# Patient Record
Sex: FEMALE | Race: WHITE | Employment: OTHER | URBAN - METROPOLITAN AREA
[De-identification: names, ages, dates, MRNs, and addresses within clinical notes are randomized per-mention and may not be internally consistent; named-entity substitution may affect disease eponyms.]

---

## 2017-05-05 ENCOUNTER — HOSPITAL ENCOUNTER (EMERGENCY)
Age: 77
Discharge: HOME | End: 2017-05-05
Attending: EMERGENCY MEDICINE
Payer: MEDICARE

## 2017-05-05 ENCOUNTER — APPOINTMENT (OUTPATIENT)
Dept: ULTRASOUND IMAGING | Age: 77
End: 2017-05-05
Attending: EMERGENCY MEDICINE
Payer: MEDICARE

## 2017-05-05 VITALS
RESPIRATION RATE: 18 BRPM | TEMPERATURE: 98 F | OXYGEN SATURATION: 94 % | DIASTOLIC BLOOD PRESSURE: 87 MMHG | HEIGHT: 63 IN | SYSTOLIC BLOOD PRESSURE: 179 MMHG | HEART RATE: 69 BPM | BODY MASS INDEX: 35.44 KG/M2 | WEIGHT: 200 LBS

## 2017-05-05 DIAGNOSIS — R60.0 BILATERAL LEG EDEMA: Primary | ICD-10-CM

## 2017-05-05 LAB
ALBUMIN SERPL BCP-MCNC: 3.6 G/DL (ref 3.2–4.6)
ALBUMIN/GLOB SERPL: 1 {RATIO} (ref 1.2–3.5)
ALP SERPL-CCNC: 134 U/L (ref 50–136)
ALT SERPL-CCNC: 21 U/L (ref 12–65)
ANION GAP BLD CALC-SCNC: 9 MMOL/L (ref 7–16)
AST SERPL W P-5'-P-CCNC: 15 U/L (ref 15–37)
BASOPHILS # BLD AUTO: 0 K/UL (ref 0–0.2)
BASOPHILS # BLD: 0 % (ref 0–2)
BILIRUB SERPL-MCNC: 1 MG/DL (ref 0.2–1.1)
BNP SERPL-MCNC: 31 PG/ML
BUN SERPL-MCNC: 4 MG/DL (ref 8–23)
CALCIUM SERPL-MCNC: 9.6 MG/DL (ref 8.3–10.4)
CHLORIDE SERPL-SCNC: 109 MMOL/L (ref 98–107)
CO2 SERPL-SCNC: 28 MMOL/L (ref 21–32)
CREAT SERPL-MCNC: 0.58 MG/DL (ref 0.6–1)
DIFFERENTIAL METHOD BLD: ABNORMAL
EOSINOPHIL # BLD: 0 K/UL (ref 0–0.8)
EOSINOPHIL NFR BLD: 0 % (ref 0.5–7.8)
ERYTHROCYTE [DISTWIDTH] IN BLOOD BY AUTOMATED COUNT: 14.4 % (ref 11.9–14.6)
GLOBULIN SER CALC-MCNC: 3.7 G/DL (ref 2.3–3.5)
GLUCOSE SERPL-MCNC: 113 MG/DL (ref 65–100)
HCT VFR BLD AUTO: 47.3 % (ref 35.8–46.3)
HGB BLD-MCNC: 15.7 G/DL (ref 11.7–15.4)
IMM GRANULOCYTES # BLD: 0 K/UL (ref 0–0.5)
IMM GRANULOCYTES NFR BLD AUTO: 0.2 % (ref 0–5)
LYMPHOCYTES # BLD AUTO: 24 % (ref 13–44)
LYMPHOCYTES # BLD: 1.3 K/UL (ref 0.5–4.6)
MCH RBC QN AUTO: 29 PG (ref 26.1–32.9)
MCHC RBC AUTO-ENTMCNC: 33.2 G/DL (ref 31.4–35)
MCV RBC AUTO: 87.3 FL (ref 79.6–97.8)
MONOCYTES # BLD: 0.3 K/UL (ref 0.1–1.3)
MONOCYTES NFR BLD AUTO: 6 % (ref 4–12)
NEUTS SEG # BLD: 3.9 K/UL (ref 1.7–8.2)
NEUTS SEG NFR BLD AUTO: 70 % (ref 43–78)
PLATELET # BLD AUTO: 217 K/UL (ref 150–450)
PMV BLD AUTO: 11 FL (ref 10.8–14.1)
POTASSIUM SERPL-SCNC: 3.7 MMOL/L (ref 3.5–5.1)
PROT SERPL-MCNC: 7.3 G/DL (ref 6.3–8.2)
RBC # BLD AUTO: 5.42 M/UL (ref 4.05–5.25)
SODIUM SERPL-SCNC: 146 MMOL/L (ref 136–145)
WBC # BLD AUTO: 5.5 K/UL (ref 4.3–11.1)

## 2017-05-05 PROCEDURE — 81003 URINALYSIS AUTO W/O SCOPE: CPT | Performed by: EMERGENCY MEDICINE

## 2017-05-05 PROCEDURE — 99284 EMERGENCY DEPT VISIT MOD MDM: CPT | Performed by: EMERGENCY MEDICINE

## 2017-05-05 PROCEDURE — 85025 COMPLETE CBC W/AUTO DIFF WBC: CPT

## 2017-05-05 PROCEDURE — 83880 ASSAY OF NATRIURETIC PEPTIDE: CPT | Performed by: EMERGENCY MEDICINE

## 2017-05-05 PROCEDURE — 80053 COMPREHEN METABOLIC PANEL: CPT

## 2017-05-05 RX ORDER — FUROSEMIDE 20 MG/1
20 TABLET ORAL DAILY
Qty: 7 TAB | Refills: 0 | Status: SHIPPED | OUTPATIENT
Start: 2017-05-05 | End: 2017-05-07

## 2017-05-05 NOTE — DISCHARGE INSTRUCTIONS
Return with any fevers, vomiting, difficulty breathing, worsening symptoms, or additional concerns. Consider wearing compression stockings to help decrease some of her swelling. Follow-up with your primary care doctor in 3-5 days for reevaluation.

## 2017-05-05 NOTE — ED TRIAGE NOTES
Pt states swelling in both legs for the past week. Pt reports she just got out of rehab and was sent to [facility] yesterday where they took her off of one of her blood pressure meds.  Pt states her feet and legs are painful

## 2017-05-05 NOTE — ED PROVIDER NOTES
HPI Comments: 69-year-old lady with a history of bilateral lower leg swelling that she says has gotten worse over the past several days. Patient says that she was at a rehabilitation facility in [place] which is where she was sent by social workers in [place]. Patient says ever since getting edges had to be on her feet too much which is causing her legs to become swollen. She denies any chest pain or difficulty breathing. Patient says that both legs hurt because they are so swollen. She denies any redness or fevers in the. Patient says she is not on any fluid pills that she is aware of. Elements of this note were created using speech recognition software. As such, errors of speech recognition may be present. Patient is a 69 y.o. female presenting with ankle swelling. The history is provided by the patient. Ankle swelling           Past Medical History:   Diagnosis Date    Arthritis     Asthma     Chronic kidney disease     Gastrointestinal disorder     acid reflux, hernia    Hypertension     Other ill-defined conditions     blood clot on lung    Stroke ([facility] [address]     Thyroid disease        Past Surgical History:   Procedure Laterality Date    APPENDECTOMY      HX APPENDECTOMY      HX GYN  71,73    c-sec    HX HEENT      t & a    HX OTHER SURGICAL      fatty tissue removed from under arm         Family History:   Problem Relation Age of Onset    No Known Problems Mother     No Known Problems Father        Social History     Social History    Marital status:      Spouse name: N/A    Number of children: N/A    Years of education: N/A     Occupational History    Not on file. Social History Main Topics    Smoking status: Never Smoker    Smokeless tobacco: Never Used    Alcohol use No    Drug use: No    Sexual activity: No     Other Topics Concern    Not on file     Social History Narrative         ALLERGIES: Aspirin; Ciprofloxacin; Diflucan [fluconazole];  Glucophage [metformin]; Motrin [ibuprofen]; Nsaids (non-steroidal anti-inflammatory drug); Other medication; Pcn [penicillins]; Septra [sulfamethoprim ds]; Sulfa (sulfonamide antibiotics); Tessalon perles [benzonatate]; and Vibramycin [doxycycline calcium]    Review of Systems   Constitutional: Positive for fatigue. Negative for chills, diaphoresis and fever. HENT: Negative for congestion, rhinorrhea and sore throat. Eyes: Negative for redness and visual disturbance. Respiratory: Negative for cough, chest tightness, shortness of breath and wheezing. Cardiovascular: Positive for leg swelling. Negative for chest pain and palpitations. Gastrointestinal: Negative for abdominal pain, blood in stool, diarrhea, nausea and vomiting. Endocrine: Negative for polydipsia and polyuria. Genitourinary: Negative for dysuria and hematuria. Musculoskeletal: Negative for arthralgias, myalgias and neck stiffness. Skin: Negative for rash. Allergic/Immunologic: Negative for environmental allergies and food allergies. Neurological: Negative for dizziness, weakness and headaches. Hematological: Negative for adenopathy. Does not bruise/bleed easily. Psychiatric/Behavioral: Negative for confusion and sleep disturbance. The patient is not nervous/anxious. Vitals:    05/05/17 1046   BP: 187/80   Pulse: 66   Resp: 18   Temp: 97.8 °F (36.6 °C)   SpO2: 94%   Weight: 90.7 kg (200 lb)   Height: 5' 3\" (1.6 m)            Physical Exam   Constitutional: She is oriented to person, place, and time. She appears well-developed and well-nourished. Neck: No JVD present. Cardiovascular: Normal rate, regular rhythm and normal heart sounds. Pulmonary/Chest: No respiratory distress. She has no wheezes. She has no rales. Abdominal: There is no tenderness. There is no rebound and no guarding. Musculoskeletal: She exhibits edema. 4+ edema to both knees   Lymphadenopathy:     She has no cervical adenopathy.    Neurological: She is alert and oriented to person, place, and time. Skin: Skin is warm and dry. Nursing note and vitals reviewed. MDM  Number of Diagnoses or Management Options  Diagnosis management comments: Patient's swelling may be related simply to dependent edema potentially she could have a DVT or congestive heart failure. I will check a BNP as well as an ultrasound of both legs. I'll also check her renal and hepatic function. Further evaluation and treatment pending those results. Patient got over to ultrasound and then refused to lay flat or even at a minor inclined to get the study done. Therefore I will discuss with her the options of additional blood work to try to decrease the potential risk of a DVT being present. Patient says she does not want to wait around for any additional blood work. Therefore, I will discharge her home with some Lasix.     ED Course       Procedures

## 2017-05-05 NOTE — ED NOTES
I have reviewed discharge instructions with the patient. The patient verbalized understanding. Discharge medications reviewed with patient and appropriate educational materials and side effects teaching were provided. Pt transferred to waiting room via wheelchair w/o incident.

## 2017-05-07 RX ORDER — HYDROCHLOROTHIAZIDE 25 MG/1
25 TABLET ORAL DAILY
Qty: 7 TAB | Refills: 0 | Status: SHIPPED | OUTPATIENT
Start: 2017-05-07 | End: 2017-05-15 | Stop reason: SDUPTHER

## 2017-05-07 NOTE — ED NOTES
Patient called the ER this morning and has concerns about the Lasix prescription. The pharmacist informed her there is some cross reactivity with herself allergy therefore she does not feel comfortable taking it. I have changed her prescription to HCTZ 25 mg one every morning for 7 days as an alternate diuretic. She should follow up with her primary care physician for recheck. Return to the ED if worse.

## 2017-07-07 ENCOUNTER — HOSPITAL ENCOUNTER (EMERGENCY)
Age: 77
Discharge: HOME | End: 2017-07-07
Attending: EMERGENCY MEDICINE
Payer: MEDICARE

## 2017-07-07 VITALS
HEIGHT: 63 IN | HEART RATE: 69 BPM | DIASTOLIC BLOOD PRESSURE: 91 MMHG | RESPIRATION RATE: 18 BRPM | BODY MASS INDEX: 44.12 KG/M2 | WEIGHT: 249 LBS | TEMPERATURE: 98.5 F | SYSTOLIC BLOOD PRESSURE: 178 MMHG | OXYGEN SATURATION: 94 %

## 2017-07-07 DIAGNOSIS — J01.91 ACUTE RECURRENT SINUSITIS, UNSPECIFIED LOCATION: Primary | ICD-10-CM

## 2017-07-07 LAB
ALBUMIN SERPL BCP-MCNC: 3.4 G/DL (ref 3.2–4.6)
ALBUMIN/GLOB SERPL: 0.9 {RATIO} (ref 1.2–3.5)
ALP SERPL-CCNC: 128 U/L (ref 50–136)
ALT SERPL-CCNC: 22 U/L (ref 12–65)
ANION GAP BLD CALC-SCNC: 7 MMOL/L (ref 7–16)
AST SERPL W P-5'-P-CCNC: 19 U/L (ref 15–37)
BASOPHILS # BLD AUTO: 0 K/UL (ref 0–0.2)
BASOPHILS # BLD: 0 % (ref 0–2)
BILIRUB SERPL-MCNC: 0.6 MG/DL (ref 0.2–1.1)
BUN SERPL-MCNC: 6 MG/DL (ref 8–23)
CALCIUM SERPL-MCNC: 9.6 MG/DL (ref 8.3–10.4)
CHLORIDE SERPL-SCNC: 105 MMOL/L (ref 98–107)
CO2 SERPL-SCNC: 32 MMOL/L (ref 21–32)
CREAT SERPL-MCNC: 0.61 MG/DL (ref 0.6–1)
DIFFERENTIAL METHOD BLD: ABNORMAL
EOSINOPHIL # BLD: 0 K/UL (ref 0–0.8)
EOSINOPHIL NFR BLD: 0 % (ref 0.5–7.8)
ERYTHROCYTE [DISTWIDTH] IN BLOOD BY AUTOMATED COUNT: 14.3 % (ref 11.9–14.6)
GLOBULIN SER CALC-MCNC: 3.6 G/DL (ref 2.3–3.5)
GLUCOSE SERPL-MCNC: 118 MG/DL (ref 65–100)
HCT VFR BLD AUTO: 48.5 % (ref 35.8–46.3)
HGB BLD-MCNC: 16.4 G/DL (ref 11.7–15.4)
IMM GRANULOCYTES # BLD: 0 K/UL (ref 0–0.5)
IMM GRANULOCYTES NFR BLD AUTO: 0.1 % (ref 0–5)
LYMPHOCYTES # BLD AUTO: 23 % (ref 13–44)
LYMPHOCYTES # BLD: 1.5 K/UL (ref 0.5–4.6)
MCH RBC QN AUTO: 29.1 PG (ref 26.1–32.9)
MCHC RBC AUTO-ENTMCNC: 33.8 G/DL (ref 31.4–35)
MCV RBC AUTO: 86 FL (ref 79.6–97.8)
MONOCYTES # BLD: 0.4 K/UL (ref 0.1–1.3)
MONOCYTES NFR BLD AUTO: 6 % (ref 4–12)
NEUTS SEG # BLD: 4.7 K/UL (ref 1.7–8.2)
NEUTS SEG NFR BLD AUTO: 71 % (ref 43–78)
PLATELET # BLD AUTO: 231 K/UL (ref 150–450)
PMV BLD AUTO: 11.7 FL (ref 10.8–14.1)
POTASSIUM SERPL-SCNC: 3.2 MMOL/L (ref 3.5–5.1)
PROT SERPL-MCNC: 7 G/DL (ref 6.3–8.2)
RBC # BLD AUTO: 5.64 M/UL (ref 4.05–5.25)
SODIUM SERPL-SCNC: 144 MMOL/L (ref 136–145)
WBC # BLD AUTO: 6.7 K/UL (ref 4.3–11.1)

## 2017-07-07 PROCEDURE — 99283 EMERGENCY DEPT VISIT LOW MDM: CPT | Performed by: EMERGENCY MEDICINE

## 2017-07-07 PROCEDURE — 85025 COMPLETE CBC W/AUTO DIFF WBC: CPT | Performed by: EMERGENCY MEDICINE

## 2017-07-07 PROCEDURE — 80053 COMPREHEN METABOLIC PANEL: CPT | Performed by: EMERGENCY MEDICINE

## 2017-07-07 RX ORDER — AZITHROMYCIN 250 MG/1
TABLET, FILM COATED ORAL
Qty: 6 TAB | Refills: 0 | Status: SHIPPED | OUTPATIENT
Start: 2017-07-07 | End: 2017-07-24 | Stop reason: ALTCHOICE

## 2017-07-07 RX ORDER — FLUTICASONE PROPIONATE 50 MCG
1 SPRAY, SUSPENSION (ML) NASAL DAILY
Qty: 1 BOTTLE | Refills: 0 | Status: SHIPPED | OUTPATIENT
Start: 2017-07-07 | End: 2017-09-05 | Stop reason: SDUPTHER

## 2017-07-07 NOTE — ED TRIAGE NOTES
Patient complains of headache for over a week. States she is having \"roaring\" in right ear and \"all kinds of sounds\" in left ear.

## 2017-07-07 NOTE — ED PROVIDER NOTES
Patient is a 69 y.o. female presenting with headaches and sinus pain. The history is provided by the patient. Headache    Pertinent negatives include no fever, no palpitations, no shortness of breath and no vomiting. Sinus Pain    This is a recurrent problem. The current episode started more than 1 week ago. The problem has been gradually worsening. Patient reports a subjective fever - was not measured. The pain is moderate. The pain has been constant since onset. Associated symptoms include chills, congestion, ear pain, sinus pressure, swollen glands, rhinorrhea and headaches. Pertinent negatives include no sweats, no hoarse voice, no sore throat, no cough, no shortness of breath, no neck pain, no neck pain and no chest pain. She has tried nothing for the symptoms. Past Medical History:   Diagnosis Date    Arthritis     Asthma     Chronic kidney disease     Gastrointestinal disorder     acid reflux, hernia    Hypertension     Other ill-defined conditions     blood clot on lung    Stroke ([facility] [address]     Thyroid disease        Past Surgical History:   Procedure Laterality Date    APPENDECTOMY      HX APPENDECTOMY      HX GYN  71,73    c-sec    HX HEENT      t & a    HX OTHER SURGICAL      fatty tissue removed from under arm         Family History:   Problem Relation Age of Onset    No Known Problems Mother     No Known Problems Father        Social History     Social History    Marital status:      Spouse name: N/A    Number of children: N/A    Years of education: N/A     Occupational History    Not on file. Social History Main Topics    Smoking status: Never Smoker    Smokeless tobacco: Never Used    Alcohol use No    Drug use: No    Sexual activity: No     Other Topics Concern    Not on file     Social History Narrative         ALLERGIES: Aspirin; Ciprofloxacin; Diflucan [fluconazole]; Glucophage [metformin]; Motrin [ibuprofen];  Nsaids (non-steroidal anti-inflammatory drug); Other medication; Pcn [penicillins]; Septra [sulfamethoprim ds]; Sulfa (sulfonamide antibiotics); Tessalon perles [benzonatate]; and Vibramycin [doxycycline calcium]    Review of Systems   Constitutional: Positive for chills. Negative for fever. HENT: Positive for congestion, ear pain, rhinorrhea and sinus pressure. Negative for hoarse voice and sore throat. Eyes: Negative for photophobia and discharge. Respiratory: Negative for cough and shortness of breath. Cardiovascular: Negative for chest pain and palpitations. Gastrointestinal: Negative for abdominal pain, constipation, diarrhea and vomiting. Endocrine: Negative for cold intolerance and heat intolerance. Genitourinary: Negative for dysuria and flank pain. Musculoskeletal: Negative for arthralgias, myalgias and neck pain. Skin: Negative for rash and wound. Allergic/Immunologic: Negative for environmental allergies and food allergies. Neurological: Positive for headaches. Negative for syncope. Hematological: Negative for adenopathy. Does not bruise/bleed easily. Psychiatric/Behavioral: Negative for dysphoric mood. The patient is not nervous/anxious. All other systems reviewed and are negative. Vitals:    07/07/17 1217   BP: 142/86   Pulse: 70   Resp: 16   Temp: 98 °F (36.7 °C)   SpO2: 93%   Weight: 112.9 kg (249 lb)   Height: 5' 3\" (1.6 m)            Physical Exam   Constitutional: She is oriented to person, place, and time. She appears well-developed and well-nourished. She appears distressed. HENT:   Head: Normocephalic and atraumatic. Right Ear: Tympanic membrane, external ear and ear canal normal. No swelling. No foreign bodies. Left Ear: Tympanic membrane, external ear and ear canal normal. No swelling. No foreign bodies. Mouth/Throat: Oropharynx is clear and moist. No oropharyngeal exudate. Eyes: Conjunctivae and EOM are normal. Pupils are equal, round, and reactive to light.  Right eye exhibits no discharge. Left eye exhibits no discharge. No scleral icterus. Neck: Normal range of motion. Neck supple. No thyromegaly present. Cardiovascular: Normal rate, regular rhythm, normal heart sounds and intact distal pulses. Exam reveals no gallop and no friction rub. No murmur heard. Pulmonary/Chest: Effort normal and breath sounds normal. No respiratory distress. She has no wheezes. She exhibits no tenderness. Abdominal: Soft. Bowel sounds are normal. She exhibits no distension. There is no hepatosplenomegaly. There is no tenderness. There is no rebound and no guarding. No hernia. Musculoskeletal: Normal range of motion. She exhibits no edema or tenderness. Neurological: She is alert and oriented to person, place, and time. No cranial nerve deficit. She exhibits normal muscle tone. Skin: Skin is warm. No rash noted. She is not diaphoretic. No erythema. Psychiatric: She has a normal mood and affect. Her behavior is normal. Judgment and thought content normal.        MDM  Number of Diagnoses or Management Options  Acute recurrent sinusitis, unspecified location: new and requires workup  Diagnosis management comments: URI, otitis media, otitis externa, sinusitis       Amount and/or Complexity of Data Reviewed  Clinical lab tests: ordered and reviewed  Review and summarize past medical records: yes    Risk of Complications, Morbidity, and/or Mortality  Presenting problems: moderate  Diagnostic procedures: moderate  Management options: moderate  General comments: Elements of this note have been dictated via voice recognition software. Text and phrases may be limited by the accuracy of the software. The chart has been reviewed, but errors may still be present.       Patient Progress  Patient progress: stable    ED Course       Procedures

## 2017-07-07 NOTE — DISCHARGE INSTRUCTIONS
Sinusitis: Care Instructions  Your Care Instructions    Sinusitis is an infection of the lining of the sinus cavities in your head. Sinusitis often follows a cold. It causes pain and pressure in your head and face. In most cases, sinusitis gets better on its own in 1 to 2 weeks. But some mild symptoms may last for several weeks. Sometimes antibiotics are needed. Follow-up care is a key part of your treatment and safety. Be sure to make and go to all appointments, and call your doctor if you are having problems. It's also a good idea to know your test results and keep a list of the medicines you take. How can you care for yourself at home? · Take an over-the-counter pain medicine, such as acetaminophen (Tylenol), ibuprofen (Advil, Motrin), or naproxen (Aleve). Read and follow all instructions on the label. · If the doctor prescribed antibiotics, take them as directed. Do not stop taking them just because you feel better. You need to take the full course of antibiotics. · Be careful when taking over-the-counter cold or flu medicines and Tylenol at the same time. Many of these medicines have acetaminophen, which is Tylenol. Read the labels to make sure that you are not taking more than the recommended dose. Too much acetaminophen (Tylenol) can be harmful. · Breathe warm, moist air from a steamy shower, a hot bath, or a sink filled with hot water. Avoid cold, dry air. Using a humidifier in your home may help. Follow the directions for cleaning the machine. · Use saline (saltwater) nasal washes to help keep your nasal passages open and wash out mucus and bacteria. You can buy saline nose drops at a grocery store or drugstore. Or you can make your own at home by adding 1 teaspoon of salt and 1 teaspoon of baking soda to 2 cups of distilled water. If you make your own, fill a bulb syringe with the solution, insert the tip into your nostril, and squeeze gently. Clement Rough your nose.   · Put a hot, wet towel or a warm gel pack on your face 3 or 4 times a day for 5 to 10 minutes each time. · Try a decongestant nasal spray like oxymetazoline (Afrin). Do not use it for more than 3 days in a row. Using it for more than 3 days can make your congestion worse. When should you call for help? Call your doctor now or seek immediate medical care if:  · You have new or worse swelling or redness in your face or around your eyes. · You have a new or higher fever. Watch closely for changes in your health, and be sure to contact your doctor if:  · You have new or worse facial pain. · The mucus from your nose becomes thicker (like pus) or has new blood in it. · You are not getting better as expected. Where can you learn more? Go to http://tommy-sil.info/. Enter D751 in the search box to learn more about \"Sinusitis: Care Instructions. \"  Current as of: July 29, 2016  Content Version: 11.3  © 4369-4227 [employer]. Care instructions adapted under license by [employer] (which disclaims liability or warranty for this information). If you have questions about a medical condition or this instruction, always ask your healthcare professional. [place] [address] any warranty or liability for your use of this information.

## 2017-08-01 ENCOUNTER — APPOINTMENT (OUTPATIENT)
Dept: CT IMAGING | Age: 77
End: 2017-08-01
Attending: EMERGENCY MEDICINE
Payer: MEDICARE

## 2017-08-01 ENCOUNTER — HOSPITAL ENCOUNTER (EMERGENCY)
Age: 77
Discharge: HOME | End: 2017-08-01
Attending: EMERGENCY MEDICINE
Payer: MEDICARE

## 2017-08-01 VITALS
WEIGHT: 250 LBS | TEMPERATURE: 98.7 F | OXYGEN SATURATION: 91 % | HEART RATE: 71 BPM | SYSTOLIC BLOOD PRESSURE: 150 MMHG | RESPIRATION RATE: 16 BRPM | DIASTOLIC BLOOD PRESSURE: 82 MMHG | HEIGHT: 63 IN | BODY MASS INDEX: 44.3 KG/M2

## 2017-08-01 DIAGNOSIS — R51.9 ACUTE NONINTRACTABLE HEADACHE, UNSPECIFIED HEADACHE TYPE: Primary | ICD-10-CM

## 2017-08-01 LAB
ALBUMIN SERPL BCP-MCNC: 3.7 G/DL (ref 3.2–4.6)
ALBUMIN/GLOB SERPL: 0.9 {RATIO} (ref 1.2–3.5)
ALP SERPL-CCNC: 114 U/L (ref 50–136)
ALT SERPL-CCNC: 20 U/L (ref 12–65)
ANION GAP BLD CALC-SCNC: 9 MMOL/L (ref 7–16)
AST SERPL W P-5'-P-CCNC: 20 U/L (ref 15–37)
BASOPHILS # BLD AUTO: 0 K/UL (ref 0–0.2)
BASOPHILS # BLD: 0 % (ref 0–2)
BILIRUB SERPL-MCNC: 0.6 MG/DL (ref 0.2–1.1)
BUN SERPL-MCNC: 13 MG/DL (ref 8–23)
CALCIUM SERPL-MCNC: 10.1 MG/DL (ref 8.3–10.4)
CHLORIDE SERPL-SCNC: 103 MMOL/L (ref 98–107)
CO2 SERPL-SCNC: 29 MMOL/L (ref 21–32)
CREAT SERPL-MCNC: 0.79 MG/DL (ref 0.6–1)
DIFFERENTIAL METHOD BLD: ABNORMAL
EOSINOPHIL # BLD: 0 K/UL (ref 0–0.8)
EOSINOPHIL NFR BLD: 0 % (ref 0.5–7.8)
ERYTHROCYTE [DISTWIDTH] IN BLOOD BY AUTOMATED COUNT: 14.7 % (ref 11.9–14.6)
GLOBULIN SER CALC-MCNC: 4 G/DL (ref 2.3–3.5)
GLUCOSE SERPL-MCNC: 126 MG/DL (ref 65–100)
HCT VFR BLD AUTO: 49.6 % (ref 35.8–46.3)
HGB BLD-MCNC: 16.7 G/DL (ref 11.7–15.4)
IMM GRANULOCYTES # BLD: 0 K/UL (ref 0–0.5)
IMM GRANULOCYTES NFR BLD AUTO: 0.1 % (ref 0–5)
LYMPHOCYTES # BLD AUTO: 21 % (ref 13–44)
LYMPHOCYTES # BLD: 1.7 K/UL (ref 0.5–4.6)
MCH RBC QN AUTO: 28.9 PG (ref 26.1–32.9)
MCHC RBC AUTO-ENTMCNC: 33.7 G/DL (ref 31.4–35)
MCV RBC AUTO: 86 FL (ref 79.6–97.8)
MONOCYTES # BLD: 0.6 K/UL (ref 0.1–1.3)
MONOCYTES NFR BLD AUTO: 7 % (ref 4–12)
NEUTS SEG # BLD: 5.7 K/UL (ref 1.7–8.2)
NEUTS SEG NFR BLD AUTO: 72 % (ref 43–78)
PLATELET # BLD AUTO: 250 K/UL (ref 150–450)
PMV BLD AUTO: 11.4 FL (ref 10.8–14.1)
POTASSIUM SERPL-SCNC: 3.3 MMOL/L (ref 3.5–5.1)
PROT SERPL-MCNC: 7.7 G/DL (ref 6.3–8.2)
RBC # BLD AUTO: 5.77 M/UL (ref 4.05–5.25)
SODIUM SERPL-SCNC: 141 MMOL/L (ref 136–145)
WBC # BLD AUTO: 8 K/UL (ref 4.3–11.1)

## 2017-08-01 PROCEDURE — 80053 COMPREHEN METABOLIC PANEL: CPT | Performed by: EMERGENCY MEDICINE

## 2017-08-01 PROCEDURE — 85025 COMPLETE CBC W/AUTO DIFF WBC: CPT | Performed by: EMERGENCY MEDICINE

## 2017-08-01 PROCEDURE — 99284 EMERGENCY DEPT VISIT MOD MDM: CPT | Performed by: EMERGENCY MEDICINE

## 2017-08-01 PROCEDURE — 70450 CT HEAD/BRAIN W/O DYE: CPT

## 2017-08-01 NOTE — ED TRIAGE NOTES
Patient reports ear infection x1 month ago, medicine given and followed up with PCP. Since then patient reports migraine.

## 2017-08-01 NOTE — DISCHARGE INSTRUCTIONS
As we discussed, we did not find an exact cause of your symptoms today in the emergency department. Therefore, it is important for you to follow up with your primary care doctor for further evaluation. Please return to the emergency department with any fevers, vomiting, worsening symptoms, or additional concerns.

## 2017-08-01 NOTE — ED NOTES
I have reviewed discharge instructions with the patient. The patient verbalized understanding. The patient is ambulatory upon exit to [facility] where she is waiting for her ride. The patient is in no acute distress at this time. The patient has discharge instructions and follow up information in hand. The patient does not have any questions at this time.

## 2017-08-02 ENCOUNTER — PATIENT OUTREACH (OUTPATIENT)
Dept: CASE MANAGEMENT | Age: 77
End: 2017-08-02

## 2017-08-02 NOTE — PROGRESS NOTES
This note will not be viewable in [address] Transition of Care Discharge Follow-up Questionnaire   Date/Time of Call:   August 2, 2017 3:27PM   What was the patient hospitalized for? Patient seen in ED for Acute nonintractable headache, unspecified headache type             Does the patient understand his/her diagnosis and/or treatment and what happened during the hospitalization? Patient states understanding of diagnosis and treatment during hospitalization. Did the patient receive discharge instructions? Yes     Review any discharge instructions (see notes in [facility]). Ask patient if they understand these. Do they have any questions? Patient states understanding of discharge instructions, patient states no questions. Were home services ordered (nursing, PT, OT, ST, etc.)? No home health services ordered. If so, has the first visit occurred? If not, why? (Assist with coordination of services if necessary. ) NA         Was any DME ordered? No durable medical equipment ordered. If so, has it been received? If not, why?  (Assist with coordination of arranging DME orders if necessary. ) NA         Complete a review of all medications (new, continued and discontinued meds per the D/C instructions and medication tab in [facility]). Care Coordinator reviewed all medications with patient per [facility], no new medications prescribed. Were all new prescriptions filled? If not, why?  (Assist with obtainment of medications if necessary. ) NA         Does the patient understand the purpose and dosing instructions for all medications? (If patient has questions, provide explanation and education.) Patient states understanding of all current medications and dosing instructions. Does the patient have any problems in performing ADLs? (If patient is unable to perform ADLs - what is the limiting factor(s)?   Do they have a support system that can assist? If no support system is present, discuss possible assistance that they may be able to obtain.) Patient states she is independent with ADL's and ambulation. Patient states she is still having frying and rolling type feeling inside of head. Patient states she is managing pain with po medications. Does the patient have all follow-up appointments scheduled? 7 day f/up with PCP?    7-14 day f/up with specialist?    If f/up has not been made - what actions has the care coordinator made to accomplish this? Has transportation been arranged? [facility] follow-up should be within 7 days of discharge; all others should have PCP follow-up within 7 days of discharge; follow-ups with other specialists should be within 7-14 days of discharge.) No      No, patient states she called Dr. Ghulam Angulo office and left voicemail message requesting a hospital follow up earlier today. Patient states she is waiting for a return call. Yes      NA   Any other questions or concerns expressed by the patient? Patient states no questions or concerns. Schedule next appointment with VICENTA Matias or refer to RN Case Manager/  per the workflow guidelines. When is care coordinators next follow-up call scheduled? If referred for CCM - what RN care manager was the referral assigned?  NA          NA      NA   RUBENS Call Completed By: TIM Chi ACO  Care Coordinator

## 2017-08-02 NOTE — ED PROVIDER NOTES
HPI Comments: 60-year-old lady who presents with concerns about headache has been present for a little over one week. Patient says that she had some fluid in her ears a couple of weeks ago and feels like that has left her with a residual headache. She said that she's had no fevers or vomiting and no weakness, numbness, or tingling. Patient says she has had several months of intermittent homelessness. She says that she has been bouncing from place to place and Adult Protective Services has been involved. Patient says that she's had some sinus drainage and not had any fevers or chills she denies any ear drainage. She denies chest pain or cough. Elements of this note were made using speech recognition software. As such, there may be errors of speech recognition present. Patient is a 69 y.o. female presenting with headaches. The history is provided by the patient. Headache    Pertinent negatives include no fever, no palpitations, no shortness of breath, no weakness, no dizziness, no nausea and no vomiting.         Past Medical History:   Diagnosis Date    Arthritis     Asthma     Chronic kidney disease     Gastrointestinal disorder     acid reflux, hernia    Hyperlipidemia     Hypertension     Insomnia     Other ill-defined conditions     blood clot on lung    Stroke ([facility] [address]     Thyroid disease     Vitamin D deficiency        Past Surgical History:   Procedure Laterality Date    APPENDECTOMY      HX APPENDECTOMY      HX GYN  71,72    c-sec    HX HEENT      t & a    HX OTHER SURGICAL      fatty tissue removed from under arm         Family History:   Problem Relation Age of Onset    No Known Problems Mother     No Known Problems Father     No Known Problems Maternal Grandmother     No Known Problems Maternal Grandfather     No Known Problems Paternal Grandmother     No Known Problems Paternal Grandfather        Social History     Social History    Marital status:      Spouse name: N/A    Number of children: N/A    Years of education: N/A     Occupational History    Not on file. Social History Main Topics    Smoking status: Never Smoker    Smokeless tobacco: Never Used    Alcohol use No    Drug use: No    Sexual activity: No     Other Topics Concern    Not on file     Social History Narrative         ALLERGIES: Aspirin; Ciprofloxacin; Diflucan [fluconazole]; Glucophage [metformin]; Motrin [ibuprofen]; Nsaids (non-steroidal anti-inflammatory drug); Septra [sulfamethoprim ds]; Sulfa (sulfonamide antibiotics); Sulfabenzamide; Tessalon perles [benzonatate]; and Vibramycin [doxycycline calcium]    Review of Systems   Constitutional: Negative for chills, diaphoresis and fever. HENT: Negative for congestion, rhinorrhea and sore throat. Sinus drainage   Eyes: Negative for redness and visual disturbance. Respiratory: Negative for cough, chest tightness, shortness of breath and wheezing. Cardiovascular: Negative for chest pain and palpitations. Gastrointestinal: Negative for abdominal pain, blood in stool, diarrhea, nausea and vomiting. Endocrine: Negative for polydipsia and polyuria. Genitourinary: Negative for dysuria and hematuria. Musculoskeletal: Negative for arthralgias, myalgias and neck stiffness. Skin: Negative for rash. Allergic/Immunologic: Negative for environmental allergies and food allergies. Neurological: Positive for headaches. Negative for dizziness and weakness. Hematological: Negative for adenopathy. Does not bruise/bleed easily. Psychiatric/Behavioral: Negative for confusion and sleep disturbance. The patient is not nervous/anxious. Vitals:    08/01/17 1608 08/01/17 1907   BP: 151/86 150/82   Pulse: 83 71   Resp: 18 16   Temp:  98.7 °F (37.1 °C)   SpO2: 93% 91%   Weight: 113.4 kg (250 lb)    Height: 5' 3\" (1.6 m)             Physical Exam   Constitutional: She is oriented to person, place, and time.  She appears well-developed and well-nourished. HENT:   Head: Normocephalic and atraumatic. Eyes: Conjunctivae and EOM are normal. Pupils are equal, round, and reactive to light. Neck: Normal range of motion. Cardiovascular: Normal rate and regular rhythm. Pulmonary/Chest: Effort normal and breath sounds normal. No respiratory distress. She has no wheezes. She has no rales. She exhibits no tenderness. Abdominal: Soft. Bowel sounds are normal. There is no rebound and no guarding. Musculoskeletal: Normal range of motion. She exhibits no edema or tenderness. Lymphadenopathy:     She has no cervical adenopathy. Neurological: She is alert and oriented to person, place, and time. Skin: Skin is warm and dry. Psychiatric: She has a normal mood and affect. Nursing note and vitals reviewed. MDM  Number of Diagnoses or Management Options  Acute nonintractable headache, unspecified headache type:   Diagnosis management comments: Patient's labs and CT scan were essentially unremarkable. I did not have an excellent mentation for her symptoms tonight in the emergency department. I will discharge her home and encourage her to follow up with her primary care doctor.     ED Course       Procedures

## 2017-08-09 ENCOUNTER — HOSPITAL ENCOUNTER (OUTPATIENT)
Dept: GENERAL RADIOLOGY | Age: 77
Discharge: HOME | End: 2017-08-09
Attending: FAMILY MEDICINE
Payer: MEDICARE

## 2017-08-09 DIAGNOSIS — R13.10 DYSPHAGIA, UNSPECIFIED TYPE: ICD-10-CM

## 2017-08-09 PROCEDURE — 74011000255 HC RX REV CODE- 255: Performed by: FAMILY MEDICINE

## 2017-08-09 PROCEDURE — 74011000250 HC RX REV CODE- 250: Performed by: FAMILY MEDICINE

## 2017-08-09 PROCEDURE — 74247 XR UPPER GI W KUB AIR CONT: CPT

## 2017-08-09 RX ADMIN — ANTACID/ANTIFLATULENT 4 G: 380; 550; 10; 10 GRANULE, EFFERVESCENT ORAL at 13:46

## 2017-08-09 RX ADMIN — BARIUM SULFATE 135 ML: 980 POWDER, FOR SUSPENSION ORAL at 13:46

## 2017-08-12 ENCOUNTER — HOSPITAL ENCOUNTER (EMERGENCY)
Age: 77
Discharge: HOME | End: 2017-08-12
Attending: EMERGENCY MEDICINE
Payer: MEDICARE

## 2017-08-12 VITALS
WEIGHT: 249 LBS | TEMPERATURE: 98.7 F | DIASTOLIC BLOOD PRESSURE: 83 MMHG | BODY MASS INDEX: 44.12 KG/M2 | OXYGEN SATURATION: 95 % | HEIGHT: 63 IN | RESPIRATION RATE: 16 BRPM | SYSTOLIC BLOOD PRESSURE: 159 MMHG | HEART RATE: 74 BPM

## 2017-08-12 DIAGNOSIS — R51.9 CHRONIC DAILY HEADACHE: Primary | ICD-10-CM

## 2017-08-12 DIAGNOSIS — H93.13 TINNITUS, BILATERAL: ICD-10-CM

## 2017-08-12 LAB
ALBUMIN SERPL BCP-MCNC: 3.4 G/DL (ref 3.2–4.6)
ALBUMIN/GLOB SERPL: 0.9 {RATIO} (ref 1.2–3.5)
ALP SERPL-CCNC: 101 U/L (ref 50–136)
ALT SERPL-CCNC: 21 U/L (ref 12–65)
ANION GAP BLD CALC-SCNC: 11 MMOL/L (ref 7–16)
AST SERPL W P-5'-P-CCNC: 22 U/L (ref 15–37)
BACTERIA URNS QL MICRO: 0 /HPF
BASOPHILS # BLD AUTO: 0 K/UL (ref 0–0.2)
BASOPHILS # BLD: 0 % (ref 0–2)
BILIRUB SERPL-MCNC: 0.6 MG/DL (ref 0.2–1.1)
BUN SERPL-MCNC: 12 MG/DL (ref 8–23)
CALCIUM SERPL-MCNC: 9.8 MG/DL (ref 8.3–10.4)
CASTS URNS QL MICRO: NORMAL /LPF
CHLORIDE SERPL-SCNC: 104 MMOL/L (ref 98–107)
CO2 SERPL-SCNC: 25 MMOL/L (ref 21–32)
CREAT SERPL-MCNC: 0.7 MG/DL (ref 0.6–1)
DIFFERENTIAL METHOD BLD: ABNORMAL
EOSINOPHIL # BLD: 0 K/UL (ref 0–0.8)
EOSINOPHIL NFR BLD: 0 % (ref 0.5–7.8)
EPI CELLS #/AREA URNS HPF: NORMAL /HPF
ERYTHROCYTE [DISTWIDTH] IN BLOOD BY AUTOMATED COUNT: 14.6 % (ref 11.9–14.6)
ERYTHROCYTE [SEDIMENTATION RATE] IN BLOOD: 14 MM/HR (ref 0–30)
GLOBULIN SER CALC-MCNC: 3.9 G/DL (ref 2.3–3.5)
GLUCOSE SERPL-MCNC: 156 MG/DL (ref 65–100)
HCT VFR BLD AUTO: 48.4 % (ref 35.8–46.3)
HGB BLD-MCNC: 16.2 G/DL (ref 11.7–15.4)
IMM GRANULOCYTES # BLD: 0 K/UL (ref 0–0.5)
IMM GRANULOCYTES NFR BLD AUTO: 0.2 % (ref 0–5)
LYMPHOCYTES # BLD AUTO: 27 % (ref 13–44)
LYMPHOCYTES # BLD: 2.2 K/UL (ref 0.5–4.6)
MCH RBC QN AUTO: 29 PG (ref 26.1–32.9)
MCHC RBC AUTO-ENTMCNC: 33.5 G/DL (ref 31.4–35)
MCV RBC AUTO: 86.6 FL (ref 79.6–97.8)
MONOCYTES # BLD: 0.6 K/UL (ref 0.1–1.3)
MONOCYTES NFR BLD AUTO: 8 % (ref 4–12)
NEUTS SEG # BLD: 5.3 K/UL (ref 1.7–8.2)
NEUTS SEG NFR BLD AUTO: 65 % (ref 43–78)
PLATELET # BLD AUTO: 239 K/UL (ref 150–450)
PMV BLD AUTO: 10.9 FL (ref 10.8–14.1)
POTASSIUM SERPL-SCNC: 3.6 MMOL/L (ref 3.5–5.1)
PROT SERPL-MCNC: 7.3 G/DL (ref 6.3–8.2)
RBC # BLD AUTO: 5.59 M/UL (ref 4.05–5.25)
RBC #/AREA URNS HPF: NORMAL /HPF
SODIUM SERPL-SCNC: 140 MMOL/L (ref 136–145)
WBC # BLD AUTO: 8.2 K/UL (ref 4.3–11.1)
WBC URNS QL MICRO: NORMAL /HPF

## 2017-08-12 PROCEDURE — 81003 URINALYSIS AUTO W/O SCOPE: CPT | Performed by: EMERGENCY MEDICINE

## 2017-08-12 PROCEDURE — 85025 COMPLETE CBC W/AUTO DIFF WBC: CPT | Performed by: EMERGENCY MEDICINE

## 2017-08-12 PROCEDURE — 80053 COMPREHEN METABOLIC PANEL: CPT | Performed by: EMERGENCY MEDICINE

## 2017-08-12 PROCEDURE — 81015 MICROSCOPIC EXAM OF URINE: CPT | Performed by: EMERGENCY MEDICINE

## 2017-08-12 PROCEDURE — 85652 RBC SED RATE AUTOMATED: CPT | Performed by: EMERGENCY MEDICINE

## 2017-08-12 PROCEDURE — 99283 EMERGENCY DEPT VISIT LOW MDM: CPT | Performed by: EMERGENCY MEDICINE

## 2017-08-13 NOTE — ED PROVIDER NOTES
Patient is a 69 y.o. female presenting with headaches. The history is provided by the patient. Headache    This is a chronic problem. Episode onset: 3-4 weeks ago. The problem occurs constantly. The problem has not changed since onset. Associated with: c/o roaring in ears, worse on left. The pain is located in the bilateral and temporal region. Quality: \"roaring\" Pertinent negatives include no fever, no shortness of breath, no weakness, no visual change, no nausea and no vomiting. Treatments tried: augmentin, amox. The treatment provided no relief. Past Medical History:   Diagnosis Date    Arthritis     Asthma     Chronic kidney disease     Gastrointestinal disorder     acid reflux, hernia    Hyperlipidemia     Hypertension     Insomnia     Other ill-defined conditions     blood clot on lung    Stroke ([facility] [address]     Thyroid disease     Vitamin D deficiency        Past Surgical History:   Procedure Laterality Date    APPENDECTOMY      HX APPENDECTOMY      HX GYN  71,72    c-sec    HX HEENT      t & a    HX OTHER SURGICAL      fatty tissue removed from under arm         Family History:   Problem Relation Age of Onset    No Known Problems Mother     No Known Problems Father     No Known Problems Maternal Grandmother     No Known Problems Maternal Grandfather     No Known Problems Paternal Grandmother     No Known Problems Paternal Grandfather        Social History     Social History    Marital status:      Spouse name: N/A    Number of children: N/A    Years of education: N/A     Occupational History    Not on file. Social History Main Topics    Smoking status: Never Smoker    Smokeless tobacco: Never Used    Alcohol use No    Drug use: No    Sexual activity: No     Other Topics Concern    Not on file     Social History Narrative         ALLERGIES: Aspirin; Ciprofloxacin; Diflucan [fluconazole]; Glucophage [metformin]; Motrin [ibuprofen];  Nsaids (non-steroidal anti-inflammatory drug); Septra [sulfamethoprim ds]; Sulfa (sulfonamide antibiotics); Sulfabenzamide; Tessalon perles [benzonatate]; and Vibramycin [doxycycline calcium]    Review of Systems   Constitutional: Negative for fever. HENT: Positive for postnasal drip. Negative for congestion and rhinorrhea. Eyes: Negative for visual disturbance. Respiratory: Negative for cough and shortness of breath. Gastrointestinal: Negative for nausea and vomiting. Neurological: Positive for headaches. Negative for weakness and numbness. Vitals:    08/12/17 1956   BP: 165/87   Pulse: 71   Resp: 16   Temp: 98.5 °F (36.9 °C)   SpO2: 94%   Weight: 112.9 kg (249 lb)   Height: 5' 3\" (1.6 m)            Physical Exam   Constitutional: She is oriented to person, place, and time. She appears well-developed and well-nourished. No distress. HENT:   Head: Normocephalic and atraumatic. Mouth/Throat: Oropharynx is clear and moist. No oropharyngeal exudate. Eyes: Conjunctivae and EOM are normal. Pupils are equal, round, and reactive to light. Neck: Normal range of motion. No JVD present. Cardiovascular: Normal rate, regular rhythm, normal heart sounds and intact distal pulses. Pulses:       Carotid pulses are 2+ on the right side, and 2+ on the left side. Radial pulses are 2+ on the right side, and 2+ on the left side. No carotid bruit   Pulmonary/Chest: Effort normal and breath sounds normal.   Musculoskeletal: Normal range of motion. She exhibits no edema or tenderness. Lymphadenopathy:     She has no cervical adenopathy. Neurological: She is alert and oriented to person, place, and time. No cranial nerve deficit. She exhibits normal muscle tone. Coordination normal.   Skin: Skin is warm and dry. Nursing note and vitals reviewed.        MDM  Number of Diagnoses or Management Options  Diagnosis management comments: Patient has been seeing her primary care on a regular basis for this and saw the emergency department here not too long ago with a negative CT scan on August 1. Chief complaint seems to be more of a tendinitis versus headache. Does have some tenderness over the left temporal artery and in this vicinity of the scalp rather diffusely. I will order a sedimentation rate to evaluate for temporal arteritis  Though I think it is not very likely. Refer to ENT for tinnitus. No vertigo described.        Amount and/or Complexity of Data Reviewed  Clinical lab tests: ordered and reviewed (Results for orders placed or performed during the hospital encounter of 08/12/17  -CBC WITH AUTOMATED DIFF       Result                                            Value                         Ref Range                       WBC                                               8.2                           4.3 - 11.1 K/uL                 RBC                                               5.59 (H)                      4.05 - 5.25 M/uL                HGB                                               16.2 (H)                      11.7 - 15.4 g/dL                HCT                                               48.4 (H)                      35.8 - 46.3 %                   MCV                                               86.6                          79.6 - 97.8 FL                  MCH                                               29.0                          26.1 - 32.9 PG                  MCHC                                              33.5                          31.4 - 35.0 g/dL                RDW                                               14.6                          11.9 - 14.6 %                   PLATELET                                          239                           150 - 450 K/uL                  MPV                                               10.9                          10.8 - 14.1 FL                  DF                                                AUTOMATED NEUTROPHILS                                       65                            43 - 78 %                       LYMPHOCYTES                                       27                            13 - 44 %                       MONOCYTES                                         8                             4.0 - 12.0 %                    EOSINOPHILS                                       0 (L)                         0.5 - 7.8 %                     BASOPHILS                                         0                             0.0 - 2.0 %                     IMMATURE GRANULOCYTES                             0.2                           0.0 - 5.0 %                     ABS. NEUTROPHILS                                  5.3                           1.7 - 8.2 K/UL                  ABS. LYMPHOCYTES                                  2.2                           0.5 - 4.6 K/UL                  ABS. MONOCYTES                                    0.6                           0.1 - 1.3 K/UL                  ABS. EOSINOPHILS                                  0.0                           0.0 - 0.8 K/UL                  ABS. BASOPHILS                                    0.0                           0.0 - 0.2 K/UL                  ABS. IMM.  GRANS.                                  0.0                           0.0 - 0.5 K/UL             -METABOLIC PANEL, COMPREHENSIVE       Result                                            Value                         Ref Range                       Sodium                                            140                           136 - 145 mmol/L                Potassium                                         3.6                           3.5 - 5.1 mmol/L                Chloride                                          104                           98 - 107 mmol/L                 CO2                                               25                            21 - 32 mmol/L                  Anion gap 11                            7 - 16 mmol/L                   Glucose                                           156 (H)                       65 - 100 mg/dL                  BUN                                               12                            8 - 23 MG/DL                    Creatinine                                        0.70                          0.6 - 1.0 MG/DL                 GFR est AA                                        >60                           >60 ml/min/1.73m2               GFR est non-AA                                    >60                           >60 ml/min/1.73m2               Calcium                                           9.8                           8.3 - 10.4 MG/DL                Bilirubin, total                                  0.6                           0.2 - 1.1 MG/DL                 ALT (SGPT)                                        21                            12 - 65 U/L                     AST (SGOT)                                        22                            15 - 37 U/L                     Alk. phosphatase                                  101                           50 - 136 U/L                    Protein, total                                    7.3                           6.3 - 8.2 g/dL                  Albumin                                           3.4                           3.2 - 4.6 g/dL                  Globulin                                          3.9 (H)                       2.3 - 3.5 g/dL                  A-G Ratio                                         0.9 (L)                       1.2 - 3.5                  -URINE MICROSCOPIC       Result                                            Value                         Ref Range                       WBC                                               10-20                         0 /hpf                          RBC 0-3                           0 /hpf                          Epithelial cells                                  0-3                           0 /hpf                          Bacteria                                          0                             0 /hpf                          Casts                                             5-10                          0 /lpf                      sed rate-14)      ED Course       Procedures

## 2017-08-13 NOTE — DISCHARGE INSTRUCTIONS
Tinnitus: Care Instructions  Your Care Instructions  Many people have some ringing sounds in their ears once in a while. You may hear a roar, a hiss, a tinkle, or a buzz. The sound usually lasts only a few minutes. If it goes on all the time, you may have tinnitus. Tinnitus is usually caused by long-term exposure to loud noise. This damages the nerves in the inner ear. It can occur with all types of hearing loss. It may be a symptom of almost any ear problem. Tinnitus may be caused by a buildup of earwax. Or it may be caused by ear infections or certain medicines (especially antibiotics or large amounts of aspirin). You can also hear noises in your ears because of an injury to the ears, drinking too much alcohol or caffeine, or a medical condition. You may need tests to evaluate your hearing and to find causes of long-lasting tinnitus. Your doctor may suggest one or more treatments to help you cope with it. You can also do things at home to help reduce symptoms. Follow-up care is a key part of your treatment and safety. Be sure to make and go to all appointments, and call your doctor if you are having problems. It's also a good idea to know your test results and keep a list of the medicines you take. How can you care for yourself at home? · Limit or cut out alcohol and caffeine. They can make your symptoms worse. · Do not smoke or use other tobacco products. Nicotine reduces blood flow to the ear and makes tinnitus worse. If you need help quitting, talk to your doctor about stop-smoking programs and medicines. These can increase your chances of quitting for good. · Talk to your doctor about whether to stop taking aspirin and similar products such as ibuprofen or naproxen. · Get exercise often. It can improve blood flow to the ear. Ways to cope with noise  Some tinnitus may last a long time. To cope with noise, try to:  · Avoid noises that you think caused your tinnitus.  If you can't avoid loud noises, wear earplugs or earmuffs. · Ignore the sound by paying attention to other things. · Relax using biofeedback, meditation, or yoga. Feeling stressed and being tired can make tinnitus worse. · Play music or white noise to help you sleep. Background noise may cover up the noise that you hear in your ears. You can buy a machine that makes soothing sounds, such as ocean waves. When should you call for help? Call 911 anytime you think you may need emergency care. For example, call if:  · You have symptoms of a stroke. These may include:  ¨ Sudden numbness, tingling, weakness, or loss of movement in your face, arm, or leg, especially on only one side of your body. ¨ Sudden vision changes. ¨ Sudden trouble speaking. ¨ Sudden confusion or trouble understanding simple statements. ¨ Sudden problems with walking or balance. ¨ A sudden, severe headache that is different from past headaches. Call your doctor now or seek immediate medical care if:  · You develop other symptoms. These may include hearing loss (or worse hearing loss), balance problems, dizziness, nausea, or vomiting. Watch closely for changes in your health, and be sure to contact your doctor if:  · Your tinnitus moves from both ears to one ear. · Your hearing loss gets worse within 1 day after an ear injury. · Your tinnitus or hearing loss does not get better within 1 week after an ear injury. · Your tinnitus bothers you enough that you want to take medicines to help you cope with it. Where can you learn more? Go to http://tommy-sil.info/. Enter S165 in the search box to learn more about \"Tinnitus: Care Instructions. \"  Current as of: July 29, 2016  Content Version: 11.3  © 8416-3074 [employer]. Care instructions adapted under license by [employer] (which disclaims liability or warranty for this information).  If you have questions about a medical condition or this instruction, always ask your healthcare professional. [address] any warranty or liability for your use of this information.

## 2017-09-22 ENCOUNTER — HOME HEALTH ADMISSION (OUTPATIENT)
Dept: HOME HEALTH SERVICES | Facility: HOME HEALTH | Age: 77
End: 2017-09-22

## 2017-09-23 PROBLEM — E11.49 DIABETES MELLITUS TYPE 2 WITH NEUROLOGICAL MANIFESTATIONS (HCC): Chronic | Status: ACTIVE | Noted: 2017-09-23

## 2017-10-20 PROBLEM — Z74.09 POOR MOBILITY: Status: ACTIVE | Noted: 2017-10-20

## 2017-10-31 PROBLEM — R41.3 MEMORY CHANGE: Status: ACTIVE | Noted: 2017-10-31

## 2017-11-10 PROBLEM — M79.606 CHRONIC PAIN OF LOWER EXTREMITY: Chronic | Status: ACTIVE | Noted: 2017-11-10

## 2017-11-10 PROBLEM — G89.29 CHRONIC PAIN OF LOWER EXTREMITY: Chronic | Status: ACTIVE | Noted: 2017-11-10

## 2017-11-27 PROBLEM — E66.01 OBESITY, MORBID (HCC): Status: ACTIVE | Noted: 2017-11-27

## 2017-12-07 PROBLEM — E78.2 MIXED HYPERLIPIDEMIA: Chronic | Status: ACTIVE | Noted: 2017-12-07

## 2017-12-07 PROBLEM — E66.01 OBESITY, MORBID (HCC): Chronic | Status: ACTIVE | Noted: 2017-11-27

## 2017-12-07 PROBLEM — E66.01 OBESITY, MORBID (HCC): Chronic | Status: RESOLVED | Noted: 2017-11-27 | Resolved: 2017-12-07

## 2018-03-05 PROBLEM — Z74.09 POOR MOBILITY: Chronic | Status: ACTIVE | Noted: 2017-10-20

## 2018-03-05 PROBLEM — H93.13 TINNITUS, BILATERAL: Status: ACTIVE | Noted: 2017-09-14

## 2018-03-08 PROBLEM — G47.34 NOCTURNAL HYPOXIA: Chronic | Status: ACTIVE | Noted: 2018-03-08

## 2018-04-26 ENCOUNTER — HOSPITAL ENCOUNTER (OUTPATIENT)
Dept: CT IMAGING | Age: 78
Discharge: HOME | End: 2018-04-26
Attending: FAMILY MEDICINE
Payer: MEDICARE

## 2018-04-26 DIAGNOSIS — R10.84 CHRONIC GENERALIZED ABDOMINAL PAIN: ICD-10-CM

## 2018-04-26 DIAGNOSIS — R10.2 CHRONIC PELVIC PAIN IN FEMALE: ICD-10-CM

## 2018-04-26 DIAGNOSIS — G89.29 CHRONIC GENERALIZED ABDOMINAL PAIN: ICD-10-CM

## 2018-04-26 DIAGNOSIS — G89.29 CHRONIC PELVIC PAIN IN FEMALE: ICD-10-CM

## 2018-04-26 LAB — CREAT BLD-MCNC: 0.6 MG/DL (ref 0.8–1.5)

## 2018-04-26 PROCEDURE — 82565 ASSAY OF CREATININE: CPT

## 2018-04-26 PROCEDURE — 74177 CT ABD & PELVIS W/CONTRAST: CPT

## 2018-04-26 PROCEDURE — 74011000258 HC RX REV CODE- 258: Performed by: FAMILY MEDICINE

## 2018-04-26 PROCEDURE — 74011636320 HC RX REV CODE- 636/320: Performed by: FAMILY MEDICINE

## 2018-04-26 RX ORDER — SODIUM CHLORIDE 0.9 % (FLUSH) 0.9 %
10 SYRINGE (ML) INJECTION
Status: COMPLETED | OUTPATIENT
Start: 2018-04-26 | End: 2018-04-26

## 2018-04-26 RX ADMIN — SODIUM CHLORIDE 100 ML: 900 INJECTION, SOLUTION INTRAVENOUS at 15:01

## 2018-04-26 RX ADMIN — Medication 10 ML: at 15:01

## 2018-04-26 RX ADMIN — DIATRIZOATE MEGLUMINE AND DIATRIZOATE SODIUM 15 ML: 660; 100 LIQUID ORAL; RECTAL at 15:01

## 2018-04-26 RX ADMIN — IOPAMIDOL 100 ML: 755 INJECTION, SOLUTION INTRAVENOUS at 15:00

## 2018-05-15 PROBLEM — G89.29 CHRONIC GENERALIZED ABDOMINAL PAIN: Chronic | Status: ACTIVE | Noted: 2018-05-15

## 2018-05-15 PROBLEM — R10.84 CHRONIC GENERALIZED ABDOMINAL PAIN: Chronic | Status: ACTIVE | Noted: 2018-05-15

## 2018-05-15 PROBLEM — G89.29 CHRONIC PAIN OF LEFT KNEE: Chronic | Status: ACTIVE | Noted: 2018-05-15

## 2018-05-15 PROBLEM — M25.562 CHRONIC PAIN OF LEFT KNEE: Chronic | Status: ACTIVE | Noted: 2018-05-15

## 2018-05-15 PROBLEM — N39.41 URGE INCONTINENCE OF URINE: Chronic | Status: ACTIVE | Noted: 2018-05-15

## 2018-07-16 ENCOUNTER — HOME HEALTH ADMISSION (OUTPATIENT)
Dept: HOME HEALTH SERVICES | Facility: HOME HEALTH | Age: 78
End: 2018-07-16

## 2018-07-17 ENCOUNTER — HOME CARE VISIT (OUTPATIENT)
Dept: SCHEDULING | Facility: HOME HEALTH | Age: 78
End: 2018-07-17

## 2018-07-20 ENCOUNTER — HOME CARE VISIT (OUTPATIENT)
Dept: HOME HEALTH SERVICES | Facility: HOME HEALTH | Age: 78
End: 2018-07-20

## 2018-08-27 ENCOUNTER — HOSPITAL ENCOUNTER (OUTPATIENT)
Dept: GENERAL RADIOLOGY | Age: 78
Discharge: HOME | End: 2018-08-27
Payer: MEDICARE

## 2018-08-27 DIAGNOSIS — M25.562 CHRONIC PAIN OF LEFT KNEE: Chronic | ICD-10-CM

## 2018-08-27 DIAGNOSIS — G89.29 CHRONIC PAIN OF LEFT KNEE: Chronic | ICD-10-CM

## 2018-08-27 PROCEDURE — 73560 X-RAY EXAM OF KNEE 1 OR 2: CPT

## 2018-11-06 ENCOUNTER — HOSPITAL ENCOUNTER (OUTPATIENT)
Dept: GENERAL RADIOLOGY | Age: 78
Discharge: HOME | End: 2018-11-06
Payer: MEDICARE

## 2018-11-06 DIAGNOSIS — R07.9 LEFT SIDED CHEST PAIN: ICD-10-CM

## 2018-11-06 PROCEDURE — 71046 X-RAY EXAM CHEST 2 VIEWS: CPT

## 2018-11-13 NOTE — PROGRESS NOTES
It wasn't ordered the day she was here. It was ordered the last time she was here she just never went to get it.

## 2019-01-28 ENCOUNTER — HOSPITAL ENCOUNTER (OUTPATIENT)
Dept: GENERAL RADIOLOGY | Age: 79
Discharge: HOME | End: 2019-01-28
Payer: MEDICARE

## 2019-01-28 DIAGNOSIS — M54.50 LUMBAR BACK PAIN: ICD-10-CM

## 2019-01-28 PROCEDURE — 72110 X-RAY EXAM L-2 SPINE 4/>VWS: CPT

## 2019-02-28 ENCOUNTER — PATIENT OUTREACH (OUTPATIENT)
Dept: CASE MANAGEMENT | Age: 79
End: 2019-02-28

## 2019-02-28 NOTE — PROGRESS NOTES
Ambulatory Care Coordination - Social Work Assessment   Referral from: Dr. Zoey Álvarez    Previously referred? If so, reason and brief outcome No   Reason for current referral: Community resources   Income information (if needed): 799 a month     Sources of Support: Family    ACP set up? Needs information? Doesnt want talk about    Medication Cost assistance needed? Said PCP office was helping her get her medications   Referral to CLTC/Medicaid needed? Already has in place    Referral to Medicare Extra Help/LIS program needed? NA   Small home repair needed? NA   MOW referral? Already getting MOW    Any other concerns/questions? NA   Next steps: MARCELLO CM will mail out information on utility and rent assistance programs  MARCELLO CM will follow up with pts CLTC CM  MARCELLO CM will follow up with pt in two weeks      This note will not be viewable in [employer].

## 2019-03-14 ENCOUNTER — PATIENT OUTREACH (OUTPATIENT)
Dept: CASE MANAGEMENT | Age: 79
End: 2019-03-14

## 2019-03-14 NOTE — PROGRESS NOTES
MARCELLO OLEARY called to follow up with pt and to see if she got the information MARCELLO OLEARY mailed out to her and to talk with her about what MARCELLO OLEARY was told by her Abida Lancaster. However, when MARCELLO OLEARY called pt's phone it sounded like someone answered to phone but then MARCELLO OLEARY was unable to talk to anyone or leave a message. MARCELLO OLEARY tried again and got the same thing. PLAN:  MARCELLO OLEARY will attempt to reach pt again in a week if MARCELLO OLEARY doesn't hear from pt before then. This note will not be viewable in [employer].

## 2019-03-20 ENCOUNTER — PATIENT OUTREACH (OUTPATIENT)
Dept: CASE MANAGEMENT | Age: 79
End: 2019-03-20

## 2019-03-20 NOTE — PROGRESS NOTES
MARCELLO OLEARY called and spoke with pt who said that she's going to change the [address] she has coming out. She said that she hopes she'll be able to get to one of the assistance programs MARCELLO OLEARY mailed her information on if she's able to switch CLTC aid companies. PLAN:  MARCELLO OLEARY will follow back up with pt in two weeks to see if she has a different aid company that can help her. This note will not be viewable in [address]

## 2019-04-03 ENCOUNTER — PATIENT OUTREACH (OUTPATIENT)
Dept: CASE MANAGEMENT | Age: 79
End: 2019-04-03

## 2019-04-03 NOTE — Clinical Note
Just an FYI on this one,I've tried to talk with her multiple times about resources and supports but she's not willing or open to my input. She only tells me how nothing will help her and what she can't do. I've tried talking with her SW through DSS but she said pt has fired multiple aid companies and is difficult to get assistance for. I'm sorry I don't think I can impact this patient. MARCELLO Bueno CM

## 2019-04-03 NOTE — PROGRESS NOTES
MARCELLO OLEARY called and talked with pt who said that she's just not able to do any of the things MARCELLO OLEARY has provided her information on. Pt said that no one ever calls her back and she can't get her aid to do what she needs them to do. MARCELLO OLEARY tried to provide pt with information on aid companies but pt declined. PLAN:  MARCELLO OLEARY will close CCM episode and remove herself from pt's care team because MARCELLO OLEARY is unable to impact pt's care at this time. Pt is unwilling to follow through with information MARCELLO OLEARY has provided and declines help/ information MARCELLO OLEARY gives. This note will not be viewable in [address]

## 2019-04-05 ENCOUNTER — PATIENT OUTREACH (OUTPATIENT)
Dept: CASE MANAGEMENT | Age: 79
End: 2019-04-05

## 2019-04-05 NOTE — PROGRESS NOTES
Attempted outreach to f/u with patient about resources we had discussed this week - UTR at this time  PLAN:  Will attempt outreach to patient again next week  This note will not be viewable in [address]

## 2019-04-09 ENCOUNTER — PATIENT OUTREACH (OUTPATIENT)
Dept: CASE MANAGEMENT | Age: 79
End: 2019-04-09

## 2019-04-09 NOTE — PROGRESS NOTES
Jamee Gilbert from [facility] [employer] phoned me back and will be outreaching to patient to assist patient with social needs  PLAN:  Will f/u with patient later this week  This note will not be viewable in [address]

## 2019-04-09 NOTE — PROGRESS NOTES
· Outreached to f/u with patient for referral for some assistance with rent/utilities, etc.  · (phoned patient at request of PCP office due to patient makes numerous calls to PCP office)  · Phoned [facility] and left message for Judy Moffett to phone me back   · Patient is needing assistance paying her power bill    · Patient stating that she cant get anyone from the phone numbers I give her   · Patient has transportation numbers for Medicaid transport  · Will mail patient a list of resources with phone numbers for assistance  PLAN:  · Will f/u with patient later this week  This note will not be viewable in [address]

## 2019-04-12 ENCOUNTER — PATIENT OUTREACH (OUTPATIENT)
Dept: CASE MANAGEMENT | Age: 79
End: 2019-04-12

## 2019-04-12 NOTE — PROGRESS NOTES
[employer] has outreached to patient   Patient being assisted by [employer] at this time  PLAN:  Will f/u with patient next week  This note will not be viewable in [address]

## 2019-04-23 ENCOUNTER — PATIENT OUTREACH (OUTPATIENT)
Dept: CASE MANAGEMENT | Age: 79
End: 2019-04-23

## 2019-04-23 NOTE — PROGRESS NOTES
Attempted outreach to f/u with patient about using resources through the [facility]  PCP office has reported that patient is making fewer outreaches to their office   PLAN:  Will attempt to outreach to patient again later this week for f/u  This note will not be viewable in [address]

## 2019-04-23 NOTE — PROGRESS NOTES
Patient phoned me back and stated that her neighbor is helping her to get an apartment   Will phone Bon Yen at [facility] and update her on this information  PLAN:  Will f/u with patient again next week - patient stated that if I have a hard time getting in touch with her it is because she is letting her phone charge  This note will not be viewable in [address]

## 2019-05-01 ENCOUNTER — PATIENT OUTREACH (OUTPATIENT)
Dept: CASE MANAGEMENT | Age: 79
End: 2019-05-01

## 2019-05-06 ENCOUNTER — PATIENT OUTREACH (OUTPATIENT)
Dept: CASE MANAGEMENT | Age: 79
End: 2019-05-06

## 2019-05-06 NOTE — PROGRESS NOTES
Missed call from patient while on phone with another patient  Attempted to phone patient back and was UTR  PLAN:  Will attempt to outreach again tomorrow if patient doesn't return my call before EOB today  This note will not be viewable in [address]

## 2019-05-14 ENCOUNTER — PATIENT OUTREACH (OUTPATIENT)
Dept: CASE MANAGEMENT | Age: 79
End: 2019-05-14

## 2019-05-14 NOTE — PROGRESS NOTES
Attempted outreach to patient on 5/7/19 - UTR  Attempted outreach today 5/14/19 - UTR no answer  Patient currently has open DSS case in [facility] with APS  PLAN:  Will attempt to outreach to f/u with patient later this week  This note will not be viewable in [address]

## 2019-05-17 ENCOUNTER — PATIENT OUTREACH (OUTPATIENT)
Dept: CASE MANAGEMENT | Age: 79
End: 2019-05-17

## 2019-05-17 NOTE — PROGRESS NOTES
· Patient outreached to me on 5/16/19  and stated that she was still looking for an apartment/place to live  · Neighbor that was helping her look for an apartment did not have any luck and wasn't able to get an apartment because one wasn't open at that time   · Patient did state that neighbor did help her get her power bill paid  PLAN:  · Will f/u patient next week and attempt to f/u about APS case opened on patient back last month (not sure of who called and opened case with APS on patient)  This note will not be viewable in [address]

## 2019-05-28 ENCOUNTER — HOSPITAL ENCOUNTER (EMERGENCY)
Age: 79
Discharge: HOME | End: 2019-05-29
Attending: EMERGENCY MEDICINE
Payer: MEDICARE

## 2019-05-28 ENCOUNTER — APPOINTMENT (OUTPATIENT)
Dept: GENERAL RADIOLOGY | Age: 79
End: 2019-05-28
Attending: EMERGENCY MEDICINE
Payer: MEDICARE

## 2019-05-28 DIAGNOSIS — R07.89 ATYPICAL CHEST PAIN: Primary | ICD-10-CM

## 2019-05-28 LAB
ALBUMIN SERPL-MCNC: 3.9 G/DL (ref 3.2–4.6)
ALBUMIN/GLOB SERPL: 1.1 {RATIO} (ref 1.2–3.5)
ALP SERPL-CCNC: 98 U/L (ref 50–136)
ALT SERPL-CCNC: 22 U/L (ref 12–65)
ANION GAP SERPL CALC-SCNC: 6 MMOL/L (ref 7–16)
AST SERPL-CCNC: 18 U/L (ref 15–37)
BASOPHILS # BLD: 0 K/UL (ref 0–0.2)
BASOPHILS NFR BLD: 0 % (ref 0–2)
BILIRUB SERPL-MCNC: 1 MG/DL (ref 0.2–1.1)
BUN SERPL-MCNC: 18 MG/DL (ref 8–23)
CALCIUM SERPL-MCNC: 10.3 MG/DL (ref 8.3–10.4)
CHLORIDE SERPL-SCNC: 103 MMOL/L (ref 98–107)
CO2 SERPL-SCNC: 31 MMOL/L (ref 21–32)
CREAT SERPL-MCNC: 0.84 MG/DL (ref 0.6–1)
DIFFERENTIAL METHOD BLD: ABNORMAL
EOSINOPHIL # BLD: 0 K/UL (ref 0–0.8)
EOSINOPHIL NFR BLD: 0 % (ref 0.5–7.8)
ERYTHROCYTE [DISTWIDTH] IN BLOOD BY AUTOMATED COUNT: 14.2 % (ref 11.9–14.6)
GLOBULIN SER CALC-MCNC: 3.4 G/DL (ref 2.3–3.5)
GLUCOSE SERPL-MCNC: 104 MG/DL (ref 65–100)
HCT VFR BLD AUTO: 52 % (ref 35.8–46.3)
HGB BLD-MCNC: 16.8 G/DL (ref 11.7–15.4)
IMM GRANULOCYTES # BLD AUTO: 0 K/UL (ref 0–0.5)
IMM GRANULOCYTES NFR BLD AUTO: 0 % (ref 0–5)
LYMPHOCYTES # BLD: 2 K/UL (ref 0.5–4.6)
LYMPHOCYTES NFR BLD: 25 % (ref 13–44)
MCH RBC QN AUTO: 28.6 PG (ref 26.1–32.9)
MCHC RBC AUTO-ENTMCNC: 32.3 G/DL (ref 31.4–35)
MCV RBC AUTO: 88.6 FL (ref 79.6–97.8)
MONOCYTES # BLD: 0.6 K/UL (ref 0.1–1.3)
MONOCYTES NFR BLD: 7 % (ref 4–12)
NEUTS SEG # BLD: 5.5 K/UL (ref 1.7–8.2)
NEUTS SEG NFR BLD: 67 % (ref 43–78)
NRBC # BLD: 0 K/UL (ref 0–0.2)
PLATELET # BLD AUTO: 239 K/UL (ref 150–450)
PMV BLD AUTO: 11.7 FL (ref 9.4–12.3)
POTASSIUM SERPL-SCNC: 3.3 MMOL/L (ref 3.5–5.1)
PROT SERPL-MCNC: 7.3 G/DL (ref 6.3–8.2)
RBC # BLD AUTO: 5.87 M/UL (ref 4.05–5.2)
SODIUM SERPL-SCNC: 140 MMOL/L (ref 136–145)
TROPONIN I BLD-MCNC: 0 NG/ML (ref 0.02–0.05)
TROPONIN I SERPL-MCNC: <0.02 NG/ML (ref 0.02–0.05)
WBC # BLD AUTO: 8.1 K/UL (ref 4.3–11.1)

## 2019-05-28 PROCEDURE — 84484 ASSAY OF TROPONIN QUANT: CPT

## 2019-05-28 PROCEDURE — 80053 COMPREHEN METABOLIC PANEL: CPT

## 2019-05-28 PROCEDURE — 93005 ELECTROCARDIOGRAM TRACING: CPT | Performed by: EMERGENCY MEDICINE

## 2019-05-28 PROCEDURE — 85025 COMPLETE CBC W/AUTO DIFF WBC: CPT

## 2019-05-28 PROCEDURE — 74011250637 HC RX REV CODE- 250/637: Performed by: EMERGENCY MEDICINE

## 2019-05-28 PROCEDURE — 99285 EMERGENCY DEPT VISIT HI MDM: CPT | Performed by: EMERGENCY MEDICINE

## 2019-05-28 PROCEDURE — 71046 X-RAY EXAM CHEST 2 VIEWS: CPT

## 2019-05-28 RX ORDER — SUCRALFATE 1 G/1
1 TABLET ORAL 3 TIMES DAILY
Qty: 42 TAB | Refills: 0 | Status: SHIPPED | OUTPATIENT
Start: 2019-05-28 | End: 2019-06-11

## 2019-05-28 RX ORDER — HYDROCODONE BITARTRATE AND ACETAMINOPHEN 5; 325 MG/1; MG/1
1 TABLET ORAL ONCE
Status: COMPLETED | OUTPATIENT
Start: 2019-05-28 | End: 2019-05-28

## 2019-05-28 RX ADMIN — HYDROCODONE BITARTRATE AND ACETAMINOPHEN 1 TABLET: 5; 325 TABLET ORAL at 22:23

## 2019-05-28 NOTE — ED TRIAGE NOTES
Patient from home via EMS for chest pain x 6 days. Described as nondescript it radiated to \"shourlder and into my bronchical tubes. \" States that she has a cough. Reports that she is coughing up \"white stuff. \" Reports some nausea yesterday denies vomiting.  EMS vitals 158/82, temp 98.9,

## 2019-05-29 ENCOUNTER — PATIENT OUTREACH (OUTPATIENT)
Dept: CASE MANAGEMENT | Age: 79
End: 2019-05-29

## 2019-05-29 VITALS
OXYGEN SATURATION: 93 % | RESPIRATION RATE: 19 BRPM | SYSTOLIC BLOOD PRESSURE: 119 MMHG | WEIGHT: 252 LBS | TEMPERATURE: 98 F | BODY MASS INDEX: 44.65 KG/M2 | HEART RATE: 64 BPM | DIASTOLIC BLOOD PRESSURE: 75 MMHG | HEIGHT: 63 IN

## 2019-05-29 LAB
ATRIAL RATE: 74 BPM
CALCULATED P AXIS, ECG09: 53 DEGREES
CALCULATED R AXIS, ECG10: -15 DEGREES
CALCULATED T AXIS, ECG11: 33 DEGREES
DIAGNOSIS, 93000: NORMAL
P-R INTERVAL, ECG05: 174 MS
Q-T INTERVAL, ECG07: 410 MS
QRS DURATION, ECG06: 90 MS
QTC CALCULATION (BEZET), ECG08: 455 MS
VENTRICULAR RATE, ECG03: 74 BPM

## 2019-05-29 NOTE — ED NOTES
I have reviewed discharge instructions with the patient. The patient verbalized understanding. Patient left ED via Discharge Method: stretcher to Home with [facility] for questions and clarification provided. Patient given 1 scripts. To continue your aftercare when you leave the hospital, you may receive an automated call from our care team to check in on how you are doing. This is a free service and part of our promise to provide the best care and service to meet your aftercare needs.  If you have questions, or wish to unsubscribe from this service please call [phone]. Thank you for Choosing our [facility] Emergency Department.

## 2019-05-29 NOTE — ED PROVIDER NOTES
73-year-old lady presents with a pain that is in her chest, right shoulder, upper back, and right side. She says those symptoms have been present for about 6 days. She said she has had a mild cough with some clear to white sputum but it has been non-persistent and intermittent. She denies any specific fevers or chills and says she has had no vomiting. Yesterday she said she did have some nausea but it has since resolved. She denies any difficulty breathing or shortness of breath. Elements of this note were created using speech recognition software. As such, errors of speech recognition may be present.            Past Medical History:   Diagnosis Date    Arthritis     Asthma     Chronic kidney disease     Ear problems     Gastrointestinal disorder     acid reflux, hernia    Hyperlipidemia     Hypertension     Insomnia     Other ill-defined conditions(799.89)     blood clot on lung    Stroke ([facility] [address]     Thyroid disease     Tinnitus     Vitamin D deficiency        Past Surgical History:   Procedure Laterality Date    APPENDECTOMY      HX APPENDECTOMY      HX GYN  [age]    c-sec    HX HEENT      t & a    HX OTHER SURGICAL      fatty tissue removed from under arm         Family History:   Problem Relation Age of Onset    No Known Problems Mother     No Known Problems Father     No Known Problems Maternal Grandmother     No Known Problems Maternal Grandfather     No Known Problems Paternal Grandmother     No Known Problems Paternal Grandfather        Social History     Socioeconomic History    Marital status:      Spouse name: Not on file    Number of children: Not on file    Years of education: Not on file    Highest education level: Not on file   Occupational History    Not on file   Social Needs    Financial resource strain: Not on file    Food insecurity:     Worry: Not on file     Inability: Not on file    Transportation needs:     Medical: Not on file     Non-medical: Not on file   Tobacco Use    Smoking status: Never Smoker    Smokeless tobacco: Never Used   Substance and Sexual Activity    Alcohol use: No     Alcohol/week: 0.0 oz    Drug use: No    Sexual activity: Never   Lifestyle    Physical activity:     Days per week: Not on file     Minutes per session: Not on file    Stress: Not on file   Relationships    Social connections:     Talks on phone: Not on file     Gets together: Not on file     Attends Pent[employer]ism service: Not on file     Active member of club or organization: Not on file     Attends meetings of clubs or organizations: Not on file     Relationship status: Not on file    Intimate partner violence:     Fear of current or ex partner: Not on file     Emotionally abused: Not on file     Physically abused: Not on file     Forced sexual activity: Not on file   Other Topics Concern    Not on file   Social History Narrative    Not on file         ALLERGIES: Aspirin; Ciprofloxacin; Diflucan [fluconazole]; Glucophage [metformin]; Motrin [ibuprofen]; Neurontin [gabapentin]; Nsaids (non-steroidal anti-inflammatory drug); Septra [sulfamethoprim ds]; Sulfa (sulfonamide antibiotics); Sulfabenzamide; Tessalon perles [benzonatate]; and Vibramycin [doxycycline calcium]    Review of Systems   Constitutional: Negative for chills, diaphoresis and fever. HENT: Negative for congestion, rhinorrhea and sore throat. Eyes: Negative for redness and visual disturbance. Respiratory: Positive for cough. Negative for chest tightness, shortness of breath and wheezing. Cardiovascular: Positive for chest pain. Negative for palpitations. Gastrointestinal: Negative for abdominal pain, blood in stool, diarrhea, nausea and vomiting. Endocrine: Negative for polydipsia and polyuria. Genitourinary: Negative for dysuria and hematuria. Musculoskeletal: Negative for arthralgias, myalgias and neck stiffness. Skin: Negative for rash.    Allergic/Immunologic: Negative for environmental allergies and food allergies. Neurological: Negative for dizziness, weakness and headaches. Hematological: Negative for adenopathy. Does not bruise/bleed easily. Psychiatric/Behavioral: Negative for confusion and sleep disturbance. The patient is not nervous/anxious. Vitals:    05/28/19 1617 05/28/19 2201 05/28/19 2301   BP: 150/82 124/85 124/64   Pulse: 71 64 67   Resp: 18 15    Temp: 98 °F (36.7 °C)     SpO2: 95% 94% 95%   Weight: 114.3 kg (252 lb)     Height: 5' 3\" (1.6 m)              Physical Exam   Constitutional: She is oriented to person, place, and time. She appears well-developed and well-nourished. General deconditioning   HENT:   Head: Normocephalic and atraumatic. Mouth/Throat: Oropharynx is clear and moist.   Eyes: Pupils are equal, round, and reactive to light. Conjunctivae are normal. Right eye exhibits no discharge. Left eye exhibits no discharge. Neck: No thyromegaly present. Cardiovascular: Normal rate, regular rhythm and normal heart sounds. No murmur heard. Pulmonary/Chest: Effort normal and breath sounds normal. No stridor. No respiratory distress. She has no wheezes. Abdominal: Soft. Bowel sounds are normal. There is no tenderness. There is no rebound and no guarding. Musculoskeletal: Normal range of motion. She exhibits no edema. Neurological: She is alert and oriented to person, place, and time. She exhibits normal muscle tone. Coordination normal.   Skin: Skin is warm and dry. Psychiatric: She has a normal mood and affect. Her behavior is normal.        MDM  Number of Diagnoses or Management Options  Diagnosis management comments: Patient's troponins are negative. Her chest x-ray is unremarkable. Her other blood work is unrevealing. I do not have an exact cause of her symptoms and I will discharge her home.          Procedures

## 2019-05-29 NOTE — PROGRESS NOTES
Attempted outreach to patient to f/u - UTR no answer at this time unable to leave VM  Patient was in the ER yesterday with chest pain and was discharged to home today  PLAN:  Will attempt outreach again later this week   This note will not be viewable in [address]

## 2019-05-31 ENCOUNTER — PATIENT OUTREACH (OUTPATIENT)
Dept: CASE MANAGEMENT | Age: 79
End: 2019-05-31

## 2019-05-31 ENCOUNTER — HOSPITAL ENCOUNTER (EMERGENCY)
Age: 79
Discharge: HOME | End: 2019-06-01
Attending: EMERGENCY MEDICINE | Admitting: EMERGENCY MEDICINE
Payer: MEDICARE

## 2019-05-31 DIAGNOSIS — R07.89 ATYPICAL CHEST PAIN: Primary | ICD-10-CM

## 2019-05-31 DIAGNOSIS — Z65.9 OTHER SOCIAL STRESSOR: ICD-10-CM

## 2019-05-31 LAB
ALBUMIN SERPL-MCNC: 3.7 G/DL (ref 3.2–4.6)
ALBUMIN/GLOB SERPL: 1.1 {RATIO} (ref 1.2–3.5)
ALP SERPL-CCNC: 96 U/L (ref 50–136)
ALT SERPL-CCNC: 24 U/L (ref 12–65)
ANION GAP SERPL CALC-SCNC: 8 MMOL/L (ref 7–16)
AST SERPL-CCNC: 18 U/L (ref 15–37)
BASOPHILS # BLD: 0 K/UL (ref 0–0.2)
BASOPHILS NFR BLD: 0 % (ref 0–2)
BILIRUB SERPL-MCNC: 1.1 MG/DL (ref 0.2–1.1)
BUN SERPL-MCNC: 13 MG/DL (ref 8–23)
CALCIUM SERPL-MCNC: 9.9 MG/DL (ref 8.3–10.4)
CHLORIDE SERPL-SCNC: 103 MMOL/L (ref 98–107)
CO2 SERPL-SCNC: 30 MMOL/L (ref 21–32)
CREAT SERPL-MCNC: 0.75 MG/DL (ref 0.6–1)
DIFFERENTIAL METHOD BLD: ABNORMAL
EOSINOPHIL # BLD: 0 K/UL (ref 0–0.8)
EOSINOPHIL NFR BLD: 0 % (ref 0.5–7.8)
ERYTHROCYTE [DISTWIDTH] IN BLOOD BY AUTOMATED COUNT: 14.2 % (ref 11.9–14.6)
GLOBULIN SER CALC-MCNC: 3.4 G/DL (ref 2.3–3.5)
GLUCOSE SERPL-MCNC: 106 MG/DL (ref 65–100)
HCT VFR BLD AUTO: 50.3 % (ref 35.8–46.3)
HGB BLD-MCNC: 16.6 G/DL (ref 11.7–15.4)
IMM GRANULOCYTES # BLD AUTO: 0 K/UL (ref 0–0.5)
IMM GRANULOCYTES NFR BLD AUTO: 0 % (ref 0–5)
LYMPHOCYTES # BLD: 2 K/UL (ref 0.5–4.6)
LYMPHOCYTES NFR BLD: 30 % (ref 13–44)
MCH RBC QN AUTO: 29 PG (ref 26.1–32.9)
MCHC RBC AUTO-ENTMCNC: 33 G/DL (ref 31.4–35)
MCV RBC AUTO: 87.8 FL (ref 79.6–97.8)
MONOCYTES # BLD: 0.5 K/UL (ref 0.1–1.3)
MONOCYTES NFR BLD: 8 % (ref 4–12)
NEUTS SEG # BLD: 4 K/UL (ref 1.7–8.2)
NEUTS SEG NFR BLD: 61 % (ref 43–78)
NRBC # BLD: 0 K/UL (ref 0–0.2)
PLATELET # BLD AUTO: 223 K/UL (ref 150–450)
PMV BLD AUTO: 11.9 FL (ref 9.4–12.3)
POTASSIUM SERPL-SCNC: 2.8 MMOL/L (ref 3.5–5.1)
PROT SERPL-MCNC: 7.1 G/DL (ref 6.3–8.2)
RBC # BLD AUTO: 5.73 M/UL (ref 4.05–5.2)
SODIUM SERPL-SCNC: 141 MMOL/L (ref 136–145)
TROPONIN I SERPL-MCNC: <0.02 NG/ML (ref 0.02–0.05)
WBC # BLD AUTO: 6.6 K/UL (ref 4.3–11.1)

## 2019-05-31 PROCEDURE — 74011250637 HC RX REV CODE- 250/637: Performed by: EMERGENCY MEDICINE

## 2019-05-31 PROCEDURE — 93005 ELECTROCARDIOGRAM TRACING: CPT | Performed by: EMERGENCY MEDICINE

## 2019-05-31 PROCEDURE — 99285 EMERGENCY DEPT VISIT HI MDM: CPT | Performed by: EMERGENCY MEDICINE

## 2019-05-31 PROCEDURE — 80053 COMPREHEN METABOLIC PANEL: CPT

## 2019-05-31 PROCEDURE — 81003 URINALYSIS AUTO W/O SCOPE: CPT | Performed by: EMERGENCY MEDICINE

## 2019-05-31 PROCEDURE — 85025 COMPLETE CBC W/AUTO DIFF WBC: CPT

## 2019-05-31 PROCEDURE — 84484 ASSAY OF TROPONIN QUANT: CPT

## 2019-05-31 RX ORDER — POTASSIUM CHLORIDE 20 MEQ/1
40 TABLET, EXTENDED RELEASE ORAL
Status: COMPLETED | OUTPATIENT
Start: 2019-05-31 | End: 2019-05-31

## 2019-05-31 RX ADMIN — POTASSIUM CHLORIDE 40 MEQ: 20 TABLET, EXTENDED RELEASE ORAL at 19:11

## 2019-05-31 NOTE — PROGRESS NOTES
Attempted outreach to f/u with patient UTR at this time  PLAN:  Will attempt outreach again next week  This note will not be viewable in [address]

## 2019-05-31 NOTE — ED NOTES
Patient has been provided turkey sandwich tray at this time. Patient denies having any other needs, appears in no acute distress. Will continue to monitor.

## 2019-05-31 NOTE — ED TRIAGE NOTES
Pt arrives via EMS from home with c/o being upset after receiving an eviction notice. 12 lead unremarkable in route. VSS in route. Pt is on [facility] as needed. Pt states she is having some cp right now but denies SOB. Pt states she just hurts and can't rates or describe it but moves to right shoulder/ back. Pt denies being upset and states she was only aggravated.

## 2019-06-01 VITALS
RESPIRATION RATE: 18 BRPM | HEIGHT: 63 IN | BODY MASS INDEX: 44.47 KG/M2 | DIASTOLIC BLOOD PRESSURE: 65 MMHG | WEIGHT: 251 LBS | HEART RATE: 70 BPM | OXYGEN SATURATION: 96 % | SYSTOLIC BLOOD PRESSURE: 103 MMHG | TEMPERATURE: 98 F

## 2019-06-01 LAB
ATRIAL RATE: 65 BPM
CALCULATED P AXIS, ECG09: 42 DEGREES
CALCULATED R AXIS, ECG10: -16 DEGREES
CALCULATED T AXIS, ECG11: 41 DEGREES
DIAGNOSIS, 93000: NORMAL
P-R INTERVAL, ECG05: 194 MS
Q-T INTERVAL, ECG07: 434 MS
QRS DURATION, ECG06: 92 MS
QTC CALCULATION (BEZET), ECG08: 451 MS
VENTRICULAR RATE, ECG03: 65 BPM

## 2019-06-01 NOTE — PROGRESS NOTES
LMSW contacted by ED about pt presentation to the ED as homeless since eviction on yesterday 5/31/19. Reviewed pt chart. Per community CM documentation pt has an open APS Case and they were aware that pt was to be evicted. Call placed to Intake line with [phone]. Left a request for a call back from the [address] [place] [address] worker on call this weekend in reference to this pt. Await call back.

## 2019-06-01 NOTE — ED NOTES
IV removed per patient request.  Vital signs have been updated. Patient resting in stretcher, appears in no acute distress. Will continue to monitor.

## 2019-06-01 NOTE — ED NOTES
Full report to [employer]. Care assumed by that RN at this time. At current, patient restful in bed on O2 as per usual at home. Plan for AM Social work consult.   Unit sec to order breakfast tray in AM.

## 2019-06-01 NOTE — PROGRESS NOTES
LMSW received a call back from Sachin Love [phone] with [facility] APS. Pt does not have an open case on pt but they did accept the referral and have 72 hours to investigate pt situation and they are aware that pt is going to be evicted soon and needs assistance. Upon follow up with pt LMSW learned that she was not actually evicted by authorities yesterday she just received the eviction notice which I read to say that she has 10 days from receipt to respond to. I left a message on pt behalf for the 's office, [address] [phone]. Pt also disclosed that she has an adult son that is staying with her that doesn't work but is looking for a job. Pt will get her next check on Monday. Pt will go back to the her current residence today via trasport. She has contact number for [facility] [phone]. I have instructed her that if authorities arrive to evict her before she can secure other housing to ask them to contact [facility] on her behalf. LMSW also agreed to make contact with [facility] and community outreach to ask them to please follow up with pt Monday am to assist with other housing options. Since she has an eviction notice [facility] has a program that may be able to assist with transitional housing and also the [facility] may have options but these will need to be contact during regular business hours.

## 2019-06-01 NOTE — ED NOTES
Patient assisted via wheelchair to restroom and voided w/o difficulty. Patient states she is going to try to rest. Patient reports she uses home O2 via NC.  Placed on O2 to rest.

## 2019-06-01 NOTE — ED NOTES
Patient moved to fast track room for rest overnight ahead of social work consult in AM.     Patient placed on O2 via NC @ 3LPM which is what patient normally uses at home. RT contacted for humidified O2 as patient reports airway \"raspiness\" in AM r/t dry O2 use. Unit secretary and charge RN aware of plan. US to order AM breakfast for the patient. Patient restful in bed in NAD with no further needs expressed. Patient oriented to sitter outside of room and to ask with any needs.

## 2019-06-01 NOTE — ED NOTES
Full report from [facility]. Care assumed by this RN at this time. Plan to let patient rest in ED overnight and speak to  in the AM. Will attempt to move patient to a FT room when one comes open.

## 2019-06-01 NOTE — ED PROVIDER NOTES
23-year-old lady who presents with social issues as well as lingering intermittent upper chest pain. Patient was seen by me about a week ago and evaluated for the pain symptoms that she says these are the same. Additionally, she said that she was evicted from her trailer today. She said that she normally requires oxygen in the evenings she does not now have access to that. She also said that she has not sure if she is going to have a place to live but does apparently have a friend who may be trying to get her own apartment. Patient also has several other medical problems. She denies any other symptoms and says that she thinks her pain was exacerbated by the fact that she got upset and angry. Elements of this note were created using speech recognition software. As such, errors of speech recognition may be present.            Past Medical History:   Diagnosis Date    Arthritis     Asthma     Chronic kidney disease     Ear problems     Gastrointestinal disorder     acid reflux, hernia    Hyperlipidemia     Hypertension     Insomnia     Other ill-defined conditions(799.89)     blood clot on lung    Stroke ([facility] [address]     Thyroid disease     Tinnitus     Vitamin D deficiency        Past Surgical History:   Procedure Laterality Date    APPENDECTOMY      HX APPENDECTOMY      HX GYN  [age]    c-sec    HX HEENT      t & a    HX OTHER SURGICAL      fatty tissue removed from under arm         Family History:   Problem Relation Age of Onset    No Known Problems Mother     No Known Problems Father     No Known Problems Maternal Grandmother     No Known Problems Maternal Grandfather     No Known Problems Paternal Grandmother     No Known Problems Paternal Grandfather        Social History     Socioeconomic History    Marital status:      Spouse name: Not on file    Number of children: Not on file    Years of education: Not on file    Highest education level: Not on file   Occupational History    Not on file   Social Needs    Financial resource strain: Not on file    Food insecurity:     Worry: Not on file     Inability: Not on file    Transportation needs:     Medical: Not on file     Non-medical: Not on file   Tobacco Use    Smoking status: Never Smoker    Smokeless tobacco: Never Used   Substance and Sexual Activity    Alcohol use: No     Alcohol/week: 0.0 oz    Drug use: No    Sexual activity: Never   Lifestyle    Physical activity:     Days per week: Not on file     Minutes per session: Not on file    Stress: Not on file   Relationships    Social connections:     Talks on phone: Not on file     Gets together: Not on file     Attends Congregation service: Not on file     Active member of club or organization: Not on file     Attends meetings of clubs or organizations: Not on file     Relationship status: Not on file    Intimate partner violence:     Fear of current or ex partner: Not on file     Emotionally abused: Not on file     Physically abused: Not on file     Forced sexual activity: Not on file   Other Topics Concern    Not on file   Social History Narrative    Not on file         ALLERGIES: Aspirin; Ciprofloxacin; Diflucan [fluconazole]; Glucophage [metformin]; Motrin [ibuprofen]; Neurontin [gabapentin]; Nsaids (non-steroidal anti-inflammatory drug); Septra [sulfamethoprim ds]; Sulfa (sulfonamide antibiotics); Sulfabenzamide; Tessalon perles [benzonatate]; and Vibramycin [doxycycline calcium]    Review of Systems   Constitutional: Negative for chills, diaphoresis and fever. HENT: Negative for congestion, rhinorrhea and sore throat. Eyes: Negative for redness and visual disturbance. Respiratory: Negative for cough, chest tightness, shortness of breath and wheezing. Cardiovascular: Positive for chest pain. Negative for palpitations. Gastrointestinal: Negative for abdominal pain, blood in stool, diarrhea, nausea and vomiting.    Endocrine: Negative for polydipsia and polyuria. Genitourinary: Negative for dysuria and hematuria. Musculoskeletal: Negative for arthralgias, myalgias and neck stiffness. Skin: Negative for rash. Allergic/Immunologic: Negative for environmental allergies and food allergies. Neurological: Negative for dizziness, weakness and headaches. Hematological: Negative for adenopathy. Does not bruise/bleed easily. Psychiatric/Behavioral: Negative for confusion and sleep disturbance. The patient is not nervous/anxious. Vitals:    05/31/19 1631   BP: 102/76   Pulse: 67   Resp: 16   Temp: 98.3 °F (36.8 °C)   SpO2: 97%   Weight: 113.9 kg (251 lb)   Height: 5' 3\" (1.6 m)            Physical Exam   Constitutional: She is oriented to person, place, and time. She appears well-developed and well-nourished. HENT:   Head: Normocephalic and atraumatic. Cardiovascular: Regular rhythm and normal heart sounds. Pulmonary/Chest: Effort normal and breath sounds normal.   Abdominal: Soft. Bowel sounds are normal.   Neurological: She is alert and oriented to person, place, and time. Nursing note and vitals reviewed. MDM  Number of Diagnoses or Management Options  Diagnosis management comments: I am uncertain about the patient's current social situation and I am not sure if she will have anywhere to be able to go. She is also not certain. Therefore I will hold her tonight and have case management and investigate this tomorrow.          Procedures

## 2019-06-01 NOTE — ED NOTES
Patient has been wheeled to bathroom via nurse. Able to transfer with minimal difficulty. Patient able to provide urine specimen. Patient has returned to stretcher, appears in no acute distress at this time. Will continue to monitor.

## 2019-06-01 NOTE — ED NOTES
I have reviewed discharge instructions with the patient. The patient verbalized understanding. Patient left ED via Discharge Method: wheelchair to Home with [facility]. Opportunity for questions and clarification provided. Patient given 0 scripts. To continue your aftercare when you leave the hospital, you may receive an automated call from our care team to check in on how you are doing. This is a free service and part of our promise to provide the best care and service to meet your aftercare needs.  If you have questions, or wish to unsubscribe from this service please call [phone]. Thank you for Choosing our [facility] Emergency Department.

## 2019-06-06 ENCOUNTER — PATIENT OUTREACH (OUTPATIENT)
Dept: CASE MANAGEMENT | Age: 79
End: 2019-06-06

## 2019-06-06 NOTE — PROGRESS NOTES
Patient attempted to phone me on 6/5/19 - was on phone with another patient and unable to answer at that time  Attempted to phone patient back on 6/5/19 - UTR when I phoned patient back and unable to leave VM message  Attempted to phone patient back this morning on 6/6/19 and still UTR at this time  PLAN:  Will attempt to phone patient again tomorrow unless patient returns my call before then  This note will not be viewable in [address]

## 2019-06-07 ENCOUNTER — PATIENT OUTREACH (OUTPATIENT)
Dept: CASE MANAGEMENT | Age: 79
End: 2019-06-07

## 2019-06-07 NOTE — PROGRESS NOTES
Attempted outreach to f/u with patient - UTR at this time no answer and unable to leave VM  PLAN:  Will attempt outreach to patient again next week  This note will not be viewable in [address]

## 2019-06-14 ENCOUNTER — PATIENT OUTREACH (OUTPATIENT)
Dept: CASE MANAGEMENT | Age: 79
End: 2019-06-14

## 2019-06-14 NOTE — PROGRESS NOTES
Late entry for 6/12/19:   Patient outreached to me and stated that her phone has been messing up and she has been missing calls from everyone  Patient stated that she has not been able to outreach to the resources/phone numbers that were given to her at the  hospital during her last ER visit   Patient was at her PCP office for appointment on 6/11/19 and per staff patient had to use SW assistance to get ride back home after appointment  Encouraged patient to contact those numbers given to her at the ER for assistance with housing  Patient will change the subject and say she hasn't been able to call anyone and doesn't know what to do  Patient has an unemployed son that lives with her which may be making it difficult for patient to find suitable housing at this time   Patient's phone cut off during conversation and was unable to outreach and reach patient again  6/14/19: attempted outreach to patient again and UTR and no VM available   PLAN:  Will attempt to outreach to patient again later this week  This note will not be viewable in [address]

## 2019-06-19 ENCOUNTER — PATIENT OUTREACH (OUTPATIENT)
Dept: CASE MANAGEMENT | Age: 79
End: 2019-06-19

## 2019-06-19 NOTE — PROGRESS NOTES
Outreached to patient to f/u - UTR no answer at this time and unable to leave a VM message  PLAN:  Will attempt to outreach again later this week to f/u with patient   This note will not be viewable in [address]

## 2019-06-21 ENCOUNTER — PATIENT OUTREACH (OUTPATIENT)
Dept: CASE MANAGEMENT | Age: 79
End: 2019-06-21

## 2019-06-21 NOTE — PROGRESS NOTES
Outreached to patient to f/u - UTR no answer at this time and unable to leave a VM message  PLAN:  Will attempt to outreach again next week to f/u with patient   This note will not be viewable in [address]

## 2019-06-22 ENCOUNTER — HOSPITAL ENCOUNTER (EMERGENCY)
Age: 79
Discharge: HOME | End: 2019-06-28
Attending: EMERGENCY MEDICINE
Payer: MEDICARE

## 2019-06-22 ENCOUNTER — APPOINTMENT (OUTPATIENT)
Dept: CT IMAGING | Age: 79
End: 2019-06-22
Attending: EMERGENCY MEDICINE
Payer: MEDICARE

## 2019-06-22 DIAGNOSIS — G47.00 INSOMNIA, UNSPECIFIED TYPE: Chronic | ICD-10-CM

## 2019-06-22 DIAGNOSIS — R42 DIZZINESS: Primary | ICD-10-CM

## 2019-06-22 DIAGNOSIS — M17.0 OSTEOARTHRITIS OF BOTH KNEES, UNSPECIFIED OSTEOARTHRITIS TYPE: ICD-10-CM

## 2019-06-22 DIAGNOSIS — Z59.00 HOMELESSNESS: ICD-10-CM

## 2019-06-22 LAB
ALBUMIN SERPL-MCNC: 3.3 G/DL (ref 3.2–4.6)
ALBUMIN/GLOB SERPL: 1 {RATIO} (ref 1.2–3.5)
ALP SERPL-CCNC: 100 U/L (ref 50–136)
ALT SERPL-CCNC: 13 U/L (ref 12–65)
ANION GAP SERPL CALC-SCNC: 9 MMOL/L (ref 7–16)
AST SERPL-CCNC: 16 U/L (ref 15–37)
ATRIAL RATE: 55 BPM
BASOPHILS # BLD: 0 K/UL (ref 0–0.2)
BASOPHILS NFR BLD: 0 % (ref 0–2)
BILIRUB SERPL-MCNC: 0.9 MG/DL (ref 0.2–1.1)
BUN SERPL-MCNC: 10 MG/DL (ref 8–23)
CALCIUM SERPL-MCNC: 9.4 MG/DL (ref 8.3–10.4)
CALCULATED P AXIS, ECG09: 75 DEGREES
CALCULATED R AXIS, ECG10: -2 DEGREES
CALCULATED T AXIS, ECG11: 7 DEGREES
CHLORIDE SERPL-SCNC: 103 MMOL/L (ref 98–107)
CO2 SERPL-SCNC: 29 MMOL/L (ref 21–32)
CREAT SERPL-MCNC: 0.71 MG/DL (ref 0.6–1)
DIAGNOSIS, 93000: NORMAL
DIFFERENTIAL METHOD BLD: ABNORMAL
EOSINOPHIL # BLD: 0 K/UL (ref 0–0.8)
EOSINOPHIL NFR BLD: 0 % (ref 0.5–7.8)
ERYTHROCYTE [DISTWIDTH] IN BLOOD BY AUTOMATED COUNT: 14.2 % (ref 11.9–14.6)
GLOBULIN SER CALC-MCNC: 3.4 G/DL (ref 2.3–3.5)
GLUCOSE SERPL-MCNC: 121 MG/DL (ref 65–100)
HCT VFR BLD AUTO: 47.9 % (ref 35.8–46.3)
HGB BLD-MCNC: 15.2 G/DL (ref 11.7–15.4)
IMM GRANULOCYTES # BLD AUTO: 0 K/UL (ref 0–0.5)
IMM GRANULOCYTES NFR BLD AUTO: 0 % (ref 0–5)
LYMPHOCYTES # BLD: 1.6 K/UL (ref 0.5–4.6)
LYMPHOCYTES NFR BLD: 25 % (ref 13–44)
MCH RBC QN AUTO: 28.4 PG (ref 26.1–32.9)
MCHC RBC AUTO-ENTMCNC: 31.7 G/DL (ref 31.4–35)
MCV RBC AUTO: 89.5 FL (ref 79.6–97.8)
MONOCYTES # BLD: 0.4 K/UL (ref 0.1–1.3)
MONOCYTES NFR BLD: 6 % (ref 4–12)
NEUTS SEG # BLD: 4.4 K/UL (ref 1.7–8.2)
NEUTS SEG NFR BLD: 69 % (ref 43–78)
NRBC # BLD: 0 K/UL (ref 0–0.2)
P-R INTERVAL, ECG05: 204 MS
PLATELET # BLD AUTO: 234 K/UL (ref 150–450)
PMV BLD AUTO: 11.7 FL (ref 9.4–12.3)
POTASSIUM SERPL-SCNC: 3.2 MMOL/L (ref 3.5–5.1)
PROT SERPL-MCNC: 6.7 G/DL (ref 6.3–8.2)
Q-T INTERVAL, ECG07: 448 MS
QRS DURATION, ECG06: 86 MS
QTC CALCULATION (BEZET), ECG08: 428 MS
RBC # BLD AUTO: 5.35 M/UL (ref 4.05–5.2)
SODIUM SERPL-SCNC: 141 MMOL/L (ref 136–145)
VENTRICULAR RATE, ECG03: 55 BPM
WBC # BLD AUTO: 6.5 K/UL (ref 4.3–11.1)

## 2019-06-22 PROCEDURE — 99285 EMERGENCY DEPT VISIT HI MDM: CPT | Performed by: EMERGENCY MEDICINE

## 2019-06-22 PROCEDURE — 70450 CT HEAD/BRAIN W/O DYE: CPT

## 2019-06-22 PROCEDURE — 86580 TB INTRADERMAL TEST: CPT | Performed by: EMERGENCY MEDICINE

## 2019-06-22 PROCEDURE — 74011000302 HC RX REV CODE- 302: Performed by: EMERGENCY MEDICINE

## 2019-06-22 PROCEDURE — 80053 COMPREHEN METABOLIC PANEL: CPT

## 2019-06-22 PROCEDURE — 93005 ELECTROCARDIOGRAM TRACING: CPT | Performed by: EMERGENCY MEDICINE

## 2019-06-22 PROCEDURE — 85025 COMPLETE CBC W/AUTO DIFF WBC: CPT

## 2019-06-22 RX ORDER — LEVOTHYROXINE SODIUM 125 UG/1
125 TABLET ORAL
Status: DISCONTINUED | OUTPATIENT
Start: 2019-06-23 | End: 2019-06-28 | Stop reason: HOSPADM

## 2019-06-22 RX ORDER — ATORVASTATIN CALCIUM 40 MG/1
20 TABLET, FILM COATED ORAL DAILY
Status: DISCONTINUED | OUTPATIENT
Start: 2019-06-23 | End: 2019-06-28 | Stop reason: HOSPADM

## 2019-06-22 RX ORDER — HYDROCHLOROTHIAZIDE 25 MG/1
25 TABLET ORAL DAILY
Status: DISCONTINUED | OUTPATIENT
Start: 2019-06-23 | End: 2019-06-28 | Stop reason: HOSPADM

## 2019-06-22 RX ADMIN — TUBERCULIN PURIFIED PROTEIN DERIVATIVE 5 UNITS: 5 INJECTION, SOLUTION INTRADERMAL at 15:48

## 2019-06-22 SDOH — ECONOMIC STABILITY - HOUSING INSECURITY: HOMELESSNESS UNSPECIFIED: Z59.00

## 2019-06-22 NOTE — ED PROVIDER NOTES
Patient is a vague historian. She tells me she was told by the courts yesterday she could not state where she is living. She has been working with DSS to get another place to stay. She called EMS this morning because she complains of dizziness and some headache. Denies vertigo denies lightheadedness or syncope. But states she is dizzy and is worse when she is up and around. She uses a walker to get around but has difficulty because of arthritis in her knees cannot find her medications. She is recently changed primary care doctor. Has a history of hypertension and diabetes. Remote history of PE. The history is provided by the patient. Headache    This is a new problem. The current episode started yesterday. The problem occurs constantly. The problem has not changed since onset. The headache is aggravated by nothing. The pain is located in the generalized region. The pain is mild. Associated symptoms include dizziness. Pertinent negatives include no fever, no near-syncope, no palpitations, no syncope, no shortness of breath, no weakness, no tingling, no visual change, no nausea and no vomiting. She has tried nothing for the symptoms.         Past Medical History:   Diagnosis Date    Arthritis     Asthma     Chronic kidney disease     Ear problems     Gastrointestinal disorder     acid reflux, hernia    Hyperlipidemia     Hypertension     Insomnia     Other ill-defined conditions(799.89)     blood clot on lung    Stroke ([facility] [address]     Thyroid disease     Tinnitus     Vitamin D deficiency        Past Surgical History:   Procedure Laterality Date    APPENDECTOMY      HX APPENDECTOMY      HX GYN  [age]    c-sec    HX HEENT      t & a    HX OTHER SURGICAL      fatty tissue removed from under arm         Family History:   Problem Relation Age of Onset    No Known Problems Mother     No Known Problems Father     No Known Problems Maternal Grandmother     No Known Problems Maternal Grandfather  No Known Problems Paternal Grandmother     No Known Problems Paternal Grandfather        Social History     Socioeconomic History    Marital status:      Spouse name: Not on file    Number of children: Not on file    Years of education: Not on file    Highest education level: Not on file   Occupational History    Not on file   Social Needs    Financial resource strain: Not on file    Food insecurity:     Worry: Not on file     Inability: Not on file    Transportation needs:     Medical: Not on file     Non-medical: Not on file   Tobacco Use    Smoking status: Never Smoker    Smokeless tobacco: Never Used   Substance and Sexual Activity    Alcohol use: No     Alcohol/week: 0.0 oz    Drug use: No    Sexual activity: Never   Lifestyle    Physical activity:     Days per week: Not on file     Minutes per session: Not on file    Stress: Not on file   Relationships    Social connections:     Talks on phone: Not on file     Gets together: Not on file     Attends Spiritism service: Not on file     Active member of club or organization: Not on file     Attends meetings of clubs or organizations: Not on file     Relationship status: Not on file    Intimate partner violence:     Fear of current or ex partner: Not on file     Emotionally abused: Not on file     Physically abused: Not on file     Forced sexual activity: Not on file   Other Topics Concern    Not on file   Social History Narrative    Not on file         ALLERGIES: Aspirin; Ciprofloxacin; Diflucan [fluconazole]; Glucophage [metformin]; Motrin [ibuprofen]; Neurontin [gabapentin]; Nsaids (non-steroidal anti-inflammatory drug); Septra [sulfamethoprim ds]; Sulfa (sulfonamide antibiotics); Sulfabenzamide; Tessalon perles [benzonatate]; and Vibramycin [doxycycline calcium]    Review of Systems   Constitutional: Negative for chills and fever. Respiratory: Negative for cough and shortness of breath.     Cardiovascular: Negative for chest pain, palpitations, syncope and near-syncope. Gastrointestinal: Negative for abdominal pain, diarrhea, nausea and vomiting. Genitourinary: Negative for dysuria and flank pain. Musculoskeletal: Negative for back pain and neck pain. Skin: Negative for color change and rash. Neurological: Positive for dizziness and headaches. Negative for tingling, syncope, speech difficulty, weakness and light-headedness. All other systems reviewed and are negative. Vitals:    06/22/19 1046   BP: 140/69   Pulse: (!) 59   Resp: 16   Temp: 97.7 °F (36.5 °C)   SpO2: 94%   Weight: 113.9 kg (251 lb)   Height: 5' 3\" (1.6 m)            Physical Exam   Constitutional: She is oriented to person, place, and time. She appears well-developed and well-nourished. No distress. HENT:   Head: Normocephalic and atraumatic. Right Ear: External ear normal.   Left Ear: External ear normal.   Mouth/Throat: Oropharynx is clear and moist. No oropharyngeal exudate. Eyes: Pupils are equal, round, and reactive to light. Conjunctivae and EOM are normal.   Neck: Normal range of motion. Neck supple. Cardiovascular: Normal rate, regular rhythm and intact distal pulses. No murmur heard. Pulmonary/Chest: Breath sounds normal. No respiratory distress. Abdominal: Soft. Bowel sounds are normal. She exhibits no mass. There is no tenderness. There is no rebound and no guarding. No hernia. Neurological: She is alert and oriented to person, place, and time. Gait normal.   Nl speech  No drift. Normal finger-nose testing. Skin: Skin is warm and dry. Psychiatric: She has a normal mood and affect. Her speech is normal.   Nursing note and vitals reviewed. MDM  Number of Diagnoses or Management Options  Diagnosis management comments: Screening lab work. Attempt to contact DSS and case management.        Amount and/or Complexity of Data Reviewed  Clinical lab tests: ordered and reviewed  Tests in the radiology section of CPT®: reviewed and ordered  Tests in the medicine section of CPT®: ordered and reviewed  Independent visualization of images, tracings, or specimens: yes    Risk of Complications, Morbidity, and/or Mortality  Presenting problems: moderate  Diagnostic procedures: minimal  Management options: low    Patient Progress  Patient progress: stable         Procedures    Results Include:    Recent Results (from the past 24 hour(s))   CBC WITH AUTOMATED DIFF    Collection Time: 06/22/19 11:14 AM   Result Value Ref Range    WBC 6.5 4.3 - 11.1 K/uL    RBC 5.35 (H) 4.05 - 5.2 M/uL    HGB 15.2 11.7 - 15.4 g/dL    HCT 47.9 (H) 35.8 - 46.3 %    MCV 89.5 79.6 - 97.8 FL    MCH 28.4 26.1 - 32.9 PG    MCHC 31.7 31.4 - 35.0 g/dL    RDW 14.2 11.9 - 14.6 %    PLATELET 419 407 - 074 K/uL    MPV 11.7 9.4 - 12.3 FL    ABSOLUTE NRBC 0.00 0.0 - 0.2 K/uL    DF AUTOMATED      NEUTROPHILS 69 43 - 78 %    LYMPHOCYTES 25 13 - 44 %    MONOCYTES 6 4.0 - 12.0 %    EOSINOPHILS 0 (L) 0.5 - 7.8 %    BASOPHILS 0 0.0 - 2.0 %    IMMATURE GRANULOCYTES 0 0.0 - 5.0 %    ABS. NEUTROPHILS 4.4 1.7 - 8.2 K/UL    ABS. LYMPHOCYTES 1.6 0.5 - 4.6 K/UL    ABS. MONOCYTES 0.4 0.1 - 1.3 K/UL    ABS. EOSINOPHILS 0.0 0.0 - 0.8 K/UL    ABS. BASOPHILS 0.0 0.0 - 0.2 K/UL    ABS. IMM. GRANS. 0.0 0.0 - 0.5 K/UL   METABOLIC PANEL, COMPREHENSIVE    Collection Time: 06/22/19 11:14 AM   Result Value Ref Range    Sodium 141 136 - 145 mmol/L    Potassium 3.2 (L) 3.5 - 5.1 mmol/L    Chloride 103 98 - 107 mmol/L    CO2 29 21 - 32 mmol/L    Anion gap 9 7 - 16 mmol/L    Glucose 121 (H) 65 - 100 mg/dL    BUN 10 8 - 23 MG/DL    Creatinine 0.71 0.6 - 1.0 MG/DL    GFR est AA >60 >60 ml/min/1.73m2    GFR est non-AA >60 >60 ml/min/1.73m2    Calcium 9.4 8.3 - 10.4 MG/DL    Bilirubin, total 0.9 0.2 - 1.1 MG/DL    ALT (SGPT) 13 12 - 65 U/L    AST (SGOT) 16 15 - 37 U/L    Alk.  phosphatase 100 50 - 136 U/L    Protein, total 6.7 6.3 - 8.2 g/dL    Albumin 3.3 3.2 - 4.6 g/dL    Globulin 3.4 2.3 - 3.5 g/dL    A-G Ratio 1.0 (L) 1.2 - 3.5     EKG, 12 LEAD, INITIAL    Collection Time: 06/22/19 11:25 AM   Result Value Ref Range    Ventricular Rate 55 BPM    Atrial Rate 55 BPM    P-R Interval 204 ms    QRS Duration 86 ms    Q-T Interval 448 ms    QTC Calculation (Bezet) 428 ms    Calculated P Axis 75 degrees    Calculated R Axis -2 degrees    Calculated T Axis 7 degrees    Diagnosis       !! AGE AND GENDER SPECIFIC ECG ANALYSIS !! Sinus bradycardia  Low voltage QRS  Cannot rule out Anterior infarct , age undetermined  Abnormal ECG  When compared with ECG of 31-MAY-2019 16:28,  Nonspecific T wave abnormality now evident in Inferior leads       Ct Head Wo Cont    Result Date: 6/22/2019  NONCONTRAST HEAD CT CLINICAL HISTORY:  Headaches and dizziness. TECHNIQUE:  Axial images were obtained with spiral technique. Radiation dose reduction was achieved using one or all of the following techniques: automated exposure control, weight-based dosing, iterative reconstruction. COMPARISON:  August 1, 2017. REPORT:   Standard non contrast head CT demonstrates no definite intracranial mass effect, hemorrhage, or evidence of acute geographic infarction. Extensive white matter hypodensities are most consistent with small vessel ischemic disease. The ventricles are normal in size and configuration, accounting for the patient's age. Orbits and  paranasal sinuses are clear where imaged. Bone windows demonstrate no definite fracture or destruction. IMPRESSION:     EXTENSIVE SMALL VESSEL ISCHEMIC DISEASE WITH NO ACUTE INTRACRANIAL ABNORMALITY IDENTIFIED AT NONCONTRAST CT. No emergency medical condition identified.  is familiar with the patient. They will contact DSS and get further background on the story.  asked that please called to evaluate patient to see if they needed to be placed in a PC. Please evaluate the situation and I think that is necessary for proper.   Patient having second thoughts and would consent to placement. PPD ordered. Will order PT and OT evaluation.

## 2019-06-22 NOTE — ED NOTES
Patient notified that she will be staying in ER tonight, until PT and OT can assess her tomorrow. Pt to be moved into room when one is available.

## 2019-06-22 NOTE — PROGRESS NOTES
BERYL spoke with [phone] option [phone]. They have agreed to send a deputy to assess pt and they have the contact information for Finesse CHILD with [address] [facility] for follow up.

## 2019-06-22 NOTE — ED TRIAGE NOTES
Pt arrives via EMS from home with c/o dizziness, but has situational issues as well. Pt is not able to return home because DSS states she cannot be there and pt brought her belongings with her. Pt states she woke up dizzy this morning. Pt states she has dizziness a lot, but she is under pressure because she has to move today and doesn't have anywhere to go. Pt states [facility] told the pt they are trying to find her a place to go and she is under APC with [facility]. Pt states Addi Askew is the agent she is dealing with in [facility].  Pt denies dizziness right now, but states she now has a HA over her left eye and states she can't give a number, it just isn't hurting all that bad

## 2019-06-22 NOTE — PROGRESS NOTES
Pt was seen by [facility] but was not placed in emergency custody. PPD and therapies have been ordered for any rehab needs. LMSW spoke back with DSS APS worker Maryann Holman and she will follow up with ED CM staff Monday am to assist with coordination of pt placement at the appropriate level of care.

## 2019-06-22 NOTE — PROGRESS NOTES
LMSW met this pt about 3 weeks ago when she came to the ED after she first received the eviction notice from her residence. Pt was referred to APS at that time and she returned to her home where she resides with an adult unemployed son. Pt returns today saying she is now evicted and has no place to go. She reported that name of her APS worker and this LMSW has spoken with Fern Padilla with [address] [phone] about this pt. She has seen pt in the community and offered. to assist with placement in a boarding home or [facility] but pt refused as she wants her own place. Pt cannot go to a shelter unless she can manage her own care without any physical assistance and she appears to need some physical support for activity. Pt did receive her SSA check this month so she can opt to go to a motel with her son if she has not exhausted her funds. Karla Eastman advised us to contact [employer] to have pt assessed for [facility] then she would be placed in a boarding home or other facility by APS without her agreement if necessary. BERYL updated MD about conversation with DSS .

## 2019-06-22 NOTE — ED NOTES
Patient report received from [facility]. Patient care to be assumed at this time.

## 2019-06-23 LAB — GLUCOSE BLD STRIP.AUTO-MCNC: 96 MG/DL (ref 65–100)

## 2019-06-23 PROCEDURE — 74011250637 HC RX REV CODE- 250/637: Performed by: EMERGENCY MEDICINE

## 2019-06-23 PROCEDURE — 97530 THERAPEUTIC ACTIVITIES: CPT

## 2019-06-23 PROCEDURE — 97161 PT EVAL LOW COMPLEX 20 MIN: CPT

## 2019-06-23 PROCEDURE — 82962 GLUCOSE BLOOD TEST: CPT

## 2019-06-23 RX ORDER — LORATADINE 10 MG/1
10 TABLET ORAL DAILY
Status: DISCONTINUED | OUTPATIENT
Start: 2019-06-24 | End: 2019-06-28 | Stop reason: HOSPADM

## 2019-06-23 RX ADMIN — HYDROCHLOROTHIAZIDE 25 MG: 25 TABLET ORAL at 09:25

## 2019-06-23 RX ADMIN — ATORVASTATIN CALCIUM 20 MG: 40 TABLET, FILM COATED ORAL at 09:24

## 2019-06-23 RX ADMIN — SITAGLIPTIN 100 MG: 100 TABLET, FILM COATED ORAL at 09:25

## 2019-06-23 RX ADMIN — LEVOTHYROXINE SODIUM 125 MCG: 125 TABLET ORAL at 09:24

## 2019-06-23 NOTE — ED NOTES
Pt resting in bed, eyes closed, arouse to verbal, sitter at bedside, bed low and locked, will continue to monitor

## 2019-06-23 NOTE — ED NOTES
Patient up to bedside commode with assistance and without difficulty. Patient states she is going to attempt to get some more sleep. No further needs expressed or apparent at this time.

## 2019-06-23 NOTE — ED NOTES
Full report to [employer]. Care assumed by that RN at this time. Oncoming RN aware of plan for coordination with social work for PT/OT eval to secure possible placement in assisted living. Patient with some routine meds to be given in AM. Patient restful at current with eyes closed and even, non-labored respirations.

## 2019-06-23 NOTE — ED NOTES
Bedside rounding completed - pt resting on stretcher - pt awaiting PT/OT evaluation - will continue to monitor

## 2019-06-23 NOTE — ED NOTES
Patient sitting upright in stretcher, appears in no acute distress. Belongings at bedside. Patient denies having any needs at this time. Will continue to monitor.

## 2019-06-23 NOTE — PROGRESS NOTES
Problem: Mobility Impaired (Adult and Pediatric)  Goal: *Acute Goals and Plan of Care (Insert Text)  Description  LTG:  (1.)Ms. Ashley Mckee will move from supine to sit and sit to supine , scoot up and down and roll side to side in bed with MODIFIED INDEPENDENCE within 7 treatment day(s). (2.)Ms. Ashley Mckee will transfer from bed to chair and chair to bed with SUPERVISION using the least restrictive device within 7 treatment day(s). (3.)Ms. Ashley Mckee will ambulate with SUPERVISION for 100 feet with the least restrictive device within 7 treatment day(s). (4.)Ms. Ashley Mckee will participate in therapeutic activity/exercises x 23 minutes for increased strength within 7 treatment days. ________________________________________________________________________________________________     Outcome: Progressing Towards Goal     PHYSICAL THERAPY: Initial Assessment and PM 6/23/2019  EMERGENCY: PT Visit Days : 1  Payor: Devon Shay / Plan: [address] [place] [address] HMO / Product Type: [employer] /       NAME/AGE/GENDER: Eugene Royal is a 67 y.o. female   PRIMARY DIAGNOSIS: No admission diagnoses are documented for this encounter. <principal problem not specified>   <principal problem not specified>          ICD-10: Treatment Diagnosis:    Generalized Muscle Weakness (M62.81)  History of falling (Z91.81)   Precaution/Allergies:  Aspirin; Ciprofloxacin; Diflucan [fluconazole]; Glucophage [metformin]; Motrin [ibuprofen]; Neurontin [gabapentin]; Nsaids (non-steroidal anti-inflammatory drug); Septra [sulfamethoprim ds]; Sulfa (sulfonamide antibiotics); Sulfabenzamide; Tessalon perles [benzonatate]; and Vibramycin [doxycycline calcium]      ASSESSMENT:     Ms. Ashley Mckee is a 67 y.o. female in the hospital for the above who was sitting on the edge of the stretcher upon arrival.  Pt reports that she was recently evicted and has no place to go. She stated that she has a daughter but doesn't feel she is very supportive.   Pt also reported that PTA she was getting assistance from aid with ADLs and ambulated with RW for household distnaces. Pt admitted to several recent falls in the past year. Ms. Donta Hammonds presents to PT with decreased B hip flexion AROM and strength but WFL PROM. During evaluation pt performed STS with with CGA and good to fair standing balance. Pt ambulated short distance with CGA/RW demonstrating shuffling gait with decreased gait speed. Ms. Donta Hammonds could benefit from skilled PT as she is currently functioning below her baseline. Pt received treatment of therapeutic activity. She ambulated in zhang with RW/gait belt while receiving CGA-SBA. Pt noted to push RW too far in from and attempted to correct several times with pt stating she knew what she was going. Pt took standing rest break and SpO2 after walk recorded at 93% on room air. Pt also performed several exercises sitting on the edge of the stretcher. This section established at most recent assessment   PROBLEM LIST (Impairments causing functional limitations):  Decreased Strength  Decreased ADL/Functional Activities  Decreased Ambulation Ability/Technique  Decreased Balance   INTERVENTIONS PLANNED: (Benefits and precautions of physical therapy have been discussed with the patient.)  Balance Exercise  Bed Mobility  Family Education  Gait Training  Therapeutic Activites  Therapeutic Exercise/Strengthening  Transfer Training     TREATMENT PLAN: Frequency/Duration: 3 times a week for duration of hospital stay  Rehabilitation Potential For Stated Goals: Good     REHAB RECOMMENDATIONS (at time of discharge pending progress):    Placement: It is my opinion, based on this patient's performance to date, that Ms. Donta Hammonds may benefit from participating in 1-2 additional therapy sessions in order to continue to assess for rehab potential and then make recommendation for disposition at discharge.   Equipment:   None at this time              HISTORY:   History of Present Injury/Illness (Reason for Referral):  See H&P  Past Medical History/Comorbidities:   Ms. Donta Hammonds  has a past medical history of Arthritis, Asthma, Chronic kidney disease, Ear problems, Gastrointestinal disorder, Hyperlipidemia, Hypertension, Insomnia, Other ill-defined conditions(799.89), Stroke [facility]), Thyroid disease, Tinnitus, and Vitamin D deficiency. Ms. Donta Hammonds  has a past surgical history that includes hx gyn ([age]); hx appendectomy; hx heent; hx other surgical; and pr appendectomy. Social History/Living Environment:   Patient Expects to be Discharged to[de-identified] Unknown  Current DME Used/Available at Home: Walker, rolling  Prior Level of Function/Work/Activity:  Recently homeless and reporting PTA getting help with ADLs. RW for household ambulation. Recent falls reported.      Number of Personal Factors/Comorbidities that affect the Plan of Care: 3+: HIGH COMPLEXITY   EXAMINATION:   Most Recent Physical Functioning:   Gross Assessment:  AROM: Generally decreased, functional(B hip flexion)  PROM: Within functional limits(B hip flexion)  Strength: Generally decreased, functional(B hip flexion 3-/5)               Posture:  Posture (WDL): Exceptions to WDL  Posture Assessment: Rounded shoulders  Balance:  Sitting: Intact  Standing: Impaired  Standing - Static: Good  Standing - Dynamic : Fair Bed Mobility:     Wheelchair Mobility:     Transfers:  Sit to Stand: Contact guard assistance  Stand to Sit: Stand-by assistance  Gait:     Base of Support: Widened  Speed/Anju: Slow;Shuffled  Step Length: Left shortened;Right shortened  Gait Abnormalities: Decreased step clearance;Shuffling gait  Distance (ft): 80 Feet (ft)(x 3)  Assistive Device: Walker, rolling;Gait belt  Ambulation - Level of Assistance: Contact guard assistance;Stand-by assistance      Body Structures Involved:  Muscles Body Functions Affected:  Neuromusculoskeletal  Movement Related Activities and Participation Affected:  General Tasks and Demands  Mobility  Self Care  Domestic Life  Community, Social and [place]   Number of elements that affect the Plan of Care: 4+: HIGH COMPLEXITY   CLINICAL PRESENTATION:   Presentation: Stable and uncomplicated: LOW COMPLEXITY   CLINICAL DECISION MAKIN [address]? ?6 Clicks? Basic Mobility Inpatient Short Form  How much difficulty does the patient currently have. .. Unable A Lot A Little None   1. Turning over in bed (including adjusting bedclothes, sheets and blankets)? ? 1   ? 2   ? 3   ? 4   2. Sitting down on and standing up from a chair with arms ( e.g., wheelchair, bedside commode, etc.)   ? 1   ? 2   ? 3   ? 4   3. Moving from lying on back to sitting on the side of the bed?   ? 1   ? 2   ? 3   ? 4   How much help from another person does the patient currently need. .. Total A Lot A Little None   4. Moving to and from a bed to a chair (including a wheelchair)? ? 1   ? 2   ? 3   ? 4   5. Need to walk in hospital room? ? 1   ? 2   ? 3   ? 4   6. Climbing 3-5 steps with a railing? ? 1   ? 2   ? 3   ? 4   © 2007, Trustees of [address] [place] [address] [address] [address], under license to [employer]. All rights reserved      Score:  Initial: 21 Most Recent: X (Date: -- )    Interpretation of Tool:  Represents activities that are increasingly more difficult (i.e. Bed mobility, Transfers, Gait). Medical Necessity:     Patient demonstrates good   rehab potential due to higher previous functional level. Reason for Services/Other Comments:  Patient continues to require skilled intervention due to decreased balance   .    Use of outcome tool(s) and clinical judgement create a POC that gives a: Clear prediction of patient's progress: LOW COMPLEXITY            TREATMENT:   (In addition to Assessment/Re-Assessment sessions the following treatments were rendered)   Pre-treatment Symptoms/Complaints:  None  Pain: Initial:   Pain Intensity 1: 0  Post Session:  0     Therapeutic Activity: (    8 minutes): Therapeutic activities including STS transfers, Ambulation on level ground and seated exercises  to improve mobility, strength and balance. Required minimal cuing   to promote dynamic balance in standing and safe use of RW . Date:  6/23/19 Date:   Date:     Activity/Exercise Parameters Parameters Parameters   APs 1 x 20 B     LAQ 1 x 15 B     Marching 1 x 5 B                                 Braces/Orthotics/Lines/Etc:   O2 Device: Room air  Treatment/Session Assessment:    Response to Treatment:  Tolerated well but exhibited decreased safety awareness. Interdisciplinary Collaboration:   Physical Therapist  Registered Nurse  After treatment position/precautions:   Sitting EOB with RN alerted    Compliance with Program/Exercises: Will assess as treatment progresses  Recommendations/Intent for next treatment session: \"Next visit will focus on advancements to more challenging activities and reduction in assistance provided\".   Total Treatment Duration:  PT Patient Time In/Time Out  Time In: 1307  Time Out: [address] PT, DPT

## 2019-06-23 NOTE — ED NOTES
Patient reports taking her nighttime home medication to help sleep, temazepam.  Dr. Michela Choe aware. Patient also requesting 2L NC O2 and states she she sleeps with O2 at night and uses as needed at home. Patient denies having any other needs at this time. Will continue to monitor.

## 2019-06-23 NOTE — ED NOTES
Vital signs updated. Patient requesting more food. Patient has been provided turkey sandwich tray and soda. Items have been provided. Patient appears in no acute distress. Denies having any needs at this time. Will continue to monitor.

## 2019-06-23 NOTE — ED NOTES
Full report from [employer]. Care assumed by this RN at this time. At current, patient restful in bed with eyes closed, even non-labored respirations. No needs expressed or apparent at this time.

## 2019-06-23 NOTE — ED NOTES
Bedside commode placed in patient's room due to location from restroom. Patient denies having any other needs. Will continue to monitor.

## 2019-06-23 NOTE — ED NOTES
Pt resting in bed, eyes closed, arouse to verbal, bedside commode and walker at bedside, 2X side rails in place, will continue to monitor

## 2019-06-23 NOTE — ED NOTES
Patient  Yes - Name: ludy    Patient  Oriented YES   High risk patients are in line of sight at all times Yes   Excess equipment/medical supplies not necessary for the care of the patient removed Yes   All sharp or dangerous objects are removed from room: including but not limited to belts, pens & pencils, needles, medications, cosmetics, lighters, matches, nail files, watches, necklaces, glass objects, razors, razor blades, knives, aerosol sprays, drawstring pants, shoes, cords (telephone, call bells, etc.) cleaning wipes or other cleaning items, aluminum cans, not permanently attached wall décor Yes   Telephone/cell phone removed as well as TV remote (batteries can be swallowed) Yes   Patient belongings removed and labeled at nurses station Yes   Excess linen is removed from room Yes   All plastic bags are removed from the room and replaced with paper trash bags Yes   Patient is in gown and using hospital socks with rubber soles Yes   No metal, hard eating utensils or hard plates are on meal tray Yes   Remove all cleaning agents used by [employer] Yes   Ensure bathroom door key is easily accessible Yes   If Crucifix is hanging on a nail, remove Crucifix as well as the nail Yes       *If any question above is answered \"No,\" documentation is required.

## 2019-06-24 PROCEDURE — 97165 OT EVAL LOW COMPLEX 30 MIN: CPT

## 2019-06-24 NOTE — PROGRESS NOTES
Problem: Self Care Deficits Care Plan (Adult)  Goal: *Acute Goals and Plan of Care (Insert Text)  Description     1. Pt will complete functional mobility for ADLs with mod I  2. Pt will complete lower body dressing with independence using AE as needed  3. Pt will complete grooming and hygiene at sink with independence  4. Pt will demonstrate independence with HEP to promote increased BUE strength and functional use for ADLs      Timeframe: 7 days     Outcome: Progressing Towards Goal     OCCUPATIONAL THERAPY: Initial Assessment 6/24/2019  EMERGENCY:    Payor: Otis Chauhan / Plan: [address] [place] [address] HMO / Product Type: Managed Care Medicare /      NAME/AGE/GENDER: Kady Talbot is a 67 y.o. female   PRIMARY DIAGNOSIS:  No admission diagnoses are documented for this encounter. <principal problem not specified>   <principal problem not specified>          ICD-10: Treatment Diagnosis:    Dizziness and Giddiness (R42)   Precautions/Allergies:     Aspirin; Ciprofloxacin; Diflucan [fluconazole]; Glucophage [metformin]; Motrin [ibuprofen]; Neurontin [gabapentin]; Nsaids (non-steroidal anti-inflammatory drug); Septra [sulfamethoprim ds]; Sulfa (sulfonamide antibiotics); Sulfabenzamide; Tessalon perles [benzonatate]; and Vibramycin [doxycycline calcium]      ASSESSMENT:     Ms. Alysia Daniel was admitted with dizziness. Pt just got evicted and is now homeless, is dealing with underlying social issues. Pt reports that she is independent at baseline with RW for mobility, endorses 1-2 recent falls. This session, pt demonstrated ADLs (donned/ doffed shoes, hygiene at sink, feeding) and mobility for ADLs with SBA using RW. BUE strength is generally decreased, grossly 4- to 4/5. Pt defensive throughout session, stated, \"I'm not crazy\" with interview questions and attempted use of gait belt. Pt appears to be close to her functional baseline, may benefit from 1-2 additional sessions to maximize safety and independence.      This section established at most recent assessment   PROBLEM LIST (Impairments causing functional limitations):  Decreased Strength  Decreased ADL/Functional Activities  Decreased Transfer Abilities  Decreased Activity Tolerance  Decreased Cognition   INTERVENTIONS PLANNED: (Benefits and precautions of occupational therapy have been discussed with the patient.)  Activities of daily living training  Adaptive equipment training  Balance training  Therapeutic activity  Therapeutic exercise     TREATMENT PLAN: Frequency/Duration: Follow patient 2 times/ week to address above goals. Rehabilitation Potential For Stated Goals: [facility] (at time of discharge pending progress):    Placement:  Pt will likely not require further skilled OT services post d/c   Equipment:   None at this time              Louis 86:   History of Present Injury/Illness (Reason for Referral):  See H&P  Past Medical History/Comorbidities:   Ms. Evans Parker  has a past medical history of Arthritis, Asthma, Chronic kidney disease, Ear problems, Gastrointestinal disorder, Hyperlipidemia, Hypertension, Insomnia, Other ill-defined conditions(799.89), Stroke [facility]), Thyroid disease, Tinnitus, and Vitamin D deficiency. Ms. Evans Parker  has a past surgical history that includes hx gyn ([age]); hx appendectomy; hx heent; hx other surgical; and pr appendectomy.   Social History/Living Environment:   Home Environment: (homeless/ just got evicted)  Patient Expects to be Discharged to[de-identified] Unknown  Current DME Used/Available at Home: Walker, rolling  Prior Level of Function/Work/Activity:  Independent, lived with son but just got evicted, RW for mobility     Number of Personal Factors/Comorbidities that affect the Plan of Care: Brief history (0):  LOW COMPLEXITY   ASSESSMENT OF OCCUPATIONAL PERFORMANCE[de-identified]   Activities of Daily Living:   Basic ADLs (From Assessment) Complex ADLs (From Assessment)   Feeding: Independent  Oral Facial Hygiene/Grooming: Stand-by assistance  Bathing: Stand-by assistance  Upper Body Dressing: Independent  Lower Body Dressing: Setup  Toileting: Stand by assistance Instrumental ADL  Meal Preparation: Minimum assistance  Homemaking: Minimum assistance   Grooming/Bathing/Dressing Activities of Daily Living     Cognitive Retraining  Safety/Judgement: Fall prevention                       Bed/Mat Mobility  Supine to Sit: Stand-by assistance  Sit to Supine: Stand-by assistance  Sit to Stand: Stand-by assistance  Stand to Sit: Stand-by assistance  Scooting: Stand-by assistance     Most Recent Physical Functioning:   Gross Assessment:  AROM: Within functional limits  Strength: Generally decreased, functional               Posture:  Posture (WDL): Exceptions to WDL  Posture Assessment: Rounded shoulders  Balance:  Sitting: Intact  Standing: Intact; With support Bed Mobility:  Supine to Sit: Stand-by assistance  Sit to Supine: Stand-by assistance  Scooting: Stand-by assistance  Wheelchair Mobility:     Transfers:  Sit to Stand: Stand-by assistance  Stand to Sit: Stand-by assistance            No data found. Mental Status  Neurologic State: Alert  Orientation Level: Oriented X4  Cognition: Follows commands  Perception: Appears intact  Perseveration: No perseveration noted  Safety/Judgement: Fall prevention                          Physical Skills Involved:  Strength  Activity Tolerance Cognitive Skills Affected (resulting in the inability to perform in a timely and safe manner):  Executive Function Psychosocial Skills Affected:  Habits/Routines  Self-Awareness   Number of elements that affect the Plan of Care: 1-3:  LOW COMPLEXITY   CLINICAL DECISION MAKIN [address]? ?6 Clicks? Daily Activity Inpatient Short Form  How much help from another person does the patient currently need. .. Total A Lot A Little None   1. Putting on and taking off regular lower body clothing? ? 1   ? 2   ? 3   ? 4   2. Bathing (including washing, rinsing, drying)? ? 1   ? 2   ? 3   ? 4   3. Toileting, which includes using toilet, bedpan or urinal?   ? 1   ? 2   ? 3   ? 4   4. Putting on and taking off regular upper body clothing? ? 1   ? 2   ? 3   ? 4   5. Taking care of personal grooming such as brushing teeth? ? 1   ? 2   ? 3   ? 4   6. Eating meals? ? 1   ? 2   ? 3   ? 4   © 2007, Trustees of [facility] [employer], under license to [employer]. All rights reserved      Score:  Initial: 22 Most Recent: X (Date: -- )    Interpretation of Tool:  Represents activities that are increasingly more difficult (i.e. Bed mobility, Transfers, Gait). Medical Necessity:     Patient demonstrates good   rehab potential due to higher previous functional level. Reason for Services/Other Comments:  Patient continues to require present interventions due to patient's inability to complete ADLs at baseline level of function   . Use of outcome tool(s) and clinical judgement create a POC that gives a: LOW COMPLEXITY         TREATMENT:   (In addition to Assessment/Re-Assessment sessions the following treatments were rendered)     Pre-treatment Symptoms/Complaints:    Pain: Initial:   Pain Intensity 1: 0  Post Session:  0     Assessment/Reassessment only, no treatment provided today    Braces/Orthotics/Lines/Etc:   O2 Device: Nasal cannula  Treatment/Session Assessment:    Response to Treatment:  no adverse reaction   Interdisciplinary Collaboration:   Occupational Therapist  Registered Nurse  After treatment position/precautions:   Bed/Chair-wheels locked  Bed in low position  Call light within reach  RN notified   Compliance with Program/Exercises: Will assess as treatment progresses. Recommendations/Intent for next treatment session: \"Next visit will focus on advancements to more challenging activities and reduction in assistance provided\".   Total Treatment Duration:  OT Patient Time In/Time Out  Time In: 0846  Time Out: 9[address] Dallas Suarez, OT

## 2019-06-24 NOTE — ED NOTES
Full report to [employer] Care assumed by that RN at this time. At current, patient restful in bed with eyes closed and even, non-labored respirations. Plan for social work to consult this AM to hopefully place patient in assisted living. No needs expressed or apparent at current.

## 2019-06-24 NOTE — ED NOTES
Report from Rosalio Antonio, [address].  Transfer of care at this time

## 2019-06-24 NOTE — ED NOTES
Pt assisted to bedside commode, assisted back to bed, side rails in place, given ice water at bedside

## 2019-06-24 NOTE — PROGRESS NOTES
No call back from Marialuisa Kenny at [place] [address]. Spoke with Dahiana Power ([phone]) with [facility] / she is patient's CM. She states she is aware of status, and also aware that online referral made to [facility] for NH placement. Called patients friend Jerome Franc [phone]) per patient's request  / no answer / left VM.

## 2019-06-24 NOTE — ED NOTES
Bedside rounding completed - this RN assuming care of patient - report received from Andria Young [facility]

## 2019-06-24 NOTE — ED NOTES
Pt resting in bed, respirations even and unlabored, pt denies any additional needs at this time. Report to John Padron RN for continuation of care.

## 2019-06-24 NOTE — ED NOTES
Pt resting in bed, side rails in place, resp easy, eyes closed, arouse to verbal, bedside commode and walker at bedside, will continue to monitor

## 2019-06-24 NOTE — ED NOTES
Pt resting in stretcher, pt denies any additional needs at this time, respirations even and unlabored.

## 2019-06-25 ENCOUNTER — PATIENT OUTREACH (OUTPATIENT)
Dept: CASE MANAGEMENT | Age: 79
End: 2019-06-25

## 2019-06-25 LAB — GLUCOSE BLD STRIP.AUTO-MCNC: 119 MG/DL (ref 65–100)

## 2019-06-25 PROCEDURE — 74011250637 HC RX REV CODE- 250/637: Performed by: EMERGENCY MEDICINE

## 2019-06-25 PROCEDURE — 97530 THERAPEUTIC ACTIVITIES: CPT

## 2019-06-25 PROCEDURE — 82962 GLUCOSE BLOOD TEST: CPT

## 2019-06-25 RX ADMIN — HYDROCHLOROTHIAZIDE 25 MG: 25 TABLET ORAL at 08:17

## 2019-06-25 RX ADMIN — SITAGLIPTIN 100 MG: 100 TABLET, FILM COATED ORAL at 09:00

## 2019-06-25 RX ADMIN — ATORVASTATIN CALCIUM 20 MG: 40 TABLET, FILM COATED ORAL at 08:17

## 2019-06-25 RX ADMIN — LEVOTHYROXINE SODIUM 125 MCG: 125 TABLET ORAL at 08:17

## 2019-06-25 RX ADMIN — LORATADINE 10 MG: 10 TABLET ORAL at 08:17

## 2019-06-25 NOTE — ED NOTES
This RN assisted patient to the bathroom and washed patients body. Patient put on new clothes and was given new pads. Pt states \"I feel so much better now\". This RN walked the patient back to her room and given lunch meal tray.

## 2019-06-25 NOTE — PROGRESS NOTES
Referral (through [address] [place] [address]) to [facility] and Sami Schwab. Information also sent to [facility] and [facility].

## 2019-06-25 NOTE — PROGRESS NOTES
Problem: Mobility Impaired (Adult and Pediatric)  Goal: *Acute Goals and Plan of Care (Insert Text)  Description  LTG:  (1.)Ms. Justin Espinal will move from supine to sit and sit to supine , scoot up and down and roll side to side in bed with MODIFIED INDEPENDENCE within 7 treatment day(s). Goal met 6/25/19  (2.)Ms. Justin Espinal will transfer from bed to chair and chair to bed with SUPERVISION using the least restrictive device within 7 treatment day(s). (3.)Ms. Justin Espinal will ambulate with SUPERVISION for 100 feet with the least restrictive device within 7 treatment day(s). Goal met 9/25/19  (4.)Ms. Justin Espinal will participate in therapeutic activity/exercises x 23 minutes for increased strength within 7 treatment days. ________________________________________________________________________________________________     Outcome: Progressing Towards Goal     PHYSICAL THERAPY: Daily Note and AM 6/25/2019  EMERGENCY: PT Visit Days : 2  Payor: [employer] / Plan: [address] [place] [address] HMO / Product Type: [employer] Care Medicare /       NAME/AGE/GENDER: Lilly Curtis is a 67 y.o. female   PRIMARY DIAGNOSIS: No admission diagnoses are documented for this encounter. <principal problem not specified>   <principal problem not specified>         ICD-10: Treatment Diagnosis:    · Generalized Muscle Weakness (M62.81)  · History of falling (Z91.81)   Precaution/Allergies:  Aspirin; Ciprofloxacin; Diflucan [fluconazole]; Glucophage [metformin]; Motrin [ibuprofen]; Neurontin [gabapentin]; Nsaids (non-steroidal anti-inflammatory drug); Septra [sulfamethoprim ds]; Sulfa (sulfonamide antibiotics); Sulfabenzamide; Tessalon perles [benzonatate]; and Vibramycin [doxycycline calcium]      ASSESSMENT:     Ms. Justin Espinal is a 67 y.o. female in the hospital for the above sleeping and somewhat hard to arouse however when this writer did get the patient awake she was very rough.   After explaining the reason for this visit the patient stated that is was too early to walk, her knees hurts and she doesn't want to do this right now. This was reported to the RN who entered the room and encouraged the patient to participate. Patient agreeable. This writer donned her socks and shoes and the patient scooted to the edge and stood to the walker. Gait training with the rolling walker x 150 feet with fluctuation blanca. The patient appears to be no happy about participating and is not listening to any of this writer instructions. She even states \"I know what I am doing\"  Patient is returned to the room and as I asked the patient to participate in therapeutic exercises the patient spotted her breakfast and asked that it be heated now so that she could eat. Patient is left sitting edge of the bed eating her breakfast.  Good session. Per eval:  Patient does have some safety issues with her gait. Pt reports that she was recently evicted and has no place to go. She stated that she has a daughter but doesn't feel she is very supportive. Pt also reported that PTA she was getting assistance from aid with ADLs and ambulated with RW for household distnaces. Pt admitted to several recent falls in the past year. Patient would benefit from additional skilled therapy to allow the patient to increased her independence and safety with all functional mobility activities. Will continue PT efforts. This section established at most recent assessment   PROBLEM LIST (Impairments causing functional limitations):  1. Decreased Strength  2. Decreased ADL/Functional Activities  3. Decreased Ambulation Ability/Technique  4. Decreased Balance   INTERVENTIONS PLANNED: (Benefits and precautions of physical therapy have been discussed with the patient.)  1. Balance Exercise  2. Bed Mobility  3. Family Education  4. Gait Training  5. Therapeutic Activites  6. Therapeutic Exercise/Strengthening  7.  Transfer Training     TREATMENT PLAN: Frequency/Duration: 3 times a week for duration of hospital stay  Rehabilitation Potential For Stated Goals: Good     REHAB RECOMMENDATIONS (at time of discharge pending progress):    Placement: It is my opinion, based on this patient's performance to date, that Ms. Yasir Loaiza may benefit from participating in 1-2 additional therapy sessions in order to continue to assess for rehab potential and then make recommendation for disposition at discharge. Equipment:    None at this time              HISTORY:   History of Present Injury/Illness (Reason for Referral):  See H&P  Past Medical History/Comorbidities:   Ms. Yasir Loaiza  has a past medical history of Arthritis, Asthma, Chronic kidney disease, Ear problems, Gastrointestinal disorder, Hyperlipidemia, Hypertension, Insomnia, Other ill-defined conditions(799.89), Stroke [facility]), Thyroid disease, Tinnitus, and Vitamin D deficiency. Ms. Yasir Loaiza  has a past surgical history that includes hx gyn ([age]); hx appendectomy; hx heent; hx other surgical; and pr appendectomy. Social History/Living Environment:   Home Environment: (homeless/ just got evicted)  Patient Expects to be Discharged to[de-identified] Unknown  Current DME Used/Available at Home: Walker, rolling  Prior Level of Function/Work/Activity:  Recently homeless and reporting PTA getting help with ADLs. RW for household ambulation. Recent falls reported.      Number of Personal Factors/Comorbidities that affect the Plan of Care: 3+: HIGH COMPLEXITY   EXAMINATION:   Most Recent Physical Functioning:   Gross Assessment:                  Posture:     Balance:  Sitting: Intact  Standing: Intact  Standing - Static: Good  Standing - Dynamic : Fair Bed Mobility:  Rolling: Supervision;Stand-by assistance  Supine to Sit: Supervision;Stand-by assistance  Scooting: Supervision;Stand-by assistance  Wheelchair Mobility:     Transfers:  Sit to Stand: Stand-by assistance  Stand to Sit: Stand-by assistance  Gait:     Base of Support: Widened  Speed/Anju: Fluctuations  Distance (ft): 150 Feet (ft)  Assistive Device: Walker, rolling  Ambulation - Level of Assistance: Contact guard assistance      Body Structures Involved:  1. Muscles Body Functions Affected:  1. Neuromusculoskeletal  2. Movement Related Activities and Participation Affected:  1. General Tasks and Demands  2. Mobility  3. Self Care  4. Domestic Life  5. Community, Social and [place]   Number of elements that affect the Plan of Care: 4+: HIGH COMPLEXITY   CLINICAL PRESENTATION:   Presentation: Stable and uncomplicated: LOW COMPLEXITY   CLINICAL DECISION MAKIN [facility] Mobility Inpatient Short Form  How much difficulty does the patient currently have. .. Unable A Lot A Little None   1. Turning over in bed (including adjusting bedclothes, sheets and blankets)? ? 1   ? 2   ? 3   ? 4   2. Sitting down on and standing up from a chair with arms ( e.g., wheelchair, bedside commode, etc.)   ? 1   ? 2   ? 3   ? 4   3. Moving from lying on back to sitting on the side of the bed?   ? 1   ? 2   ? 3   ? 4   How much help from another person does the patient currently need. .. Total A Lot A Little None   4. Moving to and from a bed to a chair (including a wheelchair)? ? 1   ? 2   ? 3   ? 4   5. Need to walk in hospital room? ? 1   ? 2   ? 3   ? 4   6. Climbing 3-5 steps with a railing? ? 1   ? 2   ? 3   ? 4   © , Trustees of [facility] [employer], under license to [employer]. All rights reserved      Score:  Initial: 21 Most Recent: X (Date: -- )    Interpretation of Tool:  Represents activities that are increasingly more difficult (i.e. Bed mobility, Transfers, Gait). Medical Necessity:     · Patient demonstrates good  ·  rehab potential due to higher previous functional level. Reason for Services/Other Comments:  · Patient continues to require skilled intervention due to decreased balance   · .    Use of outcome tool(s) and clinical judgement create a POC that gives a: Clear prediction of patient's progress: LOW COMPLEXITY            TREATMENT:   (In addition to Assessment/Re-Assessment sessions the following treatments were rendered)   Pre-treatment Symptoms/Complaints: \"I can't walk this time of morning\"  \"My knees hurt\"  Pain: Initial:   Pain Intensity 1: (no number given)  Post Session:  0     Therapeutic Activity: (    12 minutes): Therapeutic activities including bed mobility, sitting and standing balance activities, STS transfers, Ambulation on level ground x 150 feet   to improve mobility, strength and balance. Required minimal cuing   to promote dynamic balance in standing and safe use of RW . Date:  6/23/19 Date:   Date:     Activity/Exercise Parameters Parameters Parameters   APs 1 x 20 B     LAQ 1 x 15 B     Marching 1 x 5 B                                 Braces/Orthotics/Lines/Etc:   · O2 Device: Room air but 2L when asleep  Treatment/Session Assessment:    · Response to Treatment:  Tolerated well gait speed fluctuates due to behavior  · Interdisciplinary Collaboration:   o Physical Therapy Assistant  o Registered Nurse  · After treatment position/precautions:   o Sitting edge of stretcher eating breakfast   · Compliance with Program/Exercises: Will assess as treatment progresses  · Recommendations/Intent for next treatment session: \"Next visit will focus on advancements to more challenging activities and reduction in assistance provided\".   Total Treatment Duration:  PT Patient Time In/Time Out  Time In: 0950  Time Out: Sunshine Castillo PTA

## 2019-06-25 NOTE — PROGRESS NOTES
SW discussed in-depth with patient about SNF/rehab facilities. Patient states that she is agreeable with the plan. Referral has been made.

## 2019-06-25 NOTE — ED NOTES
This RN gave patient a food tray at this time,  Pt states not wanting to eat any food. Lights off in patients room, VSS. This RN will continue to monitor.

## 2019-06-25 NOTE — PROGRESS NOTES
· This nurse missed phone call from patient last night 6/24/19 at 7:37pm  · Per [facility] patient is currently at the ER at [facility]   · Patient at ER for dizziness and headache but also stating that she has been evicted and is now homeless  · Case management at [facility] working with patient on finding placement at SNF  · Per [facility] patient has an open case with APS at [address]  · This nurse and SW has been trying to work with patient on community resources but patient wouldn't call resources or patient had issues with phone and couldn't answer calls  · Patient non-compliant with working with community resources even after this nurse made calls to the community resources for patient  · Patient has an unemployed adult son that was living with her and may have been one of the reasons she was hesitant in the past about going to a LTC setting  PLAN:  · Will f/u and review patient's chart during hospital stay   · Will f/u for [facility]/CCM if patient were to be discharged to a private residence for living arrangements  This note will not be viewable in [address]

## 2019-06-25 NOTE — ED NOTES
Patient is resting in her bed at this time. VSS. Patient states having no needs at this time. This RN will continue to monitor.

## 2019-06-26 LAB — GLUCOSE BLD STRIP.AUTO-MCNC: 103 MG/DL (ref 65–100)

## 2019-06-26 PROCEDURE — 74011250637 HC RX REV CODE- 250/637: Performed by: EMERGENCY MEDICINE

## 2019-06-26 PROCEDURE — 82962 GLUCOSE BLOOD TEST: CPT

## 2019-06-26 RX ADMIN — LORATADINE 10 MG: 10 TABLET ORAL at 09:55

## 2019-06-26 RX ADMIN — SITAGLIPTIN 100 MG: 100 TABLET, FILM COATED ORAL at 10:23

## 2019-06-26 RX ADMIN — HYDROCHLOROTHIAZIDE 25 MG: 25 TABLET ORAL at 10:23

## 2019-06-26 RX ADMIN — ATORVASTATIN CALCIUM 20 MG: 40 TABLET, FILM COATED ORAL at 09:55

## 2019-06-26 RX ADMIN — LEVOTHYROXINE SODIUM 125 MCG: 125 TABLET ORAL at 09:55

## 2019-06-26 NOTE — ED NOTES
Patient ambulatory to bathroom with walker. Patient asked for water to drink. Patient denies pain or discomfort. No acute distress noted.

## 2019-06-26 NOTE — ED NOTES
Patient ambulatory with walker to bathroom. Patient in pleasant mood. No signs of distress noted at this time. Respirations equal and unlabored.

## 2019-06-26 NOTE — ED NOTES
Pt moved to B bed at this time - pt on hospital stretcher - report given to Christine Rosenberg, [facility] - will continue to monitor

## 2019-06-26 NOTE — ED NOTES
Patient ambulatory at this time to bathroom with walker. Patient requested her bed straightened before she went to sleep. No signs of distress noted at this time.

## 2019-06-26 NOTE — ED NOTES
Pt given nighttime snacks as requested (pb and crackers and a radha mist). Verbal report given to Addi Bailey RN and Laurel Natarajan RN. Transfer of care at this time.

## 2019-06-26 NOTE — ED NOTES
During hourly rounding pt reports left knee pain stating \"it feels like it is splitting in two\" Tried to have pt rate pain on a scale of 0 to 10 several times, but pt would just start describing the pain again. Pt says that she was given tylenol this afternoon for pain. MAR does not reflect any tylenol given. Asked pt if she had taken any OTC tylenol from home, pt denies taking any of her at home medications since she has been in the ED. Will follow-up with pt regarding the fact that she has not been ordered or given any tylenol since arrival to ED.

## 2019-06-27 LAB
GLUCOSE BLD STRIP.AUTO-MCNC: 177 MG/DL (ref 65–100)
GLUCOSE BLD STRIP.AUTO-MCNC: 94 MG/DL (ref 65–100)

## 2019-06-27 PROCEDURE — 74011250637 HC RX REV CODE- 250/637: Performed by: EMERGENCY MEDICINE

## 2019-06-27 PROCEDURE — 82962 GLUCOSE BLOOD TEST: CPT

## 2019-06-27 RX ORDER — NYSTATIN 100000 [USP'U]/G
POWDER TOPICAL 2 TIMES DAILY
Status: DISCONTINUED | OUTPATIENT
Start: 2019-06-27 | End: 2019-06-28 | Stop reason: HOSPADM

## 2019-06-27 RX ADMIN — NYSTATIN: 100000 POWDER TOPICAL at 15:07

## 2019-06-27 RX ADMIN — LORATADINE 10 MG: 10 TABLET ORAL at 08:57

## 2019-06-27 RX ADMIN — SITAGLIPTIN 100 MG: 100 TABLET, FILM COATED ORAL at 08:57

## 2019-06-27 RX ADMIN — HYDROCHLOROTHIAZIDE 25 MG: 25 TABLET ORAL at 08:57

## 2019-06-27 RX ADMIN — ATORVASTATIN CALCIUM 20 MG: 40 TABLET, FILM COATED ORAL at 08:57

## 2019-06-27 RX ADMIN — LEVOTHYROXINE SODIUM 125 MCG: 125 TABLET ORAL at 07:23

## 2019-06-27 NOTE — PROGRESS NOTES
CLTC Referral made and a possible bed placement at [facility]. [facility] Referral Reference #: [id number]    Patient states she's agreeable.

## 2019-06-27 NOTE — ED NOTES
[facility] care assumed at this time.

## 2019-06-27 NOTE — ED NOTES
Patient voices no complaints at this time. Sitting up in bed. Alert and oriented. Given water as requested.

## 2019-06-27 NOTE — ED NOTES
Patient ambulatory to rest room and back with walker. Voices no complaints at this time. Patient given ice water as requested.

## 2019-06-28 VITALS
RESPIRATION RATE: 16 BRPM | HEART RATE: 55 BPM | DIASTOLIC BLOOD PRESSURE: 70 MMHG | HEIGHT: 63 IN | BODY MASS INDEX: 44.47 KG/M2 | TEMPERATURE: 98 F | SYSTOLIC BLOOD PRESSURE: 138 MMHG | OXYGEN SATURATION: 99 % | WEIGHT: 251 LBS

## 2019-06-28 LAB
GLUCOSE BLD STRIP.AUTO-MCNC: 101 MG/DL (ref 65–100)
GLUCOSE BLD STRIP.AUTO-MCNC: 103 MG/DL (ref 65–100)

## 2019-06-28 PROCEDURE — 74011250637 HC RX REV CODE- 250/637: Performed by: EMERGENCY MEDICINE

## 2019-06-28 PROCEDURE — 82962 GLUCOSE BLOOD TEST: CPT

## 2019-06-28 RX ORDER — TEMAZEPAM 30 MG/1
30 CAPSULE ORAL
Qty: 10 CAP | Refills: 0 | Status: SHIPPED | OUTPATIENT
Start: 2019-06-28 | End: 2019-08-21 | Stop reason: SDUPTHER

## 2019-06-28 RX ADMIN — LORATADINE 10 MG: 10 TABLET ORAL at 09:24

## 2019-06-28 RX ADMIN — ATORVASTATIN CALCIUM 20 MG: 40 TABLET, FILM COATED ORAL at 09:24

## 2019-06-28 RX ADMIN — LEVOTHYROXINE SODIUM 125 MCG: 125 TABLET ORAL at 09:24

## 2019-06-28 NOTE — ED NOTES
Verbal report given to Lan Dawson RN for continuation of patient care upon shift change. Patient care transferred at this time.

## 2019-06-28 NOTE — ED NOTES
[facility] report to Soila Lynch RN. Lakshmi soares arranged for .

## 2019-06-28 NOTE — ED NOTES
Patient refuses to wake up and take additional medications. Patient states that, \"all ya'll do is wake people up when they are sleeping. \" Patient does not want to take ordered medication at this time.

## 2019-06-28 NOTE — ED NOTES
I have reviewed discharge instructions with the patient. The patient verbalized understanding. Patient left ED via Discharge Method: stretcher to [facility] with [employer] transport. Opportunity for questions and clarification provided. Patient given 0 scripts. To continue your aftercare when you leave the hospital, you may receive an automated call from our care team to check in on how you are doing. This is a free service and part of our promise to provide the best care and service to meet your aftercare needs.  If you have questions, or wish to unsubscribe from this service please call [phone]. Thank you for Choosing our [employer] [facility] Emergency Department.
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
## 2019-06-28 NOTE — ED NOTES
Patient report from [address] [place] [address]. Care assumed at this time. Patient resting in bed with eyes closed. Respirations present.

## 2019-06-28 NOTE — PROGRESS NOTES
Patient scheduled to go [facility] rehab. MD and Primary nurse updated. Primary Nurse has agreed to all and let them know patient is on her way. [facility] to Transport by nurse arrangement. Patient is agreeable and wants to go. No other needs identified.

## 2019-06-28 NOTE — DISCHARGE INSTRUCTIONS
Continue medications as prescribed    Slow position changes    Drink plenty of fluids    Small frequent meals

## 2019-06-28 NOTE — ED NOTES
Patient resting in bed. Medicated as ordered with medications available. Some medications not available from pharmacy. Sent message to pharmacy to send ordered medications. Patient upset with this RN and nurse extern that she was awaken for BGL check and medication administration.

## 2019-06-28 NOTE — ED NOTES
[facility] Pt sleeping in bed, respirations even and unlabored.

## 2019-07-01 ENCOUNTER — PATIENT OUTREACH (OUTPATIENT)
Dept: CASE MANAGEMENT | Age: 79
End: 2019-07-01

## 2019-07-01 NOTE — PROGRESS NOTES
Patient discharged from hospital to [facility] on 6/28/19   PLAN:  Patient will possibly need LTC placement after receiving rehab  Will f/u with patient and STR admit in the next 2-3 weeks  This note will not be viewable in [address]

## 2019-07-22 ENCOUNTER — PATIENT OUTREACH (OUTPATIENT)
Dept: CASE MANAGEMENT | Age: 79
End: 2019-07-22
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
## 2019-08-05 ENCOUNTER — PATIENT OUTREACH (OUTPATIENT)
Dept: CASE MANAGEMENT | Age: 79
End: 2019-08-05
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
## 2019-08-11 ENCOUNTER — HOSPITAL ENCOUNTER (EMERGENCY)
Age: 79
Discharge: HOME | End: 2019-08-12
Attending: EMERGENCY MEDICINE
Payer: MEDICARE

## 2019-08-11 ENCOUNTER — APPOINTMENT (OUTPATIENT)
Dept: GENERAL RADIOLOGY | Age: 79
End: 2019-08-11
Attending: EMERGENCY MEDICINE
Payer: MEDICARE

## 2019-08-11 DIAGNOSIS — R53.83 FATIGUE, UNSPECIFIED TYPE: Primary | ICD-10-CM

## 2019-08-11 PROCEDURE — 81003 URINALYSIS AUTO W/O SCOPE: CPT | Performed by: EMERGENCY MEDICINE

## 2019-08-11 PROCEDURE — 93005 ELECTROCARDIOGRAM TRACING: CPT | Performed by: EMERGENCY MEDICINE

## 2019-08-11 PROCEDURE — 85025 COMPLETE CBC W/AUTO DIFF WBC: CPT

## 2019-08-11 PROCEDURE — 71046 X-RAY EXAM CHEST 2 VIEWS: CPT

## 2019-08-11 PROCEDURE — 84484 ASSAY OF TROPONIN QUANT: CPT

## 2019-08-11 PROCEDURE — 80053 COMPREHEN METABOLIC PANEL: CPT

## 2019-08-11 PROCEDURE — 99285 EMERGENCY DEPT VISIT HI MDM: CPT | Performed by: EMERGENCY MEDICINE

## 2019-08-12 ENCOUNTER — APPOINTMENT (OUTPATIENT)
Dept: GENERAL RADIOLOGY | Age: 79
End: 2019-08-12
Attending: EMERGENCY MEDICINE
Payer: MEDICARE

## 2019-08-12 VITALS
WEIGHT: 251 LBS | BODY MASS INDEX: 44.47 KG/M2 | OXYGEN SATURATION: 95 % | SYSTOLIC BLOOD PRESSURE: 159 MMHG | DIASTOLIC BLOOD PRESSURE: 79 MMHG | HEART RATE: 62 BPM | HEIGHT: 63 IN | TEMPERATURE: 98 F | RESPIRATION RATE: 18 BRPM

## 2019-08-12 LAB
ALBUMIN SERPL-MCNC: 3.6 G/DL (ref 3.2–4.6)
ALBUMIN/GLOB SERPL: 0.9 {RATIO} (ref 1.2–3.5)
ALP SERPL-CCNC: 108 U/L (ref 50–136)
ALT SERPL-CCNC: 21 U/L (ref 12–65)
ANION GAP SERPL CALC-SCNC: 5 MMOL/L (ref 7–16)
ARTERIAL PATENCY WRIST A: YES
AST SERPL-CCNC: 17 U/L (ref 15–37)
ATRIAL RATE: 57 BPM
BASE EXCESS BLD CALC-SCNC: 3 MMOL/L
BASOPHILS # BLD: 0 K/UL (ref 0–0.2)
BASOPHILS NFR BLD: 0 % (ref 0–2)
BDY SITE: ABNORMAL
BILIRUB SERPL-MCNC: 0.7 MG/DL (ref 0.2–1.1)
BODY TEMPERATURE: 98.6
BUN SERPL-MCNC: 10 MG/DL (ref 8–23)
CALCIUM SERPL-MCNC: 10.2 MG/DL (ref 8.3–10.4)
CALCULATED P AXIS, ECG09: 54 DEGREES
CALCULATED R AXIS, ECG10: -2 DEGREES
CALCULATED T AXIS, ECG11: 53 DEGREES
CHLORIDE SERPL-SCNC: 103 MMOL/L (ref 98–107)
CO2 BLD-SCNC: 29 MMOL/L
CO2 SERPL-SCNC: 33 MMOL/L (ref 21–32)
COLLECT TIME,HTIME: 40
CREAT SERPL-MCNC: 0.74 MG/DL (ref 0.6–1)
DIAGNOSIS, 93000: NORMAL
DIFFERENTIAL METHOD BLD: ABNORMAL
EOSINOPHIL # BLD: 0.1 K/UL (ref 0–0.8)
EOSINOPHIL NFR BLD: 2 % (ref 0.5–7.8)
ERYTHROCYTE [DISTWIDTH] IN BLOOD BY AUTOMATED COUNT: 14.1 % (ref 11.9–14.6)
GAS FLOW.O2 O2 DELIVERY SYS: ABNORMAL L/MIN
GLOBULIN SER CALC-MCNC: 3.8 G/DL (ref 2.3–3.5)
GLUCOSE BLD STRIP.AUTO-MCNC: 104 MG/DL (ref 65–100)
GLUCOSE SERPL-MCNC: 104 MG/DL (ref 65–100)
HCO3 BLD-SCNC: 28 MMOL/L (ref 22–26)
HCT VFR BLD AUTO: 51.5 % (ref 35.8–46.3)
HGB BLD-MCNC: 16.4 G/DL (ref 11.7–15.4)
IMM GRANULOCYTES # BLD AUTO: 0 K/UL (ref 0–0.5)
IMM GRANULOCYTES NFR BLD AUTO: 0 % (ref 0–5)
LYMPHOCYTES # BLD: 2.3 K/UL (ref 0.5–4.6)
LYMPHOCYTES NFR BLD: 31 % (ref 13–44)
MCH RBC QN AUTO: 28.6 PG (ref 26.1–32.9)
MCHC RBC AUTO-ENTMCNC: 31.8 G/DL (ref 31.4–35)
MCV RBC AUTO: 89.9 FL (ref 79.6–97.8)
MONOCYTES # BLD: 0.5 K/UL (ref 0.1–1.3)
MONOCYTES NFR BLD: 7 % (ref 4–12)
NEUTS SEG # BLD: 4.4 K/UL (ref 1.7–8.2)
NEUTS SEG NFR BLD: 60 % (ref 43–78)
NRBC # BLD: 0 K/UL (ref 0–0.2)
O2/TOTAL GAS SETTING VFR VENT: 21 %
P-R INTERVAL, ECG05: 196 MS
PCO2 BLD: 43.1 MMHG (ref 35–45)
PH BLD: 7.42 [PH] (ref 7.35–7.45)
PLATELET # BLD AUTO: 225 K/UL (ref 150–450)
PMV BLD AUTO: 11.4 FL (ref 9.4–12.3)
PO2 BLD: 64 MMHG (ref 75–100)
POTASSIUM SERPL-SCNC: 3 MMOL/L (ref 3.5–5.1)
PROT SERPL-MCNC: 7.4 G/DL (ref 6.3–8.2)
Q-T INTERVAL, ECG07: 430 MS
QRS DURATION, ECG06: 84 MS
QTC CALCULATION (BEZET), ECG08: 418 MS
RBC # BLD AUTO: 5.73 M/UL (ref 4.05–5.2)
SAO2 % BLD: 92 % (ref 95–98)
SERVICE CMNT-IMP: ABNORMAL
SERVICE CMNT-IMP: ABNORMAL
SODIUM SERPL-SCNC: 141 MMOL/L (ref 136–145)
SPECIMEN TYPE: ABNORMAL
TROPONIN I SERPL-MCNC: <0.02 NG/ML (ref 0.02–0.05)
VENTRICULAR RATE, ECG03: 57 BPM
WBC # BLD AUTO: 7.4 K/UL (ref 4.3–11.1)

## 2019-08-12 PROCEDURE — 82803 BLOOD GASES ANY COMBINATION: CPT

## 2019-08-12 PROCEDURE — 73080 X-RAY EXAM OF ELBOW: CPT

## 2019-08-12 PROCEDURE — 82962 GLUCOSE BLOOD TEST: CPT

## 2019-08-12 PROCEDURE — 36600 WITHDRAWAL OF ARTERIAL BLOOD: CPT

## 2019-08-12 RX ORDER — LEVOTHYROXINE SODIUM 125 UG/1
125 TABLET ORAL
Status: DISCONTINUED | OUTPATIENT
Start: 2019-08-12 | End: 2019-08-12 | Stop reason: HOSPADM

## 2019-08-12 RX ORDER — HYDROCHLOROTHIAZIDE 25 MG/1
25 TABLET ORAL DAILY
Status: DISCONTINUED | OUTPATIENT
Start: 2019-08-12 | End: 2019-08-12 | Stop reason: HOSPADM

## 2019-08-12 RX ORDER — ATORVASTATIN CALCIUM 40 MG/1
20 TABLET, FILM COATED ORAL DAILY
Status: DISCONTINUED | OUTPATIENT
Start: 2019-08-12 | End: 2019-08-12 | Stop reason: HOSPADM

## 2019-08-12 RX ORDER — TEMAZEPAM 15 MG/1
30 CAPSULE ORAL
Status: DISCONTINUED | OUTPATIENT
Start: 2019-08-12 | End: 2019-08-12 | Stop reason: HOSPADM
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
## 2019-08-12 NOTE — PROGRESS NOTES
MARCELLO is familiar with patient from previous admission in the ED. Patient's last admission in  ED is similar to the previous one. She is on and off homeless. Patient was last evicted at last admission. SW [facility] for [facility] care at that time by MARCELLO. MARCELLO arranged for patient to go to [facility] after level of care (MARCELLO arranged) was approved by [employer]. Patient states that she left after they requested she pay $800. She states she did not have the money. Patient was then sent to a boarding home. She states that she hated it there and that the room was small and everyone has MR. Patient has been in the custody of [facility] in the past but they have closed her case. The have helped with boarding homes in the past but she did not like those either. Patient states that she wants to go back to the residence she was staying prior to coming to the ED. She states that she has spoken with her friend who let her move in and that she states that her and her  have resolved there issues. She states that she feels safe there and no one bothered her she just did not feel comfortable without her friend there.

## 2019-08-12 NOTE — ED NOTES
I have reviewed discharge instructions with the patient. The patient verbalized understanding. Patient left ED via Discharge Method: ambulatory to Home with self/cab). Opportunity for questions and clarification provided. Patient given 0 scripts. To continue your aftercare when you leave the hospital, you may receive an automated call from our care team to check in on how you are doing. This is a free service and part of our promise to provide the best care and service to meet your aftercare needs.  If you have questions, or wish to unsubscribe from this service please call [phone]. Thank you for Choosing our [facility] Emergency Department.
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
## 2019-08-16 NOTE — PROGRESS NOTES
Electronic Referral for Confirmation #[id number]     IMPORTANT   Be sure to record this confirmation number. Because of HIPAA regulations, all further information regarding this referral will contain no identifying information except for this confirmation number.    Referal submit complete,Confirmation number is [id number], repeat, [id number],,Referral created on 08/16/2019,Reason for referral is OSCAP,Referral status is Awaiting Participant Match,You may now upload PDF documents for this referral.   Confirmation Number:   [id number]     Reason for Referral:   OSCAP   Created On:   08/16/2019     Status:   Awaiting Participant Match

## 2019-08-21 ENCOUNTER — PATIENT OUTREACH (OUTPATIENT)
Dept: CASE MANAGEMENT | Age: 79
End: 2019-08-21

## 2019-08-21 NOTE — PROGRESS NOTES
· Received phone call from PCP office for referral for CCM services for patient  · Patient currently in PCP office for apt  · PCP has written orders for PT and requesting CCM services  PLAN:  · Will attempt outreach to patient tomorrow due to patient currently at PCP office  This note will not be viewable in [address]

## 2019-08-23 ENCOUNTER — PATIENT OUTREACH (OUTPATIENT)
Dept: CASE MANAGEMENT | Age: 79
End: 2019-08-23

## 2019-08-23 NOTE — PROGRESS NOTES
Attempted outreach to f/u with patient for CCM on 8/22/19 - UTR at this time  Attempted outreach # 2 today for CCM - UTR and unable to leave VM message  PLAN:  Will attempt outreach # 2 next week  This note will not be viewable in [address]

## 2019-08-29 ENCOUNTER — PATIENT OUTREACH (OUTPATIENT)
Dept: CASE MANAGEMENT | Age: 79
End: 2019-08-29

## 2019-08-30 ENCOUNTER — PATIENT OUTREACH (OUTPATIENT)
Dept: CASE MANAGEMENT | Age: 79
End: 2019-08-30
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
## 2019-09-12 ENCOUNTER — PATIENT OUTREACH (OUTPATIENT)
Dept: CASE MANAGEMENT | Age: 79
End: 2019-09-12

## 2019-09-12 NOTE — PROGRESS NOTES
Received message from [facility] about patient calling the doctor's office asking for assistance on places to live  Referral sent to [address] to outreach and assist patient with this   PLAN:  Will outreach to f/u with patient tomorrow about SW referral   This note will not be viewable in [address]

## 2019-09-13 ENCOUNTER — PATIENT OUTREACH (OUTPATIENT)
Dept: CASE MANAGEMENT | Age: 79
End: 2019-09-13

## 2019-09-13 NOTE — PROGRESS NOTES
Ambulatory Care Coordination - Social Work Assessment   Referral from which KELLY CM: Anabelle Hebert   Previously referred? If so, reason and brief outcome Yes,  didnt want information    Reason for current referral: Housing issues    Income information (if needed): 795   Sources of Support: Family    ACP set up? Needs information? NA   Medication Cost assistance needed? NA   Referral to CLTC/Medicaid needed? Has CLTC in place    Referral to Medicare Extra Help/LIS program needed? NA   Small home repair needed? NA   MOW referral? NA   Any other concerns/questions? NA   Next steps: See below      [facility]LLO OLEARY called and spoke with pt who said that she would like information on Section 8 and HUD housing. [facility]O OLEARY let pt know that [facility] O[facility] can mail her out that information but most of those housing options have a pretty long wait list.  Pt stated she can't wait she wants to move now. MARCELLO OLEARY talked with pt about going to a Medicaid [facility]AAK but pt stated she doesn't want to do anything like that. PLAN:  MARCELLO OLEARY will mail out information on housing and follow back up with pt. This note will not be viewable in [address]

## 2019-09-13 NOTE — PROGRESS NOTES
· Outreached to f/u with patient   · SW is mailing patient information on housing options  · Patient requiring reminders to be patient that some of the options will take time and there are waiting lists  · Patient working with SW on these options now  PLAN:  · Will f/u with patient on 9/23/19 due to out of office next week  · Patient has my contact information  This note will not be viewable in [address]

## 2019-09-23 ENCOUNTER — PATIENT OUTREACH (OUTPATIENT)
Dept: CASE MANAGEMENT | Age: 79
End: 2019-09-23

## 2019-09-23 NOTE — PROGRESS NOTES
· Outreached to f/u with patient - UTR at this time and unable to leave VM message  Patient working with SW on housing options  PLAN:  · Will f/u with patient later this week  · Patient has my contact information  This note will not be viewable in [address]

## 2019-09-27 ENCOUNTER — PATIENT OUTREACH (OUTPATIENT)
Dept: CASE MANAGEMENT | Age: 79
End: 2019-09-27

## 2019-09-27 NOTE — PROGRESS NOTES
· Attempted outreach to patient for f/u - UTR and unable to leave VM message at this time  · Patient has hx of becoming angry and not working with SW or CM in providing assistance and patient is non-compliant with plan of care  · Per connect care patient was seen at [address] on 9/24/19 for nausea/ living arrangement conditions  · Patient is scheduled to speak with a SW  from [facility] and patient was evaluated by Rocky ARREOLA-FRANCHESKA while in the ER at [facility]  · Patient reports to SW at ER that she is not happy with current living arrangements because home is not clean and she doesn't want to return to boarding home   · Rocky Cerrato did the following while patient was at ER: \"I have placed a call to [employer] with [employer], [phone] and to [employer], [phone]. I have also placed a call to [employer] with [employer], [phone], and requested a return call to determine if he would be able to assist the patient with finding a new place to live. Awaiting a return call. \"  · Patient has a CLTC adie 5 days a week ( is [employer] [phone] with [employer])   · Patient has MOW's   · Patient uses 2 L of oxygen during sleep  · Patient uses walker for ambulation  · Patient has the following DME:  Hospital bed, [employer] [employer], Wheelchair  · Patient has Medicare, Humana, and Medicaid  · Patient uses [facility] for transport  PLAN:  · Will attempt one more outreach next week to f/u with patient before closing the case   This note will not be viewable in [address]

## 2019-09-27 NOTE — Clinical Note
FYI only,Pt said she didn't like the information I mailed out that she wants a house to move into. I explained that I can't help her find a house and offered her [facility]AAK placement again. Pt yelled that she doesn't want that she just needs to move into a house. I let her know I can't help her find a home to move into that I've provided her with HUD/Low income housing and she needs to follow up with that (she refused). I've removed myself from her case I have nothing else I can do to help her.

## 2019-09-27 NOTE — PROGRESS NOTES
[facility] O[facility] called and spoke with pt who said that she got the information that MARCELLO OLEARY mailed out but she doesn't like any of those options she wants a house to move into. MARCELLO OLEARY explained that [facility] can't help her find a house to move into. MARCELLO OLEARY talked with pt about USP options and pt yelled that she's not going to one of those places and she doesn't need [facility]. MARCELLO OLEARY let pt know that if she changes her mind and decides that she wants placement [facility] can help her with that but otherwise there are no other resources MARCE[facility] OLE[facility] has to provide her with housing options. PLAN:  MARCELLO OLEARY will remove herself from pt's care team a this time. This note will not be viewable in [address]

## 2019-10-04 ENCOUNTER — PATIENT OUTREACH (OUTPATIENT)
Dept: CASE MANAGEMENT | Age: 79
End: 2019-10-04

## 2019-10-04 NOTE — PROGRESS NOTES
· Attempted outreach to patient for f/u - UTR and unable to leave VM message at this time  · Patient has hx of becoming angry and not working with SW or CM in providing assistance and patient is non-compliant with plan of care  · Per connect care patient was seen at [address] on 9/24/19 for nausea/ living arrangement conditions  · Patient is scheduled to speak with a SW  from [facility] and patient was evaluated by Eli BERNABE while in the ER at [facility]  · Patient reports to SW at ER that she is not happy with current living arrangements because home is not clean and she doesn't want to return to boarding home   · Eli Delgado did the following while patient was at ER: \"I have placed a call to [employer] with [employer], [phone] and to [employer], [phone]. I have also placed a call to [employer] with [employer], [phone], and requested a return call to determine if he would be able to assist the patient with finding a new place to live. Awaiting a return call. \"  · Patient has a CLTC adie 5 days a week ( is [employer] [phone] with [employer])   · Patient has MOW's   · Patient uses 2 L of oxygen during sleep  · Patient uses walker for ambulation  · Patient has the following DME:  Hospital bed, Walker, Wheelchair  · Patient has Medicare, Humana, and Medicaid  · Patient uses [facility] for transport  PLAN:  · Case closed at this time due to numerous UTR episodes and patient is non-compliant with CCM services and/or recommendations  This note will not be viewable in [address]

## 2019-10-22 PROBLEM — Z91.14 MEDICATION NONADHERENCE DUE TO PSYCHOSOCIAL PROBLEM: Status: ACTIVE | Noted: 2019-10-22

## 2019-10-22 PROBLEM — J30.9 ALLERGIC RHINITIS: Status: ACTIVE | Noted: 2019-10-22

## 2020-01-19 ENCOUNTER — APPOINTMENT (OUTPATIENT)
Dept: GENERAL RADIOLOGY | Age: 80
End: 2020-01-19
Attending: EMERGENCY MEDICINE
Payer: MEDICARE

## 2020-01-19 ENCOUNTER — HOSPITAL ENCOUNTER (OUTPATIENT)
Age: 80
Setting detail: OBSERVATION
Discharge: HOME | End: 2020-01-27
Attending: EMERGENCY MEDICINE | Admitting: HOSPITALIST
Payer: MEDICARE

## 2020-01-19 DIAGNOSIS — J18.9 COMMUNITY ACQUIRED PNEUMONIA OF LEFT LOWER LOBE OF LUNG: Primary | ICD-10-CM

## 2020-01-19 PROBLEM — R07.81 PLEURITIC CHEST PAIN: Status: ACTIVE | Noted: 2020-01-19

## 2020-01-19 PROBLEM — I51.7 CARDIOMEGALY: Status: ACTIVE | Noted: 2020-01-19

## 2020-01-19 PROBLEM — R06.02 SHORTNESS OF BREATH: Status: ACTIVE | Noted: 2020-01-19

## 2020-01-19 LAB
ALBUMIN SERPL-MCNC: 3 G/DL (ref 3.2–4.6)
ALBUMIN/GLOB SERPL: 0.7 {RATIO} (ref 1.2–3.5)
ALP SERPL-CCNC: 95 U/L (ref 50–136)
ALT SERPL-CCNC: 19 U/L (ref 12–65)
ANION GAP SERPL CALC-SCNC: 7 MMOL/L (ref 7–16)
AST SERPL-CCNC: 19 U/L (ref 15–37)
BASOPHILS # BLD: 0 K/UL (ref 0–0.2)
BASOPHILS NFR BLD: 0 % (ref 0–2)
BILIRUB SERPL-MCNC: 0.5 MG/DL (ref 0.2–1.1)
BNP SERPL-MCNC: 89 PG/ML
BUN SERPL-MCNC: 10 MG/DL (ref 8–23)
CALCIUM SERPL-MCNC: 9.7 MG/DL (ref 8.3–10.4)
CHLORIDE SERPL-SCNC: 106 MMOL/L (ref 98–107)
CO2 SERPL-SCNC: 28 MMOL/L (ref 21–32)
CREAT SERPL-MCNC: 0.69 MG/DL (ref 0.6–1)
DIFFERENTIAL METHOD BLD: ABNORMAL
EOSINOPHIL # BLD: 0 K/UL (ref 0–0.8)
EOSINOPHIL NFR BLD: 0 % (ref 0.5–7.8)
ERYTHROCYTE [DISTWIDTH] IN BLOOD BY AUTOMATED COUNT: 13.8 % (ref 11.9–14.6)
GLOBULIN SER CALC-MCNC: 4.1 G/DL (ref 2.3–3.5)
GLUCOSE BLD STRIP.AUTO-MCNC: 101 MG/DL (ref 65–100)
GLUCOSE SERPL-MCNC: 161 MG/DL (ref 65–100)
HCT VFR BLD AUTO: 47.6 % (ref 35.8–46.3)
HGB BLD-MCNC: 15.6 G/DL (ref 11.7–15.4)
IMM GRANULOCYTES # BLD AUTO: 0 K/UL (ref 0–0.5)
IMM GRANULOCYTES NFR BLD AUTO: 0 % (ref 0–5)
LACTATE SERPL-SCNC: 1.6 MMOL/L (ref 0.4–2)
LYMPHOCYTES # BLD: 2.1 K/UL (ref 0.5–4.6)
LYMPHOCYTES NFR BLD: 29 % (ref 13–44)
MCH RBC QN AUTO: 29.5 PG (ref 26.1–32.9)
MCHC RBC AUTO-ENTMCNC: 32.8 G/DL (ref 31.4–35)
MCV RBC AUTO: 90 FL (ref 79.6–97.8)
MONOCYTES # BLD: 0.4 K/UL (ref 0.1–1.3)
MONOCYTES NFR BLD: 5 % (ref 4–12)
NEUTS SEG # BLD: 4.7 K/UL (ref 1.7–8.2)
NEUTS SEG NFR BLD: 66 % (ref 43–78)
NRBC # BLD: 0 K/UL (ref 0–0.2)
PLATELET # BLD AUTO: 256 K/UL (ref 150–450)
PMV BLD AUTO: 11.7 FL (ref 9.4–12.3)
POTASSIUM SERPL-SCNC: 3.8 MMOL/L (ref 3.5–5.1)
PROCALCITONIN SERPL-MCNC: <0.05 NG/ML
PROT SERPL-MCNC: 7.1 G/DL (ref 6.3–8.2)
RBC # BLD AUTO: 5.29 M/UL (ref 4.05–5.2)
SODIUM SERPL-SCNC: 141 MMOL/L (ref 136–145)
WBC # BLD AUTO: 7.2 K/UL (ref 4.3–11.1)

## 2020-01-19 PROCEDURE — 87040 BLOOD CULTURE FOR BACTERIA: CPT

## 2020-01-19 PROCEDURE — 96368 THER/DIAG CONCURRENT INF: CPT

## 2020-01-19 PROCEDURE — 85025 COMPLETE CBC W/AUTO DIFF WBC: CPT

## 2020-01-19 PROCEDURE — 83605 ASSAY OF LACTIC ACID: CPT

## 2020-01-19 PROCEDURE — 83880 ASSAY OF NATRIURETIC PEPTIDE: CPT

## 2020-01-19 PROCEDURE — 96365 THER/PROPH/DIAG IV INF INIT: CPT

## 2020-01-19 PROCEDURE — 99218 HC RM OBSERVATION: CPT

## 2020-01-19 PROCEDURE — 99283 EMERGENCY DEPT VISIT LOW MDM: CPT

## 2020-01-19 PROCEDURE — 84145 PROCALCITONIN (PCT): CPT

## 2020-01-19 PROCEDURE — 80053 COMPREHEN METABOLIC PANEL: CPT

## 2020-01-19 PROCEDURE — 74011250636 HC RX REV CODE- 250/636: Performed by: EMERGENCY MEDICINE

## 2020-01-19 PROCEDURE — 82962 GLUCOSE BLOOD TEST: CPT

## 2020-01-19 PROCEDURE — 74011000250 HC RX REV CODE- 250: Performed by: EMERGENCY MEDICINE

## 2020-01-19 PROCEDURE — 74011000258 HC RX REV CODE- 258: Performed by: EMERGENCY MEDICINE

## 2020-01-19 PROCEDURE — 96372 THER/PROPH/DIAG INJ SC/IM: CPT

## 2020-01-19 PROCEDURE — 94640 AIRWAY INHALATION TREATMENT: CPT

## 2020-01-19 PROCEDURE — 71046 X-RAY EXAM CHEST 2 VIEWS: CPT

## 2020-01-19 RX ORDER — DEXAMETHASONE SODIUM PHOSPHATE 100 MG/10ML
10 INJECTION INTRAMUSCULAR; INTRAVENOUS
Status: COMPLETED | OUTPATIENT
Start: 2020-01-19 | End: 2020-01-19

## 2020-01-19 RX ORDER — GUAIFENESIN 600 MG/1
600 TABLET, EXTENDED RELEASE ORAL EVERY 12 HOURS
Status: DISCONTINUED | OUTPATIENT
Start: 2020-01-19 | End: 2020-01-27 | Stop reason: HOSPADM

## 2020-01-19 RX ORDER — ALBUTEROL SULFATE 0.83 MG/ML
1.25 SOLUTION RESPIRATORY (INHALATION)
Status: DISCONTINUED | OUTPATIENT
Start: 2020-01-20 | End: 2020-01-25

## 2020-01-19 RX ORDER — ALBUTEROL SULFATE 0.83 MG/ML
5 SOLUTION RESPIRATORY (INHALATION)
Status: COMPLETED | OUTPATIENT
Start: 2020-01-19 | End: 2020-01-19

## 2020-01-19 RX ADMIN — ALBUTEROL SULFATE 5 MG: 2.5 SOLUTION RESPIRATORY (INHALATION) at 20:41

## 2020-01-19 RX ADMIN — AZITHROMYCIN MONOHYDRATE 500 MG: 500 INJECTION, POWDER, LYOPHILIZED, FOR SOLUTION INTRAVENOUS at 21:37

## 2020-01-19 RX ADMIN — CEFTRIAXONE 2 G: 2 INJECTION, POWDER, FOR SOLUTION INTRAMUSCULAR; INTRAVENOUS at 21:35

## 2020-01-19 RX ADMIN — DEXAMETHASONE SODIUM PHOSPHATE 10 MG: 10 INJECTION INTRAMUSCULAR; INTRAVENOUS at 21:32

## 2020-01-19 NOTE — ED NOTES
Patient arrives via [facility] from home. Patient reports shortness of breath, starting yesterday. 96% on room air. 180/90, hr 62, bgl 181. Patient reports cough for approximately 1 week. \"rattling in my bronchial tubes. \"  Patient uses 2L NC at night. Patient reports unable to sleep due to cough. Patient does not have inhaler, does not do breathing treatments at home. Patient reports post nasal drip but unable to cough up secretions. Patient with increased edema to right leg, reports need for knee replacement, states this is unchanged from baseline.

## 2020-01-20 LAB
ANION GAP SERPL CALC-SCNC: 5 MMOL/L (ref 7–16)
BUN SERPL-MCNC: 10 MG/DL (ref 8–23)
CALCIUM SERPL-MCNC: 10.2 MG/DL (ref 8.3–10.4)
CHLORIDE SERPL-SCNC: 107 MMOL/L (ref 98–107)
CO2 SERPL-SCNC: 30 MMOL/L (ref 21–32)
CREAT SERPL-MCNC: 0.72 MG/DL (ref 0.6–1)
ERYTHROCYTE [DISTWIDTH] IN BLOOD BY AUTOMATED COUNT: 13.6 % (ref 11.9–14.6)
GLUCOSE BLD STRIP.AUTO-MCNC: 139 MG/DL (ref 65–100)
GLUCOSE BLD STRIP.AUTO-MCNC: 146 MG/DL (ref 65–100)
GLUCOSE BLD STRIP.AUTO-MCNC: 219 MG/DL (ref 65–100)
GLUCOSE SERPL-MCNC: 198 MG/DL (ref 65–100)
HCT VFR BLD AUTO: 47.5 % (ref 35.8–46.3)
HGB BLD-MCNC: 15.2 G/DL (ref 11.7–15.4)
MCH RBC QN AUTO: 28.6 PG (ref 26.1–32.9)
MCHC RBC AUTO-ENTMCNC: 32 G/DL (ref 31.4–35)
MCV RBC AUTO: 89.5 FL (ref 79.6–97.8)
NRBC # BLD: 0 K/UL (ref 0–0.2)
PLATELET # BLD AUTO: 228 K/UL (ref 150–450)
PMV BLD AUTO: 11.3 FL (ref 9.4–12.3)
POTASSIUM SERPL-SCNC: 3.2 MMOL/L (ref 3.5–5.1)
RBC # BLD AUTO: 5.31 M/UL (ref 4.05–5.2)
SODIUM SERPL-SCNC: 142 MMOL/L (ref 136–145)
WBC # BLD AUTO: 6.8 K/UL (ref 4.3–11.1)

## 2020-01-20 PROCEDURE — 74011000250 HC RX REV CODE- 250: Performed by: HOSPITALIST

## 2020-01-20 PROCEDURE — 99218 HC RM OBSERVATION: CPT

## 2020-01-20 PROCEDURE — 77010033678 HC OXYGEN DAILY

## 2020-01-20 PROCEDURE — 74011250637 HC RX REV CODE- 250/637: Performed by: HOSPITALIST

## 2020-01-20 PROCEDURE — 94640 AIRWAY INHALATION TREATMENT: CPT

## 2020-01-20 PROCEDURE — 74011250637 HC RX REV CODE- 250/637: Performed by: INTERNAL MEDICINE

## 2020-01-20 PROCEDURE — 74011250636 HC RX REV CODE- 250/636: Performed by: EMERGENCY MEDICINE

## 2020-01-20 PROCEDURE — 97110 THERAPEUTIC EXERCISES: CPT

## 2020-01-20 PROCEDURE — 74011000302 HC RX REV CODE- 302: Performed by: INTERNAL MEDICINE

## 2020-01-20 PROCEDURE — 96372 THER/PROPH/DIAG INJ SC/IM: CPT

## 2020-01-20 PROCEDURE — 82962 GLUCOSE BLOOD TEST: CPT

## 2020-01-20 PROCEDURE — 74011250636 HC RX REV CODE- 250/636: Performed by: HOSPITALIST

## 2020-01-20 PROCEDURE — 96366 THER/PROPH/DIAG IV INF ADDON: CPT

## 2020-01-20 PROCEDURE — 94760 N-INVAS EAR/PLS OXIMETRY 1: CPT

## 2020-01-20 PROCEDURE — 85027 COMPLETE CBC AUTOMATED: CPT

## 2020-01-20 PROCEDURE — 97161 PT EVAL LOW COMPLEX 20 MIN: CPT

## 2020-01-20 PROCEDURE — 74011000258 HC RX REV CODE- 258: Performed by: EMERGENCY MEDICINE

## 2020-01-20 PROCEDURE — 86580 TB INTRADERMAL TEST: CPT | Performed by: INTERNAL MEDICINE

## 2020-01-20 PROCEDURE — 93306 TTE W/DOPPLER COMPLETE: CPT

## 2020-01-20 PROCEDURE — 80048 BASIC METABOLIC PNL TOTAL CA: CPT

## 2020-01-20 RX ORDER — POTASSIUM CHLORIDE 20 MEQ/1
40 TABLET, EXTENDED RELEASE ORAL
Status: COMPLETED | OUTPATIENT
Start: 2020-01-20 | End: 2020-01-20

## 2020-01-20 RX ORDER — PANTOPRAZOLE SODIUM 40 MG/1
40 TABLET, DELAYED RELEASE ORAL
Status: DISCONTINUED | OUTPATIENT
Start: 2020-01-20 | End: 2020-01-27 | Stop reason: HOSPADM

## 2020-01-20 RX ORDER — ACETAMINOPHEN 325 MG/1
650 TABLET ORAL
Status: DISCONTINUED | OUTPATIENT
Start: 2020-01-20 | End: 2020-01-27 | Stop reason: HOSPADM

## 2020-01-20 RX ORDER — NYSTATIN 100000 [USP'U]/G
POWDER TOPICAL 4 TIMES DAILY
Status: DISCONTINUED | OUTPATIENT
Start: 2020-01-20 | End: 2020-01-27 | Stop reason: HOSPADM

## 2020-01-20 RX ORDER — FLUTICASONE PROPIONATE 50 MCG
1 SPRAY, SUSPENSION (ML) NASAL DAILY
Status: DISCONTINUED | OUTPATIENT
Start: 2020-01-20 | End: 2020-01-27 | Stop reason: HOSPADM

## 2020-01-20 RX ORDER — ENOXAPARIN SODIUM 100 MG/ML
40 INJECTION SUBCUTANEOUS EVERY 12 HOURS
Status: DISCONTINUED | OUTPATIENT
Start: 2020-01-20 | End: 2020-01-21

## 2020-01-20 RX ORDER — ATORVASTATIN CALCIUM 10 MG/1
20 TABLET, FILM COATED ORAL
Status: DISCONTINUED | OUTPATIENT
Start: 2020-01-20 | End: 2020-01-27 | Stop reason: HOSPADM

## 2020-01-20 RX ORDER — ONDANSETRON 2 MG/ML
4 INJECTION INTRAMUSCULAR; INTRAVENOUS
Status: DISCONTINUED | OUTPATIENT
Start: 2020-01-20 | End: 2020-01-27 | Stop reason: HOSPADM

## 2020-01-20 RX ORDER — LORATADINE 10 MG/1
10 TABLET ORAL DAILY
Status: DISCONTINUED | OUTPATIENT
Start: 2020-01-20 | End: 2020-01-27 | Stop reason: HOSPADM

## 2020-01-20 RX ORDER — TEMAZEPAM 15 MG/1
30 CAPSULE ORAL
Status: DISCONTINUED | OUTPATIENT
Start: 2020-01-20 | End: 2020-01-27 | Stop reason: HOSPADM

## 2020-01-20 RX ORDER — HYDROCHLOROTHIAZIDE 25 MG/1
25 TABLET ORAL DAILY
Status: DISCONTINUED | OUTPATIENT
Start: 2020-01-20 | End: 2020-01-22

## 2020-01-20 RX ORDER — SODIUM CHLORIDE 0.9 % (FLUSH) 0.9 %
5-40 SYRINGE (ML) INJECTION AS NEEDED
Status: DISCONTINUED | OUTPATIENT
Start: 2020-01-20 | End: 2020-01-27 | Stop reason: HOSPADM

## 2020-01-20 RX ORDER — AMOXICILLIN 250 MG
1 CAPSULE ORAL DAILY
Status: DISCONTINUED | OUTPATIENT
Start: 2020-01-20 | End: 2020-01-27 | Stop reason: HOSPADM

## 2020-01-20 RX ORDER — LEVOTHYROXINE SODIUM 125 UG/1
125 TABLET ORAL
Status: DISCONTINUED | OUTPATIENT
Start: 2020-01-20 | End: 2020-01-27 | Stop reason: HOSPADM

## 2020-01-20 RX ORDER — SODIUM CHLORIDE 0.9 % (FLUSH) 0.9 %
5-40 SYRINGE (ML) INJECTION EVERY 8 HOURS
Status: DISCONTINUED | OUTPATIENT
Start: 2020-01-20 | End: 2020-01-26

## 2020-01-20 RX ORDER — ENOXAPARIN SODIUM 100 MG/ML
40 INJECTION SUBCUTANEOUS EVERY 24 HOURS
Status: DISCONTINUED | OUTPATIENT
Start: 2020-01-20 | End: 2020-01-20 | Stop reason: DRUGHIGH

## 2020-01-20 RX ORDER — HYDROCODONE BITARTRATE AND ACETAMINOPHEN 7.5; 325 MG/1; MG/1
1 TABLET ORAL
Status: DISCONTINUED | OUTPATIENT
Start: 2020-01-20 | End: 2020-01-27 | Stop reason: HOSPADM

## 2020-01-20 RX ORDER — NALOXONE HYDROCHLORIDE 0.4 MG/ML
0.4 INJECTION, SOLUTION INTRAMUSCULAR; INTRAVENOUS; SUBCUTANEOUS AS NEEDED
Status: DISCONTINUED | OUTPATIENT
Start: 2020-01-20 | End: 2020-01-27 | Stop reason: HOSPADM

## 2020-01-20 RX ADMIN — PANTOPRAZOLE SODIUM 40 MG: 40 TABLET, DELAYED RELEASE ORAL at 08:22

## 2020-01-20 RX ADMIN — AZITHROMYCIN MONOHYDRATE 500 MG: 500 INJECTION, POWDER, LYOPHILIZED, FOR SOLUTION INTRAVENOUS at 22:01

## 2020-01-20 RX ADMIN — HYDROCHLOROTHIAZIDE 25 MG: 25 TABLET ORAL at 09:21

## 2020-01-20 RX ADMIN — NYSTATIN: 100000 POWDER TOPICAL at 09:24

## 2020-01-20 RX ADMIN — NYSTATIN: 100000 POWDER TOPICAL at 17:26

## 2020-01-20 RX ADMIN — GUAIFENESIN 600 MG: 600 TABLET ORAL at 21:30

## 2020-01-20 RX ADMIN — ALBUTEROL SULFATE 1.25 MG: 2.5 SOLUTION RESPIRATORY (INHALATION) at 15:13

## 2020-01-20 RX ADMIN — ALBUTEROL SULFATE 1.25 MG: 2.5 SOLUTION RESPIRATORY (INHALATION) at 07:48

## 2020-01-20 RX ADMIN — SITAGLIPTIN 100 MG: 100 TABLET, FILM COATED ORAL at 09:23

## 2020-01-20 RX ADMIN — TUBERCULIN PURIFIED PROTEIN DERIVATIVE 5 UNITS: 5 INJECTION, SOLUTION INTRADERMAL at 17:25

## 2020-01-20 RX ADMIN — POTASSIUM CHLORIDE 40 MEQ: 20 TABLET, EXTENDED RELEASE ORAL at 09:19

## 2020-01-20 RX ADMIN — ENOXAPARIN SODIUM 40 MG: 40 INJECTION SUBCUTANEOUS at 09:24

## 2020-01-20 RX ADMIN — ALBUTEROL SULFATE 1.25 MG: 2.5 SOLUTION RESPIRATORY (INHALATION) at 11:49

## 2020-01-20 RX ADMIN — ALBUTEROL SULFATE 1.25 MG: 2.5 SOLUTION RESPIRATORY (INHALATION) at 20:03

## 2020-01-20 RX ADMIN — LORATADINE 10 MG: 10 TABLET ORAL at 09:22

## 2020-01-20 RX ADMIN — CEFTRIAXONE 2 G: 2 INJECTION, POWDER, FOR SOLUTION INTRAMUSCULAR; INTRAVENOUS at 21:51

## 2020-01-20 RX ADMIN — ENOXAPARIN SODIUM 40 MG: 40 INJECTION SUBCUTANEOUS at 21:33

## 2020-01-20 RX ADMIN — Medication 10 ML: at 22:09

## 2020-01-20 RX ADMIN — SENNOSIDES AND DOCUSATE SODIUM 1 TABLET: 8.6; 5 TABLET ORAL at 09:23

## 2020-01-20 RX ADMIN — GUAIFENESIN 600 MG: 600 TABLET ORAL at 09:21

## 2020-01-20 RX ADMIN — NYSTATIN: 100000 POWDER TOPICAL at 21:43

## 2020-01-20 RX ADMIN — TEMAZEPAM 30 MG: 15 CAPSULE ORAL at 22:07

## 2020-01-20 RX ADMIN — Medication 5 ML: at 08:29

## 2020-01-20 RX ADMIN — FLUTICASONE PROPIONATE 1 SPRAY: 50 SPRAY, METERED NASAL at 09:17

## 2020-01-20 RX ADMIN — LEVOTHYROXINE SODIUM 125 MCG: 125 TABLET ORAL at 08:22

## 2020-01-20 NOTE — ED NOTES
Pt calling to nurses station requesting help to place oxygen back on. Pt still belligerent and yelling. Oxygen placed on pt. Pt refuses to leave one side rail down so she can get up easily and use walker to bathroom. Pt educated that this is a fall risk.

## 2020-01-20 NOTE — PROGRESS NOTES
Pt is a 78 yo female admitted due to SOB. SW consult received to assist with her home O2 concentrator as pt reported that it is malfunctioning. Chart reviewed. Pt has an extensive history of involvement with both inpatient and community case management at both hospital systems. SW met with pt. She denied any problems with her O2 concentrator. She did, however, express a myriad of complaints about her current living situation (an adult boarding home) and adamantly and vehemently stated that she would not return there. She does not have another plan for housing but stated she refused to return there. Pt wants her own apartment but has not followed through with resources provided to be put on the housing authority waiting list.  At present, if she is refusing to go back to the boarding home, pt's options are to either go to a shelter or, if she qualifies, go to a short term rehab facility to give her a little bit of time to work on the housing issues. PT/OT evals are ordered. PPD ordered today. Pt has little to no social or family supports except one friend and also her TC  and aide. TC to [employer] @ [address][phone]. Left  msg requesting a return call. SW will continue to follow. Care Management Interventions  PCP Verified by CM: Yes  Last Visit to PCP: 10/22/19  Mode of Transport at Discharge: Other (see comment)([facility] or AOL)  Transition of Care Consult (CM Consult):  Other  Discharge Durable Medical Equipment: No  Physical Therapy Consult: Yes  Occupational Therapy Consult: Yes  Current Support Network: Family Lives Nearby, Has Personal Caregivers, Adult Group Home([facility] [phone]; pt has [facility] aide)  Confirm Follow Up Transport: Wheelchair [facility] Reviewed  Discharge Location  Discharge Placement: Unable to determine at this time

## 2020-01-20 NOTE — PROGRESS NOTES
Problem: Mobility Impaired (Adult and Pediatric)  Goal: *Acute Goals and Plan of Care (Insert Text)  Description  LTG:  (1.)Ms. Evans Parker will move from supine to sit and sit to supine , scoot up and down and roll side to side in bed with MODIFIED INDEPENDENCE within 7 treatment day(s). (2.)Ms. Evans Parker will transfer from bed to chair and chair to bed with MODIFIED INDEPENDENCE using the least restrictive device within 7 treatment day(s). (3.)Ms. Evans Parker will ambulate with SUPERVISION for 90 feet with the least restrictive device within 7 treatment day(s). (4.)Ms. Evans Parker will participate in therapeutic activity/exercises x 23 minutes for increased strength within 7 treatment days. ________________________________________________________________________________________________      Outcome: Progressing Towards Goal     PHYSICAL THERAPY: Initial Assessment and AM 1/20/2020  OBSERVATION: PT Visit Days : 1  Payor: [address] [place],9D / Plan: [address] / Product Type: Managed Care Medicare /       NAME/AGE/GENDER: Shadia Garrido is a 79 y.o. female   PRIMARY DIAGNOSIS: Shortness of breath [R06.02]  Community acquired pneumonia [J18.9]  Cardiomegaly [I51.7]  Pleuritic chest pain [R07.81] Shortness of breath Shortness of breath        ICD-10: Treatment Diagnosis:    · Generalized Muscle Weakness (M62.81)  · History of falling (Z91.81)   Precaution/Allergies:  Aspirin; Ciprofloxacin; Diflucan [fluconazole]; Glucophage [metformin]; Motrin [ibuprofen]; Neurontin [gabapentin]; Nsaids (non-steroidal anti-inflammatory drug); Septra [sulfamethoprim ds]; Sulfa (sulfonamide antibiotics); Sulfabenzamide; Tessalon perles [benzonatate]; and Vibramycin [doxycycline calcium]      ASSESSMENT:     Ms. Evans Parker is a 79 y.o. female in the hospital for the above who was supine in bed upon arrival.  Ms. Evans Parker presents to PT with [facility] AROM and decreased strength in B LEs.   During evaluation pt performed bed mobility with SBA and intact sitting balance. Pt given CGA-Otis for STS transfers, toilet transfers, and chair transfers. Pt also performed numerous activities with SBA and verbal/tactile cuing. Ms. Jose Bruce could benefit from skilled PT as she is currently functioning below her baseline. This section established at most recent assessment   PROBLEM LIST (Impairments causing functional limitations):  1. Decreased Strength  2. Decreased Transfer Abilities  3. Decreased Ambulation Ability/Technique  4. Decreased Balance  5. Decreased Activity Tolerance  6. Increased Fatigue   INTERVENTIONS PLANNED: (Benefits and precautions of physical therapy have been discussed with the patient.)  1. Balance Exercise  2. Bed Mobility  3. Family Education  4. Gait Training  5. Home Exercise Program (HEP)  6. Neuromuscular Re-education/Strengthening  7. Therapeutic Activites  8. Therapeutic Exercise/Strengthening  9. Transfer Training     TREATMENT PLAN: Frequency/Duration: 3 times a week for duration of hospital stay  Rehabilitation Potential For Stated Goals: Good     REHAB RECOMMENDATIONS (at time of discharge pending progress):    Placement: It is my opinion, based on this patient's performance to date, that Ms. Jose Bruce may benefit from participating in 1-2 additional therapy sessions in order to continue to assess for rehab potential and then make recommendation for disposition at discharge. Equipment:    None at this time              HISTORY:   History of Present Injury/Illness (Reason for Referral):  SOB  Past Medical History/Comorbidities:   Ms. Jose Bruce  has a past medical history of Arthritis, Asthma, Chronic kidney disease, Ear problems, Gastrointestinal disorder, Hyperlipidemia, Hypertension, Insomnia, Other ill-defined conditions(799.89), Stroke [facility]), Thyroid disease, Tinnitus, and Vitamin D deficiency.   Ms. Jose Bruce  has a past surgical history that includes hx gyn ([age]); hx appendectomy; hx heent; hx other surgical; and pr appendectomy. Social History/Living Environment:   Home Environment: Private residence(boarding home)  Living Alone: No  Support Systems: Family member(s)  Patient Expects to be Discharged to[de-identified] Unknown  Current DME Used/Available at Home: Walker, rolling  Prior Level of Function/Work/Activity:  Lives in boarding house; ambulatory with RW; history of falls. Number of Personal Factors/Comorbidities that affect the Plan of Care: 1-2: MODERATE COMPLEXITY   EXAMINATION:   Most Recent Physical Functioning:   Gross Assessment:  AROM: Within functional limits  Strength: Within functional limits               Posture:     Balance:  Sitting: Intact  Standing: Impaired  Standing - Static: Fair  Standing - Dynamic : Fair Bed Mobility:  Supine to Sit: Stand-by assistance  Wheelchair Mobility:     Transfers:  Sit to Stand: Contact guard assistance  Stand to Sit: Contact guard assistance  Bed to Chair: Contact guard assistance;Minimum assistance  Gait:            Body Structures Involved:  1. Muscles Body Functions Affected:  1. Neuromusculoskeletal  2. Movement Related Activities and Participation Affected:  1. Mobility  2. Community, Social and Archbald Crandall   Number of elements that affect the Plan of Care: 4+: HIGH COMPLEXITY   CLINICAL PRESENTATION:   Presentation: Stable and uncomplicated: LOW COMPLEXITY   CLINICAL DECISION MAKIN [facility] Mobility Inpatient Short Form  How much difficulty does the patient currently have. .. Unable A Lot A Little None   1. Turning over in bed (including adjusting bedclothes, sheets and blankets)? [] 1   [] 2   [] 3   [x] 4   2. Sitting down on and standing up from a chair with arms ( e.g., wheelchair, bedside commode, etc.)   [] 1   [] 2   [x] 3   [] 4   3. Moving from lying on back to sitting on the side of the bed? [] 1   [] 2   [] 3   [x] 4   How much help from another person does the patient currently need. ..  Total A Lot A Little None   4. Moving to and from a bed to a chair (including a wheelchair)? [] 1   [] 2   [x] 3   [] 4   5. Need to walk in hospital room? [] 1   [] 2   [x] 3   [] 4   6. Climbing 3-5 steps with a railing? [] 1   [x] 2   [] 3   [] 4   © 2007, Trustees of [address] [place] [address] [address] [id number], under license to [employer]. All rights reserved      Score:  Initial: 19 Most Recent: X (Date: -- )    Interpretation of Tool:  Represents activities that are increasingly more difficult (i.e. Bed mobility, Transfers, Gait). Medical Necessity:     · Patient demonstrates   · good  ·  rehab potential due to higher previous functional level. Reason for Services/Other Comments:  · Patient continues to require skilled intervention due to   · Decreased balance   · . Use of outcome tool(s) and clinical judgement create a POC that gives a: Clear prediction of patient's progress: LOW COMPLEXITY            TREATMENT:   (In addition to Assessment/Re-Assessment sessions the following treatments were rendered)   Pre-treatment Symptoms/Complaints: \"Get me a \"  Pain: Initial:   Pain Intensity 1: 0  Post Session:  0     Therapeutic Exercise: ( 24 minutes):  Functional tasks to improve mobility, strength, balance, and activity tolerance including balance/transfer training. Required minimal visual, verbal, and manual cues to promote safe transfer techniques and activity pacing . Progressed duration of activity as indicated. Braces/Orthotics/Lines/Etc:   · O2 Device: Room air  Treatment/Session Assessment:    · Response to Treatment:  Fairly given some decreased safety awareness. · Interdisciplinary Collaboration:   o Physical Therapist  o Registered Nurse  o Certified Nursing Assistant/Patient Care Technician  · After treatment position/precautions:   o Up in chair  o Bed/Chair-wheels locked  o Bed in low position  o Call light within reach  o RN notified   · Compliance with Program/Exercises:  Will assess as treatment progresses  · Recommendations/Intent for next treatment session: \"Next visit will focus on advancements to more challenging activities and reduction in assistance provided\".   Total Treatment Duration:  PT Patient Time In/Time Out  Time In: 1046  Time Out: Rich Baca [address] [place] PT, DPT

## 2020-01-20 NOTE — PROGRESS NOTES
Hospitalist Progress Note    2020  Admit Date: 2020  6:21 PM   NAME: Fatou Beltrán   :  1940   MRN:  [id number]   Attending: Iliana Cowart MD  PCP:  Henry Dunn MD    HPI/SUBJECTIVE:   79years old female with multiple medical comorbidities as mentioned below presented to ER with CC of SOB for 2 days, admitted for CAP treatment. : Patient seen, Alan Shaw in bed. Reports multiple issues with her social situation. Has flight of ideas. Complains about so many different things. Nursing notes and chart reviewed. Review of Systems negative with exception of pertinent positives noted above. PHYSICAL EXAM     Visit Vitals  BP (!) 187/97   Pulse 81   Temp 97.5 °F (36.4 °C)   Resp 18   Ht 5' 3\" (1.6 m)   Wt 111 kg (244 lb 12.8 oz)   SpO2 95%   BMI 43.36 kg/m²      Temp (24hrs), Av.8 °F (36.6 °C), Min:97.5 °F (36.4 °C), Max:98 °F (36.7 °C)    Oxygen Therapy  O2 Sat (%): 95 % (20 075)  Pulse via Oximetry: 75 beats per minute (20 075)  O2 Device: Nasal cannula (20)  O2 Flow Rate (L/min): 2 l/min (20 075)  No intake or output data in the 24 hours ending 20 0809      General: No acute distress. Alert.  Obese  Head:  AT/NC  Lungs:  Rales at bases. Heart:  RRR, no murmur, rub, or gallop  Abdomen: Soft, non-distended, non-tender, +bs  Extremities: No cyanosis or clubbing. Neurologic:  No focal deficits. Moves all extremities. Skin:  No Obvious Rash  Psych:  Anxious appearing. LABS AND STUDIES:  Personally reviewed all labs, meds, and studies for past 24hrs.       ASSESSMENT      Active Hospital Problems    Diagnosis Date Noted    Cardiomegaly 2020    Shortness of breath 2020    Pleuritic chest pain 2020    Community acquired pneumonia 2020    BMI 40.0-44.9, adult ([address] 2017    Mixed hyperlipidemia 2017    Bradycardia 2016    Diabetes mellitus type 2, controlled ([facility] 2016    Benign essential HTN 08/05/2015    Asthma 08/05/2015    Congenital hypothyroidism without goiter 08/05/2015    IBS (irritable bowel syndrome) 08/05/2015    Gastroesophageal reflux disease without esophagitis 08/05/2015    Primary osteoarthritis involving multiple joints 08/05/2015           PLAN:    · CAP: New with antibiotics. On supplemental oxygen, PRN nebs. Follow cultures. · HLD: Resume home statin. · DM2: On Januvia and SSI. Trend sugars. · HTN: Resumed HCTZ. Trend BP. · GERD: On home dose of Protonix. · Hypothyroidism: Resumed home dose of Synthroid. Dispo: Hopefully should be ready in 1 to 2 days, social work to assist with discharge. DVT ppx: Lovenox  Discussed plan with pt who is in agreement. All questions answered.     Signed By: Brandon Davidson MD     January 20, 2020

## 2020-01-20 NOTE — ED NOTES
Pt walked to nurses station and began yelling about her cell phone and needing blankets. Security took phone to place with purse in safe at this time. Pt left in bed.

## 2020-01-20 NOTE — H&P
HOSPITALIST H&P/CONSULT  NAME:  Minnie Koyanagi   Age:  79 y.o.  :   1940   MRN:   [id number]  PCP: Claudine Walters MD  Consulting MD:  Treatment Team: Attending Provider: Ady Floyd MD; Primary Nurse: Clint Guan RN  HPI:   Patient is a 79years old female with multiple medical comorbidities as mentioned below presented to ER with CC of SOB for past 1-2 days. Pt reports feeling SOB for past 1-2 days, progressively got worse this AM. Pt reports only mild cough with minimal sputum production. She stated that she had a recent URI, she felt better for few days but symptoms came back again. She reports pain in right back on deep breath and coughing. She uses 2 ltrs supplemental oxygen at night. She denies fever, chills, nausea/vomiting, diarrhea, chest pain, palpitations, urinary symptoms. ER work up showed CXR with cardiomegaly and possible bibasilar infiltrates. Complete ROS done and is as stated in HPI or otherwise negative  Past Medical History:   Diagnosis Date    Arthritis     Asthma     Chronic kidney disease     Ear problems     Gastrointestinal disorder     acid reflux, hernia    Hyperlipidemia     Hypertension     Insomnia     Other ill-defined conditions(799.89)     blood clot on lung    Stroke ([facility]     Thyroid disease     Tinnitus     Vitamin D deficiency       Past Surgical History:   Procedure Laterality Date    APPENDECTOMY      HX APPENDECTOMY      HX GYN  [age]    c-sec    HX HEENT      t & a    HX OTHER SURGICAL      fatty tissue removed from under arm      Prior to Admission Medications   Prescriptions Last Dose Informant Patient Reported? Taking? SITagliptin (JANUVIA) 100 mg tablet   No No   Sig: TAKE 1 TAB BY MOUTH DAILY. acetaminophen (TYLENOL EXTRA STRENGTH) 500 mg tablet   No No   Sig: Take 2 Tabs by mouth three (3) times daily.    albuterol (PROVENTIL HFA, VENTOLIN HFA, PROAIR HFA) 90 mcg/actuation inhaler   No No   Sig: Take 2 Puffs by inhalation every six (6) hours as needed for Wheezing. atorvastatin (LIPITOR) 20 mg tablet   No No   Sig: TAKE 1 TABLET BY MOUTH EVERY DAY   diclofenac (VOLTAREN) 1 % gel   No No   Sig: Apply 2 g to affected area four (4) times daily. ergocalciferol (ERGOCALCIFEROL) 50,000 unit capsule   No No   Sig: Take 1 Cap by mouth every seven (7) days. esomeprazole (NEXIUM) 40 mg capsule   No No   Sig: Take 1 Cap by mouth daily. fexofenadine (ALLEGRA) 60 mg tablet   No No   Sig: Take 1 Tab by mouth daily. fluticasone propionate (FLONASE) 50 mcg/actuation nasal spray   No No   [place] by Both Nostrils route daily. glucose blood VI test strips (BLOOD GLUCOSE TEST) strip   No No   Sig: Check blood sugars BID. E11.9   hydroCHLOROthiazide (HYDRODIURIL) 25 mg tablet   No No   Sig: TAKE 1 TABLET BY MOUTH EVERY DAY   lancets misc   No No   Sig: Check blood sugars BID. E11.9   levothyroxine (SYNTHROID) 125 mcg tablet   No No   Sig: TAKE 1 TAB BY MOUTH DAILY (BEFORE BREAKFAST). naftifine (NAFTIN) 1 % topical cream   No No   Sig: Apply  to affected area daily. nystatin (MYCOSTATIN) powder   No No   Sig: Apply  to affected area four (4) times daily. Under left breast   temazepam (RESTORIL) 30 mg capsule   No No   Sig: Take 1 Cap by mouth nightly as needed for Sleep. Max Daily Amount: 30 mg.       Facility-Administered Medications: None     Allergies   Allergen Reactions    Aspirin Not Reported This Time    Ciprofloxacin Not Reported This Time    Diflucan [Fluconazole] Not Reported This Time    Glucophage [Metformin] Not Reported This Time    Motrin [Ibuprofen] Not Reported This Time    Neurontin [Gabapentin] Anxiety    Nsaids (Non-Steroidal Anti-Inflammatory Drug) Other (comments)    Septra [Sulfamethoprim Ds] Not Reported This Time    Sulfa (Sulfonamide Antibiotics) Hives    Sulfabenzamide Unknown (comments)    Tessalon Perles [Benzonatate] Rash    Vibramycin [Doxycycline Calcium] Not Reported This Time Social History     Tobacco Use    Smoking status: Never Smoker    Smokeless tobacco: Never Used   Substance Use Topics    Alcohol use: No     Alcohol/week: 0.0 standard drinks      Family History   Problem Relation Age of Onset    No Known Problems Mother     No Known Problems Father     No Known Problems Maternal Grandmother     No Known Problems Maternal Grandfather     No Known Problems Paternal Grandmother     No Known Problems Paternal Grandfather       Objective:     Visit Vitals  /55   Pulse (!) 58   Temp 98 °F (36.7 °C)   Resp 18   Ht 5' 3\" (1.6 m)   Wt 113.4 kg (250 lb)   SpO2 93%   BMI 44.29 kg/m²      Temp (24hrs), Av °F (36.7 °C), Min:98 °F (36.7 °C), Max:98 °F (36.7 °C)    Oxygen Therapy  O2 Sat (%): 93 % (20)  Pulse via Oximetry: 60 beats per minute (20)  O2 Device: Room air (20)  Physical Exam:  General:    Alert, cooperative, obese, NAD, on RA    Head:   Normocephalic, without obvious abnormality, atraumatic. Nose:  Nares normal. No drainage or sinus tenderness. Lungs:   Clear to auscultation bilaterally. No Wheezing or Rhonchi. No rales. Heart:   Regular rate and rhythm,  no murmur, rub or gallop. Abdomen:   Soft, non-tender. Not distended. Bowel sounds normal.   Extremities: No cyanosis. No edema. No clubbing  Skin:     Texture, turgor normal. No rashes or lesions.   Not Jaundiced  Neurologic: GCS 15, no motor or sensory deficits, CN 2-12 intact  Psych:             AOx3, mood and affect appropriate  Data Review:   Recent Results (from the past 24 hour(s))   CBC WITH AUTOMATED DIFF    Collection Time: 20  5:51 PM   Result Value Ref Range    WBC 7.2 4.3 - 11.1 K/uL    RBC 5.29 (H) 4.05 - 5.2 M/uL    HGB 15.6 (H) 11.7 - 15.4 g/dL    HCT 47.6 (H) 35.8 - 46.3 %    MCV 90.0 79.6 - 97.8 FL    MCH 29.5 26.1 - 32.9 PG    MCHC 32.8 31.4 - 35.0 g/dL    RDW 13.8 11.9 - 14.6 %    PLATELET 598 836 - 627 K/uL    MPV 11.7 9.4 - 12.3 FL    ABSOLUTE NRBC 0.00 0.0 - 0.2 K/uL    DF AUTOMATED      NEUTROPHILS 66 43 - 78 %    LYMPHOCYTES 29 13 - 44 %    MONOCYTES 5 4.0 - 12.0 %    EOSINOPHILS 0 (L) 0.5 - 7.8 %    BASOPHILS 0 0.0 - 2.0 %    IMMATURE GRANULOCYTES 0 0.0 - 5.0 %    ABS. NEUTROPHILS 4.7 1.7 - 8.2 K/UL    ABS. LYMPHOCYTES 2.1 0.5 - 4.6 K/UL    ABS. MONOCYTES 0.4 0.1 - 1.3 K/UL    ABS. EOSINOPHILS 0.0 0.0 - 0.8 K/UL    ABS. BASOPHILS 0.0 0.0 - 0.2 K/UL    ABS. IMM. GRANS. 0.0 0.0 - 0.5 K/UL   METABOLIC PANEL, COMPREHENSIVE    Collection Time: 01/19/20  5:51 PM   Result Value Ref Range    Sodium 141 136 - 145 mmol/L    Potassium 3.8 3.5 - 5.1 mmol/L    Chloride 106 98 - 107 mmol/L    CO2 28 21 - 32 mmol/L    Anion gap 7 7 - 16 mmol/L    Glucose 161 (H) 65 - 100 mg/dL    BUN 10 8 - 23 MG/DL    Creatinine 0.69 0.6 - 1.0 MG/DL    GFR est AA >60 >60 ml/min/1.73m2    GFR est non-AA >60 >60 ml/min/1.73m2    Calcium 9.7 8.3 - 10.4 MG/DL    Bilirubin, total 0.5 0.2 - 1.1 MG/DL    ALT (SGPT) 19 12 - 65 U/L    AST (SGOT) 19 15 - 37 U/L    Alk. phosphatase 95 50 - 136 U/L    Protein, total 7.1 6.3 - 8.2 g/dL    Albumin 3.0 (L) 3.2 - 4.6 g/dL    Globulin 4.1 (H) 2.3 - 3.5 g/dL    A-G Ratio 0.7 (L) 1.2 - 3.5     PROCALCITONIN    Collection Time: 01/19/20  9:22 PM   Result Value Ref Range    Procalcitonin <0.05 ng/mL   LACTIC ACID    Collection Time: 01/19/20  9:22 PM   Result Value Ref Range    Lactic acid 1.6 0.4 - 2.0 MMOL/L   GLUCOSE, POC    Collection Time: 01/19/20  9:42 PM   Result Value Ref Range    Glucose (POC) 101 (H) 65 - 100 mg/dL     Imaging /Procedures /Studies   All diagnostic imaging personally reviewed by me. CHEST X-RAY, 2 views 1/19/2020     History: Shortness of breath starting yesterday with cough for one week.     Technique: PA and lateral views of the chest.      Comparison: Chest x-ray 11/20/2019     Findings: The cardiac silhouette is mildly enlarged although stable. The lungs are  expanded without evidence for pneumothorax.   Left basilar airspace changes and  small left basilar pleural effusion are seen.     The bony thorax demonstrates no acute changes. The upper abdomen is  unremarkable in appearance.     IMPRESSION  IMPRESSION:   1. Left basilar airspace changes and small left basilar effusion. Symptoms of  pneumonia should be excluded. Alternatively, this could represent an atypical  presentation of heart failure given the associated cardiomegaly. Assessment and Plan: Active Hospital Problems    Diagnosis Date Noted    Cardiomegaly 01/19/2020    Shortness of breath 01/19/2020    Pleuritic chest pain 01/19/2020    Community acquired pneumonia 01/19/2020    BMI 40.0-44.9, adult ([facility] 12/07/2017    Mixed hyperlipidemia 12/07/2017    Bradycardia 08/24/2016    Diabetes mellitus type 2, controlled ([facility] 08/04/2016    Benign essential HTN 08/05/2015    Asthma 08/05/2015    Congenital hypothyroidism without goiter 08/05/2015    IBS (irritable bowel syndrome) 08/05/2015    Gastroesophageal reflux disease without esophagitis 08/05/2015    Primary osteoarthritis involving multiple joints 08/05/2015       PLAN  ·  Admit as obs for SOB from CAP likely bacterial etiology  · Continue IV rocephin and azithromycin. Complete total of 7 days, switch to oral antibiotics on discharge  · Albuterol nebs and incentive spirometry  · mucinex bid  · Check ECHO to r/o CHF.  Check BNP  · poc glucose qachs, continue Januvia and humalog SS  · Continue other home meds as reconciled in [facility]  · Supplemental oxygen at HS  · CM to assist with home oxygen concentrator assessment  · Bradycardia is asymptomatic, present at baseline  · PT/OT eval    Code Status: full    Anticipated discharge: less than 24 hours    Signed By: Siomara Gaffney MD     January 19, 2020

## 2020-01-20 NOTE — ROUTINE PROCESS
TRANSFER - OUT REPORT:    Verbal report given to KELLY hollins(name) on Marianne Tenorio  being transferred to 3rd floor(unit) for routine progression of care       Report consisted of patients Situation, Background, Assessment and   Recommendations(SBAR). Information from the following report(s) SBAR, Kardex, ED Summary, Intake/Output, MAR and Recent Results was reviewed with the receiving nurse. Lines:   Peripheral IV 01/19/20 Left Hand (Active)   Site Assessment Clean, dry, & intact 1/19/2020  5:51 PM   Phlebitis Assessment 0 1/19/2020  5:51 PM   Infiltration Assessment 0 1/19/2020  5:51 PM   Dressing Status Clean, dry, & intact 1/19/2020  5:51 PM   Dressing Type 4 X 4;Transparent 1/19/2020  5:51 PM   Hub Color/Line Status Blue 1/19/2020  5:51 PM       Peripheral IV Left Antecubital (Active)       Peripheral IV Right Antecubital (Active)        Opportunity for questions and clarification was provided. Patient transported with:  Transport.

## 2020-01-20 NOTE — ED PROVIDER NOTES
Presents with complaint of cough and shortness of breath. Patient states she was up all night coughing. She denies any fever. She is on oxygen at night and has felt congested because the water function is not working. He reports sneezing headache ear and sinus congestion. The history is provided by the patient. Cough   This is a new problem. The current episode started more than 2 days ago. The problem occurs constantly. The problem has not changed since onset. The cough is productive of sputum. There has been no fever. Associated symptoms include chills and shortness of breath. Pertinent negatives include no chest pain. She has tried nothing for the symptoms. She is not a smoker. Her past medical history is significant for asthma.         Past Medical History:   Diagnosis Date    Arthritis     Asthma     Chronic kidney disease     Ear problems     Gastrointestinal disorder     acid reflux, hernia    Hyperlipidemia     Hypertension     Insomnia     Other ill-defined conditions(799.89)     blood clot on lung    Stroke ([facility] [address]     Thyroid disease     Tinnitus     Vitamin D deficiency        Past Surgical History:   Procedure Laterality Date    APPENDECTOMY      HX APPENDECTOMY      HX GYN  71,73    c-sec    HX HEENT      t & a    HX OTHER SURGICAL      fatty tissue removed from under arm         Family History:   Problem Relation Age of Onset    No Known Problems Mother     No Known Problems Father     No Known Problems Maternal Grandmother     No Known Problems Maternal Grandfather     No Known Problems Paternal Grandmother     No Known Problems Paternal Grandfather        Social History     Socioeconomic History    Marital status:      Spouse name: Not on file    Number of children: Not on file    Years of education: Not on file    Highest education level: Not on file   Occupational History    Not on file   Social Needs    Financial resource strain: Not on file   [facility] insecurity:     Worry: Not on file     Inability: Not on file    Transportation needs:     Medical: Not on file     Non-medical: Not on file   Tobacco Use    Smoking status: Never Smoker    Smokeless tobacco: Never Used   Substance and Sexual Activity    Alcohol use: No     Alcohol/week: 0.0 standard drinks    Drug use: No    Sexual activity: Not Currently     Partners: Male     Birth control/protection: None   Lifestyle    Physical activity:     Days per week: Not on file     Minutes per session: Not on file    Stress: Not on file   Relationships    Social connections:     Talks on phone: Not on file     Gets together: Not on file     Attends Sabianism service: Not on file     Active member of club or organization: Not on file     Attends meetings of clubs or organizations: Not on file     Relationship status: Not on file    Intimate partner violence:     Fear of current or ex partner: Not on file     Emotionally abused: Not on file     Physically abused: Not on file     Forced sexual activity: Not on file   Other Topics Concern    Not on file   Social History Narrative    Not on file         ALLERGIES: Aspirin; Ciprofloxacin; Diflucan [fluconazole]; Glucophage [metformin]; Motrin [ibuprofen]; Neurontin [gabapentin]; Nsaids (non-steroidal anti-inflammatory drug); Septra [sulfamethoprim ds]; Sulfa (sulfonamide antibiotics); Sulfabenzamide; Tessalon perles [benzonatate]; and Vibramycin [doxycycline calcium]    Review of Systems   Constitutional: Positive for chills. Respiratory: Positive for cough and shortness of breath. Cardiovascular: Negative for chest pain and leg swelling. All other systems reviewed and are negative. Vitals:    01/19/20 1747 01/19/20 2026   BP: 140/80 127/55   Pulse: 60 (!) 58   Resp: 18    Temp: 98 °F (36.7 °C)    SpO2: 94% 93%   Weight: 113.4 kg (250 lb)    Height: 5' 3\" (1.6 m)             Physical Exam  Vitals signs and nursing note reviewed.    Constitutional: General: She is not in acute distress. Appearance: Normal appearance. She is not ill-appearing or toxic-appearing. HENT:      Head: Normocephalic and atraumatic. Mouth/Throat:      Mouth: Mucous membranes are moist.   Cardiovascular:      Rate and Rhythm: Normal rate and regular rhythm. Pulmonary:      Effort: No respiratory distress. Breath sounds: Wheezing present. Abdominal:      General: There is no distension. Palpations: Abdomen is soft. Musculoskeletal: Normal range of motion. Right lower leg: No edema. Left lower leg: No edema. Skin:     General: Skin is warm and dry. Neurological:      General: No focal deficit present. Mental Status: She is alert and oriented to person, place, and time. MDM  Number of Diagnoses or Management Options  Diagnosis management comments: Patient given duo nebs and steroids and antibiotics. Will treat for pneumonia. Check lactic and pro Kenneth.    Pt feels better but her PNA severity index is 99 mortality at 30 days 8.2-9.3. Recommended admission. She desated into upper 80s. Wears O2 at home.   Placed back on 2 L       Amount and/or Complexity of Data Reviewed  Clinical lab tests: ordered and reviewed  Review and summarize past medical records: yes  Discuss the patient with other providers: yes    Risk of Complications, Morbidity, and/or Mortality  Presenting problems: high  Diagnostic procedures: moderate  Management options: high    Patient Progress  Patient progress: improved         Procedures

## 2020-01-20 NOTE — PROGRESS NOTES
Bedside report completed and pt is w/o complaint. She states she has never had chest pain and at best a mild cough. She is very unhappy with her living situation. I will consult with case cayla.

## 2020-01-20 NOTE — ED NOTES
At 0010 pt was left in room with meal tray, sitting up eating. At 0050 this rn went to pt room to give night meds. Pt found lying in bed with face pressed into side rail. Woke pt, pt confused. Unable to remember where she is at. Pt belligerent, yelling stating \"im not crazy\". Pt finally admitted to taking home medications she has in her purse. Security arrived to take pt purse and secure it in safe. Pt still confused and uncooperative. Vss. bgl 219. md and charge rn notified. No new orders received.

## 2020-01-20 NOTE — PROGRESS NOTES
Skin assessment completed with second RN. Scattered bruising on extremities. Sacrum red, but blanchable. Encouraged repositioning.

## 2020-01-20 NOTE — ROUTINE PROCESS
TRANSFER - OUT REPORT:    Verbal report given to 5th floor, RN on Jesus Manuel Mcdaniel being transferred to [phone] for routine progression of care       Report consisted of patients Situation, Background, Assessment and Recommendations (SBAR). Information from the following report(s) SBAR, Kardex, [facility] and Recent Results was reviewed with the receiving nurse. Opportunity for questions and clarification was provided.

## 2020-01-21 PROBLEM — E87.6 HYPOKALEMIA: Status: ACTIVE | Noted: 2020-01-21

## 2020-01-21 LAB
ANION GAP SERPL CALC-SCNC: 6 MMOL/L (ref 7–16)
BUN SERPL-MCNC: 23 MG/DL (ref 8–23)
CALCIUM SERPL-MCNC: 9.4 MG/DL (ref 8.3–10.4)
CHLORIDE SERPL-SCNC: 107 MMOL/L (ref 98–107)
CO2 SERPL-SCNC: 30 MMOL/L (ref 21–32)
CREAT SERPL-MCNC: 0.66 MG/DL (ref 0.6–1)
ERYTHROCYTE [DISTWIDTH] IN BLOOD BY AUTOMATED COUNT: 14 % (ref 11.9–14.6)
GLUCOSE BLD STRIP.AUTO-MCNC: 101 MG/DL (ref 65–100)
GLUCOSE BLD STRIP.AUTO-MCNC: 111 MG/DL (ref 65–100)
GLUCOSE BLD STRIP.AUTO-MCNC: 111 MG/DL (ref 65–100)
GLUCOSE BLD STRIP.AUTO-MCNC: 88 MG/DL (ref 65–100)
GLUCOSE SERPL-MCNC: 122 MG/DL (ref 65–100)
HCT VFR BLD AUTO: 41.7 % (ref 35.8–46.3)
HGB BLD-MCNC: 13.1 G/DL (ref 11.7–15.4)
MAGNESIUM SERPL-MCNC: 1.9 MG/DL (ref 1.8–2.4)
MCH RBC QN AUTO: 28.2 PG (ref 26.1–32.9)
MCHC RBC AUTO-ENTMCNC: 31.4 G/DL (ref 31.4–35)
MCV RBC AUTO: 89.9 FL (ref 79.6–97.8)
MM INDURATION POC: 0 MM (ref 0–5)
NRBC # BLD: 0 K/UL (ref 0–0.2)
PLATELET # BLD AUTO: 224 K/UL (ref 150–450)
PMV BLD AUTO: 11.4 FL (ref 9.4–12.3)
POTASSIUM SERPL-SCNC: 3.3 MMOL/L (ref 3.5–5.1)
PPD POC: NEGATIVE NEGATIVE
RBC # BLD AUTO: 4.64 M/UL (ref 4.05–5.2)
SODIUM SERPL-SCNC: 143 MMOL/L (ref 136–145)
WBC # BLD AUTO: 9.4 K/UL (ref 4.3–11.1)

## 2020-01-21 PROCEDURE — 74011000250 HC RX REV CODE- 250: Performed by: HOSPITALIST

## 2020-01-21 PROCEDURE — 80048 BASIC METABOLIC PNL TOTAL CA: CPT

## 2020-01-21 PROCEDURE — 96366 THER/PROPH/DIAG IV INF ADDON: CPT

## 2020-01-21 PROCEDURE — 74011250636 HC RX REV CODE- 250/636: Performed by: EMERGENCY MEDICINE

## 2020-01-21 PROCEDURE — 83735 ASSAY OF MAGNESIUM: CPT

## 2020-01-21 PROCEDURE — 74011250637 HC RX REV CODE- 250/637: Performed by: FAMILY MEDICINE

## 2020-01-21 PROCEDURE — 74011000250 HC RX REV CODE- 250: Performed by: INTERNAL MEDICINE

## 2020-01-21 PROCEDURE — 85027 COMPLETE CBC AUTOMATED: CPT

## 2020-01-21 PROCEDURE — 77010033678 HC OXYGEN DAILY

## 2020-01-21 PROCEDURE — 74011250637 HC RX REV CODE- 250/637: Performed by: HOSPITALIST

## 2020-01-21 PROCEDURE — 94640 AIRWAY INHALATION TREATMENT: CPT

## 2020-01-21 PROCEDURE — 97165 OT EVAL LOW COMPLEX 30 MIN: CPT

## 2020-01-21 PROCEDURE — 94760 N-INVAS EAR/PLS OXIMETRY 1: CPT

## 2020-01-21 PROCEDURE — 99218 HC RM OBSERVATION: CPT

## 2020-01-21 PROCEDURE — 74011250636 HC RX REV CODE- 250/636: Performed by: FAMILY MEDICINE

## 2020-01-21 PROCEDURE — 82962 GLUCOSE BLOOD TEST: CPT

## 2020-01-21 PROCEDURE — 74011000258 HC RX REV CODE- 258: Performed by: EMERGENCY MEDICINE

## 2020-01-21 PROCEDURE — 97535 SELF CARE MNGMENT TRAINING: CPT

## 2020-01-21 PROCEDURE — 36415 COLL VENOUS BLD VENIPUNCTURE: CPT

## 2020-01-21 PROCEDURE — 96372 THER/PROPH/DIAG INJ SC/IM: CPT

## 2020-01-21 RX ORDER — ENOXAPARIN SODIUM 100 MG/ML
40 INJECTION SUBCUTANEOUS EVERY 24 HOURS
Status: DISCONTINUED | OUTPATIENT
Start: 2020-01-21 | End: 2020-01-27 | Stop reason: HOSPADM

## 2020-01-21 RX ORDER — POTASSIUM CHLORIDE 20 MEQ/1
40 TABLET, EXTENDED RELEASE ORAL EVERY 4 HOURS
Status: COMPLETED | OUTPATIENT
Start: 2020-01-21 | End: 2020-01-21

## 2020-01-21 RX ORDER — ALBUTEROL SULFATE 0.83 MG/ML
2.5 SOLUTION RESPIRATORY (INHALATION)
Status: DISCONTINUED | OUTPATIENT
Start: 2020-01-21 | End: 2020-01-27 | Stop reason: HOSPADM

## 2020-01-21 RX ADMIN — TEMAZEPAM 30 MG: 15 CAPSULE ORAL at 21:07

## 2020-01-21 RX ADMIN — POTASSIUM CHLORIDE 40 MEQ: 20 TABLET, EXTENDED RELEASE ORAL at 09:44

## 2020-01-21 RX ADMIN — Medication 10 ML: at 21:17

## 2020-01-21 RX ADMIN — ALBUTEROL SULFATE 1.25 MG: 2.5 SOLUTION RESPIRATORY (INHALATION) at 15:31

## 2020-01-21 RX ADMIN — PANTOPRAZOLE SODIUM 40 MG: 40 TABLET, DELAYED RELEASE ORAL at 09:45

## 2020-01-21 RX ADMIN — AZITHROMYCIN MONOHYDRATE 500 MG: 500 INJECTION, POWDER, LYOPHILIZED, FOR SOLUTION INTRAVENOUS at 22:25

## 2020-01-21 RX ADMIN — HYDROCHLOROTHIAZIDE 25 MG: 25 TABLET ORAL at 09:45

## 2020-01-21 RX ADMIN — POTASSIUM CHLORIDE 40 MEQ: 20 TABLET, EXTENDED RELEASE ORAL at 12:19

## 2020-01-21 RX ADMIN — ALBUTEROL SULFATE 2.5 MG: 2.5 SOLUTION RESPIRATORY (INHALATION) at 03:16

## 2020-01-21 RX ADMIN — LORATADINE 10 MG: 10 TABLET ORAL at 09:45

## 2020-01-21 RX ADMIN — ENOXAPARIN SODIUM 40 MG: 40 INJECTION SUBCUTANEOUS at 21:07

## 2020-01-21 RX ADMIN — Medication 10 ML: at 06:38

## 2020-01-21 RX ADMIN — SENNOSIDES AND DOCUSATE SODIUM 1 TABLET: 8.6; 5 TABLET ORAL at 09:44

## 2020-01-21 RX ADMIN — ALBUTEROL SULFATE 1.25 MG: 2.5 SOLUTION RESPIRATORY (INHALATION) at 20:56

## 2020-01-21 RX ADMIN — ATORVASTATIN CALCIUM 20 MG: 10 TABLET, FILM COATED ORAL at 21:07

## 2020-01-21 RX ADMIN — ALBUTEROL SULFATE 1.25 MG: 2.5 SOLUTION RESPIRATORY (INHALATION) at 11:47

## 2020-01-21 RX ADMIN — SITAGLIPTIN 100 MG: 100 TABLET, FILM COATED ORAL at 09:45

## 2020-01-21 RX ADMIN — GUAIFENESIN 600 MG: 600 TABLET ORAL at 21:07

## 2020-01-21 RX ADMIN — Medication 10 ML: at 14:17

## 2020-01-21 RX ADMIN — GUAIFENESIN 600 MG: 600 TABLET ORAL at 09:44

## 2020-01-21 RX ADMIN — LEVOTHYROXINE SODIUM 125 MCG: 125 TABLET ORAL at 09:45

## 2020-01-21 RX ADMIN — CEFTRIAXONE 2 G: 2 INJECTION, POWDER, FOR SOLUTION INTRAMUSCULAR; INTRAVENOUS at 21:09

## 2020-01-21 NOTE — PROGRESS NOTES
Hospitalist Progress Note     Admit Date:  2020  6:21 PM   Name:  Kady Talbot   Age:  79 y.o.  :  1940   MRN:  [id number]   PCP:  Mario Goncalves MD  Treatment Team: Attending Provider: Rosa Oppenheim, MD; Primary Nurse: Nell Marcum; Utilization Review: Izabella Costa RN    Subjective:   79years old female with multiple medical comorbidities as mentioned below presented to ER with CC of SOB for 2 days, admitted for CAP treatment. 2020  Says still is sob at times  Placement issues      Objective:     Patient Vitals for the past 24 hrs:   Temp Pulse Resp BP SpO2   20 1538 98.6 °F (37 °C) 68 18 138/59 100 %   20 1533 -- -- -- -- 92 %   20 1155 98.2 °F (36.8 °C) 61 18 (!) 153/91 94 %   20 1150 -- -- -- -- 94 %   20 0752 98.1 °F (36.7 °C) 61 -- 142/62 97 %   20 0348 97.6 °F (36.4 °C) 76 17 118/71 97 %   20 0316 -- -- -- -- 95 %   20 2244 97.6 °F (36.4 °C) 76 18 121/52 96 %   20 2043 98.3 °F (36.8 °C) 73 18 105/51 91 %   20 -- -- -- -- 94 %     Oxygen Therapy  O2 Sat (%): 100 % (20)  Pulse via Oximetry: 65 beats per minute (20)  O2 Device: Nasal cannula (20)  O2 Flow Rate (L/min): 3 l/min (20)    Intake/Output Summary (Last 24 hours) at 2020 1804  Last data filed at 2020 1405  Gross per 24 hour   Intake 1620 ml   Output 1 ml   Net 1619 ml         General:    Well nourished. Alert.    heent- normal  CV:   RRR. No murmur, rub, or gallop. Lungs:   Minimal wheezing, coarse breath sounds  Abdomen:   Soft, nontender, nondistended. Cns- no focal neurological deficits  Extremities: Warm and dry. No cyanosis or edema. Skin:     No rashes or jaundice. Data Review:  I have reviewed all labs, meds, telemetry events, and studies from the last 24 hours.     Recent Results (from the past 24 hour(s))   GLUCOSE, POC    Collection Time: 20  6:53 PM Result Value Ref Range    Glucose (POC) 146 (H) 65 - 100 mg/dL   GLUCOSE, POC    Collection Time: 01/20/20  9:05 PM   Result Value Ref Range    Glucose (POC) 139 (H) 65 - 100 mg/dL   CBC W/O DIFF    Collection Time: 01/21/20  2:47 AM   Result Value Ref Range    WBC 9.4 4.3 - 11.1 K/uL    RBC 4.64 4.05 - 5.2 M/uL    HGB 13.1 11.7 - 15.4 g/dL    HCT 41.7 35.8 - 46.3 %    MCV 89.9 79.6 - 97.8 FL    MCH 28.2 26.1 - 32.9 PG    MCHC 31.4 31.4 - 35.0 g/dL    RDW 14.0 11.9 - 14.6 %    PLATELET 785 808 - 449 K/uL    MPV 11.4 9.4 - 12.3 FL    ABSOLUTE NRBC 0.00 0.0 - 0.2 K/uL   METABOLIC PANEL, BASIC    Collection Time: 01/21/20  2:47 AM   Result Value Ref Range    Sodium 143 136 - 145 mmol/L    Potassium 3.3 (L) 3.5 - 5.1 mmol/L    Chloride 107 98 - 107 mmol/L    CO2 30 21 - 32 mmol/L    Anion gap 6 (L) 7 - 16 mmol/L    Glucose 122 (H) 65 - 100 mg/dL    BUN 23 8 - 23 MG/DL    Creatinine 0.66 0.6 - 1.0 MG/DL    GFR est AA >60 >60 ml/min/1.73m2    GFR est non-AA >60 >60 ml/min/1.73m2    Calcium 9.4 8.3 - 10.4 MG/DL   MAGNESIUM    Collection Time: 01/21/20  2:47 AM   Result Value Ref Range    Magnesium 1.9 1.8 - 2.4 mg/dL   GLUCOSE, POC    Collection Time: 01/21/20  7:46 AM   Result Value Ref Range    Glucose (POC) 111 (H) 65 - 100 mg/dL   GLUCOSE, POC    Collection Time: 01/21/20 11:38 AM   Result Value Ref Range    Glucose (POC) 88 65 - 100 mg/dL   GLUCOSE, POC    Collection Time: 01/21/20  5:03 PM   Result Value Ref Range    Glucose (POC) 101 (H) 65 - 100 mg/dL   PLEASE READ & DOCUMENT PPD TEST IN 24 HRS    Collection Time: 01/21/20  5:26 PM   Result Value Ref Range    PPD Negative Negative    mm Induration 0 0 - 5 mm        All Micro Results     Procedure Component Value Units Date/Time    CULTURE, BLOOD [[id number]] Collected:  01/19/20 2122    Order Status:  Completed Specimen:  Blood Updated:  01/21/20 0734     Special Requests: --        RIGHT  Antecubital       Culture result: NO GROWTH 2 DAYS       CULTURE, BLOOD [[id number]] Collected:  01/19/20 2122    Order Status:  Completed Specimen:  Blood Updated:  01/21/20 0734     Special Requests: --        LEFT  Antecubital       Culture result: NO GROWTH 2 DAYS             Current Meds:  Current Facility-Administered Medications   Medication Dose Route Frequency    albuterol (PROVENTIL VENTOLIN) nebulizer solution 2.5 mg  2.5 mg Nebulization Q6H PRN    enoxaparin (LOVENOX) injection 40 mg  40 mg SubCUTAneous Q24H    atorvastatin (LIPITOR) tablet 20 mg  20 mg Oral QHS    pantoprazole (PROTONIX) tablet 40 mg  40 mg Oral ACB    loratadine (CLARITIN) tablet 10 mg  10 mg Oral DAILY    fluticasone propionate (FLONASE) 50 mcg/actuation nasal spray 1 Spray  1 Spray Both Nostrils DAILY    hydroCHLOROthiazide (HYDRODIURIL) tablet 25 mg  25 mg Oral DAILY    levothyroxine (SYNTHROID) tablet 125 mcg  125 mcg Oral ACB    nystatin (MYCOSTATIN) 100,000 unit/gram powder   Topical QID    SITagliptin (JANUVIA) tablet 100 mg  100 mg Oral DAILY    temazepam (RESTORIL) capsule 30 mg  30 mg Oral QHS PRN    sodium chloride (NS) flush 5-40 mL  5-40 mL IntraVENous Q8H    sodium chloride (NS) flush 5-40 mL  5-40 mL IntraVENous PRN    acetaminophen (TYLENOL) tablet 650 mg  650 mg Oral Q4H PRN    HYDROcodone-acetaminophen (NORCO) 7.5-325 mg per tablet 1 Tab  1 Tab Oral Q6H PRN    naloxone (NARCAN) injection 0.4 mg  0.4 mg IntraVENous PRN    ondansetron (ZOFRAN) injection 4 mg  4 mg IntraVENous Q4H PRN    senna-docusate (PERICOLACE) 8.6-50 mg per tablet 1 Tab  1 Tab Oral DAILY    cefTRIAXone (ROCEPHIN) 2 g in 0.9% sodium chloride (MBP/ADV) 50 mL  2 g IntraVENous Q24H    azithromycin (ZITHROMAX) 500 mg in 0.9% sodium chloride (MBP/ADV) 250 mL  500 mg IntraVENous Q24H    albuterol (PROVENTIL VENTOLIN) nebulizer solution 1.25 mg  1.25 mg Nebulization Q4HWA RT    guaiFENesin ER (MUCINEX) tablet 600 mg  600 mg Oral Q12H       Other Studies (last 24 hours):  No results found.     Assessment and Plan:     Hospital Problems as of 1/21/2020 Date Reviewed: 10/22/2019          Codes Class Noted - Resolved POA    Hypokalemia ICD-10-CM: E87.6  ICD-9-CM: 276.8  1/21/2020 - Present Unknown        Cardiomegaly ICD-10-CM: I51.7  ICD-9-CM: 429.3  1/19/2020 - Present Unknown        * (Principal) Shortness of breath ICD-10-CM: R06.02  ICD-9-CM: 786.05  1/19/2020 - Present Unknown        Pleuritic chest pain ICD-10-CM: R07.81  ICD-9-CM: 786.52  1/19/2020 - Present Unknown        Community acquired pneumonia ICD-10-CM: J18.9  ICD-9-CM: 151  1/19/2020 - Present Unknown        Mixed hyperlipidemia (Chronic) ICD-10-CM: E78.2  ICD-9-CM: 272.2  12/7/2017 - Present Yes        BMI 40.0-44.9, adult (HCC) (Chronic) ICD-10-CM: Z68.41  ICD-9-CM: V85.41  12/7/2017 - Present Yes        Bradycardia (Chronic) ICD-10-CM: R00.1  ICD-9-CM: 427.89  8/24/2016 - Present Yes        Diabetes mellitus type 2, controlled ([facility] (Chronic) ICD-10-CM: E11.9  ICD-9-CM: 250.00  8/4/2016 - Present Yes        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) ICD-10-CM: E03.1  ICD-9-CM: 243  8/5/2015 - Present Yes        Benign essential HTN (Chronic) ICD-10-CM: I10  ICD-9-CM: 401.1  8/5/2015 - Present Yes        Asthma (Chronic) ICD-10-CM: J45.909  ICD-9-CM: 493.90  8/5/2015 - Present Yes        Gastroesophageal reflux disease without esophagitis (Chronic) ICD-10-CM: K21.9  ICD-9-CM: 530.81  8/5/2015 - Present Yes        IBS (irritable bowel syndrome) (Chronic) ICD-10-CM: K58.9  ICD-9-CM: 564.1  8/5/2015 - Present Yes        Primary osteoarthritis involving multiple joints (Chronic) ICD-10-CM: M15.0  ICD-9-CM: 715.09  8/5/2015 - Present Yes              PLAN:    · CAP: New with antibiotics. On supplemental oxygen, PRN nebs. Follow cultures. Midl wheezing will add prednisone. · Hypokalemia- replace. · HLD: Resume home statin. · DM2: On Januvia and SSI. Trend sugars. · HTN: Resumed HCTZ. Trend BP.   · GERD: On home dose of Protonix. · Hypothyroidism: Resumed home dose of Synthroid.     Case management working on placement issues    Signed:  Carlitos Estrada MD

## 2020-01-21 NOTE — PROGRESS NOTES
Return call and msg from [facility] CM. She stated that there is no resources that she can offer the pt for immediate housing and suggests that the pt return to her boarding home. Pt has transferred to a different unit in the hospital and will be followed by another CM who has been alerted to this pt's predicament.

## 2020-01-21 NOTE — PROGRESS NOTES
Problem: Falls - Risk of  Goal: *Absence of Falls  Description  Document Gage Pat Fall Risk and appropriate interventions in the flowsheet.   Outcome: Progressing Towards Goal  Note: Fall Risk Interventions:  Mobility Interventions: Communicate number of staff needed for ambulation/transfer, Patient to call before getting OOB         Medication Interventions: Teach patient to arise slowly, Patient to call before getting OOB    Elimination Interventions: Patient to call for help with toileting needs, Call light in reach

## 2020-01-21 NOTE — PROGRESS NOTES
Initial visit by  to convey care and concern and encourage patient that  services are available if desired. No needs were voiced during the visit. Provided 's business card for future reference. Chaplains remain available for support.      Valentina Antunez [place]  Board Certified

## 2020-01-21 NOTE — PROGRESS NOTES
END OF SHIFT NOTE:    Intake/Output  01/21 0701 - 01/21 1900  In: 840 [P.O.:840]  Out: 1 [Urine:1]   Voiding: YES  Catheter: NO  Drain:              Stool:  0 occurrences. Emesis:  0 occurrences. VITAL SIGNS  Patient Vitals for the past 12 hrs:   Temp Pulse Resp BP SpO2   01/21/20 1538 98.6 °F (37 °C) 68 18 138/59 100 %   01/21/20 1533 -- -- -- -- 92 %   01/21/20 1155 98.2 °F (36.8 °C) 61 18 (!) 153/91 94 %   01/21/20 1150 -- -- -- -- 94 %   01/21/20 0752 98.1 °F (36.7 °C) 61 -- 142/62 97 %       Pain Assessment  Pain 1  Pain Scale 1: Numeric (0 - 10) (01/21/20 1410)  Pain Intensity 1: 0 (01/21/20 1410)  Patient Stated Pain Goal: 0 (01/21/20 1410)    Ambulating  Yes    Additional Information: Patient very belligerent this morning. Seem to be in a better mood this afternoon. VSS. No needs voiced. Shift report given to oncoming nurse at the bedside.     Arnel Feliz

## 2020-01-21 NOTE — PROGRESS NOTES
Patient very verbally aggressive this morning. Yelled at the PCT, the nursing students and I. Tried to communicate that we are just doing our job and that we would be out of her room as soon as possible. Yelled at all of us to \"get the hell out of my room. \" Also yelled at dietary taking her order.

## 2020-01-21 NOTE — PROGRESS NOTES
Problem: Self Care Deficits Care Plan (Adult)  Goal: *Acute Goals and Plan of Care (Insert Text)  Description  1. Patient will perform bathing with modified independence within 7 days with equipment as needed. 2.  Patient will perform upper body dressing and lower body dressing with modified independence within 7 days with equipment as needed. 3.  Patient will perform toileting with modified independence within 7 days with equipment as needed. 4.   Patient will perform functional transfers with modified independence within 7 days with equipment as needed. 5.   Pt will participate in B UE therapeutic exercises for 8 minutes with 3 rest breaks within 7 days. 6.  Pt and or caregiver to demonstrate and verbalize good understanding of recommendations for increasing safety with functional tasks within 7 days. Outcome: Progressing Towards Goal     OCCUPATIONAL THERAPY: Initial Assessment, Daily Note, and AM 1/21/2020  OBSERVATION: OT Visit Days: 1  Payor: [address] [place] [id number] / Plan: [address] / Product Type: Managed Care Medicare /      NAME/AGE/GENDER: Monica Thorne is a 79 y.o. female   PRIMARY DIAGNOSIS:  Shortness of breath [R06.02]  Community acquired pneumonia [J18.9]  Cardiomegaly [I51.7]  Pleuritic chest pain [R07.81] Shortness of breath Shortness of breath        ICD-10: Treatment Diagnosis:    Generalized Muscle Weakness (M62.81)   Precautions/Allergies:     Aspirin; Ciprofloxacin; Diflucan [fluconazole]; Glucophage [metformin]; Motrin [ibuprofen]; Neurontin [gabapentin]; Nsaids (non-steroidal anti-inflammatory drug); Septra [sulfamethoprim ds]; Sulfa (sulfonamide antibiotics); Sulfabenzamide; Tessalon perles [benzonatate]; and Vibramycin [doxycycline calcium]      ASSESSMENT:     Ms. Cruz Krysta admitted from a boarding home with the above diagnosis.   Patient reported that she was able to take care of herself, prepare small meals, and that she was able to ambulate with RW. Patient required CGA for bathroom mobility, and CGA for toileting, and SBA to don socks. Patient talkative with decreased attention span, though she did cooperate with therapy tasks with encouragement. Patient functioning just below baseline, and patient to benefit from Occupational Therapy to maximize ADL performance. Cont OT per tx plan. This section established at most recent assessment   PROBLEM LIST (Impairments causing functional limitations):  Decreased Strength  Decreased ADL/Functional Activities  Decreased Transfer Abilities  Decreased Ambulation Ability/Technique  Decreased Balance  Decreased Activity Tolerance  Decreased Work Simplification/Energy Conservation Techniques  Increased Fatigue  Increased Shortness of Breath  Decreased Flexibility/Joint Mobility  Edema/Girth  Decreased Collin with Home Exercise Program  Decreased Cognition   INTERVENTIONS PLANNED: (Benefits and precautions of occupational therapy have been discussed with the patient.)  Activities of daily living training  Adaptive equipment training  Balance training  Clothing management  Cognitive training  Donning&doffing training  Hygiene training  Medication management training  Neuromuscular re-eduation  Re-evaluation  Sensory reintegration training  Therapeutic activity  Therapeutic exercise     TREATMENT PLAN: Frequency/Duration: Follow patient 3x's/wk to address above goals. Rehabilitation Potential For Stated Goals: Good     REHAB RECOMMENDATIONS (at time of discharge pending progress):    Placement: It is my opinion, based on this patient's performance to date, that Ms. Bianca Demarco may benefit from participating in 1-2 additional therapy sessions in order to continue to assess for rehab potential and then make recommendation for disposition at discharge.   STR vs HH OT  Equipment:   None at this time              OCCUPATIONAL PROFILE AND HISTORY:   History of Present Injury/Illness (Reason for Referral):  Patient is a 79years old female with multiple medical comorbidities as mentioned below presented to ER with CC of SOB for past 1-2 days. Pt reports feeling SOB for past 1-2 days, progressively got worse this AM. Pt reports only mild cough with minimal sputum production. She stated that she had a recent URI, she felt better for few days but symptoms came back again. She reports pain in right back on deep breath and coughing. She uses 2 ltrs supplemental oxygen at night. She denies fever, chills, nausea/vomiting, diarrhea, chest pain, palpitations, urinary symptoms. ER work up showed CXR with cardiomegaly and possible bibasilar infiltrates. Past Medical History/Comorbidities:   Ms. Graeme Victor  has a past medical history of Arthritis, Asthma, Chronic kidney disease, Ear problems, Gastrointestinal disorder, Hyperlipidemia, Hypertension, Insomnia, Other ill-defined conditions(799.89), Stroke [facility]), Thyroid disease, Tinnitus, and Vitamin D deficiency. Ms. Graeme Victor  has a past surgical history that includes hx gyn ([age]); hx appendectomy; hx heent; hx other surgical; and pr appendectomy. Social History/Living Environment:   Home Environment: Private residence(boarding home)  Living Alone: No  Support Systems: Family member(s)  Patient Expects to be Discharged to[de-identified] Unknown  Current DME Used/Available at Home: Walker, rolling  Tub or Shower Type: (Pt took sponge baths)  Prior Level of Function/Work/Activity:  admitted from a boarding home with the above diagnosis. Patient reported that she was able to take care of herself, prepare small meals, and that she was able to ambulate with RW.       Number of Personal Factors/Comorbidities that affect the Plan of Care: Brief history (0):  LOW COMPLEXITY   ASSESSMENT OF OCCUPATIONAL PERFORMANCE[de-identified]   Activities of Daily Living:   Basic ADLs (From Assessment) Complex ADLs (From Assessment)   Feeding: Modified independent  Oral Facial Hygiene/Grooming: Contact guard assistance  Bathing: Minimum assistance  Upper Body Dressing: Contact guard assistance  Lower Body Dressing: Minimum assistance  Toileting: Minimum assistance Instrumental ADL  Meal Preparation: Minimum assistance  Homemaking: Maximum assistance  Medication Management: Moderate assistance   Grooming/Bathing/Dressing Activities of Daily Living     Cognitive Retraining  Safety/Judgement: Fall prevention;Decreased awareness of need for safety;Decreased awareness of need for assistance                 Functional Transfers  Bathroom Mobility: Contact guard assistance  Toilet Transfer : Contact guard assistance     Bed/Mat Mobility  Sit to Stand: Contact guard assistance  Stand to Sit: Contact guard assistance     Most Recent Physical Functioning:   Gross Assessment:                  Posture:     Balance:    Bed Mobility:     Wheelchair Mobility:     Transfers:  Sit to Stand: Contact guard assistance  Stand to Sit: Contact guard assistance            Patient Vitals for the past 6 hrs:   BP BP Patient Position SpO2 O2 Flow Rate (L/min) Pulse   01/21/20 1150 -- -- 94 % 3 l/min --   01/21/20 1155 (!) 153/91 Sitting 94 % 3 l/min 61   01/21/20 1329 -- -- -- 3 l/min --   01/21/20 1405 -- -- -- 3 l/min --       Mental Status  Neurologic State: Alert  Orientation Level: Oriented to person, Oriented to place  Cognition: Follows commands, Decreased attention/concentration, Impulsive  Perception: Appears intact  Perseveration: Perseverates during ADLS, Perseverates during conversation, Perseverates during mobility, Verbal cues provided  Safety/Judgement: Fall prevention, Decreased awareness of need for safety, Decreased awareness of need for assistance                          Physical Skills Involved:  Balance  Strength  Activity Tolerance Cognitive Skills Affected (resulting in the inability to perform in a timely and safe manner):  Executive Function  Sustained Attention  Divided Attention Psychosocial Skills Affected:  Habits/Routines  Social Interaction  Awareness of Others  Social Roles   Number of elements that affect the Plan of Care: 3-5:  MODERATE COMPLEXITY   CLINICAL DECISION MAKIN[address] [place] [address] AM-PAC 6 Clicks   Daily Activity Inpatient Short Form  How much help from another person does the patient currently need. .. Total A Lot A Little None   1. Putting on and taking off regular lower body clothing? [] 1   [] 2   [x] 3   [] 4   2. Bathing (including washing, rinsing, drying)? [] 1   [] 2   [x] 3   [] 4   3. Toileting, which includes using toilet, bedpan or urinal?   [] 1   [] 2   [x] 3   [] 4   4. Putting on and taking off regular upper body clothing? [] 1   [] 2   [x] 3   [] 4   5. Taking care of personal grooming such as brushing teeth? [] 1   [] 2   [x] 3   [] 4   6. Eating meals? [] 1   [] 2   [] 3   [x] 4   © , Trustees of [address] [place] [address], under license to [employer]. All rights reserved      Score:  Initial: 19 Most Recent: X (Date: -- )    Interpretation of Tool:  Represents activities that are increasingly more difficult (i.e. Bed mobility, Transfers, Gait). Medical Necessity:     Patient demonstrates   good   rehab potential due to higher previous functional level. Reason for Services/Other Comments:  Patient continues to require skilled intervention due to   Patient's inability to take care of herself   . Use of outcome tool(s) and clinical judgement create a POC that gives a: LOW COMPLEXITY         TREATMENT:   (In addition to Assessment/Re-Assessment sessions the following treatments were rendered)     Pre-treatment Symptoms/Complaints:    Pain: Initial:   Pain Intensity 1: 0 0 Post Session:  0     Self Care: (17 minutes): Procedure(s) (per grid) utilized to improve and/or restore self-care/home management as related to dressing, toileting, and grooming. Required minimal verbal and tactile cueing to facilitate activities of daily living skills.     Braces/Orthotics/Lines/Etc:   IV  O2 Device: Nasal cannula  Treatment/Session Assessment:    Response to Treatment:  fair  Interdisciplinary Collaboration:   Physical Therapist  Registered Nurse  Certified Nursing Assistant/Patient Care Technician  Respiratory Therapist  After treatment position/precautions:   Bed/Chair-wheels locked  Bed in low position  Caregiver at bedside  Call light within reach  RN notified  Nurse at bedside  Student nurse at bedside   Compliance with Program/Exercises: Compliant all of the time, Will assess as treatment progresses. Recommendations/Intent for next treatment session: \"Next visit will focus on advancements to more challenging activities and reduction in assistance provided\".   Total Treatment Duration:  OT Patient Time In/Time Out  Time In: 1130  Time Out: [address], OT

## 2020-01-22 PROBLEM — J45.901 MILD ASTHMA WITH EXACERBATION: Status: ACTIVE | Noted: 2020-01-22

## 2020-01-22 LAB
ANION GAP SERPL CALC-SCNC: 6 MMOL/L (ref 7–16)
BUN SERPL-MCNC: 16 MG/DL (ref 8–23)
CALCIUM SERPL-MCNC: 9.9 MG/DL (ref 8.3–10.4)
CHLORIDE SERPL-SCNC: 109 MMOL/L (ref 98–107)
CO2 SERPL-SCNC: 29 MMOL/L (ref 21–32)
CREAT SERPL-MCNC: 0.61 MG/DL (ref 0.6–1)
GLUCOSE BLD STRIP.AUTO-MCNC: 109 MG/DL (ref 65–100)
GLUCOSE BLD STRIP.AUTO-MCNC: 196 MG/DL (ref 65–100)
GLUCOSE BLD STRIP.AUTO-MCNC: 94 MG/DL (ref 65–100)
GLUCOSE SERPL-MCNC: 113 MG/DL (ref 65–100)
MM INDURATION POC: 0 MM (ref 0–5)
POTASSIUM SERPL-SCNC: 3.9 MMOL/L (ref 3.5–5.1)
PPD POC: NEGATIVE NEGATIVE
SODIUM SERPL-SCNC: 144 MMOL/L (ref 136–145)

## 2020-01-22 PROCEDURE — 74011250636 HC RX REV CODE- 250/636: Performed by: EMERGENCY MEDICINE

## 2020-01-22 PROCEDURE — 74011636637 HC RX REV CODE- 636/637: Performed by: FAMILY MEDICINE

## 2020-01-22 PROCEDURE — 99218 HC RM OBSERVATION: CPT

## 2020-01-22 PROCEDURE — 74011000258 HC RX REV CODE- 258: Performed by: FAMILY MEDICINE

## 2020-01-22 PROCEDURE — 97530 THERAPEUTIC ACTIVITIES: CPT

## 2020-01-22 PROCEDURE — 96366 THER/PROPH/DIAG IV INF ADDON: CPT

## 2020-01-22 PROCEDURE — 74011250637 HC RX REV CODE- 250/637: Performed by: HOSPITALIST

## 2020-01-22 PROCEDURE — 94640 AIRWAY INHALATION TREATMENT: CPT

## 2020-01-22 PROCEDURE — 80048 BASIC METABOLIC PNL TOTAL CA: CPT

## 2020-01-22 PROCEDURE — 74011250637 HC RX REV CODE- 250/637: Performed by: FAMILY MEDICINE

## 2020-01-22 PROCEDURE — 36415 COLL VENOUS BLD VENIPUNCTURE: CPT

## 2020-01-22 PROCEDURE — 74011000250 HC RX REV CODE- 250: Performed by: HOSPITALIST

## 2020-01-22 PROCEDURE — 96372 THER/PROPH/DIAG INJ SC/IM: CPT

## 2020-01-22 PROCEDURE — 94760 N-INVAS EAR/PLS OXIMETRY 1: CPT

## 2020-01-22 PROCEDURE — 77010033678 HC OXYGEN DAILY

## 2020-01-22 PROCEDURE — 74011250636 HC RX REV CODE- 250/636: Performed by: FAMILY MEDICINE

## 2020-01-22 PROCEDURE — 82962 GLUCOSE BLOOD TEST: CPT

## 2020-01-22 RX ORDER — LISINOPRIL 5 MG/1
10 TABLET ORAL DAILY
Status: DISCONTINUED | OUTPATIENT
Start: 2020-01-22 | End: 2020-01-27 | Stop reason: HOSPADM

## 2020-01-22 RX ORDER — PREDNISONE 20 MG/1
20 TABLET ORAL
Status: DISCONTINUED | OUTPATIENT
Start: 2020-01-22 | End: 2020-01-22

## 2020-01-22 RX ORDER — PREDNISONE 20 MG/1
20 TABLET ORAL 2 TIMES DAILY WITH MEALS
Status: DISCONTINUED | OUTPATIENT
Start: 2020-01-22 | End: 2020-01-25

## 2020-01-22 RX ADMIN — SITAGLIPTIN 100 MG: 100 TABLET, FILM COATED ORAL at 08:00

## 2020-01-22 RX ADMIN — CEFTRIAXONE 1 G: 1 INJECTION, POWDER, FOR SOLUTION INTRAMUSCULAR; INTRAVENOUS at 21:25

## 2020-01-22 RX ADMIN — LISINOPRIL 10 MG: 5 TABLET ORAL at 08:00

## 2020-01-22 RX ADMIN — LORATADINE 10 MG: 10 TABLET ORAL at 08:00

## 2020-01-22 RX ADMIN — ALBUTEROL SULFATE 1.25 MG: 2.5 SOLUTION RESPIRATORY (INHALATION) at 12:18

## 2020-01-22 RX ADMIN — TEMAZEPAM 30 MG: 15 CAPSULE ORAL at 20:43

## 2020-01-22 RX ADMIN — GUAIFENESIN 600 MG: 600 TABLET ORAL at 08:00

## 2020-01-22 RX ADMIN — HYDROCODONE BITARTRATE AND ACETAMINOPHEN 1 TABLET: 7.5; 325 TABLET ORAL at 20:43

## 2020-01-22 RX ADMIN — ALBUTEROL SULFATE 1.25 MG: 2.5 SOLUTION RESPIRATORY (INHALATION) at 21:42

## 2020-01-22 RX ADMIN — ALBUTEROL SULFATE 1.25 MG: 2.5 SOLUTION RESPIRATORY (INHALATION) at 07:52

## 2020-01-22 RX ADMIN — ENOXAPARIN SODIUM 40 MG: 40 INJECTION SUBCUTANEOUS at 21:35

## 2020-01-22 RX ADMIN — PREDNISONE 20 MG: 20 TABLET ORAL at 08:00

## 2020-01-22 RX ADMIN — Medication 10 ML: at 14:54

## 2020-01-22 RX ADMIN — SENNOSIDES AND DOCUSATE SODIUM 1 TABLET: 8.6; 5 TABLET ORAL at 08:00

## 2020-01-22 RX ADMIN — LEVOTHYROXINE SODIUM 125 MCG: 125 TABLET ORAL at 08:00

## 2020-01-22 RX ADMIN — Medication 10 ML: at 21:36

## 2020-01-22 RX ADMIN — PANTOPRAZOLE SODIUM 40 MG: 40 TABLET, DELAYED RELEASE ORAL at 08:00

## 2020-01-22 RX ADMIN — ATORVASTATIN CALCIUM 20 MG: 10 TABLET, FILM COATED ORAL at 20:43

## 2020-01-22 RX ADMIN — GUAIFENESIN 600 MG: 600 TABLET ORAL at 20:36

## 2020-01-22 RX ADMIN — AZITHROMYCIN MONOHYDRATE 500 MG: 500 INJECTION, POWDER, LYOPHILIZED, FOR SOLUTION INTRAVENOUS at 22:09

## 2020-01-22 RX ADMIN — PREDNISONE 20 MG: 20 TABLET ORAL at 17:24

## 2020-01-22 NOTE — PROGRESS NOTES
Problem: Mobility Impaired (Adult and Pediatric)  Goal: *Acute Goals and Plan of Care (Insert Text)  Description  LTG:  (1.)Ms. Bianca Demarco will move from supine to sit and sit to supine , scoot up and down and roll side to side in bed with MODIFIED INDEPENDENCE within 7 treatment day(s). (2.)Ms. Bianca Demarco will transfer from bed to chair and chair to bed with MODIFIED INDEPENDENCE using the least restrictive device within 7 treatment day(s). (3.)Ms. Bianca Demarco will ambulate with SUPERVISION for 90 feet with the least restrictive device within 7 treatment day(s). (4.)Ms. Bianca Demarco will participate in therapeutic activity/exercises x 23 minutes for increased strength within 7 treatment days. ________________________________________________________________________________________________      Outcome: Progressing Towards Goal     PHYSICAL THERAPY: Daily Note and AM 1/22/2020  OBSERVATION: PT Visit Days : 2  Payor: Gayathri Hoff / Plan: [address] / Product Type: [employer] Medicare /       NAME/AGE/GENDER: Nora Boss is a 79 y.o. female   PRIMARY DIAGNOSIS: Shortness of breath [R06.02]  Community acquired pneumonia [J18.9]  Cardiomegaly [I51.7]  Pleuritic chest pain [R07.81] Shortness of breath Shortness of breath       ICD-10: Treatment Diagnosis:    · Generalized Muscle Weakness (M62.81)  · History of falling (Z91.81)   Precaution/Allergies:  Aspirin; Ciprofloxacin; Diflucan [fluconazole]; Glucophage [metformin]; Motrin [ibuprofen]; Neurontin [gabapentin]; Nsaids (non-steroidal anti-inflammatory drug); Septra [sulfamethoprim ds]; Sulfa (sulfonamide antibiotics); Sulfabenzamide; Tessalon perles [benzonatate]; and Vibramycin [doxycycline calcium]      ASSESSMENT:     Ms. Bianca Demarco is a 79 y.o. female in the hospital for the above who was supine in bed upon arrival. She sat to EOB and performed bilateral LE ex. She stood and amb with CGA and RW 15'. Returned to EOB then supine. Ms. Anthony Chaparro could benefit from skilled PT as she is currently functioning below her baseline. This section established at most recent assessment   PROBLEM LIST (Impairments causing functional limitations):  1. Decreased Strength  2. Decreased Transfer Abilities  3. Decreased Ambulation Ability/Technique  4. Decreased Balance  5. Decreased Activity Tolerance  6. Increased Fatigue   INTERVENTIONS PLANNED: (Benefits and precautions of physical therapy have been discussed with the patient.)  1. Balance Exercise  2. Bed Mobility  3. Family Education  4. Gait Training  5. Home Exercise Program (HEP)  6. Neuromuscular Re-education/Strengthening  7. Therapeutic Activites  8. Therapeutic Exercise/Strengthening  9. Transfer Training     TREATMENT PLAN: Frequency/Duration: 3 times a week for duration of hospital stay  Rehabilitation Potential For Stated Goals: Good     REHAB RECOMMENDATIONS (at time of discharge pending progress):    Placement: It is my opinion, based on this patient's performance to date, that Ms. Anthony Chaparro may benefit from participating in 1-2 additional therapy sessions in order to continue to assess for rehab potential and then make recommendation for disposition at discharge. Equipment:    None at this time              HISTORY:   History of Present Injury/Illness (Reason for Referral):  SOB  Past Medical History/Comorbidities:   Ms. Anthony Chaparro  has a past medical history of Arthritis, Asthma, Chronic kidney disease, Ear problems, Gastrointestinal disorder, Hyperlipidemia, Hypertension, Insomnia, Other ill-defined conditions(799.89), Stroke [facility]), Thyroid disease, Tinnitus, and Vitamin D deficiency. Ms. Anthony Chaparro  has a past surgical history that includes hx gyn ([age]); hx appendectomy; hx heent; hx other surgical; and pr appendectomy.   Social History/Living Environment:   Home Environment: Private residence(boarding home)  Living Alone: No  Support Systems: Family member(s)  Patient Expects to be Discharged to[de-identified] Unknown  Current DME Used/Available at Home: Walker, rolling  Tub or Shower Type: (Pt took sponge baths)  Prior Level of Function/Work/Activity:  Lives in boarding house; ambulatory with RW; history of falls. Number of Personal Factors/Comorbidities that affect the Plan of Care: 1-2: MODERATE COMPLEXITY   EXAMINATION:   Most Recent Physical Functioning:   Gross Assessment:                  Posture:     Balance:  Sitting: Intact  Standing: Impaired  Standing - Static: Fair  Standing - Dynamic : Fair Bed Mobility:  Supine to Sit: Stand-by assistance  Wheelchair Mobility:     Transfers:  Sit to Stand: Stand-by assistance  Stand to Sit: Stand-by assistance  Gait:     Speed/Anju: Shuffled; Slow  Gait Abnormalities: Decreased step clearance  Distance (ft): 15 Feet (ft)  Assistive Device: Walker, rolling  Ambulation - Level of Assistance: Stand-by assistance      Body Structures Involved:  1. Muscles Body Functions Affected:  1. Neuromusculoskeletal  2. Movement Related Activities and Participation Affected:  1. Mobility  2. Community, Social and [place]   Number of elements that affect the Plan of Care: 4+: HIGH COMPLEXITY   CLINICAL PRESENTATION:   Presentation: Stable and uncomplicated: LOW COMPLEXITY   CLINICAL DECISION MAKIN [facility] Mobility Inpatient Short Form  How much difficulty does the patient currently have. .. Unable A Lot A Little None   1. Turning over in bed (including adjusting bedclothes, sheets and blankets)? [] 1   [] 2   [] 3   [x] 4   2. Sitting down on and standing up from a chair with arms ( e.g., wheelchair, bedside commode, etc.)   [] 1   [] 2   [x] 3   [] 4   3. Moving from lying on back to sitting on the side of the bed? [] 1   [] 2   [] 3   [x] 4   How much help from another person does the patient currently need. .. Total A Lot A Little None   4. Moving to and from a bed to a chair (including a wheelchair)?    [] 1   [] 2   [x] 3   [] 4   5.  Need to walk in hospital room? [] 1   [] 2   [x] 3   [] 4   6. Climbing 3-5 steps with a railing? [] 1   [x] 2   [] 3   [] 4   © 2007, Trustees of [address] [place] [address] [address] [address], under license to [employer]. All rights reserved      Score:  Initial: 19 Most Recent: X (Date: -- )    Interpretation of Tool:  Represents activities that are increasingly more difficult (i.e. Bed mobility, Transfers, Gait). Medical Necessity:     · Patient demonstrates   · good  ·  rehab potential due to higher previous functional level. Reason for Services/Other Comments:  · Patient continues to require skilled intervention due to   · Decreased balance   · . Use of outcome tool(s) and clinical judgement create a POC that gives a: Clear prediction of patient's progress: LOW COMPLEXITY            TREATMENT:   (In addition to Assessment/Re-Assessment sessions the following treatments were rendered)   Pre-treatment Symptoms/Complaints:  \"I can't walk! \"  Pain: Initial:      Post Session:  0     Therapeutic Activity: (    24): Therapeutic activities including bed mobility, sitting balance, LE ex, amb on level surfaces to improve mobility, strength and balance. Required  min to promote dynamic balance in standing. Braces/Orthotics/Lines/Etc:   · O2 Device: Room air  Treatment/Session Assessment:    · Response to Treatment:  See above  · Interdisciplinary Collaboration:   o Physical Therapy Assistant  o Registered Nurse  o Certified Nursing Assistant/Patient Care Technician  · After treatment position/precautions:   o Supine in bed  o Bed/Chair-wheels locked  o Bed in low position  o Call light within reach  o RN notified   · Compliance with Program/Exercises: Will assess as treatment progresses  · Recommendations/Intent for next treatment session: \"Next visit will focus on advancements to more challenging activities and reduction in assistance provided\".   Total Treatment Duration:  PT Patient Time In/Time Out  Time In: 1103  Time Out: Jaylon Herrera, [place]

## 2020-01-22 NOTE — PROGRESS NOTES
Problem: Falls - Risk of  Goal: *Absence of Falls  Description  Document Lasha Granda Fall Risk and appropriate interventions in the flowsheet.   Outcome: Progressing Towards Goal  Note: Fall Risk Interventions:  Mobility Interventions: Bed/chair exit alarm, Communicate number of staff needed for ambulation/transfer, Patient to call before getting OOB         Medication Interventions: Patient to call before getting OOB, Teach patient to arise slowly    Elimination Interventions: Bed/chair exit alarm, Call light in reach, Patient to call for help with toileting needs, Stay With Me (per policy)

## 2020-01-22 NOTE — PROGRESS NOTES
END OF SHIFT NOTE:    Intake/Output  No intake/output data recorded. Voiding: YES  Catheter: NO  Drain:              Stool:  0 occurrences. Emesis:  0 occurrences. VITAL SIGNS  Patient Vitals for the past 12 hrs:   Temp Pulse Resp BP SpO2   01/21/20 2340 97.7 °F (36.5 °C) 70 16 131/54 98 %   01/21/20 2056 -- -- -- -- 95 %   01/21/20 1951 98 °F (36.7 °C) 68 20 130/72 98 %       Pain Assessment  Pain 1  Pain Scale 1: Numeric (0 - 10) (01/22/20 0216)  Pain Intensity 1: 0 (01/22/20 0216)  Patient Stated Pain Goal: 0 (01/22/20 0216)    Ambulating  Yes    Additional Information:     Shift report given to oncoming nurse at the bedside.     Teri Thomas RN

## 2020-01-22 NOTE — PROGRESS NOTES
Hospitalist Progress Note     Admit Date:  2020  6:21 PM   Name:  Kristopher Ibrahim   Age:  79 y.o.  :  1940   MRN:  [id number]   PCP:  Valerie Webster MD  Treatment Team: Attending Provider: Layla Thacker MD; Utilization Review: Cleopatra rUias RN; Primary Nurse: Ligia Oliveira; Care Manager: Moraima Varma RN    Subjective:   79years old female with multiple medical comorbidities as mentioned below presented to ER with CC of SOB for 2 days, admitted for CAP treatment. 2020  Says still is sob at times  Placement issues    2020  Complaining of cough      Objective:     Patient Vitals for the past 24 hrs:   Temp Pulse Resp BP SpO2   20 1453 98.5 °F (36.9 °C) 69 18 146/73 92 %   20 1218 -- -- -- -- 95 %   20 1030 97.8 °F (36.6 °C) 68 18 114/55 94 %   20 0752 -- -- -- -- 96 %   20 0737 98.2 °F (36.8 °C) (!) 53 16 132/67 96 %   20 2340 97.7 °F (36.5 °C) 70 16 131/54 98 %   206 -- -- -- -- 95 %   20 1951 98 °F (36.7 °C) 68 20 130/72 98 %     Oxygen Therapy  O2 Sat (%): 92 % (20 1453)  Pulse via Oximetry: 77 beats per minute (20 1218)  O2 Device: Nasal cannula (20 1218)  O2 Flow Rate (L/min): 3 l/min (20 1218)    Intake/Output Summary (Last 24 hours) at 2020 1644  Last data filed at 2020 1311  Gross per 24 hour   Intake 480 ml   Output --   Net 480 ml         General:    Well nourished. Alert.    heent- normal  CV:   RRR. No murmur, rub, or gallop. Lungs:   Minimal wheezing, coarse breath sounds  Abdomen:   Soft, nontender, nondistended. Cns- no focal neurological deficits  Extremities: Warm and dry. No cyanosis or edema. Skin:     No rashes or jaundice. Data Review:  I have reviewed all labs, meds, telemetry events, and studies from the last 24 hours.     Recent Results (from the past 24 hour(s))   GLUCOSE, POC    Collection Time: 20  5:03 PM   Result Value Ref Range Glucose (POC) 101 (H) 65 - 100 mg/dL   PLEASE READ & DOCUMENT PPD TEST IN 24 HRS    Collection Time: 01/21/20  5:26 PM   Result Value Ref Range    PPD Negative Negative    mm Induration 0 0 - 5 mm   GLUCOSE, POC    Collection Time: 01/21/20  9:05 PM   Result Value Ref Range    Glucose (POC) 111 (H) 65 - 468 mg/dL   METABOLIC PANEL, BASIC    Collection Time: 01/22/20  5:13 AM   Result Value Ref Range    Sodium 144 136 - 145 mmol/L    Potassium 3.9 3.5 - 5.1 mmol/L    Chloride 109 (H) 98 - 107 mmol/L    CO2 29 21 - 32 mmol/L    Anion gap 6 (L) 7 - 16 mmol/L    Glucose 113 (H) 65 - 100 mg/dL    BUN 16 8 - 23 MG/DL    Creatinine 0.61 0.6 - 1.0 MG/DL    GFR est AA >60 >60 ml/min/1.73m2    GFR est non-AA >60 >60 ml/min/1.73m2    Calcium 9.9 8.3 - 10.4 MG/DL   GLUCOSE, POC    Collection Time: 01/22/20  7:32 AM   Result Value Ref Range    Glucose (POC) 94 65 - 100 mg/dL   GLUCOSE, POC    Collection Time: 01/22/20 10:52 AM   Result Value Ref Range    Glucose (POC) 196 (H) 65 - 100 mg/dL   GLUCOSE, POC    Collection Time: 01/22/20  4:05 PM   Result Value Ref Range    Glucose (POC) 109 (H) 65 - 100 mg/dL        All Micro Results     Procedure Component Value Units Date/Time    CULTURE, BLOOD [[id number]] Collected:  01/19/20 2122    Order Status:  Completed Specimen:  Blood Updated:  01/22/20 0912     Special Requests: --        LEFT  Antecubital       Culture result: NO GROWTH 3 DAYS       CULTURE, BLOOD [[id number]] Collected:  01/19/20 2122    Order Status:  Completed Specimen:  Blood Updated:  01/22/20 0912     Special Requests: --        RIGHT  Antecubital       Culture result: NO GROWTH 3 DAYS             Current Meds:  Current Facility-Administered Medications   Medication Dose Route Frequency    cefTRIAXone (ROCEPHIN) 1 g in 0.9% sodium chloride (MBP/ADV) 50 mL  1 g IntraVENous Q24H    lisinopril (PRINIVIL, ZESTRIL) tablet 10 mg  10 mg Oral DAILY    predniSONE (DELTASONE) tablet 20 mg  20 mg Oral BID WITH MEALS    albuterol (PROVENTIL VENTOLIN) nebulizer solution 2.5 mg  2.5 mg Nebulization Q6H PRN    enoxaparin (LOVENOX) injection 40 mg  40 mg SubCUTAneous Q24H    atorvastatin (LIPITOR) tablet 20 mg  20 mg Oral QHS    pantoprazole (PROTONIX) tablet 40 mg  40 mg Oral ACB    loratadine (CLARITIN) tablet 10 mg  10 mg Oral DAILY    fluticasone propionate (FLONASE) 50 mcg/actuation nasal spray 1 Spray  1 Spray Both Nostrils DAILY    levothyroxine (SYNTHROID) tablet 125 mcg  125 mcg Oral ACB    nystatin (MYCOSTATIN) 100,000 unit/gram powder   Topical QID    SITagliptin (JANUVIA) tablet 100 mg  100 mg Oral DAILY    temazepam (RESTORIL) capsule 30 mg  30 mg Oral QHS PRN    sodium chloride (NS) flush 5-40 mL  5-40 mL IntraVENous Q8H    sodium chloride (NS) flush 5-40 mL  5-40 mL IntraVENous PRN    acetaminophen (TYLENOL) tablet 650 mg  650 mg Oral Q4H PRN    HYDROcodone-acetaminophen (NORCO) 7.5-325 mg per tablet 1 Tab  1 Tab Oral Q6H PRN    naloxone (NARCAN) injection 0.4 mg  0.4 mg IntraVENous PRN    ondansetron (ZOFRAN) injection 4 mg  4 mg IntraVENous Q4H PRN    senna-docusate (PERICOLACE) 8.6-50 mg per tablet 1 Tab  1 Tab Oral DAILY    azithromycin (ZITHROMAX) 500 mg in 0.9% sodium chloride (MBP/ADV) 250 mL  500 mg IntraVENous Q24H    albuterol (PROVENTIL VENTOLIN) nebulizer solution 1.25 mg  1.25 mg Nebulization Q4HWA RT    guaiFENesin ER (MUCINEX) tablet 600 mg  600 mg Oral Q12H       Other Studies (last 24 hours):  No results found.     Assessment and Plan:     Hospital Problems as of 1/22/2020 Date Reviewed: 10/22/2019          Codes Class Noted - Resolved POA    Mild asthma with exacerbation ICD-10-CM: J45.901  ICD-9-CM: 493.92  1/22/2020 - Present Unknown        Hypokalemia ICD-10-CM: E87.6  ICD-9-CM: 276.8  1/21/2020 - Present Unknown        Cardiomegaly ICD-10-CM: I51.7  ICD-9-CM: 429.3  1/19/2020 - Present Unknown        * (Principal) Shortness of breath ICD-10-CM: R06.02  ICD-9-CM: 786.05  1/19/2020 - Present Unknown        Pleuritic chest pain ICD-10-CM: R07.81  ICD-9-CM: 786.52  1/19/2020 - Present Unknown        Community acquired pneumonia ICD-10-CM: J18.9  ICD-9-CM: 235  1/19/2020 - Present Unknown        Mixed hyperlipidemia (Chronic) ICD-10-CM: E78.2  ICD-9-CM: 272.2  12/7/2017 - Present Yes        BMI 40.0-44.9, adult (HCC) (Chronic) ICD-10-CM: Z68.41  ICD-9-CM: V85.41  12/7/2017 - Present Yes        Bradycardia (Chronic) ICD-10-CM: R00.1  ICD-9-CM: 427.89  8/24/2016 - Present Yes        Diabetes mellitus type 2, controlled ([facility] (Chronic) ICD-10-CM: E11.9  ICD-9-CM: 250.00  8/4/2016 - Present Yes        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) ICD-10-CM: E03.1  ICD-9-CM: 243  8/5/2015 - Present Yes        Benign essential HTN (Chronic) ICD-10-CM: I10  ICD-9-CM: 401.1  8/5/2015 - Present Yes        Asthma (Chronic) ICD-10-CM: J45.909  ICD-9-CM: 493.90  8/5/2015 - Present Yes        Gastroesophageal reflux disease without esophagitis (Chronic) ICD-10-CM: K21.9  ICD-9-CM: 530.81  8/5/2015 - Present Yes        IBS (irritable bowel syndrome) (Chronic) ICD-10-CM: K58.9  ICD-9-CM: 564.1  8/5/2015 - Present Yes        Primary osteoarthritis involving multiple joints (Chronic) ICD-10-CM: M15.0  ICD-9-CM: 715.09  8/5/2015 - Present Yes              PLAN:    · CAP: New with antibiotics. On supplemental oxygen, PRN nebs. Follow cultures. · Mild asthma exacerbation-continue steroids and breathing treatments. · Hypokalemia-resolved  · HLD: Resume home statin. · DM2: On Januvia and SSI. Trend sugars. · HTN: Resumed HCTZ. Trend BP. · GERD: On home dose of Protonix. · Hypothyroidism: Resumed home dose of Synthroid.     Case management working on placement issues    Signed:  Hermila Seals MD

## 2020-01-22 NOTE — PROGRESS NOTES
END OF SHIFT NOTE:    Intake/Output  01/22 0701 - 01/22 1900  In: 720 [P.O.:720]  Out: -    Voiding: YES  Catheter: NO  Drain:              Stool:  0 occurrences. Emesis:  0 occurrences. VITAL SIGNS  Patient Vitals for the past 12 hrs:   Temp Pulse Resp BP SpO2   01/22/20 1453 98.5 °F (36.9 °C) 69 18 146/73 92 %   01/22/20 1218 -- -- -- -- 95 %   01/22/20 1030 97.8 °F (36.6 °C) 68 18 114/55 94 %   01/22/20 0752 -- -- -- -- 96 %   01/22/20 0737 98.2 °F (36.8 °C) (!) 53 16 132/67 96 %       Pain Assessment  Pain 1  Pain Scale 1: Numeric (0 - 10) (01/22/20 1405)  Pain Intensity 1: 0 (01/22/20 1405)  Patient Stated Pain Goal: 0 (01/22/20 1405)    Ambulating  Yes    Additional Information: Patient in much better spirits today. Very talkative and happy. Still trying to find placement. VSS. No needs voiced. Shift report given to oncoming nurse at the bedside.     Raymond Eron

## 2020-01-23 LAB
GLUCOSE BLD STRIP.AUTO-MCNC: 114 MG/DL (ref 65–100)
GLUCOSE BLD STRIP.AUTO-MCNC: 121 MG/DL (ref 65–100)
GLUCOSE BLD STRIP.AUTO-MCNC: 133 MG/DL (ref 65–100)
GLUCOSE BLD STRIP.AUTO-MCNC: 98 MG/DL (ref 65–100)
MM INDURATION POC: 0 MM (ref 0–5)
PPD POC: NEGATIVE NEGATIVE

## 2020-01-23 PROCEDURE — 94640 AIRWAY INHALATION TREATMENT: CPT

## 2020-01-23 PROCEDURE — 96366 THER/PROPH/DIAG IV INF ADDON: CPT

## 2020-01-23 PROCEDURE — 82962 GLUCOSE BLOOD TEST: CPT

## 2020-01-23 PROCEDURE — 74011250637 HC RX REV CODE- 250/637: Performed by: HOSPITALIST

## 2020-01-23 PROCEDURE — 74011000258 HC RX REV CODE- 258: Performed by: FAMILY MEDICINE

## 2020-01-23 PROCEDURE — 96372 THER/PROPH/DIAG INJ SC/IM: CPT

## 2020-01-23 PROCEDURE — 74011636637 HC RX REV CODE- 636/637: Performed by: FAMILY MEDICINE

## 2020-01-23 PROCEDURE — 74011250637 HC RX REV CODE- 250/637: Performed by: FAMILY MEDICINE

## 2020-01-23 PROCEDURE — 74011000250 HC RX REV CODE- 250: Performed by: HOSPITALIST

## 2020-01-23 PROCEDURE — 94760 N-INVAS EAR/PLS OXIMETRY 1: CPT

## 2020-01-23 PROCEDURE — 99218 HC RM OBSERVATION: CPT

## 2020-01-23 PROCEDURE — 74011250636 HC RX REV CODE- 250/636: Performed by: EMERGENCY MEDICINE

## 2020-01-23 PROCEDURE — 74011250636 HC RX REV CODE- 250/636: Performed by: FAMILY MEDICINE

## 2020-01-23 PROCEDURE — 77010033678 HC OXYGEN DAILY

## 2020-01-23 RX ADMIN — LISINOPRIL 10 MG: 5 TABLET ORAL at 09:26

## 2020-01-23 RX ADMIN — LEVOTHYROXINE SODIUM 125 MCG: 125 TABLET ORAL at 06:41

## 2020-01-23 RX ADMIN — GUAIFENESIN 600 MG: 600 TABLET ORAL at 09:26

## 2020-01-23 RX ADMIN — ALBUTEROL SULFATE 1.25 MG: 2.5 SOLUTION RESPIRATORY (INHALATION) at 19:36

## 2020-01-23 RX ADMIN — ALBUTEROL SULFATE 1.25 MG: 2.5 SOLUTION RESPIRATORY (INHALATION) at 12:06

## 2020-01-23 RX ADMIN — PREDNISONE 20 MG: 20 TABLET ORAL at 17:12

## 2020-01-23 RX ADMIN — PANTOPRAZOLE SODIUM 40 MG: 40 TABLET, DELAYED RELEASE ORAL at 06:41

## 2020-01-23 RX ADMIN — ATORVASTATIN CALCIUM 20 MG: 10 TABLET, FILM COATED ORAL at 21:03

## 2020-01-23 RX ADMIN — AZITHROMYCIN MONOHYDRATE 500 MG: 500 INJECTION, POWDER, LYOPHILIZED, FOR SOLUTION INTRAVENOUS at 21:02

## 2020-01-23 RX ADMIN — Medication 10 ML: at 21:03

## 2020-01-23 RX ADMIN — SITAGLIPTIN 100 MG: 100 TABLET, FILM COATED ORAL at 09:26

## 2020-01-23 RX ADMIN — NYSTATIN: 100000 POWDER TOPICAL at 21:04

## 2020-01-23 RX ADMIN — CEFTRIAXONE 1 G: 1 INJECTION, POWDER, FOR SOLUTION INTRAMUSCULAR; INTRAVENOUS at 21:03

## 2020-01-23 RX ADMIN — GUAIFENESIN 600 MG: 600 TABLET ORAL at 21:03

## 2020-01-23 RX ADMIN — ALBUTEROL SULFATE 1.25 MG: 2.5 SOLUTION RESPIRATORY (INHALATION) at 16:26

## 2020-01-23 RX ADMIN — PREDNISONE 20 MG: 20 TABLET ORAL at 09:26

## 2020-01-23 RX ADMIN — Medication 10 ML: at 13:33

## 2020-01-23 RX ADMIN — LORATADINE 10 MG: 10 TABLET ORAL at 09:26

## 2020-01-23 RX ADMIN — ENOXAPARIN SODIUM 40 MG: 40 INJECTION SUBCUTANEOUS at 21:04

## 2020-01-23 RX ADMIN — TEMAZEPAM 30 MG: 15 CAPSULE ORAL at 21:03

## 2020-01-23 NOTE — PROGRESS NOTES
END OF SHIFT NOTE:    Intake/Output  01/22 1901 - 01/23 0700  In: -   Out: 450 [Urine:450]   Voiding: YES  Catheter: NO  Drain:              Stool:  0 occurrences. Stool Assessment  Stool Appearance: Soft (01/22/20 2100)    Emesis:  0 occurrences. VITAL SIGNS  Patient Vitals for the past 12 hrs:   Temp Pulse Resp BP SpO2   01/22/20 2315 98.2 °F (36.8 °C) 63 18 112/64 98 %   01/22/20 2142 -- -- -- -- 97 %   01/22/20 2011 98.7 °F (37.1 °C) 71 18 126/70 93 %       Pain Assessment  Pain 1  Pain Scale 1: Visual (01/23/20 0407)  Pain Intensity 1: 0 (01/23/20 0407)  Patient Stated Pain Goal: 0 (01/22/20 1405)    Ambulating  Yes    Additional Information: Patient rested well throughout night. Uneventful night. Patient voices no complaints at this time. Shift report given to oncoming nurse Simi Ibarra at the bedside.     Merlin Cantu

## 2020-01-23 NOTE — PROGRESS NOTES
OT attempted to see pt in am. Pt refused saying, \"I do not need therapy. \" Will attempt at another time/date as schedule permits.      Thank you   MADISYN Booth/DASHA

## 2020-01-23 NOTE — PROGRESS NOTES
Tried to go in at 0800 to do shift assessment and patient said no one needs to come into her room \"until the sun is up\". Patient called at 0930 and said I could come in to do my assessment and give her, her medication.

## 2020-01-23 NOTE — PROGRESS NOTES
END OF SHIFT NOTE:    Intake/Output  01/23 0701 - 01/23 1900  In: 480 [P.O.:480]  Out: 600 [Urine:600]   Voiding: YES  Catheter: NO  Drain:              Stool:  0 occurrences. Stool Assessment  Stool Appearance: Soft (01/22/20 2100)    Emesis:  0 occurrences. VITAL SIGNS  Patient Vitals for the past 12 hrs:   Temp Pulse Resp BP SpO2   01/23/20 1627 -- -- -- -- 96 %   01/23/20 1537 98.2 °F (36.8 °C) (!) 55 18 122/71 98 %   01/23/20 1208 -- -- -- -- 94 %   01/23/20 1123 97.7 °F (36.5 °C) (!) 58 18 116/67 93 %   01/23/20 0934 97.7 °F (36.5 °C) 60 18 142/75 92 %       Pain Assessment  Pain 1  Pain Scale 1: Numeric (0 - 10) (01/23/20 1405)  Pain Intensity 1: 0 (01/23/20 1405)  Patient Stated Pain Goal: 0 (01/23/20 1405)    Ambulating  Yes    Additional Information: Patient more angry this morning. As day went on she seemed to be better. Very talkative. Still waiting on placement. VSS. No needs voiced. Shift report given to oncoming nurse at the bedside.     Remberto Arevalo

## 2020-01-23 NOTE — PROGRESS NOTES
Problem: Falls - Risk of  Goal: *Absence of Falls  Description  Document Abdoulaye Crabtree Fall Risk and appropriate interventions in the flowsheet.   Outcome: Progressing Towards Goal  Note: Fall Risk Interventions:  Mobility Interventions: Patient to call before getting OOB         Medication Interventions: Patient to call before getting OOB    Elimination Interventions: Patient to call for help with toileting needs

## 2020-01-23 NOTE — PROGRESS NOTES
Physical therapy note: Attempted PT this AM. Pt became angry stating she could not and would not work with therapy. Will continue to treat as pt is able and time/schedule permit.     Elaine Faria, PTA

## 2020-01-24 LAB
BACTERIA SPEC CULT: NORMAL
BACTERIA SPEC CULT: NORMAL
GLUCOSE BLD STRIP.AUTO-MCNC: 101 MG/DL (ref 65–100)
GLUCOSE BLD STRIP.AUTO-MCNC: 119 MG/DL (ref 65–100)
GLUCOSE BLD STRIP.AUTO-MCNC: 123 MG/DL (ref 65–100)
GLUCOSE BLD STRIP.AUTO-MCNC: 201 MG/DL (ref 65–100)
SERVICE CMNT-IMP: NORMAL
SERVICE CMNT-IMP: NORMAL

## 2020-01-24 PROCEDURE — 74011250636 HC RX REV CODE- 250/636: Performed by: FAMILY MEDICINE

## 2020-01-24 PROCEDURE — 77010033678 HC OXYGEN DAILY

## 2020-01-24 PROCEDURE — 94640 AIRWAY INHALATION TREATMENT: CPT

## 2020-01-24 PROCEDURE — 82962 GLUCOSE BLOOD TEST: CPT

## 2020-01-24 PROCEDURE — 74011000258 HC RX REV CODE- 258: Performed by: FAMILY MEDICINE

## 2020-01-24 PROCEDURE — 74011000250 HC RX REV CODE- 250: Performed by: HOSPITALIST

## 2020-01-24 PROCEDURE — 74011250636 HC RX REV CODE- 250/636: Performed by: EMERGENCY MEDICINE

## 2020-01-24 PROCEDURE — 74011250637 HC RX REV CODE- 250/637: Performed by: FAMILY MEDICINE

## 2020-01-24 PROCEDURE — 96372 THER/PROPH/DIAG INJ SC/IM: CPT

## 2020-01-24 PROCEDURE — 74011250637 HC RX REV CODE- 250/637: Performed by: HOSPITALIST

## 2020-01-24 PROCEDURE — 74011636637 HC RX REV CODE- 636/637: Performed by: FAMILY MEDICINE

## 2020-01-24 PROCEDURE — 97530 THERAPEUTIC ACTIVITIES: CPT

## 2020-01-24 PROCEDURE — 94760 N-INVAS EAR/PLS OXIMETRY 1: CPT

## 2020-01-24 PROCEDURE — 96366 THER/PROPH/DIAG IV INF ADDON: CPT

## 2020-01-24 PROCEDURE — 99218 HC RM OBSERVATION: CPT

## 2020-01-24 PROCEDURE — 97110 THERAPEUTIC EXERCISES: CPT

## 2020-01-24 RX ADMIN — CEFTRIAXONE 1 G: 1 INJECTION, POWDER, FOR SOLUTION INTRAMUSCULAR; INTRAVENOUS at 21:53

## 2020-01-24 RX ADMIN — LEVOTHYROXINE SODIUM 125 MCG: 125 TABLET ORAL at 08:33

## 2020-01-24 RX ADMIN — ENOXAPARIN SODIUM 40 MG: 40 INJECTION SUBCUTANEOUS at 21:53

## 2020-01-24 RX ADMIN — PREDNISONE 20 MG: 20 TABLET ORAL at 17:20

## 2020-01-24 RX ADMIN — Medication 10 ML: at 15:14

## 2020-01-24 RX ADMIN — GUAIFENESIN 600 MG: 600 TABLET ORAL at 08:33

## 2020-01-24 RX ADMIN — ALBUTEROL SULFATE 1.25 MG: 2.5 SOLUTION RESPIRATORY (INHALATION) at 08:52

## 2020-01-24 RX ADMIN — GUAIFENESIN 600 MG: 600 TABLET ORAL at 21:53

## 2020-01-24 RX ADMIN — SITAGLIPTIN 100 MG: 100 TABLET, FILM COATED ORAL at 08:33

## 2020-01-24 RX ADMIN — ALBUTEROL SULFATE 1.25 MG: 2.5 SOLUTION RESPIRATORY (INHALATION) at 16:06

## 2020-01-24 RX ADMIN — NYSTATIN: 100000 POWDER TOPICAL at 22:00

## 2020-01-24 RX ADMIN — LORATADINE 10 MG: 10 TABLET ORAL at 08:34

## 2020-01-24 RX ADMIN — Medication 10 ML: at 06:39

## 2020-01-24 RX ADMIN — LISINOPRIL 10 MG: 5 TABLET ORAL at 08:33

## 2020-01-24 RX ADMIN — PREDNISONE 20 MG: 20 TABLET ORAL at 08:34

## 2020-01-24 RX ADMIN — Medication 1 EACH: at 09:21

## 2020-01-24 RX ADMIN — PANTOPRAZOLE SODIUM 40 MG: 40 TABLET, DELAYED RELEASE ORAL at 08:33

## 2020-01-24 RX ADMIN — Medication 10 ML: at 21:55

## 2020-01-24 RX ADMIN — ATORVASTATIN CALCIUM 20 MG: 10 TABLET, FILM COATED ORAL at 21:53

## 2020-01-24 RX ADMIN — ALBUTEROL SULFATE 1.25 MG: 2.5 SOLUTION RESPIRATORY (INHALATION) at 11:50

## 2020-01-24 RX ADMIN — AZITHROMYCIN MONOHYDRATE 500 MG: 500 INJECTION, POWDER, LYOPHILIZED, FOR SOLUTION INTRAVENOUS at 21:28

## 2020-01-24 RX ADMIN — ALBUTEROL SULFATE 1.25 MG: 2.5 SOLUTION RESPIRATORY (INHALATION) at 21:14

## 2020-01-24 RX ADMIN — TEMAZEPAM 30 MG: 15 CAPSULE ORAL at 22:10

## 2020-01-24 NOTE — PROGRESS NOTES
Visit with patient to build rapport with .   Calm  Encouraged with presence and words of hope  She shared that she has a 46 yr old handicapped son who is in a facility since 2 yrs old  Evidently some changes are taking place there because of budget cuts    Prayer for her      Julisa Baptiste,  Staff   C: [phone]  /  [email or web address]

## 2020-01-24 NOTE — PROGRESS NOTES
Spoke with Isabela Lees at [facility] [phone]  Pt  Would not be eligible for shelter related that she requires a walker and facility does not allow them. Will discuss with pt again to reconsider [facility] and continue to look for new boarding residence.   Case management will continue to follow

## 2020-01-24 NOTE — PROGRESS NOTES
END OF SHIFT NOTE:    Intake/Output  01/24 0701 - 01/24 1900  In: 562 [P.O.:594]  Out: 500 [Urine:500]   Voiding: YES  Catheter: NO  Drain:              Stool:  0 occurrences. Stool Assessment  Stool Color: Irene Left (01/24/20 0415)  Stool Appearance: Soft (01/24/20 0415)  Stool Amount: Large (01/24/20 0415)  Stool Source/Status: Rectum (01/24/20 0415)    Emesis:  0 occurrences. VITAL SIGNS  Patient Vitals for the past 12 hrs:   Temp Pulse Resp BP SpO2   01/24/20 1606 -- -- -- -- 91 %   01/24/20 1439 98.3 °F (36.8 °C) 65 18 125/64 95 %   01/24/20 1150 -- -- -- -- 95 %   01/24/20 1131 97.8 °F (36.6 °C) 60 18 156/77 95 %   01/24/20 0852 -- -- -- -- 98 %   01/24/20 0734 98.2 °F (36.8 °C) (!) 57 19 148/68 95 %       Pain Assessment  Pain 1  Pain Scale 1: Numeric (0 - 10) (01/24/20 0830)  Pain Intensity 1: 0 (01/24/20 0830)  Patient Stated Pain Goal: 0 (01/24/20 0830)    Ambulating  Yes    Additional Information: trying to figure out placement, doesn't want STR. Shift report will be given to oncoming nurse at the bedside.     Trinity Cano RN

## 2020-01-24 NOTE — PROGRESS NOTES
Pt is ready to be discharge but has had belongings removed from current boarding room that was paid until Feb 3rd. She does have the funds for acquiring a new room until the feb 3rd. Spoke with daughter Kashif Guillaume [phone] daughter cannot take pt in. Contacted [facility] have to call back at 2 and ask for [facility]. [facility] does not have space at this time. Case management is actively seeking placement for pt.

## 2020-01-24 NOTE — PROGRESS NOTES
Problem: Self Care Deficits Care Plan (Adult)  Goal: *Acute Goals and Plan of Care (Insert Text)  Description  1. Patient will perform bathing with modified independence within 7 days with equipment as needed. 2.  Patient will perform upper body dressing and lower body dressing with modified independence within 7 days with equipment as needed. 3.  Patient will perform toileting with modified independence within 7 days with equipment as needed. 4.   Patient will perform functional transfers with modified independence within 7 days with equipment as needed. 5.   Pt will participate in B UE therapeutic exercises for 8 minutes with 3 rest breaks within 7 days. 6.  Pt and or caregiver to demonstrate and verbalize good understanding of recommendations for increasing safety with functional tasks within 7 days. Outcome: Progressing Towards Goal     OCCUPATIONAL THERAPY: Daily Note and AM 1/24/2020  OBSERVATION: OT Visit Days: 2  Payor: Kenyatta Ramirez / Plan: [employer] / Product Type: [employer] /      NAME/AGE/GENDER: Юлия Temple is a 79 y.o. female   PRIMARY DIAGNOSIS:  Shortness of breath [R06.02]  Community acquired pneumonia [J18.9]  Cardiomegaly [I51.7]  Pleuritic chest pain [R07.81] Shortness of breath Shortness of breath       ICD-10: Treatment Diagnosis:    · Generalized Muscle Weakness (M62.81)   Precautions/Allergies:     Aspirin; Ciprofloxacin; Diflucan [fluconazole]; Glucophage [metformin]; Motrin [ibuprofen]; Neurontin [gabapentin]; Nsaids (non-steroidal anti-inflammatory drug); Septra [sulfamethoprim ds]; Sulfa (sulfonamide antibiotics); Sulfabenzamide; Tessalon perles [benzonatate]; and Vibramycin [doxycycline calcium]      ASSESSMENT:     Ms. Loza Civil admitted from a boarding home with the above diagnosis. Patient reported that she was able to take care of herself, prepare small meals, and that she was able to ambulate with RW. 1/24/2020 Pt was sitting in chair upon arrival. Pt completed the exercises below on B UE's. Pt required lots of encouragement to participate in therapy. Pt required verbal cues to stay on task due to talking. Pt decline to complete sponge bath. Minimal progress made. Continue POC. This section established at most recent assessment   PROBLEM LIST (Impairments causing functional limitations):  1. Decreased Strength  2. Decreased ADL/Functional Activities  3. Decreased Transfer Abilities  4. Decreased Ambulation Ability/Technique  5. Decreased Balance  6. Decreased Activity Tolerance  7. Decreased Work Simplification/Energy Conservation Techniques  8. Increased Fatigue  9. Increased Shortness of Breath  10. Decreased Flexibility/Joint Mobility  11. Edema/Girth  12. Decreased [place] with Home Exercise Program  13. Decreased Cognition   INTERVENTIONS PLANNED: (Benefits and precautions of occupational therapy have been discussed with the patient.)  1. Activities of daily living training  2. Adaptive equipment training  3. Balance training  4. Clothing management  5. Cognitive training  6. Donning&doffing training  7. Hygiene training  8. Medication management training  9. Neuromuscular re-eduation  10. Re-evaluation  11. Sensory reintegration training  12. Therapeutic activity  13. Therapeutic exercise     TREATMENT PLAN: Frequency/Duration: Follow patient 3x's/wk to address above goals. Rehabilitation Potential For Stated Goals: Good     REHAB RECOMMENDATIONS (at time of discharge pending progress):    Placement: It is my opinion, based on this patient's performance to date, that Ms. Shaq Barriga may benefit from participating in 1-2 additional therapy sessions in order to continue to assess for rehab potential and then make recommendation for disposition at discharge.   STR vs HH OT  Equipment:    None at this time              OCCUPATIONAL PROFILE AND HISTORY:   History of Present Injury/Illness (Reason for Referral):  Patient is a 79years old female with multiple medical comorbidities as mentioned below presented to ER with CC of SOB for past 1-2 days. Pt reports feeling SOB for past 1-2 days, progressively got worse this AM. Pt reports only mild cough with minimal sputum production. She stated that she had a recent URI, she felt better for few days but symptoms came back again. She reports pain in right back on deep breath and coughing. She uses 2 ltrs supplemental oxygen at night. She denies fever, chills, nausea/vomiting, diarrhea, chest pain, palpitations, urinary symptoms. ER work up showed CXR with cardiomegaly and possible bibasilar infiltrates. Past Medical History/Comorbidities:   Ms. Irene Lisa  has a past medical history of Arthritis, Asthma, Chronic kidney disease, Ear problems, Gastrointestinal disorder, Hyperlipidemia, Hypertension, Insomnia, Other ill-defined conditions(799.89), Stroke [facility]), Thyroid disease, Tinnitus, and Vitamin D deficiency. Ms. Irene Lisa  has a past surgical history that includes hx gyn ([age]); hx appendectomy; hx heent; hx other surgical; and pr appendectomy. Social History/Living Environment:   Home Environment: Private residence(boarding home)  Living Alone: No  Support Systems: Family member(s)  Patient Expects to be Discharged to[de-identified] Unknown  Current DME Used/Available at Home: Walker, rolling  Tub or Shower Type: (Pt took sponge baths)  Prior Level of Function/Work/Activity:  admitted from a boarding home with the above diagnosis. Patient reported that she was able to take care of herself, prepare small meals, and that she was able to ambulate with RW.       Number of Personal Factors/Comorbidities that affect the Plan of Care: Brief history (0):  LOW COMPLEXITY   ASSESSMENT OF OCCUPATIONAL PERFORMANCE[de-identified]   Activities of Daily Living:   Basic ADLs (From Assessment) Complex ADLs (From Assessment)   Feeding: Modified independent  Oral Facial Hygiene/Grooming: Contact guard assistance  Bathing: Minimum assistance  Upper Body Dressing: Contact guard assistance  Lower Body Dressing: Minimum assistance  Toileting: Minimum assistance Instrumental ADL  Meal Preparation: Minimum assistance  Homemaking: Maximum assistance  Medication Management: Moderate assistance   Grooming/Bathing/Dressing Activities of Daily Living     Cognitive Retraining  Safety/Judgement: Fall prevention                       Bed/Mat Mobility  Supine to Sit: Supervision  Sit to Stand: Supervision  Stand to Sit: Supervision     Most Recent Physical Functioning:   Gross Assessment:                  Posture:     Balance:  Sitting: Intact  Standing - Static: Fair(+)  Standing - Dynamic : Fair(+) Bed Mobility:  Supine to Sit: Supervision  Wheelchair Mobility:     Transfers:  Sit to Stand: Supervision  Stand to Sit: Supervision            Patient Vitals for the past 6 hrs:   BP BP Patient Position SpO2 O2 Flow Rate (L/min) Pulse   01/24/20 0734 148/68 At rest 95 % -- (!) 57   01/24/20 0852 -- -- 98 % 2 l/min --   01/24/20 1131 156/77 Sitting 95 % -- 60   01/24/20 1150 -- -- 95 % 2 l/min --       Mental Status  Neurologic State: Alert  Orientation Level: Oriented to person  Cognition: Decreased command following  Perception: Appears intact  Perseveration: Perseverates during ADLS, Perseverates during conversation, Perseverates during mobility, Verbal cues provided  Safety/Judgement: Fall prevention                          Physical Skills Involved:  1. Balance  2. Strength  3. Activity Tolerance Cognitive Skills Affected (resulting in the inability to perform in a timely and safe manner):  1. Executive Function  2. Sustained Attention  3. Divided Attention Psychosocial Skills Affected:  1. Habits/Routines  2. Social Interaction  3. Awareness of Others  4.  Social Roles   Number of elements that affect the Plan of Care: 3-5:  MODERATE COMPLEXITY   CLINICAL DECISION MAKING:   [employer] AM-PAC 6 Clicks   Daily Activity Inpatient Short Form  How much help from another person does the patient currently need. .. Total A Lot A Little None   1. Putting on and taking off regular lower body clothing? [] 1   [] 2   [x] 3   [] 4   2. Bathing (including washing, rinsing, drying)? [] 1   [] 2   [x] 3   [] 4   3. Toileting, which includes using toilet, bedpan or urinal?   [] 1   [] 2   [x] 3   [] 4   4. Putting on and taking off regular upper body clothing? [] 1   [] 2   [x] 3   [] 4   5. Taking care of personal grooming such as brushing teeth? [] 1   [] 2   [x] 3   [] 4   6. Eating meals? [] 1   [] 2   [] 3   [x] 4   © 2007, Trustees of [address] [place] [address] [address] [address], under license to [employer]. All rights reserved      Score:  Initial: 19 Most Recent: X (Date: -- )    Interpretation of Tool:  Represents activities that are increasingly more difficult (i.e. Bed mobility, Transfers, Gait). Medical Necessity:     · Patient demonstrates   · good  ·  rehab potential due to higher previous functional level. Reason for Services/Other Comments:  · Patient continues to require skilled intervention due to   · Patient's inability to take care of herself   · . Use of outcome tool(s) and clinical judgement create a POC that gives a: LOW COMPLEXITY         TREATMENT:   (In addition to Assessment/Re-Assessment sessions the following treatments were rendered)     Pre-treatment Symptoms/Complaints:    Pain: Initial:     0 Post Session:  0     Therapeutic Exercise: ( 15):  Exercises per grid below to improve mobility and strength. Required min verbal cues to promote proper body posture and promote proper body mechanics. Progressed range and repetitions as indicated.     B UE's  Date:  1/24/2020 Date:   Date:     Activity/Exercise Parameters Parameters Parameters   Shoulder flex/ex  10 reps      Shoulder hor abd/add 10 reps      Punches  10 reps      Elbow flex/ex  10 reps                            Braces/Orthotics/Lines/Etc:   · IV  · O2 Device: Nasal cannula  Treatment/Session Assessment:    · Response to Treatment:  fair  · Interdisciplinary Collaboration:   o Certified Occupational Therapy Assistant  o Registered Nurse  · After treatment position/precautions:   o Up in chair  o Bed/Chair-wheels locked  o Bed in low position  o Call light within reach  o RN notified   · Compliance with Program/Exercises: Compliant all of the time, Will assess as treatment progresses. · Recommendations/Intent for next treatment session: \"Next visit will focus on advancements to more challenging activities and reduction in assistance provided\".   Total Treatment Duration:  OT Patient Time In/Time Out  Time In: 1050  Time Out: [address] [employer] [facility]

## 2020-01-24 NOTE — PROGRESS NOTES
END OF SHIFT NOTE:  Pt ambulating in room using walker. Pt is alert and oriented. Restoril given for sleep. Pt refused 3am vital signs. Intake/Output  No intake/output data recorded. Voiding: yes  Catheter: no  Drain:              Stool:  0 occurrences. Stool Assessment  Stool Color: Irene Left (01/24/20 0415)  Stool Appearance: Soft (01/24/20 0415)  Stool Amount: Large (01/24/20 0415)  Stool Source/Status: Rectum (01/24/20 0415)    Emesis:  0 occurrences. VITAL SIGNS  Patient Vitals for the past 12 hrs:   Temp Pulse Resp BP SpO2   01/24/20 0734 98.2 °F (36.8 °C) (!) 57 19 148/68 95 %   01/24/20 0415 97.7 °F (36.5 °C) 64 20 153/89 94 %   01/23/20 2256 97.8 °F (36.6 °C) 67 18 122/61 96 %       Pain Assessment  Pain 1  Pain Scale 1: Numeric (0 - 10) (01/23/20 1915)  Pain Intensity 1: 0 (01/23/20 1915)  Patient Stated Pain Goal: 0 (01/23/20 1915)    Ambulating  Yes- with assistance    Additional Information:     Shift report given to oncoming nurse at the bedside.     Stefani Traore RN

## 2020-01-24 NOTE — PROGRESS NOTES
Problem: Mobility Impaired (Adult and Pediatric)  Goal: *Acute Goals and Plan of Care (Insert Text)  Description  LTG:  (1.)Ms. Jude Presley will move from supine to sit and sit to supine , scoot up and down and roll side to side in bed with MODIFIED INDEPENDENCE within 7 treatment day(s). (2.)Ms. Jude Presley will transfer from bed to chair and chair to bed with MODIFIED INDEPENDENCE using the least restrictive device within 7 treatment day(s). (3.)Ms. Jude Presley will ambulate with SUPERVISION for 90 feet with the least restrictive device within 7 treatment day(s). (4.)Ms. Jude Presley will participate in therapeutic activity/exercises x 23 minutes for increased strength within 7 treatment days. ________________________________________________________________________________________________      Outcome: Progressing Towards Goal     PHYSICAL THERAPY: Daily Note and AM 1/24/2020  OBSERVATION: PT Visit Days : 2  Payor: [address] [place] [address],9D / Plan: [address] / Product Type: Managed Care Medicare /       NAME/AGE/GENDER: Devyn Katz is a 79 y.o. female   PRIMARY DIAGNOSIS: Shortness of breath [R06.02]  Community acquired pneumonia [J18.9]  Cardiomegaly [I51.7]  Pleuritic chest pain [R07.81] Shortness of breath Shortness of breath       ICD-10: Treatment Diagnosis:    · Generalized Muscle Weakness (M62.81)  · History of falling (Z91.81)   Precaution/Allergies:  Aspirin; Ciprofloxacin; Diflucan [fluconazole]; Glucophage [metformin]; Motrin [ibuprofen]; Neurontin [gabapentin]; Nsaids (non-steroidal anti-inflammatory drug); Septra [sulfamethoprim ds]; Sulfa (sulfonamide antibiotics); Sulfabenzamide; Tessalon perles [benzonatate]; and Vibramycin [doxycycline calcium]      ASSESSMENT:     Ms. Jude Presley is a 79 y.o. female in the hospital for the above who was supine in bed upon arrival.  With encouragement she sat to EOB with supervision. She stood and amb with SBA and RW 5' to chair.  Left sitting up in chair with needs in reach. Ms. Donta Hammonds could benefit from skilled PT as she is currently functioning below her baseline. This section established at most recent assessment   PROBLEM LIST (Impairments causing functional limitations):  1. Decreased Strength  2. Decreased Transfer Abilities  3. Decreased Ambulation Ability/Technique  4. Decreased Balance  5. Decreased Activity Tolerance  6. Increased Fatigue   INTERVENTIONS PLANNED: (Benefits and precautions of physical therapy have been discussed with the patient.)  1. Balance Exercise  2. Bed Mobility  3. Family Education  4. Gait Training  5. Home Exercise Program (HEP)  6. Neuromuscular Re-education/Strengthening  7. Therapeutic Activites  8. Therapeutic Exercise/Strengthening  9. Transfer Training     TREATMENT PLAN: Frequency/Duration: 3 times a week for duration of hospital stay  Rehabilitation Potential For Stated Goals: Good     REHAB RECOMMENDATIONS (at time of discharge pending progress):    Placement: It is my opinion, based on this patient's performance to date, that Ms. Donta Hammonds may benefit from intensive therapy at a [address] [place] [address] after discharge due to the functional deficits listed above that are likely to improve with skilled rehabilitation and concerns that he/she may be unsafe to be unsupervised at home due to weakness and instability. .  Equipment:    None at this time              HISTORY:   History of Present Injury/Illness (Reason for Referral):  SOB  Past Medical History/Comorbidities:   Ms. Donta Hammonds  has a past medical history of Arthritis, Asthma, Chronic kidney disease, Ear problems, Gastrointestinal disorder, Hyperlipidemia, Hypertension, Insomnia, Other ill-defined conditions(799.89), Stroke [facility]), Thyroid disease, Tinnitus, and Vitamin D deficiency. Ms. Donta Hammonds  has a past surgical history that includes hx gyn ([age]); hx appendectomy; hx heent; hx other surgical; and pr appendectomy.   Social History/Living Environment: Home Environment: Private residence(boarding home)  Living Alone: No  Support Systems: Family member(s)  Patient Expects to be Discharged to[de-identified] Unknown  Current DME Used/Available at Home: Walker, rolling  Tub or Shower Type: (Pt took sponge baths)  Prior Level of Function/Work/Activity:  Lives in boarding house; ambulatory with RW; history of falls. Number of Personal Factors/Comorbidities that affect the Plan of Care: 1-2: MODERATE COMPLEXITY   EXAMINATION:   Most Recent Physical Functioning:   Gross Assessment:                  Posture:     Balance:  Sitting: Intact  Standing - Static: Fair(+)  Standing - Dynamic : Fair(+) Bed Mobility:  Supine to Sit: Supervision  Wheelchair Mobility:     Transfers:  Sit to Stand: Supervision  Stand to Sit: Supervision  Gait:     Speed/Anju: Shuffled; Slow  Gait Abnormalities: Decreased step clearance  Distance (ft): 5 Feet (ft)  Assistive Device: Walker, rolling  Ambulation - Level of Assistance: Stand-by assistance      Body Structures Involved:  1. Muscles Body Functions Affected:  1. Neuromusculoskeletal  2. Movement Related Activities and Participation Affected:  1. Mobility  2. Community, Social and [place]   Number of elements that affect the Plan of Care: 4+: HIGH COMPLEXITY   CLINICAL PRESENTATION:   Presentation: Stable and uncomplicated: LOW COMPLEXITY   CLINICAL DECISION MAKIN [facility] Inpatient Short Form  How much difficulty does the patient currently have. .. Unable A Lot A Little None   1. Turning over in bed (including adjusting bedclothes, sheets and blankets)? [] 1   [] 2   [] 3   [x] 4   2. Sitting down on and standing up from a chair with arms ( e.g., wheelchair, bedside commode, etc.)   [] 1   [] 2   [x] 3   [] 4   3. Moving from lying on back to sitting on the side of the bed? [] 1   [] 2   [] 3   [x] 4   How much help from another person does the patient currently need. ..  Total A Lot A Little None   4. Moving to and from a bed to a chair (including a wheelchair)? [] 1   [] 2   [x] 3   [] 4   5. Need to walk in hospital room? [] 1   [] 2   [x] 3   [] 4   6. Climbing 3-5 steps with a railing? [] 1   [x] 2   [] 3   [] 4   © 2007, Trustees of [address] [place] [address] [address] [address], under license to [employer]. All rights reserved      Score:  Initial: 19 Most Recent: X (Date: -- )    Interpretation of Tool:  Represents activities that are increasingly more difficult (i.e. Bed mobility, Transfers, Gait). Medical Necessity:     · Patient demonstrates   · good  ·  rehab potential due to higher previous functional level. Reason for Services/Other Comments:  · Patient continues to require skilled intervention due to   · Decreased balance   · . Use of outcome tool(s) and clinical judgement create a POC that gives a: Clear prediction of patient's progress: LOW COMPLEXITY            TREATMENT:   (In addition to Assessment/Re-Assessment sessions the following treatments were rendered)   Pre-treatment Symptoms/Complaints:  \"I can do all this! \"  Pain: Initial:     0  Post Session:  0     Therapeutic Activity: (    20): Therapeutic activities including bed mobility, sitting balance, chair transfers and amb on level surfaces to improve mobility, strength and balance. Required  min to promote dynamic balance in standing. Braces/Orthotics/Lines/Etc:   · O2 Device: Room air  Treatment/Session Assessment:    · Response to Treatment:  See above  · Interdisciplinary Collaboration:   o Physical Therapist  o Physical Therapy Assistant  o Registered Nurse  o   · After treatment position/precautions:   o Up in chair  o Bed/Chair-wheels locked  o Bed in low position  o Call light within reach  o RN notified  o PCT present   · Compliance with Program/Exercises: Will assess as treatment progresses  · Recommendations/Intent for next treatment session:   \"Next visit will focus on advancements to more challenging activities and reduction in assistance provided\".   Total Treatment Duration:  PT Patient Time In/Time Out  Time In: 0941  Time Out: [address] IDALIA Herrera

## 2020-01-24 NOTE — PROGRESS NOTES
Hospitalist Progress Note     Admit Date:  2020  6:21 PM   Name:  Kristopher Ibrahim   Age:  79 y.o.  :  1940   MRN:  [id number]   PCP:  Valerie Webster MD  Treatment Team: Attending Provider: Layla Thacker MD; Utilization Review: Cleopatra Urias RN; Care Manager: Moraima Varma RN; Physical Therapy Assistant: Kriss Ortega PTA    Subjective:   79years old female with multiple medical comorbidities as mentioned below presented to ER with CC of SOB for 2 days, admitted for CAP treatment. 2020  Says still is sob at times  Placement issues    2020  Complaining of cough    2020  Cough better    2020  Cough      Objective:     Patient Vitals for the past 24 hrs:   Temp Pulse Resp BP SpO2   20 1150 -- -- -- -- 95 %   20 1131 97.8 °F (36.6 °C) 60 18 156/77 95 %   20 0852 -- -- -- -- 98 %   20 0734 98.2 °F (36.8 °C) (!) 57 19 148/68 95 %   20 0415 97.7 °F (36.5 °C) 64 20 153/89 94 %   20 2256 97.8 °F (36.6 °C) 67 18 122/61 96 %   20 1939 -- -- -- -- 94 %   20 1920 98.3 °F (36.8 °C) 61 18 124/64 93 %   20 1627 -- -- -- -- 96 %   20 1537 98.2 °F (36.8 °C) (!) 55 18 122/71 98 %     Oxygen Therapy  O2 Sat (%): 95 % (20 1150)  Pulse via Oximetry: 52 beats per minute (20 1150)  O2 Device: Nasal cannula (20 1150)  O2 Flow Rate (L/min): 2 l/min (20 1150)    Intake/Output Summary (Last 24 hours) at 2020 1304  Last data filed at 2020 0919  Gross per 24 hour   Intake 598 ml   Output 800 ml   Net -202 ml         General:    Well nourished. Alert.    heent- normal  CV:   RRR. No murmur, rub, or gallop. Lungs:   Equal entry bilaterally, no wheezing   abdomen:   Soft, nontender, nondistended. Cns- no focal neurological deficits  Extremities: Warm and dry. No cyanosis or edema. Skin:     No rashes or jaundice.      Data Review:  I have reviewed all labs, meds, telemetry events, and studies from the last 24 hours.     Recent Results (from the past 24 hour(s))   PLEASE READ & DOCUMENT PPD TEST IN 72 HRS    Collection Time: 01/23/20  4:26 PM   Result Value Ref Range    PPD Negative Negative    mm Induration 0 0 - 5 mm   GLUCOSE, POC    Collection Time: 01/23/20  4:44 PM   Result Value Ref Range    Glucose (POC) 114 (H) 65 - 100 mg/dL   GLUCOSE, POC    Collection Time: 01/23/20  9:13 PM   Result Value Ref Range    Glucose (POC) 133 (H) 65 - 100 mg/dL   GLUCOSE, POC    Collection Time: 01/24/20  7:29 AM   Result Value Ref Range    Glucose (POC) 101 (H) 65 - 100 mg/dL   GLUCOSE, POC    Collection Time: 01/24/20 11:26 AM   Result Value Ref Range    Glucose (POC) 119 (H) 65 - 100 mg/dL        All Micro Results     Procedure Component Value Units Date/Time    CULTURE, BLOOD [[id number]] Collected:  01/19/20 2122    Order Status:  Completed Specimen:  Blood Updated:  01/24/20 0814     Special Requests: --        LEFT  Antecubital       Culture result: NO GROWTH 5 DAYS       CULTURE, BLOOD [[id number]] Collected:  01/19/20 2122    Order Status:  Completed Specimen:  Blood Updated:  01/24/20 0814     Special Requests: --        RIGHT  Antecubital       Culture result: NO GROWTH 5 DAYS             Current Meds:  Current Facility-Administered Medications   Medication Dose Route Frequency    lip protectant (BLISTEX) ointment 1 Each  1 Each Topical PRN    cefTRIAXone (ROCEPHIN) 1 g in 0.9% sodium chloride (MBP/ADV) 50 mL  1 g IntraVENous Q24H    lisinopril (PRINIVIL, ZESTRIL) tablet 10 mg  10 mg Oral DAILY    predniSONE (DELTASONE) tablet 20 mg  20 mg Oral BID WITH MEALS    albuterol (PROVENTIL VENTOLIN) nebulizer solution 2.5 mg  2.5 mg Nebulization Q6H PRN    enoxaparin (LOVENOX) injection 40 mg  40 mg SubCUTAneous Q24H    atorvastatin (LIPITOR) tablet 20 mg  20 mg Oral QHS    pantoprazole (PROTONIX) tablet 40 mg  40 mg Oral ACB    loratadine (CLARITIN) tablet 10 mg  10 mg Oral DAILY    fluticasone propionate (FLONASE) 50 mcg/actuation nasal spray 1 Spray  1 Spray Both Nostrils DAILY    levothyroxine (SYNTHROID) tablet 125 mcg  125 mcg Oral ACB    nystatin (MYCOSTATIN) 100,000 unit/gram powder   Topical QID    SITagliptin (JANUVIA) tablet 100 mg  100 mg Oral DAILY    temazepam (RESTORIL) capsule 30 mg  30 mg Oral QHS PRN    sodium chloride (NS) flush 5-40 mL  5-40 mL IntraVENous Q8H    sodium chloride (NS) flush 5-40 mL  5-40 mL IntraVENous PRN    acetaminophen (TYLENOL) tablet 650 mg  650 mg Oral Q4H PRN    HYDROcodone-acetaminophen (NORCO) 7.5-325 mg per tablet 1 Tab  1 Tab Oral Q6H PRN    naloxone (NARCAN) injection 0.4 mg  0.4 mg IntraVENous PRN    ondansetron (ZOFRAN) injection 4 mg  4 mg IntraVENous Q4H PRN    senna-docusate (PERICOLACE) 8.6-50 mg per tablet 1 Tab  1 Tab Oral DAILY    azithromycin (ZITHROMAX) 500 mg in 0.9% sodium chloride (MBP/ADV) 250 mL  500 mg IntraVENous Q24H    albuterol (PROVENTIL VENTOLIN) nebulizer solution 1.25 mg  1.25 mg Nebulization Q4HWA RT    guaiFENesin ER (MUCINEX) tablet 600 mg  600 mg Oral Q12H       Other Studies (last 24 hours):  No results found.     Assessment and Plan:     Hospital Problems as of 1/24/2020 Date Reviewed: 10/22/2019          Codes Class Noted - Resolved POA    Mild asthma with exacerbation ICD-10-CM: J45.901  ICD-9-CM: 493.92  1/22/2020 - Present Unknown        Hypokalemia ICD-10-CM: E87.6  ICD-9-CM: 276.8  1/21/2020 - Present Unknown        Cardiomegaly ICD-10-CM: I51.7  ICD-9-CM: 429.3  1/19/2020 - Present Unknown        * (Principal) Shortness of breath ICD-10-CM: R06.02  ICD-9-CM: 786.05  1/19/2020 - Present Unknown        Pleuritic chest pain ICD-10-CM: R07.81  ICD-9-CM: 786.52  1/19/2020 - Present Unknown        Community acquired pneumonia ICD-10-CM: J18.9  ICD-9-CM: 728  1/19/2020 - Present Unknown        Mixed hyperlipidemia (Chronic) ICD-10-CM: E78.2  ICD-9-CM: 272.2  12/7/2017 - Present Yes        BMI 40.0-44.9, adult ([facility] (Chronic) ICD-10-CM: Z68.41  ICD-9-CM: V85.41  12/7/2017 - Present Yes        Bradycardia (Chronic) ICD-10-CM: R00.1  ICD-9-CM: 427.89  8/24/2016 - Present Yes        Diabetes mellitus type 2, controlled ([facility] (Chronic) ICD-10-CM: E11.9  ICD-9-CM: 250.00  8/4/2016 - Present Yes        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) ICD-10-CM: E03.1  ICD-9-CM: 243  8/5/2015 - Present Yes        Benign essential HTN (Chronic) ICD-10-CM: I10  ICD-9-CM: 401.1  8/5/2015 - Present Yes        Asthma (Chronic) ICD-10-CM: J45.909  ICD-9-CM: 493.90  8/5/2015 - Present Yes        Gastroesophageal reflux disease without esophagitis (Chronic) ICD-10-CM: K21.9  ICD-9-CM: 530.81  8/5/2015 - Present Yes        IBS (irritable bowel syndrome) (Chronic) ICD-10-CM: K58.9  ICD-9-CM: 564.1  8/5/2015 - Present Yes        Primary osteoarthritis involving multiple joints (Chronic) ICD-10-CM: M15.0  ICD-9-CM: 715.09  8/5/2015 - Present Yes              PLAN:    · CAP:  Continue antibiotics. On supplemental oxygen, PRN nebs. Follow cultures. · Mild asthma exacerbation-improving . Continue steroids and nebs  · Hypokalemia-resolved  · HLD: Resume home statin. · DM2: On Januvia and SSI. Trend sugars. · HTN: Resumed HCTZ. Trend BP. · GERD: On home dose of Protonix. · Hypothyroidism: Resumed home dose of Synthroid.     Case management working on placement issues    Signed:  Karlee Raymond MD

## 2020-01-24 NOTE — PROGRESS NOTES
Hospitalist Progress Note     Admit Date:  2020  6:21 PM   Name:  Angela Kimble   Age:  79 y.o.  :  1940   MRN:  [id number]   PCP:  Keven Morelos MD  Treatment Team: Attending Provider: Seth Leal MD; Utilization Review: Meena Miranda, KELLY; Care Manager: Arun Barajas, RN; Primary Nurse: Aylin Brush    Subjective:   79years old female with multiple medical comorbidities as mentioned below presented to ER with CC of SOB for 2 days, admitted for CAP treatment. 2020  Says still is sob at times  Placement issues    2020  Complaining of cough    2020  Cough better      Objective:     Patient Vitals for the past 24 hrs:   Temp Pulse Resp BP SpO2   20 -- -- -- -- 94 %   20 -- -- -- -- 96 %   20 1537 98.2 °F (36.8 °C) (!) 55 18 122/71 98 %   20 1208 -- -- -- -- 94 %   20 1123 97.7 °F (36.5 °C) (!) 58 18 116/67 93 %   20 0934 97.7 °F (36.5 °C) 60 18 142/75 92 %   20 2315 98.2 °F (36.8 °C) 63 18 112/64 98 %   202 -- -- -- -- 97 %   20 98.7 °F (37.1 °C) 71 18 126/70 93 %     Oxygen Therapy  O2 Sat (%): 94 % (20)  Pulse via Oximetry: 69 beats per minute (20)  O2 Device: Room air (20)  O2 Flow Rate (L/min): 3 l/min (20)    Intake/Output Summary (Last 24 hours) at 2020  Last data filed at 2020 1537  Gross per 24 hour   Intake 480 ml   Output 1050 ml   Net -570 ml         General:    Well nourished. Alert.    heent- normal  CV:   RRR. No murmur, rub, or gallop. Lungs:   Resolved wheezing, improved coarse breath sounds   abdomen:   Soft, nontender, nondistended. Cns- no focal neurological deficits  Extremities: Warm and dry. No cyanosis or edema. Skin:     No rashes or jaundice. Data Review:  I have reviewed all labs, meds, telemetry events, and studies from the last 24 hours.     Recent Results (from the past 24 hour(s)) GLUCOSE, POC    Collection Time: 01/23/20  9:31 AM   Result Value Ref Range    Glucose (POC) 98 65 - 100 mg/dL   GLUCOSE, POC    Collection Time: 01/23/20 12:04 PM   Result Value Ref Range    Glucose (POC) 121 (H) 65 - 100 mg/dL   PLEASE READ & DOCUMENT PPD TEST IN 72 HRS    Collection Time: 01/23/20  4:26 PM   Result Value Ref Range    PPD Negative Negative    mm Induration 0 0 - 5 mm   GLUCOSE, POC    Collection Time: 01/23/20  4:44 PM   Result Value Ref Range    Glucose (POC) 114 (H) 65 - 100 mg/dL        All Micro Results     Procedure Component Value Units Date/Time    CULTURE, BLOOD [[id number]] Collected:  01/19/20 2122    Order Status:  Completed Specimen:  Blood Updated:  01/23/20 0821     Special Requests: --        RIGHT  Antecubital       Culture result: NO GROWTH 4 DAYS       CULTURE, BLOOD [[id number]] Collected:  01/19/20 2122    Order Status:  Completed Specimen:  Blood Updated:  01/23/20 0821     Special Requests: --        LEFT  Antecubital       Culture result: NO GROWTH 4 DAYS             Current Meds:  Current Facility-Administered Medications   Medication Dose Route Frequency    cefTRIAXone (ROCEPHIN) 1 g in 0.9% sodium chloride (MBP/ADV) 50 mL  1 g IntraVENous Q24H    lisinopril (PRINIVIL, ZESTRIL) tablet 10 mg  10 mg Oral DAILY    predniSONE (DELTASONE) tablet 20 mg  20 mg Oral BID WITH MEALS    albuterol (PROVENTIL VENTOLIN) nebulizer solution 2.5 mg  2.5 mg Nebulization Q6H PRN    enoxaparin (LOVENOX) injection 40 mg  40 mg SubCUTAneous Q24H    atorvastatin (LIPITOR) tablet 20 mg  20 mg Oral QHS    pantoprazole (PROTONIX) tablet 40 mg  40 mg Oral ACB    loratadine (CLARITIN) tablet 10 mg  10 mg Oral DAILY    fluticasone propionate (FLONASE) 50 mcg/actuation nasal spray 1 Spray  1 Spray Both Nostrils DAILY    levothyroxine (SYNTHROID) tablet 125 mcg  125 mcg Oral ACB    nystatin (MYCOSTATIN) 100,000 unit/gram powder   Topical QID    SITagliptin (JANUVIA) tablet 100 mg  100 mg Oral DAILY    temazepam (RESTORIL) capsule 30 mg  30 mg Oral QHS PRN    sodium chloride (NS) flush 5-40 mL  5-40 mL IntraVENous Q8H    sodium chloride (NS) flush 5-40 mL  5-40 mL IntraVENous PRN    acetaminophen (TYLENOL) tablet 650 mg  650 mg Oral Q4H PRN    HYDROcodone-acetaminophen (NORCO) 7.5-325 mg per tablet 1 Tab  1 Tab Oral Q6H PRN    naloxone (NARCAN) injection 0.4 mg  0.4 mg IntraVENous PRN    ondansetron (ZOFRAN) injection 4 mg  4 mg IntraVENous Q4H PRN    senna-docusate (PERICOLACE) 8.6-50 mg per tablet 1 Tab  1 Tab Oral DAILY    azithromycin (ZITHROMAX) 500 mg in 0.9% sodium chloride (MBP/ADV) 250 mL  500 mg IntraVENous Q24H    albuterol (PROVENTIL VENTOLIN) nebulizer solution 1.25 mg  1.25 mg Nebulization Q4HWA RT    guaiFENesin ER (MUCINEX) tablet 600 mg  600 mg Oral Q12H       Other Studies (last 24 hours):  No results found.     Assessment and Plan:     Hospital Problems as of 1/23/2020 Date Reviewed: 10/22/2019          Codes Class Noted - Resolved POA    Mild asthma with exacerbation ICD-10-CM: J45.901  ICD-9-CM: 493.92  1/22/2020 - Present Unknown        Hypokalemia ICD-10-CM: E87.6  ICD-9-CM: 276.8  1/21/2020 - Present Unknown        Cardiomegaly ICD-10-CM: I51.7  ICD-9-CM: 429.3  1/19/2020 - Present Unknown        * (Principal) Shortness of breath ICD-10-CM: R06.02  ICD-9-CM: 786.05  1/19/2020 - Present Unknown        Pleuritic chest pain ICD-10-CM: R07.81  ICD-9-CM: 786.52  1/19/2020 - Present Unknown        Community acquired pneumonia ICD-10-CM: J18.9  ICD-9-CM: 486  1/19/2020 - Present Unknown        Mixed hyperlipidemia (Chronic) ICD-10-CM: E78.2  ICD-9-CM: 272.2  12/7/2017 - Present Yes        BMI 40.0-44.9, adult (HCC) (Chronic) ICD-10-CM: Z68.41  ICD-9-CM: V85.41  12/7/2017 - Present Yes        Bradycardia (Chronic) ICD-10-CM: R00.1  ICD-9-CM: 427.89  8/24/2016 - Present Yes        Diabetes mellitus type 2, controlled ([facility] (Chronic) ICD-10-CM: E11.9  ICD-9-CM: 250.00 8/4/2016 - Present Yes        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) ICD-10-CM: E03.1  ICD-9-CM: 243  8/5/2015 - Present Yes        Benign essential HTN (Chronic) ICD-10-CM: I10  ICD-9-CM: 401.1  8/5/2015 - Present Yes        Asthma (Chronic) ICD-10-CM: J45.909  ICD-9-CM: 493.90  8/5/2015 - Present Yes        Gastroesophageal reflux disease without esophagitis (Chronic) ICD-10-CM: K21.9  ICD-9-CM: 530.81  8/5/2015 - Present Yes        IBS (irritable bowel syndrome) (Chronic) ICD-10-CM: K58.9  ICD-9-CM: 564.1  8/5/2015 - Present Yes        Primary osteoarthritis involving multiple joints (Chronic) ICD-10-CM: M15.0  ICD-9-CM: 715.09  8/5/2015 - Present Yes              PLAN:    · CAP:  Continue antibiotics. On supplemental oxygen, PRN nebs. Follow cultures. · Mild asthma exacerbation-improving . Continue steroids and nebs  · Hypokalemia-resolved  · HLD: Resume home statin. · DM2: On Januvia and SSI. Trend sugars. · HTN: Resumed HCTZ. Trend BP. · GERD: On home dose of Protonix. · Hypothyroidism: Resumed home dose of Synthroid.     Case management working on placement issues    Signed:  Kari Sr MD

## 2020-01-25 LAB
GLUCOSE BLD STRIP.AUTO-MCNC: 118 MG/DL (ref 65–100)
GLUCOSE BLD STRIP.AUTO-MCNC: 125 MG/DL (ref 65–100)
GLUCOSE BLD STRIP.AUTO-MCNC: 84 MG/DL (ref 65–100)
GLUCOSE BLD STRIP.AUTO-MCNC: 92 MG/DL (ref 65–100)

## 2020-01-25 PROCEDURE — 74011250637 HC RX REV CODE- 250/637: Performed by: HOSPITALIST

## 2020-01-25 PROCEDURE — 74011250637 HC RX REV CODE- 250/637: Performed by: FAMILY MEDICINE

## 2020-01-25 PROCEDURE — 96366 THER/PROPH/DIAG IV INF ADDON: CPT

## 2020-01-25 PROCEDURE — 94640 AIRWAY INHALATION TREATMENT: CPT

## 2020-01-25 PROCEDURE — 96376 TX/PRO/DX INJ SAME DRUG ADON: CPT

## 2020-01-25 PROCEDURE — 74011000250 HC RX REV CODE- 250: Performed by: INTERNAL MEDICINE

## 2020-01-25 PROCEDURE — 74011250636 HC RX REV CODE- 250/636: Performed by: FAMILY MEDICINE

## 2020-01-25 PROCEDURE — 99218 HC RM OBSERVATION: CPT

## 2020-01-25 PROCEDURE — 77010033678 HC OXYGEN DAILY

## 2020-01-25 PROCEDURE — 74011000250 HC RX REV CODE- 250: Performed by: FAMILY MEDICINE

## 2020-01-25 PROCEDURE — 74011636637 HC RX REV CODE- 636/637: Performed by: FAMILY MEDICINE

## 2020-01-25 PROCEDURE — 94760 N-INVAS EAR/PLS OXIMETRY 1: CPT

## 2020-01-25 PROCEDURE — 96372 THER/PROPH/DIAG INJ SC/IM: CPT

## 2020-01-25 PROCEDURE — 74011250636 HC RX REV CODE- 250/636: Performed by: EMERGENCY MEDICINE

## 2020-01-25 PROCEDURE — 82962 GLUCOSE BLOOD TEST: CPT

## 2020-01-25 PROCEDURE — 96375 TX/PRO/DX INJ NEW DRUG ADDON: CPT

## 2020-01-25 PROCEDURE — 74011000250 HC RX REV CODE- 250: Performed by: HOSPITALIST

## 2020-01-25 RX ORDER — IPRATROPIUM BROMIDE AND ALBUTEROL SULFATE 2.5; .5 MG/3ML; MG/3ML
3 SOLUTION RESPIRATORY (INHALATION)
Status: DISCONTINUED | OUTPATIENT
Start: 2020-01-25 | End: 2020-01-27 | Stop reason: HOSPADM

## 2020-01-25 RX ORDER — BUDESONIDE 0.5 MG/2ML
500 INHALANT ORAL
Status: DISCONTINUED | OUTPATIENT
Start: 2020-01-25 | End: 2020-01-27 | Stop reason: HOSPADM

## 2020-01-25 RX ADMIN — BUDESONIDE 500 MCG: 0.5 INHALANT RESPIRATORY (INHALATION) at 20:00

## 2020-01-25 RX ADMIN — TEMAZEPAM 30 MG: 15 CAPSULE ORAL at 21:47

## 2020-01-25 RX ADMIN — GUAIFENESIN 600 MG: 600 TABLET ORAL at 09:13

## 2020-01-25 RX ADMIN — ATORVASTATIN CALCIUM 20 MG: 10 TABLET, FILM COATED ORAL at 21:36

## 2020-01-25 RX ADMIN — Medication 10 ML: at 21:40

## 2020-01-25 RX ADMIN — ENOXAPARIN SODIUM 40 MG: 40 INJECTION SUBCUTANEOUS at 21:37

## 2020-01-25 RX ADMIN — IPRATROPIUM BROMIDE AND ALBUTEROL SULFATE 3 ML: .5; 3 SOLUTION RESPIRATORY (INHALATION) at 20:00

## 2020-01-25 RX ADMIN — LORATADINE 10 MG: 10 TABLET ORAL at 09:13

## 2020-01-25 RX ADMIN — METHYLPREDNISOLONE SODIUM SUCCINATE 40 MG: 40 INJECTION, POWDER, FOR SOLUTION INTRAMUSCULAR; INTRAVENOUS at 12:00

## 2020-01-25 RX ADMIN — SITAGLIPTIN 100 MG: 100 TABLET, FILM COATED ORAL at 09:13

## 2020-01-25 RX ADMIN — IPRATROPIUM BROMIDE AND ALBUTEROL SULFATE 3 ML: .5; 3 SOLUTION RESPIRATORY (INHALATION) at 16:00

## 2020-01-25 RX ADMIN — METHYLPREDNISOLONE SODIUM SUCCINATE 40 MG: 40 INJECTION, POWDER, FOR SOLUTION INTRAMUSCULAR; INTRAVENOUS at 21:36

## 2020-01-25 RX ADMIN — HYDROCODONE BITARTRATE AND ACETAMINOPHEN 1 TABLET: 7.5; 325 TABLET ORAL at 17:02

## 2020-01-25 RX ADMIN — IPRATROPIUM BROMIDE AND ALBUTEROL SULFATE 3 ML: .5; 3 SOLUTION RESPIRATORY (INHALATION) at 13:09

## 2020-01-25 RX ADMIN — GUAIFENESIN 600 MG: 600 TABLET ORAL at 21:36

## 2020-01-25 RX ADMIN — PREDNISONE 20 MG: 20 TABLET ORAL at 09:13

## 2020-01-25 RX ADMIN — Medication 10 ML: at 06:29

## 2020-01-25 RX ADMIN — ALBUTEROL SULFATE 1.25 MG: 2.5 SOLUTION RESPIRATORY (INHALATION) at 09:16

## 2020-01-25 RX ADMIN — NYSTATIN: 100000 POWDER TOPICAL at 17:03

## 2020-01-25 RX ADMIN — LEVOTHYROXINE SODIUM 125 MCG: 125 TABLET ORAL at 09:13

## 2020-01-25 RX ADMIN — LISINOPRIL 10 MG: 5 TABLET ORAL at 09:13

## 2020-01-25 RX ADMIN — NYSTATIN: 100000 POWDER TOPICAL at 09:00

## 2020-01-25 RX ADMIN — AZITHROMYCIN MONOHYDRATE 500 MG: 500 INJECTION, POWDER, LYOPHILIZED, FOR SOLUTION INTRAVENOUS at 21:28

## 2020-01-25 RX ADMIN — NYSTATIN: 100000 POWDER TOPICAL at 21:41

## 2020-01-25 NOTE — PROGRESS NOTES
END OF SHIFT NOTE:    Intake/Output  01/24 1901 - 01/25 0700  In: 360 [P.O.:360]  Out: -    Voiding: YES  Catheter: NO  Drain:              Stool:  0 occurrences. Stool Assessment  Stool Color: [place] (01/24/20 0415)  Stool Appearance: Soft (01/24/20 0415)  Stool Amount: Large (01/24/20 0415)  Stool Source/Status: Rectum (01/24/20 0415)    Emesis:  0 occurrences. VITAL SIGNS  Patient Vitals for the past 12 hrs:   Temp Pulse Resp BP SpO2   01/25/20 0420 98.8 °F (37.1 °C) 66 21 142/88 94 %   01/24/20 2346 97.9 °F (36.6 °C) 72 20 136/77 92 %   01/24/20 2114 -- -- -- -- 97 %   01/24/20 2030 98.3 °F (36.8 °C) 63 20 130/76 96 %       Pain Assessment  Pain 1  Pain Scale 1: Numeric (0 - 10) (01/24/20 0830)  Pain Intensity 1: 0 (01/24/20 0830)  Patient Stated Pain Goal: 0 (01/24/20 0830)    Ambulating  No    Additional Information: pt awake most of the night. Very restless. Unhappy with oxygen - not working properly (checked - no issues). Unhappy with her bed. \"something needs to be done about this bed\". Unable to adjust bed to suit the patient. Shift report given to oncoming nurse at the bedside.     Manny Deras RN

## 2020-01-25 NOTE — PROGRESS NOTES
END OF SHIFT NOTE:    Intake/Output  01/25 0701 - 01/25 1900  In: 240 [P.O.:240]  Out: -    Voiding: YES  Catheter: NO  Drain:              Stool:  1 occurrences. Stool Assessment  Stool Color: Jalaine Proper (01/24/20 0415)  Stool Appearance: Soft (01/24/20 0415)  Stool Amount: Large (01/24/20 0415)  Stool Source/Status: Rectum (01/24/20 0415)    Emesis:  0 occurrences. VITAL SIGNS  Patient Vitals for the past 12 hrs:   Temp Pulse Resp BP SpO2   01/25/20 1600 -- -- -- -- 92 %   01/25/20 1546 98 °F (36.7 °C) 60 20 131/86 94 %   01/25/20 1309 -- -- -- -- 92 %   01/25/20 1131 97.5 °F (36.4 °C) (!) 57 20 126/67 95 %   01/25/20 0917 -- -- -- -- 97 %   01/25/20 0734 97.7 °F (36.5 °C) 63 20 128/66 93 %       Pain Assessment  Pain 1  Pain Scale 1: Numeric (0 - 10) (01/25/20 1705)  Pain Intensity 1: 6 (01/25/20 1705)  Patient Stated Pain Goal: 0 (01/25/20 1705)    Ambulating  Yes    Additional Information:   Pt ambulated to restroom. Bathed and linens changed. Received Norco x 1 (SEE MAR). Pt unhappy and hard to meet demands. No other needs at this time. Shift report given to oncoming nurse at the bedside.     Vesta Mouse

## 2020-01-25 NOTE — PROGRESS NOTES
Hospitalist Progress Note     Admit Date:  2020  6:21 PM   Name:  Jesus Manuel Mcdaniel   Age:  79 y.o.  :  1940   MRN:  [id number]   PCP:  Shea Connelly MD  Treatment Team: Attending Provider: Kenneth Jj MD; Utilization Review: Veronica Klinefelter, RN; Care Manager: Monisha Lizarraga RN; Primary Nurse: Leia Sharif    Subjective:   79years old female with multiple medical comorbidities as mentioned below presented to ER with CC of SOB for 2 days, admitted for CAP treatment. 2020  Says still is sob at times  Placement issues    2020  Complaining of cough    2020  Cough better    2020  Cough    2020  Complaining of increasing shortness of breath      Objective:     Patient Vitals for the past 24 hrs:   Temp Pulse Resp BP SpO2   20 1309 -- -- -- -- 92 %   20 1131 97.5 °F (36.4 °C) (!) 57 20 126/67 95 %   20 0917 -- -- -- -- 97 %   20 0734 97.7 °F (36.5 °C) 63 20 128/66 93 %   20 0420 98.8 °F (37.1 °C) 66 21 142/88 94 %   20 2346 97.9 °F (36.6 °C) 72 20 136/77 92 %   20 2114 -- -- -- -- 97 %   20 2030 98.3 °F (36.8 °C) 63 20 130/76 96 %   20 1606 -- -- -- -- 91 %   20 1439 98.3 °F (36.8 °C) 65 18 125/64 95 %     Oxygen Therapy  O2 Sat (%): 92 % (20 1309)  Pulse via Oximetry: 61 beats per minute (20 1309)  O2 Device: Nasal cannula (20 1309)  O2 Flow Rate (L/min): 2 l/min (20 1309)    Intake/Output Summary (Last 24 hours) at 2020 1404  Last data filed at 2020 0847  Gross per 24 hour   Intake 840 ml   Output --   Net 840 ml         General:    Well nourished. Alert. On oxygen  heent- normal  CV:   RRR. No murmur, rub, or gallop. Lungs:   Bilateral wheezing   Abdomen:   Soft, nontender, nondistended. Cns- no focal neurological deficits  Extremities: Warm and dry. No cyanosis or edema. Skin:     No rashes or jaundice.      Data Review:  I have reviewed all labs, meds, telemetry events, and studies from the last 24 hours.     Recent Results (from the past 24 hour(s))   GLUCOSE, POC    Collection Time: 01/24/20  3:36 PM   Result Value Ref Range    Glucose (POC) 201 (H) 65 - 100 mg/dL   GLUCOSE, POC    Collection Time: 01/24/20  8:55 PM   Result Value Ref Range    Glucose (POC) 123 (H) 65 - 100 mg/dL   GLUCOSE, POC    Collection Time: 01/25/20  7:32 AM   Result Value Ref Range    Glucose (POC) 84 65 - 100 mg/dL   GLUCOSE, POC    Collection Time: 01/25/20 11:29 AM   Result Value Ref Range    Glucose (POC) 92 65 - 100 mg/dL        All Micro Results     Procedure Component Value Units Date/Time    CULTURE, BLOOD [[id number]] Collected:  01/19/20 2122    Order Status:  Completed Specimen:  Blood Updated:  01/24/20 0814     Special Requests: --        LEFT  Antecubital       Culture result: NO GROWTH 5 DAYS       CULTURE, BLOOD [[id number]] Collected:  01/19/20 2122    Order Status:  Completed Specimen:  Blood Updated:  01/24/20 0814     Special Requests: --        RIGHT  Antecubital       Culture result: NO GROWTH 5 DAYS             Current Meds:  Current Facility-Administered Medications   Medication Dose Route Frequency    methylPREDNISolone (PF) (SOLU-MEDROL) injection 40 mg  40 mg IntraVENous Q8H    albuterol-ipratropium (DUO-NEB) 2.5 MG-0.5 MG/3 ML  3 mL Nebulization Q4H RT    lip protectant (BLISTEX) ointment 1 Each  1 Each Topical PRN    cefTRIAXone (ROCEPHIN) 1 g in 0.9% sodium chloride (MBP/ADV) 50 mL  1 g IntraVENous Q24H    lisinopril (PRINIVIL, ZESTRIL) tablet 10 mg  10 mg Oral DAILY    albuterol (PROVENTIL VENTOLIN) nebulizer solution 2.5 mg  2.5 mg Nebulization Q6H PRN    enoxaparin (LOVENOX) injection 40 mg  40 mg SubCUTAneous Q24H    atorvastatin (LIPITOR) tablet 20 mg  20 mg Oral QHS    pantoprazole (PROTONIX) tablet 40 mg  40 mg Oral ACB    loratadine (CLARITIN) tablet 10 mg  10 mg Oral DAILY    fluticasone propionate (FLONASE) 50 mcg/actuation nasal spray 1 Spray  1 Spray Both Nostrils DAILY    levothyroxine (SYNTHROID) tablet 125 mcg  125 mcg Oral ACB    nystatin (MYCOSTATIN) 100,000 unit/gram powder   Topical QID    SITagliptin (JANUVIA) tablet 100 mg  100 mg Oral DAILY    temazepam (RESTORIL) capsule 30 mg  30 mg Oral QHS PRN    sodium chloride (NS) flush 5-40 mL  5-40 mL IntraVENous Q8H    sodium chloride (NS) flush 5-40 mL  5-40 mL IntraVENous PRN    acetaminophen (TYLENOL) tablet 650 mg  650 mg Oral Q4H PRN    HYDROcodone-acetaminophen (NORCO) 7.5-325 mg per tablet 1 Tab  1 Tab Oral Q6H PRN    naloxone (NARCAN) injection 0.4 mg  0.4 mg IntraVENous PRN    ondansetron (ZOFRAN) injection 4 mg  4 mg IntraVENous Q4H PRN    senna-docusate (PERICOLACE) 8.6-50 mg per tablet 1 Tab  1 Tab Oral DAILY    azithromycin (ZITHROMAX) 500 mg in 0.9% sodium chloride (MBP/ADV) 250 mL  500 mg IntraVENous Q24H    guaiFENesin ER (MUCINEX) tablet 600 mg  600 mg Oral Q12H       Other Studies (last 24 hours):  No results found.     Assessment and Plan:     Hospital Problems as of 1/25/2020 Date Reviewed: 10/22/2019          Codes Class Noted - Resolved POA    Mild asthma with exacerbation ICD-10-CM: J45.901  ICD-9-CM: 493.92  1/22/2020 - Present Unknown        Hypokalemia ICD-10-CM: E87.6  ICD-9-CM: 276.8  1/21/2020 - Present Unknown        Cardiomegaly ICD-10-CM: I51.7  ICD-9-CM: 429.3  1/19/2020 - Present Unknown        * (Principal) Shortness of breath ICD-10-CM: R06.02  ICD-9-CM: 786.05  1/19/2020 - Present Unknown        Pleuritic chest pain ICD-10-CM: R07.81  ICD-9-CM: 786.52  1/19/2020 - Present Unknown        Community acquired pneumonia ICD-10-CM: J18.9  ICD-9-CM: 486  1/19/2020 - Present Unknown        Mixed hyperlipidemia (Chronic) ICD-10-CM: E78.2  ICD-9-CM: 272.2  12/7/2017 - Present Yes        BMI 40.0-44.9, adult (HCC) (Chronic) ICD-10-CM: Z68.41  ICD-9-CM: V85.41  12/7/2017 - Present Yes        Bradycardia (Chronic) ICD-10-CM: R00.1  ICD-9-CM: 427.89 8/24/2016 - Present Yes        Diabetes mellitus type 2, controlled ([facility] (Chronic) ICD-10-CM: E11.9  ICD-9-CM: 250.00  8/4/2016 - Present Yes        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) ICD-10-CM: E03.1  ICD-9-CM: 243  8/5/2015 - Present Yes        Benign essential HTN (Chronic) ICD-10-CM: I10  ICD-9-CM: 401.1  8/5/2015 - Present Yes        Asthma (Chronic) ICD-10-CM: J45.909  ICD-9-CM: 493.90  8/5/2015 - Present Yes        Gastroesophageal reflux disease without esophagitis (Chronic) ICD-10-CM: K21.9  ICD-9-CM: 530.81  8/5/2015 - Present Yes        IBS (irritable bowel syndrome) (Chronic) ICD-10-CM: K58.9  ICD-9-CM: 564.1  8/5/2015 - Present Yes        Primary osteoarthritis involving multiple joints (Chronic) ICD-10-CM: M15.0  ICD-9-CM: 715.09  8/5/2015 - Present Yes              PLAN:    · CAP:  Continue antibiotics. On supplemental oxygen, PRN nebs. Follow cultures. · Acute asthma exasperation -increased bilateral wheezing , increase steroids to Solu-Medrol 40 mg IV every 8 hourly, continue nebs . · hypokalemia-resolved  · HLD: Resume home statin. · DM2: On Januvia and SSI. Trend sugars. · HTN: Resumed HCTZ. Trend BP. · GERD: On home dose of Protonix. · Hypothyroidism: Resumed home dose of Synthroid.         Signed:  Sirisha Miner MD

## 2020-01-26 LAB
GLUCOSE BLD STRIP.AUTO-MCNC: 102 MG/DL (ref 65–100)
GLUCOSE BLD STRIP.AUTO-MCNC: 91 MG/DL (ref 65–100)
GLUCOSE BLD STRIP.AUTO-MCNC: 95 MG/DL (ref 65–100)

## 2020-01-26 PROCEDURE — 99218 HC RM OBSERVATION: CPT

## 2020-01-26 PROCEDURE — 74011250637 HC RX REV CODE- 250/637: Performed by: FAMILY MEDICINE

## 2020-01-26 PROCEDURE — 74011250636 HC RX REV CODE- 250/636: Performed by: FAMILY MEDICINE

## 2020-01-26 PROCEDURE — 74011636637 HC RX REV CODE- 636/637: Performed by: FAMILY MEDICINE

## 2020-01-26 PROCEDURE — 82962 GLUCOSE BLOOD TEST: CPT

## 2020-01-26 PROCEDURE — 94760 N-INVAS EAR/PLS OXIMETRY 1: CPT

## 2020-01-26 PROCEDURE — 77010033678 HC OXYGEN DAILY

## 2020-01-26 PROCEDURE — 74011000250 HC RX REV CODE- 250: Performed by: FAMILY MEDICINE

## 2020-01-26 PROCEDURE — 74011250637 HC RX REV CODE- 250/637: Performed by: HOSPITALIST

## 2020-01-26 PROCEDURE — 94640 AIRWAY INHALATION TREATMENT: CPT

## 2020-01-26 RX ORDER — PREDNISONE 20 MG/1
40 TABLET ORAL
Status: DISCONTINUED | OUTPATIENT
Start: 2020-01-27 | End: 2020-01-27 | Stop reason: HOSPADM

## 2020-01-26 RX ORDER — PREDNISONE 20 MG/1
40 TABLET ORAL 2 TIMES DAILY WITH MEALS
Status: DISCONTINUED | OUTPATIENT
Start: 2020-01-26 | End: 2020-01-26

## 2020-01-26 RX ADMIN — IPRATROPIUM BROMIDE AND ALBUTEROL SULFATE 3 ML: .5; 3 SOLUTION RESPIRATORY (INHALATION) at 23:13

## 2020-01-26 RX ADMIN — TEMAZEPAM 30 MG: 15 CAPSULE ORAL at 21:40

## 2020-01-26 RX ADMIN — GUAIFENESIN 600 MG: 600 TABLET ORAL at 21:40

## 2020-01-26 RX ADMIN — ATORVASTATIN CALCIUM 20 MG: 10 TABLET, FILM COATED ORAL at 21:40

## 2020-01-26 RX ADMIN — SITAGLIPTIN 100 MG: 100 TABLET, FILM COATED ORAL at 09:13

## 2020-01-26 RX ADMIN — IPRATROPIUM BROMIDE AND ALBUTEROL SULFATE 3 ML: .5; 3 SOLUTION RESPIRATORY (INHALATION) at 16:33

## 2020-01-26 RX ADMIN — IPRATROPIUM BROMIDE AND ALBUTEROL SULFATE 3 ML: .5; 3 SOLUTION RESPIRATORY (INHALATION) at 00:12

## 2020-01-26 RX ADMIN — BUDESONIDE 500 MCG: 0.5 INHALANT RESPIRATORY (INHALATION) at 20:19

## 2020-01-26 RX ADMIN — ENOXAPARIN SODIUM 40 MG: 40 INJECTION SUBCUTANEOUS at 21:40

## 2020-01-26 RX ADMIN — LEVOTHYROXINE SODIUM 125 MCG: 125 TABLET ORAL at 09:13

## 2020-01-26 RX ADMIN — LISINOPRIL 10 MG: 5 TABLET ORAL at 09:13

## 2020-01-26 RX ADMIN — IPRATROPIUM BROMIDE AND ALBUTEROL SULFATE 3 ML: .5; 3 SOLUTION RESPIRATORY (INHALATION) at 11:52

## 2020-01-26 RX ADMIN — IPRATROPIUM BROMIDE AND ALBUTEROL SULFATE 3 ML: .5; 3 SOLUTION RESPIRATORY (INHALATION) at 20:19

## 2020-01-26 NOTE — PROGRESS NOTES
Hospitalist Progress Note     Admit Date:  2020  6:21 PM   Name:  Negrito Mo   Age:  79 y.o.  :  1940   MRN:  [id number]   PCP:  Marilee Avila MD  Treatment Team: Attending Provider: Vince Garcia MD; Utilization Review: Abril Lubin RN; Care Manager: Emmett Ghotra RN; Primary Nurse: Phoebe Torres    Subjective:   79years old female with multiple medical comorbidities as mentioned below presented to ER with CC of SOB for 2 days, admitted for CAP treatment. 2020  Says still is sob at times  Placement issues    2020  Complaining of cough    2020  Cough better    2020  Cough    2020  Complaining of increasing shortness of breath    2020  Complains of cough. Breathing better. Staff complaining that patient screaming and shouting at them. Patient does not want to be on a diabetic diet. Objective:     Patient Vitals for the past 24 hrs:   Temp Pulse Resp BP SpO2   20 -- -- -- -- 95 %   20 2044 -- -- -- -- 94 %   20 1910 98.1 °F (36.7 °C) 62 18 147/87 93 %   20 1600 -- -- -- -- 92 %   20 1546 98 °F (36.7 °C) 60 20 131/86 94 %   20 1309 -- -- -- -- 92 %   20 1131 97.5 °F (36.4 °C) (!) 57 20 126/67 95 %     Oxygen Therapy  O2 Sat (%): 95 % (20)  Pulse via Oximetry: 58 beats per minute (20)  O2 Device: Nasal cannula (20)  O2 Flow Rate (L/min): 2 l/min (20)    Intake/Output Summary (Last 24 hours) at 2020 1042  Last data filed at 2020 2128  Gross per 24 hour   Intake 360 ml   Output 1000 ml   Net -640 ml         General:    Well nourished. Alert. On oxygen  heent- normal  CV:   RRR. No murmur, rub, or gallop. Lungs: No wheezing , clear air entry   abdomen:   Soft, nontender, nondistended. Cns- no focal neurological deficits  Extremities: Warm and dry. No cyanosis or edema. Skin:     No rashes or jaundice.      Data Review:  I have reviewed all labs, meds, telemetry events, and studies from the last 24 hours.     Recent Results (from the past 24 hour(s))   GLUCOSE, POC    Collection Time: 01/25/20 11:29 AM   Result Value Ref Range    Glucose (POC) 92 65 - 100 mg/dL   GLUCOSE, POC    Collection Time: 01/25/20  4:47 PM   Result Value Ref Range    Glucose (POC) 125 (H) 65 - 100 mg/dL   GLUCOSE, POC    Collection Time: 01/25/20  8:44 PM   Result Value Ref Range    Glucose (POC) 118 (H) 65 - 100 mg/dL        All Micro Results     Procedure Component Value Units Date/Time    CULTURE, BLOOD [[id number]] Collected:  01/19/20 2122    Order Status:  Completed Specimen:  Blood Updated:  01/24/20 0814     Special Requests: --        LEFT  Antecubital       Culture result: NO GROWTH 5 DAYS       CULTURE, BLOOD [[id number]] Collected:  01/19/20 2122    Order Status:  Completed Specimen:  Blood Updated:  01/24/20 0814     Special Requests: --        RIGHT  Antecubital       Culture result: NO GROWTH 5 DAYS             Current Meds:  Current Facility-Administered Medications   Medication Dose Route Frequency    [START ON 1/27/2020] predniSONE (DELTASONE) tablet 40 mg  40 mg Oral DAILY WITH BREAKFAST    albuterol-ipratropium (DUO-NEB) 2.5 MG-0.5 MG/3 ML  3 mL Nebulization Q4H RT    budesonide (PULMICORT) 500 mcg/2 ml nebulizer suspension  500 mcg Nebulization BID RT    lip protectant (BLISTEX) ointment 1 Each  1 Each Topical PRN    lisinopril (PRINIVIL, ZESTRIL) tablet 10 mg  10 mg Oral DAILY    albuterol (PROVENTIL VENTOLIN) nebulizer solution 2.5 mg  2.5 mg Nebulization Q6H PRN    enoxaparin (LOVENOX) injection 40 mg  40 mg SubCUTAneous Q24H    atorvastatin (LIPITOR) tablet 20 mg  20 mg Oral QHS    pantoprazole (PROTONIX) tablet 40 mg  40 mg Oral ACB    loratadine (CLARITIN) tablet 10 mg  10 mg Oral DAILY    fluticasone propionate (FLONASE) 50 mcg/actuation nasal spray 1 Spray  1 Spray Both Nostrils DAILY    levothyroxine (SYNTHROID) tablet 125 mcg  125 mcg Oral ACB    nystatin (MYCOSTATIN) 100,000 unit/gram powder   Topical QID    SITagliptin (JANUVIA) tablet 100 mg  100 mg Oral DAILY    temazepam (RESTORIL) capsule 30 mg  30 mg Oral QHS PRN    sodium chloride (NS) flush 5-40 mL  5-40 mL IntraVENous Q8H    sodium chloride (NS) flush 5-40 mL  5-40 mL IntraVENous PRN    acetaminophen (TYLENOL) tablet 650 mg  650 mg Oral Q4H PRN    HYDROcodone-acetaminophen (NORCO) 7.5-325 mg per tablet 1 Tab  1 Tab Oral Q6H PRN    naloxone (NARCAN) injection 0.4 mg  0.4 mg IntraVENous PRN    ondansetron (ZOFRAN) injection 4 mg  4 mg IntraVENous Q4H PRN    senna-docusate (PERICOLACE) 8.6-50 mg per tablet 1 Tab  1 Tab Oral DAILY    guaiFENesin ER (MUCINEX) tablet 600 mg  600 mg Oral Q12H       Other Studies (last 24 hours):  No results found.     Assessment and Plan:     Hospital Problems as of 1/26/2020 Date Reviewed: 10/22/2019          Codes Class Noted - Resolved POA    Mild asthma with exacerbation ICD-10-CM: J45.901  ICD-9-CM: 493.92  1/22/2020 - Present Unknown        Hypokalemia ICD-10-CM: E87.6  ICD-9-CM: 276.8  1/21/2020 - Present Unknown        Cardiomegaly ICD-10-CM: I51.7  ICD-9-CM: 429.3  1/19/2020 - Present Unknown        * (Principal) Shortness of breath ICD-10-CM: R06.02  ICD-9-CM: 786.05  1/19/2020 - Present Unknown        Pleuritic chest pain ICD-10-CM: R07.81  ICD-9-CM: 786.52  1/19/2020 - Present Unknown        Community acquired pneumonia ICD-10-CM: J18.9  ICD-9-CM: 486  1/19/2020 - Present Unknown        Mixed hyperlipidemia (Chronic) ICD-10-CM: E78.2  ICD-9-CM: 272.2  12/7/2017 - Present Yes        BMI 40.0-44.9, adult (HCC) (Chronic) ICD-10-CM: Z68.41  ICD-9-CM: V85.41  12/7/2017 - Present Yes        Bradycardia (Chronic) ICD-10-CM: R00.1  ICD-9-CM: 427.89  8/24/2016 - Present Yes        Diabetes mellitus type 2, [facility] (Chronic) ICD-10-CM: E11.9  ICD-9-CM: 250.00  8/4/2016 - Present Yes        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) ICD-10-CM: E03.1  ICD-9-CM: 243  8/5/2015 - Present Yes        Benign essential HTN (Chronic) ICD-10-CM: I10  ICD-9-CM: 401.1  8/5/2015 - Present Yes        Asthma (Chronic) ICD-10-CM: J45.909  ICD-9-CM: 493.90  8/5/2015 - Present Yes        Gastroesophageal reflux disease without esophagitis (Chronic) ICD-10-CM: K21.9  ICD-9-CM: 530.81  8/5/2015 - Present Yes        IBS (irritable bowel syndrome) (Chronic) ICD-10-CM: K58.9  ICD-9-CM: 564.1  8/5/2015 - Present Yes        Primary osteoarthritis involving multiple joints (Chronic) ICD-10-CM: M15.0  ICD-9-CM: 715.09  8/5/2015 - Present Yes              PLAN:    · CAP:  Will DC antibiotics, finish the course of antibiotics. On supplemental oxygen, PRN nebs. · Acute asthma exasperation -no wheezing today, DC IV Solu-Medrol, changed to p.o. prednisone, continue breathing treatments   · hypokalemia-resolved  · HLD: Resume home statin. · DM2: On Januvia and SSI. Trend sugars. · HTN: Resumed HCTZ. Trend BP. · GERD: On home dose of Protonix. · Hypothyroidism: Resumed home dose of Synthroid. Placement issues.     Signed:  Chaitanya De Anda MD

## 2020-01-26 NOTE — PROGRESS NOTES
Patient initially refusing IV abx, demanding Restoril. Pt agreed, after some education, to accept her 30 min IV abx infusion. 2 minutes into the infusion, patient is yelling into the hallway that her arm is hurting. Primary RN, Anusha, checked IV site, no redness or swelling is seen. RN offered to slow the infusion for patient, who refused, stating that she didn't want it to take longer. Patient [place] [facility] [place] 3 times in the following 10 minutes, demanding someone check her arm. Although no indication of infiltration is seen, IV infusion is stopped by RN. Patient then demanded to have IV removed from her arm. Educated patient on importance of IV access, pt however is adamant that the IV be removed. Informed Refusal signed and in chart, IV removed. MD notified. Patient continues to be belligerent and rude to staff, stating that she is being treated unfairly. Repeated attempts to explain that nursing staff is trying to assist her, that her abx are important to her treatment, IV access is necessary, etc. Patient continues to be loud and use profanity.

## 2020-01-26 NOTE — PHYSICIAN ADVISORY
Letter of Status Determination:   Recommend hospitalization status upgraded from   OBSERVATION  to observation Status     Pt Name:  Eric Pastor   MR#   72 [facility] # [id number] /  [id number]  Payor: Sai Martinez / Plan: ethology / Product Type: Managed Care Medicare /    [facility]#  [id number]   [address] [facility]  @ [address] [facility]   Hospitalization date  1/19/2020  6:21 PM   Current Attending Physician  Julian De MD   Principal diagnosis  Shortness of breath [R06.02]  Community acquired pneumonia [J18.9]  Cardiomegaly [I51.7]  Pleuritic chest pain [R07.81]     Clinicals  79 y.o. y.o  female hospitalized with above diagnosis   · CAP:  Continue antibiotics.  On supplemental oxygen, PRN nebs.  Follow cultures. · Mild asthma exacerbation-improving . Continue steroids and nebs  · Hypokalemia-resolved     Milliman (MCG) criteria   Does  NOT apply    STATUS DETERMINATION  observation       Additional comments     Payor: Sai Martinez / Plan: ethology / Product Type: Managed Care Medicare /           Nacho Moise Physician [facility] - THE [address]  Jaylon Crane  T: [phone]    [email or web address]

## 2020-01-26 NOTE — PROGRESS NOTES
Problem: Falls - Risk of  Goal: *Absence of Falls  Description  Document Dylon Adams Fall Risk and appropriate interventions in the flowsheet.   Outcome: Progressing Towards Goal  Note: Fall Risk Interventions:  Mobility Interventions: Communicate number of staff needed for ambulation/transfer    Mentation Interventions: Adequate sleep, hydration, pain control    Medication Interventions: Patient to call before getting OOB, Teach patient to arise slowly    Elimination Interventions: Call light in reach

## 2020-01-26 NOTE — PROGRESS NOTES
Patient called  by phone  Lay had visited with her yesterday    Patient wanted prayer for herself and a close friend    Prayer offered    Patient wanted to continue to talk about her journey and her social challenges    Will get her some clothes from the [address] as we get closer to discharge    Jose Monsalve, staff [address]ie [address], [address] [facility]  /   [email or web address]

## 2020-01-26 NOTE — PROGRESS NOTES
Problem: Patient Education: Go to Patient Education Activity  Goal: Patient/Family Education  Outcome: Progressing Towards Goal     Problem: Falls - Risk of  Goal: *Absence of Falls  Description  Document Lester Khan Fall Risk and appropriate interventions in the flowsheet.   Outcome: Progressing Towards Goal  Note: Fall Risk Interventions:  Mobility Interventions: Communicate number of staff needed for ambulation/transfer, Patient to call before getting OOB    Mentation Interventions: Adequate sleep, hydration, pain control, Door open when patient unattended    Medication Interventions: Patient to call before getting OOB, Teach patient to arise slowly    Elimination Interventions: Call light in reach, Patient to call for help with toileting needs              Problem: Patient Education: Go to Patient Education Activity  Goal: Patient/Family Education  Outcome: Progressing Towards Goal     Problem: Gas Exchange - Impaired  Goal: *Absence of hypoxia  Outcome: Progressing Towards Goal

## 2020-01-26 NOTE — PROGRESS NOTES
END OF SHIFT NOTE:    Intake/Output  01/25 1901 - 01/26 0700  In: -   Out: 1000 [Urine:1000]   Voiding: YES  Catheter: NO  Drain:              Stool:  0 occurrences. Stool Assessment  Stool Color: Iván Countess (01/24/20 0415)  Stool Appearance: Soft (01/25/20 1930)  Stool Amount: Large (01/24/20 0415)  Stool Source/Status: Rectum (01/24/20 0415)    Emesis:  0 occurrences. VITAL SIGNS  Patient Vitals for the past 12 hrs:   Temp Pulse Resp BP SpO2   01/26/20 0012 -- -- -- -- 95 %   01/25/20 2044 -- -- -- -- 94 %   01/25/20 1910 98.1 °F (36.7 °C) 62 18 147/87 93 %       Pain Assessment  Pain 1  Pain Scale 1: Numeric (0 - 10) (01/26/20 0230)  Pain Intensity 1: 0 (01/26/20 0230)  Patient Stated Pain Goal: 0 (01/26/20 0230)    Ambulating  Yes    Additional Information: VSS. Afebrile. Pt refused antibiotics and wanted iv removed (see previous note). Pt rested throughout the night. Shift report given to oncoming nurse at the bedside.     Suresh Sosa RN

## 2020-01-27 VITALS
RESPIRATION RATE: 20 BRPM | TEMPERATURE: 98.1 F | DIASTOLIC BLOOD PRESSURE: 69 MMHG | HEART RATE: 65 BPM | BODY MASS INDEX: 43.38 KG/M2 | OXYGEN SATURATION: 92 % | WEIGHT: 244.8 LBS | HEIGHT: 63 IN | SYSTOLIC BLOOD PRESSURE: 133 MMHG

## 2020-01-27 LAB
GLUCOSE BLD STRIP.AUTO-MCNC: 109 MG/DL (ref 65–100)
GLUCOSE BLD STRIP.AUTO-MCNC: 80 MG/DL (ref 65–100)

## 2020-01-27 PROCEDURE — 99218 HC RM OBSERVATION: CPT

## 2020-01-27 PROCEDURE — 74011250637 HC RX REV CODE- 250/637: Performed by: HOSPITALIST

## 2020-01-27 PROCEDURE — 97530 THERAPEUTIC ACTIVITIES: CPT

## 2020-01-27 PROCEDURE — 94640 AIRWAY INHALATION TREATMENT: CPT

## 2020-01-27 PROCEDURE — 82962 GLUCOSE BLOOD TEST: CPT

## 2020-01-27 PROCEDURE — 74011636637 HC RX REV CODE- 636/637: Performed by: FAMILY MEDICINE

## 2020-01-27 PROCEDURE — 74011000250 HC RX REV CODE- 250: Performed by: FAMILY MEDICINE

## 2020-01-27 PROCEDURE — 74011250637 HC RX REV CODE- 250/637: Performed by: FAMILY MEDICINE

## 2020-01-27 RX ORDER — GUAIFENESIN 600 MG/1
600 TABLET, EXTENDED RELEASE ORAL 2 TIMES DAILY
Qty: 14 TAB | Refills: 0 | Status: SHIPPED | OUTPATIENT
Start: 2020-01-27 | End: 2020-02-28

## 2020-01-27 RX ORDER — METHYLPREDNISOLONE 4 MG/1
TABLET ORAL
Qty: 1 DOSE PACK | Refills: 0 | Status: SHIPPED | OUTPATIENT
Start: 2020-01-27 | End: 2020-02-28

## 2020-01-27 RX ORDER — AMOXICILLIN AND CLAVULANATE POTASSIUM 875; 125 MG/1; MG/1
1 TABLET, FILM COATED ORAL 2 TIMES DAILY
Qty: 10 TAB | Refills: 0 | Status: SHIPPED | OUTPATIENT
Start: 2020-01-27 | End: 2020-02-28

## 2020-01-27 RX ORDER — GUAIFENESIN 600 MG/1
600 TABLET, EXTENDED RELEASE ORAL EVERY 12 HOURS
Qty: 14 TAB | Refills: 0 | Status: SHIPPED | OUTPATIENT
Start: 2020-01-27 | End: 2020-02-28

## 2020-01-27 RX ORDER — LISINOPRIL 10 MG/1
10 TABLET ORAL DAILY
Qty: 30 TAB | Refills: 0 | Status: SHIPPED | OUTPATIENT
Start: 2020-01-27 | End: 2020-04-03 | Stop reason: SDUPTHER

## 2020-01-27 RX ADMIN — SITAGLIPTIN 100 MG: 100 TABLET, FILM COATED ORAL at 10:04

## 2020-01-27 RX ADMIN — PREDNISONE 40 MG: 20 TABLET ORAL at 08:01

## 2020-01-27 RX ADMIN — LORATADINE 10 MG: 10 TABLET ORAL at 08:00

## 2020-01-27 RX ADMIN — IPRATROPIUM BROMIDE AND ALBUTEROL SULFATE 3 ML: .5; 3 SOLUTION RESPIRATORY (INHALATION) at 08:00

## 2020-01-27 RX ADMIN — BUDESONIDE 500 MCG: 0.5 INHALANT RESPIRATORY (INHALATION) at 08:00

## 2020-01-27 RX ADMIN — NYSTATIN: 100000 POWDER TOPICAL at 08:00

## 2020-01-27 RX ADMIN — LISINOPRIL 10 MG: 5 TABLET ORAL at 07:59

## 2020-01-27 RX ADMIN — SENNOSIDES AND DOCUSATE SODIUM 1 TABLET: 8.6; 5 TABLET ORAL at 08:02

## 2020-01-27 RX ADMIN — PANTOPRAZOLE SODIUM 40 MG: 40 TABLET, DELAYED RELEASE ORAL at 08:01

## 2020-01-27 RX ADMIN — LEVOTHYROXINE SODIUM 125 MCG: 125 TABLET ORAL at 07:59

## 2020-01-27 RX ADMIN — HYDROCODONE BITARTRATE AND ACETAMINOPHEN 1 TABLET: 7.5; 325 TABLET ORAL at 11:05

## 2020-01-27 RX ADMIN — GUAIFENESIN 600 MG: 600 TABLET ORAL at 07:58

## 2020-01-27 NOTE — PROGRESS NOTES
Spoke to patient in presence of  and Nurse. Also spoke to Annamaria regarding the patient- told me that they have exhausted in searching  place for her to go. Ms.Zara Rubi did say that pt still has the place where she came from, available, for her to go back. Explained pt that she is clinically stable for discharge. Also spoke to the person pt was renting place to live in- told me that pt still has that place available to live. Explained the same to the pt.

## 2020-01-27 NOTE — PROGRESS NOTES
CM made contact with [facility] where patient resides at states that patient room is still available. States that patient can return back to her room. States she wasn't aware that patient wasn't returning but is willing for patient to return back to her room. States that patient will need to get her bed return back to her room. CM has informed patient that her room is still available and she will be returning back to her room today. MAMTA spoke with Caridad Valdes at [facility] to delivery patient home 02 / hospital.  CM will continue to follow.

## 2020-01-27 NOTE — DISCHARGE INSTRUCTIONS
DISCHARGE SUMMARY from Nurse    PATIENT INSTRUCTIONS:    After general anesthesia or intravenous sedation, for 24 hours or while taking prescription Narcotics:  · Limit your activities  · Do not drive and operate hazardous machinery  · Do not make important personal or business decisions  · Do  not drink alcoholic beverages  · If you have not urinated within 8 hours after discharge, please contact your surgeon on call. Report the following to your surgeon:  · Excessive pain, swelling, redness or odor of or around the surgical area  · Temperature over 100.5  · Nausea and vomiting lasting longer than 4 hours or if unable to take medications  · Any signs of decreased circulation or nerve impairment to extremity: change in color, persistent  numbness, tingling, coldness or increase pain  · Any questions    What to do at Home:  Recommended activity: Activity as tolerated,     If you experience any of the following symptoms new symptoms, please follow up with your doctor or go to the ER. *  Please give a list of your current medications to your Primary Care Provider. *  Please update this list whenever your medications are discontinued, doses are      changed, or new medications (including over-the-counter products) are added. *  Please carry medication information at all times in case of emergency situations. These are general instructions for a healthy lifestyle:    No smoking/ No tobacco products/ Avoid exposure to second hand smoke  Surgeon General's Warning:  Quitting smoking now greatly reduces serious risk to your health.     Obesity, smoking, and sedentary lifestyle greatly increases your risk for illness    A healthy diet, regular physical exercise & weight monitoring are important for maintaining a healthy lifestyle    You may be retaining fluid if you have a history of heart failure or if you experience any of the following symptoms:  Weight gain of 3 pounds or more overnight or 5 pounds in a week, increased swelling in our hands or feet or shortness of breath while lying flat in bed. Please call your doctor as soon as you notice any of these symptoms; do not wait until your next office visit. The discharge information has been reviewed with the patient. The patient verbalized understanding. Discharge medications reviewed with the patient and appropriate educational materials and side effects teaching were provided.   ___________________________________________________________________________________________________________________________________

## 2020-01-27 NOTE — PROGRESS NOTES
Problem: Mobility Impaired (Adult and Pediatric)  Goal: *Acute Goals and Plan of Care (Insert Text)  Description  LTG:  (1.)Ms. Livia Johnson will move from supine to sit and sit to supine , scoot up and down and roll side to side in bed with MODIFIED INDEPENDENCE within 7 treatment day(s). (2.)Ms. Livia Johnson will transfer from bed to chair and chair to bed with MODIFIED INDEPENDENCE using the least restrictive device within 7 treatment day(s). (3.)Ms. Livia Johnson will ambulate with SUPERVISION for 90 feet with the least restrictive device within 7 treatment day(s). (4.)Ms. Livia Johnson will participate in therapeutic activity/exercises x 23 minutes for increased strength within 7 treatment days. ________________________________________________________________________________________________      Outcome: Progressing Towards Goal     PHYSICAL THERAPY: Daily Note and AM 1/27/2020  OBSERVATION: PT Visit Days : 4  Payor: [employer] [place] / Plan: [address] / Product Type: [employer] /       NAME/AGE/GENDER: Michaelle Ferrell is a 79 y.o. female   PRIMARY DIAGNOSIS: Shortness of breath [R06.02]  Community acquired pneumonia [J18.9]  Cardiomegaly [I51.7]  Pleuritic chest pain [R07.81] Shortness of breath Shortness of breath       ICD-10: Treatment Diagnosis:    · Generalized Muscle Weakness (M62.81)  · History of falling (Z91.81)   Precaution/Allergies:  Aspirin; Ciprofloxacin; Diflucan [fluconazole]; Glucophage [metformin]; Motrin [ibuprofen]; Neurontin [gabapentin]; Nsaids (non-steroidal anti-inflammatory drug); Septra [sulfamethoprim ds]; Sulfa (sulfonamide antibiotics); Sulfabenzamide; Tessalon perles [benzonatate]; and Vibramycin [doxycycline calcium]      ASSESSMENT:     Patient presents sitting up on the EOB talking, talking and talking, upset that CM has come in and mentioned discharge plans for her.   She states \"I don't really need PT\"  but then in the next breath states she will do whatever PT asks. She is independent with bed mobility with good sitting balance. Sit to stand is supervision/independent with use of light UE support on the r/walker or objects in the room. She only took ~ 6 steps with the r/walker with supervision stating \"I don't not know why I have to get up and show you what I can do\". She returned to sit and would only do a few BLE active movements stating again \"I can move my legs just fine\". She is difficult, complaining, argumentative and contradicting in her communication. She appears to be able to function for her basic mobility needs so PT will sign off at this time. This section established at most recent assessment   PROBLEM LIST (Impairments causing functional limitations):  1. Decreased Strength  2. Decreased Transfer Abilities  3. Decreased Ambulation Ability/Technique  4. Decreased Balance  5. Decreased Activity Tolerance  6. Increased Fatigue   INTERVENTIONS PLANNED: (Benefits and precautions of physical therapy have been discussed with the patient.)  1. Balance Exercise  2. Bed Mobility  3. Family Education  4. Gait Training  5. Home Exercise Program (HEP)  6. Neuromuscular Re-education/Strengthening  7. Therapeutic Activites  8. Therapeutic Exercise/Strengthening  9. Transfer Training     TREATMENT PLAN: Frequency/Duration: 3 times a week for duration of hospital stay  Rehabilitation Potential For Stated Goals: Good     REHAB RECOMMENDATIONS (at time of discharge pending progress):    Placement: It is my opinion, based on this patient's performance to date, that Ms. Zoraida Buckner may benefit from participating in 1-2 additional therapy sessions in order to continue to assess for rehab potential and then make recommendation for disposition at discharge.   Equipment:    None at this time              HISTORY:   History of Present Injury/Illness (Reason for Referral):  SOB  Past Medical History/Comorbidities:   Ms. Zoraida Buckner  has a past medical history of Arthritis, Asthma, Chronic kidney disease, Ear problems, Gastrointestinal disorder, Hyperlipidemia, Hypertension, Insomnia, Other ill-defined conditions(799.89), Stroke [facility]), Thyroid disease, Tinnitus, and Vitamin D deficiency. Ms. Carola Alfredo  has a past surgical history that includes hx gyn ([age]); hx appendectomy; hx heent; hx other surgical; and pr appendectomy. Social History/Living Environment:   Home Environment: Shelter  One/Two Story Residence: One story  Living Alone: No  Support Systems: None  Patient Expects to be Discharged to[de-identified] Unknown  Current DME Used/Available at Home: Walker, rolling  Tub or Shower Type: (Pt took sponge baths)  Prior Level of Function/Work/Activity:  Lives in boarding house; ambulatory with RW; history of falls. Number of Personal Factors/Comorbidities that affect the Plan of Care: 1-2: MODERATE COMPLEXITY   EXAMINATION:   Most Recent Physical Functioning:   Gross Assessment:                  Posture:     Balance:  Sitting: Intact  Standing: Impaired  Standing - Static: Good  Standing - Dynamic : Fair Bed Mobility:  Supine to Sit: Supervision  Wheelchair Mobility:     Transfers:  Sit to Stand: Supervision  Stand to Sit: Modified independent  Bed to Chair: Supervision  Gait:     Speed/Anju: Slow;Shuffled  Distance (ft): 6 Feet (ft)  Assistive Device: Walker, rolling  Ambulation - Level of Assistance: Stand-by assistance      Body Structures Involved:  1. Muscles Body Functions Affected:  1. Neuromusculoskeletal  2. Movement Related Activities and Participation Affected:  1. Mobility  2. Community, Social and [place]   Number of elements that affect the Plan of Care: 4+: HIGH COMPLEXITY   CLINICAL PRESENTATION:   Presentation: Stable and uncomplicated: LOW COMPLEXITY   CLINICAL DECISION MAKIN [facility] Mobility Inpatient Short Form  How much difficulty does the patient currently have. .. Unable A Lot A Little None   1.   Turning over in bed (including adjusting bedclothes, sheets and blankets)? [] 1   [] 2   [] 3   [x] 4   2. Sitting down on and standing up from a chair with arms ( e.g., wheelchair, bedside commode, etc.)   [] 1   [] 2   [x] 3   [] 4   3. Moving from lying on back to sitting on the side of the bed? [] 1   [] 2   [] 3   [x] 4   How much help from another person does the patient currently need. .. Total A Lot A Little None   4. Moving to and from a bed to a chair (including a wheelchair)? [] 1   [] 2   [x] 3   [] 4   5. Need to walk in hospital room? [] 1   [] 2   [x] 3   [] 4   6. Climbing 3-5 steps with a railing? [] 1   [x] 2   [] 3   [] 4   © 2007, Trustees of [address] [place] [address], under license to [employer]. All rights reserved      Score:  Initial: 19 Most Recent: X (Date: -- )    Interpretation of Tool:  Represents activities that are increasingly more difficult (i.e. Bed mobility, Transfers, Gait). Medical Necessity:     · Patient demonstrates   · good  ·  rehab potential due to higher previous functional level. Reason for Services/Other Comments:  · Patient continues to require skilled intervention due to   · Decreased balance   · . Use of outcome tool(s) and clinical judgement create a POC that gives a: Clear prediction of patient's progress: LOW COMPLEXITY            TREATMENT:   (In addition to Assessment/Re-Assessment sessions the following treatments were rendered)   Pre-treatment Symptoms/Complaints:  \"I can do things for myself just fine\"  Pain: Initial:   Pain Intensity 1: 0  Post Session:  0     Therapeutic Activity: (    23): Therapeutic activities including bed mobility, sitting balance, LE ex, amb on level surfaces to improve mobility, strength and balance. Required  min to promote dynamic balance in standing.     Braces/Orthotics/Lines/Etc:   · O2 Device: Room air  Treatment/Session Assessment:    · Response to Treatment:  See above  · Interdisciplinary Collaboration:   o Physical Therapist  o Registered Nurse  · After treatment position/precautions:   o Bed/Chair-wheels locked  o Bed in low position  o Call light within reach  o RN notified  o Sitting up on the EOB talking   · Compliance with Program/Exercises: Will assess as treatment progresses  · Recommendations/Intent for next treatment session: \"Next visit will focus on advancements to more challenging activities and reduction in assistance provided\".   Total Treatment Duration:  PT Patient Time In/Time Out  Time In: 1126  Time Out: [address] [place]

## 2020-01-27 NOTE — DISCHARGE SUMMARY
Hospitalist Discharge Summary     Admit Date:  2020  6:21 PM   Name:  Pamela Ortiz   Age:  79 y.o.  :  1940   MRN:  [id number]   PCP:  Melinda Mcgovern MD  Treatment Team: Utilization Review: Maddie Lee RN; Care Manager: Won Leija, RN; Occupational Therapist: Monie Royal, OTR/L    Problem List for this Hospitalization:  Hospital Problems as of 2020 Date Reviewed: 10/22/2019          Codes Class Noted - Resolved POA    Mild asthma with exacerbation ICD-10-CM: J45.901  ICD-9-CM: 493.92  2020 - Present Unknown        Hypokalemia ICD-10-CM: E87.6  ICD-9-CM: 276.8  2020 - Present Unknown        Cardiomegaly ICD-10-CM: I51.7  ICD-9-CM: 429.3  2020 - Present Unknown        * (Principal) Shortness of breath ICD-10-CM: R06.02  ICD-9-CM: 786.05  2020 - Present Unknown        Pleuritic chest pain ICD-10-CM: R07.81  ICD-9-CM: 786.52  2020 - Present Unknown        Community acquired pneumonia ICD-10-CM: J18.9  ICD-9-CM: 486  2020 - Present Unknown        Mixed hyperlipidemia (Chronic) ICD-10-CM: E78.2  ICD-9-CM: 272.2  2017 - Present Yes        BMI 40.0-44.9, adult ([facility] (Chronic) ICD-10-CM: Z68.41  ICD-9-CM: V85.41  2017 - Present Yes        Bradycardia (Chronic) ICD-10-CM: R00.1  ICD-9-CM: 427.89  2016 - Present Yes        Diabetes mellitus type 2, controlled ([facility] (Chronic) ICD-10-CM: E11.9  ICD-9-CM: 250.00  2016 - Present Yes        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) ICD-10-CM: E03.1  ICD-9-CM: 243  2015 - Present Yes        Benign essential HTN (Chronic) ICD-10-CM: I10  ICD-9-CM: 401.1  2015 - Present Yes        Asthma (Chronic) ICD-10-CM: J45.909  ICD-9-CM: 493.90  2015 - Present Yes        Gastroesophageal reflux disease without esophagitis (Chronic) ICD-10-CM: K21.9  ICD-9-CM: 530.81  2015 - Present Yes        IBS (irritable bowel syndrome) (Chronic) ICD-10-CM: K58.9  ICD-9-CM: 564.1 8/5/2015 - Present Yes        Primary osteoarthritis involving multiple joints (Chronic) ICD-10-CM: M15.0  ICD-9-CM: 715.09  8/5/2015 - Present Yes                Admission HPI from 1/19/2020:    Patient is a 79years old female with multiple medical comorbidities as mentioned below presented to ER with CC of SOB for past 1-2 days. Pt reports feeling SOB for past 1-2 days, progressively got worse this AM. Pt reports only mild cough with minimal sputum production. She stated that she had a recent URI, she felt better for few days but symptoms came back again. She reports pain in right back on deep breath and coughing. She uses 2 ltrs supplemental oxygen at night. She denies fever, chills, nausea/vomiting, diarrhea, chest pain, palpitations, urinary symptoms. ER work up showed CXR with cardiomegaly and possible bibasilar infiltrates. Hospital Course:  Patient was admitted for pneumonia and asthma exacerbation. Patient was continued on Rocephin and Zithromax. Albuterol nebs and incentive spirometry had wheezing initially improved but later on in the stay did increase, hence was started on prednisone.,  Increase to Solu-Medrol, eventually weaned to prednisone 40 mg daily. Case management has been working for placement for the patient. Initially patient told that her room was rented to some other person and she can go back there. Later on during the stay case management after exhausting all options did find that patient had rental places still available. The same was conveyed by me and the case management and the nurse taking care of the patient to the patient. Patient is clinically stable for discharge. Follow up instructions below. Plan was discussed with patient. All questions answered. Patient was stable at time of discharge and was instructed to call or return if there are any concerns or recurrence of symptoms.     Diagnostic Imaging/Tests:   Xr Chest Pa Lat    Result Date: 1/19/2020  CHEST X-RAY, 2 views 2020 History: Shortness of breath starting yesterday with cough for one week. Technique: PA and lateral views of the chest. Comparison: Chest x-ray 2019 Findings: The cardiac silhouette is mildly enlarged although stable. The lungs are expanded without evidence for pneumothorax. Left basilar airspace changes and small left basilar pleural effusion are seen. The bony thorax demonstrates no acute changes. The upper abdomen is unremarkable in appearance. IMPRESSION: 1. Left basilar airspace changes and small left basilar effusion. Symptoms of pneumonia should be excluded. Alternatively, this could represent an atypical presentation of heart failure given the associated cardiomegaly. Echocardiogram results:  Results for orders placed or performed during the hospital encounter of 20   2D ECHO COMPLETE ADULT (TTE) W OR 1400 [facility] 1405 [facility], [address] [facility] [facility]  [phone]    Transthoracic Echocardiogram  2D, M-mode, Doppler, and Color Doppler    Patient: Tyler Thrasher  MR #: [id number]  : 1940  Age: 79 years  Gender: Female  Study date: 2020  Account #: [de-identified]  Height: 63 in  Weight: 243.5 lb  BSA: 2.1 mï¾²  Status:Routine  Location: 312  BP: 187/ 97    Allergies: ASPIRIN, CIPROFLOXACIN, FLUCONAZOLE, METFORMIN, IBUPROFEN,  GABAPENTIN, NSAIDS (NON-STEROIDAL ANTI-INFLAMMATORY DRUG), SULFAMETHOPRIM DS,  SULFA (SULFONAMIDE ANTIBIOTICS), SULFABENZAMIDE, BENZONATATE, DOXYCYCLINE  CALCIUM    Sonographer:  MATT Gutierrez  Group:  [facility]  Referring Physician:  Sita Layton MD  Reading Physician:  LEIDY Elise MD [facility]    INDICATIONS: Rule out CHF    PROCEDURE: This was a routine study. A transthoracic echocardiogram was  performed. The study included complete 2D imaging, M-mode, complete spectral  Doppler, and color Doppler. Image quality was adequate.     LEFT VENTRICLE: Size was normal. Systolic function was normal. Ejection  fraction was estimated in the range of 60 % to 65 %. There were no regional  wall motion abnormalities. There was mild concentric hypertrophy. Left  ventricular diastolic function parameters were normal. Avg E/e': 7.9. RIGHT VENTRICLE: The size was normal. Systolic function was normal. The  tricuspid jet envelope definition was inadequate for estimation of RV   systolic  pressure. LEFT ATRIUM: Size was normal.    RIGHT ATRIUM: Size was normal.    SYSTEMIC VEINS: IVC: The inferior vena cava was not well visualized. AORTIC VALVE: The valve was trileaflet. There was no evidence for stenosis. There was no insufficiency. MITRAL VALVE: There was mild annular calcification. There was no evidence for  stenosis. There was no regurgitation. TRICUSPID VALVE: The valve structure was normal. There was no evidence for  stenosis. There was trivial regurgitation. PULMONIC VALVE: Not well visualized. There was no evidence for stenosis. There  was no insufficiency. PERICARDIUM: There was no pericardial effusion. AORTA: The root exhibited normal size. SUMMARY:    -  Left ventricle: Systolic function was normal. Ejection fraction was  estimated in the range of 60 % to 65 %. There were no regional wall motion  abnormalities. There was mild concentric hypertrophy. Left ventricular  diastolic function parameters were normal. Avg E/e': 7.9.    -  Mitral valve: There was mild annular calcification.    -  Tricuspid valve: There was trivial regurgitation.     SYSTEM MEASUREMENT TABLES    2D mode  AoR Diam (2D): 3.1 cm  LA Dimension (2D): 3.9 cm  Left Atrium Systolic Volume Index; Method of Disks, Biplane; 2D mode;: 24.3  ml/m2  IVS/LVPW (2D): 1.2  IVSd (2D): 1.4 cm  LVIDd (2D): 4.3 cm  LVIDs (2D): 3 cm  LVOT Area (2D): 3.1 cm2  LVPWd (2D): 1.4 cm  RVIDd (2D): 3.7 cm    Tissue Doppler Imaging  LV Peak Early Garcia Tissue Vinay; Lateral MA (TDI): 10.6 cm/s  LV Peak Early Garcia Tissue Vinay; Medial MA (TDI): 5.4 cm/s    Unspecified Scan Mode  Peak Grad; Mean; Antegrade Flow: 8 mm[Hg]  Vmax; Antegrade Flow: 141 cm/s  LVOT Diam: 2 cm  MV Peak Vinay/LV Peak Tissue Vinay E-Wave; Lateral MA: 5.3  MV Peak Vinay/LV Peak Tissue Vinay E-Wave; Medial MA: 10.5    Prepared and signed by    LEIDY Martínez MD [facility]  Signed 20-Jan-2020 16:22:33           All Micro Results     Procedure Component Value Units Date/Time    CULTURE, BLOOD [[id number]] Collected:  01/19/20 2122    Order Status:  Completed Specimen:  Blood Updated:  01/24/20 0814     Special Requests: --        LEFT  Antecubital       Culture result: NO GROWTH 5 DAYS       CULTURE, BLOOD [[id number]] Collected:  01/19/20 2122    Order Status:  Completed Specimen:  Blood Updated:  01/24/20 0814     Special Requests: --        RIGHT  Antecubital       Culture result: NO GROWTH 5 DAYS             Labs: Results:       BMP, Mg, Phos No results for input(s): NA, K, CL, CO2, AGAP, BUN, CREA, CA, GLU, MG, PHOS in the last 72 hours. CBC No results for input(s): WBC, RBC, HGB, HCT, PLT, GRANS, LYMPH, EOS, MONOS, BASOS, IG, ANEU, ABL, ALLY, ABM, ABB, AIG, HGBEXT, HCTEXT, PLTEXT in the last 72 hours. LFT No results for input(s): SGOT, ALT, TBIL, AP, TP, ALB, GLOB, AGRAT, GPT in the last 72 hours.    Cardiac Testing Lab Results   Component Value Date/Time    BNP 31 05/05/2017 10:54 AM    BNP 20 03/18/2015 05:15 PM    Troponin-I, Qt. <0.02 (L) 08/11/2019 11:42 PM    Troponin-I, Qt. <0.02 (L) 05/31/2019 04:35 PM    Troponin-I, Qt. <0.02 (L) 05/28/2019 04:20 PM      Coagulation Tests No results found for: PTP, INR, APTT, INREXT   A1c Lab Results   Component Value Date/Time    Hemoglobin A1c 6.3 (H) 06/11/2019 11:16 AM    Hemoglobin A1c 6.4 (H) 12/12/2018 02:27 PM    Hemoglobin A1c 6.2 (H) 06/19/2018 10:14 AM      Lipid Panel Lab Results   Component Value Date/Time    Cholesterol, total 168 06/11/2019 11:16 AM    HDL Cholesterol 42 06/11/2019 11:16 AM    LDL, calculated 93 06/11/2019 11:16 AM    VLDL, calculated 33 06/11/2019 11:16 AM    Triglyceride 165 (H) 06/11/2019 11:16 AM      Thyroid Panel Lab Results   Component Value Date/Time    TSH 0.586 12/12/2018 02:27 PM    TSH 6.560 (H) 06/19/2018 10:14 AM        Most Recent UA No results found for: COLOR, APPRN, REFSG, MELISSA, PROTU, GLUCU, KETU, BILU, BLDU, UROU, GERARD, LEUKU     Allergies   Allergen Reactions    Aspirin Not Reported This Time    Ciprofloxacin Not Reported This Time    Diflucan [Fluconazole] Not Reported This Time    Glucophage [Metformin] Not Reported This Time    Motrin [Ibuprofen] Not Reported This Time    Neurontin [Gabapentin] Anxiety    Nsaids (Non-Steroidal Anti-Inflammatory Drug) Other (comments)    Septra [Sulfamethoprim Ds] Not Reported This Time    Sulfa (Sulfonamide Antibiotics) Hives    Sulfabenzamide Unknown (comments)    Tessalon Perles [Benzonatate] Rash    Vibramycin [Doxycycline Calcium] Not Reported This Time     Immunization History   Administered Date(s) Administered    Influenza Vaccine (Quad) PF 12/14/2012    Pneumococcal Conjugate (PCV-13) 10/05/2015    Pneumococcal Polysaccharide (PPSV-23) 05/21/2010    TB Skin Test (PPD) Intradermal 06/22/2019, 01/20/2020    Td 02/06/2008       All Labs from Last 24 Hrs:  Recent Results (from the past 24 hour(s))   GLUCOSE, POC    Collection Time: 01/26/20  4:43 PM   Result Value Ref Range    Glucose (POC) 102 (H) 65 - 100 mg/dL   GLUCOSE, POC    Collection Time: 01/26/20  9:31 PM   Result Value Ref Range    Glucose (POC) 95 65 - 100 mg/dL   GLUCOSE, POC    Collection Time: 01/27/20  7:41 AM   Result Value Ref Range    Glucose (POC) 80 65 - 100 mg/dL   GLUCOSE, POC    Collection Time: 01/27/20 11:34 AM   Result Value Ref Range    Glucose (POC) 109 (H) 65 - 100 mg/dL       Discharge Exam:  Patient Vitals for the past 24 hrs:   Temp Pulse Resp BP SpO2   01/27/20 1131 98.1 °F (36.7 °C) 65 20 133/69 92 %   01/27/20 0837 -- -- -- -- 96 %   01/27/20 0746 98.4 °F (36.9 °C) 64 20 136/74 93 %   01/26/20 2354 97.8 °F (36.6 °C) 77 18 128/63 94 %   01/26/20 2314 -- -- -- -- 96 %   01/26/20 2021 -- -- -- -- 95 %   01/26/20 1943 98.1 °F (36.7 °C) 63 18 123/65 96 %   01/26/20 1633 -- -- -- -- 92 %   01/26/20 1511 98 °F (36.7 °C) 71 20 -- 93 %     Oxygen Therapy  O2 Sat (%): 92 % (01/27/20 1131)  Pulse via Oximetry: 61 beats per minute (01/27/20 0837)  O2 Device: Nasal cannula (01/27/20 0837)  O2 Flow Rate (L/min): 2 l/min (01/27/20 0837)    Intake/Output Summary (Last 24 hours) at 1/27/2020 1510  Last data filed at 1/27/2020 0840  Gross per 24 hour   Intake 480 ml   Output 1000 ml   Net -520 ml       General:    Well nourished. Alert. No distress. Eyes:   Normal sclera. Extraocular movements intact. ENT:  Normocephalic, atraumatic. Moist mucous membranes  CV:   Regular rate and rhythm. No murmur, rub, or gallop. Lungs:  Clear to auscultation bilaterally. No wheezing, rhonchi, or rales. Abdomen: Soft, nontender, nondistended. Bowel sounds normal. obese  Extremities: Warm and dry. No cyanosis or edema. Neurologic: CN II-XII grossly intact. Sensation intact. Skin:     No rashes or jaundice. Psych:  Normal mood and affect. Discharge Info:   Discharge Medication List as of 1/27/2020 12:53 PM      START taking these medications    Details   !! guaiFENesin ER (MUCINEX) 600 mg ER tablet Take 1 Tab by mouth every twelve (12) hours. , Print, Disp-14 Tab, R-0      lisinopril (PRINIVIL, ZESTRIL) 10 mg tablet Take 1 Tab by mouth daily. , Print, Disp-30 Tab, R-0      methylPREDNISolone (MEDROL DOSEPACK) 4 mg tablet Take as directed. , Print, Disp-1 Dose Pack, R-0      amoxicillin-clavulanate (AUGMENTIN) 875-125 mg per tablet Take 1 Tab by mouth two (2) times a day., Print, Disp-10 Tab, R-0      !! guaiFENesin ER (MUCINEX) 600 mg ER tablet Take 1 Tab by mouth two (2) times a day., Print, Disp-14 Tab, R-0       !! - Potential duplicate medications found. Please discuss with provider. CONTINUE these medications which have NOT CHANGED    Details   SITagliptin (JANUVIA) 100 mg tablet TAKE 1 TABLET BY MOUTH EVERY DAY, Normal, Disp-90 Tab, R-1      atorvastatin (LIPITOR) 20 mg tablet TAKE 1 TABLET BY MOUTH EVERY DAY, Normal, Disp-90 Tab, R-1      levothyroxine (SYNTHROID) 125 mcg tablet TAKE 1 TAB BY MOUTH DAILY (BEFORE BREAKFAST). , Normal, Disp-90 Tab, R-1      naftifine (NAFTIN) 1 % topical cream Apply  to affected area daily. , Normal, Disp-15 g, R-1      nystatin (MYCOSTATIN) powder Apply  to affected area four (4) times daily. Under left breast, Normal, Disp-1 Bottle, R-5      diclofenac (VOLTAREN) 1 % gel Apply 2 g to affected area four (4) times daily. , Normal, Disp-100 g, R-3      fluticasone propionate (FLONASE) 50 mcg/actuation nasal spray 1 [place] by Both Nostrils route daily. , Normal, Disp-1 Bottle, R-11      fexofenadine (ALLEGRA) 60 mg tablet Take 1 Tab by mouth daily. , Normal, Disp-90 Tab, R-1      temazepam (RESTORIL) 30 mg capsule Take 1 Cap by mouth nightly as needed for Sleep. Max Daily Amount: 30 mg., Print, Disp-30 Cap, R-5      albuterol (PROVENTIL HFA, VENTOLIN HFA, PROAIR HFA) 90 mcg/actuation inhaler Take 2 Puffs by inhalation every six (6) hours as needed for Wheezing., Normal, Disp-1 Inhaler, R-5      ergocalciferol (ERGOCALCIFEROL) 50,000 unit capsule Take 1 Cap by mouth every seven (7) days. , Normal, Disp-12 Cap, R-3      esomeprazole (NEXIUM) 40 mg capsule Take 1 Cap by mouth daily. , Normal, Disp-90 Cap, R-1      lancets misc Check blood sugars BID. E11.9, Normal, Disp-100 Each, R-11      glucose blood VI test strips (BLOOD GLUCOSE TEST) strip Check blood sugars BID. E11.9, Normal, Disp-100 Strip, R-11      acetaminophen (TYLENOL EXTRA STRENGTH) 500 mg tablet Take 2 Tabs by mouth three (3) times daily. , Normal, Disp-120 Tab, R-5         STOP taking these medications       hydroCHLOROthiazide (HYDRODIURIL) 25 mg tablet Comments:   Reason for Stopping:                 Disposition:home  Activity: As tolerated. Diet: DIET DIABETIC CONSISTENT CARB Regular; 2 GM NA (House Low NA)    Follow-up Appointments   Procedures    FOLLOW UP VISIT Appointment in: One Week F/u with pcp in a week. Continue home oxygen. F/u with pcp in a week. Continue home oxygen. Standing Status:   Standing     Number of Occurrences:   1     Order Specific Question:   Appointment in     Answer: One Week         Follow-up Information     Follow up With Specialties Details Why [address], [address] [place] [address], [place] [address]  [phone]            Time spent in patient discharge planning and coordination 35 minutes.     Signed:  Julio Narayan MD

## 2020-01-27 NOTE — PROGRESS NOTES
EOS: Stable throughout shift. Pleasant and cooperative. Given Restoril prn sleep. No complaints or requests. CM working on discharge planning. PPD placed 1/16. Continue with POC. Report to oncoming RN.

## 2020-02-28 PROBLEM — J18.9 COMMUNITY ACQUIRED PNEUMONIA: Status: RESOLVED | Noted: 2020-01-19 | Resolved: 2020-02-28

## 2020-02-28 PROBLEM — J45.901 MILD ASTHMA WITH EXACERBATION: Status: RESOLVED | Noted: 2020-01-22 | Resolved: 2020-02-28

## 2020-05-20 PROBLEM — R13.19 ESOPHAGEAL DYSPHAGIA: Status: ACTIVE | Noted: 2020-05-20

## 2020-11-24 PROBLEM — R11.2 NAUSEA AND VOMITING: Status: ACTIVE | Noted: 2020-07-02

## 2020-11-24 PROBLEM — E86.0 DEHYDRATION: Status: ACTIVE | Noted: 2020-07-01

## 2020-11-24 PROBLEM — U07.1 COVID-19 VIRUS INFECTION: Status: ACTIVE | Noted: 2020-07-01

## 2021-03-24 ENCOUNTER — TRANSCRIBE ORDER (OUTPATIENT)
Dept: SCHEDULING | Age: 81
End: 2021-03-24

## 2021-03-24 DIAGNOSIS — N63.20 MASS OF LEFT BREAST ON MAMMOGRAM: Primary | ICD-10-CM

## 2021-03-24 DIAGNOSIS — N64.4 PAIN OF LEFT BREAST: Primary | ICD-10-CM

## 2021-03-24 DIAGNOSIS — N63.20 MASS OF LEFT BREAST: Primary | ICD-10-CM

## 2021-04-05 ENCOUNTER — HOSPITAL ENCOUNTER (OUTPATIENT)
Dept: MAMMOGRAPHY | Age: 81
Discharge: HOME | End: 2021-04-05
Attending: FAMILY MEDICINE
Payer: MEDICARE

## 2021-04-05 DIAGNOSIS — N63.20 LEFT BREAST MASS: ICD-10-CM

## 2021-04-05 DIAGNOSIS — N64.4 PAIN OF LEFT BREAST: ICD-10-CM

## 2021-04-05 PROCEDURE — 76642 ULTRASOUND BREAST LIMITED: CPT

## 2021-04-05 PROCEDURE — 77066 DX MAMMO INCL CAD BI: CPT

## 2021-04-07 ENCOUNTER — HOSPITAL ENCOUNTER (OUTPATIENT)
Dept: MAMMOGRAPHY | Age: 81
Discharge: HOME | End: 2021-04-07
Attending: FAMILY MEDICINE
Payer: MEDICARE

## 2021-04-07 VITALS — DIASTOLIC BLOOD PRESSURE: 66 MMHG | SYSTOLIC BLOOD PRESSURE: 150 MMHG | HEART RATE: 58 BPM

## 2021-04-07 DIAGNOSIS — R92.8 ABNORMAL MAMMOGRAM OF LEFT BREAST: ICD-10-CM

## 2021-04-07 DIAGNOSIS — N63.0 BREAST MASS: ICD-10-CM

## 2021-04-07 DIAGNOSIS — R92.8 ABNORMAL MAMMOGRAM: ICD-10-CM

## 2021-04-07 PROCEDURE — 88305 TISSUE EXAM BY PATHOLOGIST: CPT

## 2021-04-07 PROCEDURE — 77065 DX MAMMO INCL CAD UNI: CPT

## 2021-04-07 PROCEDURE — 19084 BX BREAST ADD LESION US IMAG: CPT

## 2021-04-07 PROCEDURE — 88361 TUMOR IMMUNOHISTOCHEM/COMPUT: CPT

## 2021-04-07 PROCEDURE — 38505 NEEDLE BIOPSY LYMPH NODES: CPT

## 2021-04-07 PROCEDURE — 74011000250 HC RX REV CODE- 250: Performed by: FAMILY MEDICINE

## 2021-04-07 PROCEDURE — 19083 BX BREAST 1ST LESION US IMAG: CPT

## 2021-04-07 RX ORDER — LIDOCAINE HYDROCHLORIDE 10 MG/ML
10 INJECTION INFILTRATION; PERINEURAL
Status: COMPLETED | OUTPATIENT
Start: 2021-04-07 | End: 2021-04-07

## 2021-04-07 RX ADMIN — LIDOCAINE HYDROCHLORIDE 10 ML: 10 INJECTION, SOLUTION INFILTRATION; PERINEURAL at 10:15

## 2021-04-09 NOTE — PROGRESS NOTES
The family all wanted to be present with the patient when she received the results of her left breast biopsy, so they wanted to be called. 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 I reviewed those results, answered the patients questions and communicated that she has a follow up appointment with Dr. Washington Massey on 4- at 3:00pm. Patient states that she understands.

## 2021-04-13 ENCOUNTER — PATIENT OUTREACH (OUTPATIENT)
Dept: CASE MANAGEMENT | Age: 81
End: 2021-04-13

## 2021-04-13 NOTE — PROGRESS NOTES
4/13/2021 Saw the patient with Dr Tesha Campuzano. She is here for her initial oncology visit regarding breast cancer. She is adamant about not having any surgery. Dr Tesha Campuzano discussed with her that if she is not going to have surgery, she will not be cured but we will give her treatment to control the breast cancer. Dr Tesha Campuzano discussed with her moving forward with Herceptin/perjeta and Arimidex. We will obtain a PET. She will have an echocardiogram. We will update her bone density. Navigation information given to the patient. Will continue to follow.

## 2021-04-14 NOTE — ED NOTES
Resting in bed with siderails up x2. Eyes closed. 02 via nc on and in place. No distress noted.  Awaiting sw in am PROCEDURES:  Arthroscopic remplissage procedure of shoulder 14-Apr-2021 13:00:50  Madeleine Barton  Bankart repair of left shoulder 14-Apr-2021 13:01:00  Madeleine Barton

## 2021-04-20 NOTE — PROGRESS NOTES
I spoke with Ms. Amando Mayfield regarding her insurance coverage, potential oral medication authorizations, enrollment in the [employer] ([employer]) and the [employer] ([address]), and assistance organization resource sheet. Ms. Amando Mayfield has [facility] Medicare insurance and Medicaid secondary. I went over the LPOA document with Ms. Amando Mayfield. She agreed to put it on file. I gave Ms. Amando Mayfield the Oncology Care Model participation letter. I let her know that her estimated 6-month patient responsibility after Medicare paid is $3,948. She has Medicaid to  this potential amount. Next, I spoke with Ms. Amando Mayfield regarding potential oral medication authorizations. I told her that if she ever had any problems getting her oral medications filled to give the dedicated [facility] , Mars Perez, a call. Most of the time, it is simply an authorization that needs to be done with the insurance company. Next, I spoke with Ms. Amando Mayfield regarding enrolling with [facility] and [facility]. I went over some of the services that [facility] offers and the enrollment process. Lastly, I gave Ms. Amando Mayfield a form with various [employer] that could assist with specific needs (example:  transportation, lodging, preparing meals, home cleaning)     Emailed Patient Referral form to the [employer] at [email or web address] ([email or web address] Phone [phone]. Form scanned into chart. Faxed Physician's Statement to the Ludwin Arizmendi at [phone]. Phone [phone]. Form scanned into chart. Patient expressed understanding of the information above and all questions were answered to her satisfaction. LPOA will be scanned into chart.

## 2021-04-23 ENCOUNTER — HOSPITAL ENCOUNTER (OUTPATIENT)
Dept: PET IMAGING | Age: 81
Discharge: HOME | End: 2021-04-23
Payer: MEDICARE

## 2021-04-23 ENCOUNTER — PATIENT OUTREACH (OUTPATIENT)
Dept: CASE MANAGEMENT | Age: 81
End: 2021-04-23

## 2021-04-23 ENCOUNTER — HOSPITAL ENCOUNTER (OUTPATIENT)
Dept: INFUSION THERAPY | Age: 81
End: 2021-04-23

## 2021-04-23 ENCOUNTER — HOSPITAL ENCOUNTER (OUTPATIENT)
Dept: LAB | Age: 81
Discharge: HOME | End: 2021-04-23
Payer: MEDICARE

## 2021-04-23 DIAGNOSIS — Z17.0 MALIGNANT NEOPLASM OF BREAST IN FEMALE, ESTROGEN RECEPTOR POSITIVE, UNSPECIFIED LATERALITY, UNSPECIFIED SITE OF BREAST (HCC): ICD-10-CM

## 2021-04-23 DIAGNOSIS — C50.919 MALIGNANT NEOPLASM OF BREAST IN FEMALE, ESTROGEN RECEPTOR POSITIVE, UNSPECIFIED LATERALITY, UNSPECIFIED SITE OF BREAST (HCC): ICD-10-CM

## 2021-04-23 LAB
ALBUMIN SERPL-MCNC: 3.4 G/DL (ref 3.2–4.6)
ALBUMIN/GLOB SERPL: 0.9 {RATIO} (ref 1.2–3.5)
ALP SERPL-CCNC: 125 U/L (ref 50–136)
ALT SERPL-CCNC: 17 U/L (ref 12–65)
ANION GAP SERPL CALC-SCNC: 6 MMOL/L (ref 7–16)
AST SERPL-CCNC: 12 U/L (ref 15–37)
BASOPHILS # BLD: 0 K/UL (ref 0–0.2)
BASOPHILS NFR BLD: 0 % (ref 0–2)
BILIRUB SERPL-MCNC: 0.8 MG/DL (ref 0.2–1.1)
BUN SERPL-MCNC: 11 MG/DL (ref 8–23)
CALCIUM SERPL-MCNC: 9.9 MG/DL (ref 8.3–10.4)
CHLORIDE SERPL-SCNC: 108 MMOL/L (ref 98–107)
CO2 SERPL-SCNC: 26 MMOL/L (ref 21–32)
CREAT SERPL-MCNC: 0.7 MG/DL (ref 0.6–1)
DIFFERENTIAL METHOD BLD: ABNORMAL
EOSINOPHIL # BLD: 0 K/UL (ref 0–0.8)
EOSINOPHIL NFR BLD: 0 % (ref 0.5–7.8)
ERYTHROCYTE [DISTWIDTH] IN BLOOD BY AUTOMATED COUNT: 14 % (ref 11.9–14.6)
GLOBULIN SER CALC-MCNC: 3.7 G/DL (ref 2.3–3.5)
GLUCOSE BLD STRIP.AUTO-MCNC: 103 MG/DL (ref 65–100)
GLUCOSE SERPL-MCNC: 161 MG/DL (ref 65–100)
HCT VFR BLD AUTO: 47 % (ref 35.8–46.3)
HGB BLD-MCNC: 14.7 G/DL (ref 11.7–15.4)
IMM GRANULOCYTES # BLD AUTO: 0 K/UL (ref 0–0.5)
IMM GRANULOCYTES NFR BLD AUTO: 0 % (ref 0–5)
LYMPHOCYTES # BLD: 1.6 K/UL (ref 0.5–4.6)
LYMPHOCYTES NFR BLD: 31 % (ref 13–44)
MCH RBC QN AUTO: 28.8 PG (ref 26.1–32.9)
MCHC RBC AUTO-ENTMCNC: 31.3 G/DL (ref 31.4–35)
MCV RBC AUTO: 92 FL (ref 79.6–97.8)
MONOCYTES # BLD: 0.4 K/UL (ref 0.1–1.3)
MONOCYTES NFR BLD: 7 % (ref 4–12)
NEUTS SEG # BLD: 3.3 K/UL (ref 1.7–8.2)
NEUTS SEG NFR BLD: 62 % (ref 43–78)
NRBC # BLD: 0 K/UL (ref 0–0.2)
PLATELET # BLD AUTO: 212 K/UL (ref 150–450)
PMV BLD AUTO: 11.1 FL (ref 9.4–12.3)
POTASSIUM SERPL-SCNC: 3.7 MMOL/L (ref 3.5–5.1)
PROT SERPL-MCNC: 7.1 G/DL (ref 6.3–8.2)
RBC # BLD AUTO: 5.11 M/UL (ref 4.05–5.2)
SERVICE CMNT-IMP: ABNORMAL
SODIUM SERPL-SCNC: 140 MMOL/L (ref 136–145)
WBC # BLD AUTO: 5.3 K/UL (ref 4.3–11.1)

## 2021-04-23 PROCEDURE — 36415 COLL VENOUS BLD VENIPUNCTURE: CPT

## 2021-04-23 PROCEDURE — 80053 COMPREHEN METABOLIC PANEL: CPT

## 2021-04-23 PROCEDURE — 74011000636 HC RX REV CODE- 636: Performed by: INTERNAL MEDICINE

## 2021-04-23 PROCEDURE — 85025 COMPLETE CBC W/AUTO DIFF WBC: CPT

## 2021-04-23 PROCEDURE — A9552 F18 FDG: HCPCS

## 2021-04-23 PROCEDURE — 82962 GLUCOSE BLOOD TEST: CPT

## 2021-04-23 RX ORDER — SODIUM CHLORIDE 0.9 % (FLUSH) 0.9 %
10 SYRINGE (ML) INJECTION
Status: COMPLETED | OUTPATIENT
Start: 2021-04-23 | End: 2021-04-23

## 2021-04-23 RX ADMIN — DIATRIZOATE MEGLUMINE AND DIATRIZOATE SODIUM 10 ML: 660; 100 LIQUID ORAL; RECTAL at 07:52

## 2021-04-23 RX ADMIN — Medication 10 ML: at 07:52

## 2021-04-23 NOTE — PROGRESS NOTES
4/23/2021 Saw the patient with Dr Leandro Ann. The patient had a PET this morning which shows the known area in the breast and axilla. Dr Leandro Ann discussed with her that her case was presented at tumor board and they agreed the patient could have surgery under local and not general anesthesia. Her echo shows EF that is normal. She is agreeable to go see Dr Yajaira Thomas and discuss surgery and possible port placement with surgery or in IR after surgery. Will continue to follow.

## 2021-04-24 ENCOUNTER — HOSPITAL ENCOUNTER (OUTPATIENT)
Dept: INFUSION THERAPY | Age: 81
End: 2021-04-24

## 2021-05-04 PROBLEM — Z17.0 MALIGNANT NEOPLASM OF OVERLAPPING SITES OF LEFT BREAST IN FEMALE, ESTROGEN RECEPTOR POSITIVE (HCC): Status: ACTIVE | Noted: 2021-05-04

## 2021-05-04 PROBLEM — C50.812 MALIGNANT NEOPLASM OF OVERLAPPING SITES OF LEFT BREAST IN FEMALE, ESTROGEN RECEPTOR POSITIVE (HCC): Status: ACTIVE | Noted: 2021-05-04

## 2021-05-17 RX ORDER — DIPHENHYDRAMINE HYDROCHLORIDE 50 MG/ML
25 INJECTION, SOLUTION INTRAMUSCULAR; INTRAVENOUS AS NEEDED
Status: CANCELLED
Start: 2021-05-19

## 2021-05-17 RX ORDER — EPINEPHRINE 1 MG/ML
0.3 INJECTION, SOLUTION, CONCENTRATE INTRAVENOUS AS NEEDED
Status: CANCELLED | OUTPATIENT
Start: 2021-05-19

## 2021-05-17 RX ORDER — ACETAMINOPHEN 325 MG/1
650 TABLET ORAL AS NEEDED
Status: CANCELLED
Start: 2021-05-19

## 2021-05-17 RX ORDER — ALBUTEROL SULFATE 0.83 MG/ML
2.5 SOLUTION RESPIRATORY (INHALATION) AS NEEDED
Status: CANCELLED
Start: 2021-05-19

## 2021-05-17 RX ORDER — DIPHENHYDRAMINE HYDROCHLORIDE 50 MG/ML
50 INJECTION, SOLUTION INTRAMUSCULAR; INTRAVENOUS AS NEEDED
Status: CANCELLED
Start: 2021-05-19

## 2021-05-17 RX ORDER — HYDROCORTISONE SODIUM SUCCINATE 100 MG/2ML
100 INJECTION, POWDER, FOR SOLUTION INTRAMUSCULAR; INTRAVENOUS AS NEEDED
Status: CANCELLED | OUTPATIENT
Start: 2021-05-19

## 2021-05-17 RX ORDER — ONDANSETRON 2 MG/ML
8 INJECTION INTRAMUSCULAR; INTRAVENOUS AS NEEDED
Status: CANCELLED | OUTPATIENT
Start: 2021-05-19

## 2021-05-17 RX ORDER — SODIUM CHLORIDE 9 MG/ML
10 INJECTION INTRAMUSCULAR; INTRAVENOUS; SUBCUTANEOUS AS NEEDED
Status: CANCELLED | OUTPATIENT
Start: 2021-05-19

## 2021-05-17 RX ORDER — HEPARIN 100 UNIT/ML
300-500 SYRINGE INTRAVENOUS AS NEEDED
Status: CANCELLED
Start: 2021-05-19

## 2021-05-19 ENCOUNTER — HOSPITAL ENCOUNTER (OUTPATIENT)
Dept: INFUSION THERAPY | Age: 81
Discharge: HOME | End: 2021-05-19
Payer: MEDICARE

## 2021-05-19 ENCOUNTER — PATIENT OUTREACH (OUTPATIENT)
Dept: CASE MANAGEMENT | Age: 81
End: 2021-05-19

## 2021-05-19 ENCOUNTER — HOSPITAL ENCOUNTER (OUTPATIENT)
Dept: LAB | Age: 81
Discharge: HOME | End: 2021-05-19
Payer: MEDICARE

## 2021-05-19 VITALS — HEIGHT: 63 IN | WEIGHT: 246.91 LBS | BODY MASS INDEX: 43.75 KG/M2

## 2021-05-19 DIAGNOSIS — Z17.0 MALIGNANT NEOPLASM OF OVERLAPPING SITES OF LEFT BREAST IN FEMALE, ESTROGEN RECEPTOR POSITIVE (HCC): ICD-10-CM

## 2021-05-19 DIAGNOSIS — Z17.0 MALIGNANT NEOPLASM OF OVERLAPPING SITES OF LEFT BREAST IN FEMALE, ESTROGEN RECEPTOR POSITIVE (HCC): Primary | ICD-10-CM

## 2021-05-19 DIAGNOSIS — C50.812 MALIGNANT NEOPLASM OF OVERLAPPING SITES OF LEFT BREAST IN FEMALE, ESTROGEN RECEPTOR POSITIVE (HCC): ICD-10-CM

## 2021-05-19 DIAGNOSIS — C50.812 MALIGNANT NEOPLASM OF OVERLAPPING SITES OF LEFT BREAST IN FEMALE, ESTROGEN RECEPTOR POSITIVE (HCC): Primary | ICD-10-CM

## 2021-05-19 LAB
ALBUMIN SERPL-MCNC: 3.6 G/DL (ref 3.2–4.6)
ALBUMIN/GLOB SERPL: 0.9 {RATIO} (ref 1.2–3.5)
ALP SERPL-CCNC: 127 U/L (ref 50–136)
ALT SERPL-CCNC: 14 U/L (ref 12–65)
ANION GAP SERPL CALC-SCNC: 6 MMOL/L (ref 7–16)
AST SERPL-CCNC: 13 U/L (ref 15–37)
BASOPHILS # BLD: 0 K/UL (ref 0–0.2)
BASOPHILS NFR BLD: 1 % (ref 0–2)
BILIRUB SERPL-MCNC: 1 MG/DL (ref 0.2–1.1)
BUN SERPL-MCNC: 11 MG/DL (ref 8–23)
CALCIUM SERPL-MCNC: 10.1 MG/DL (ref 8.3–10.4)
CHLORIDE SERPL-SCNC: 109 MMOL/L (ref 98–107)
CO2 SERPL-SCNC: 27 MMOL/L (ref 21–32)
CREAT SERPL-MCNC: 0.7 MG/DL (ref 0.6–1)
DIFFERENTIAL METHOD BLD: ABNORMAL
EOSINOPHIL # BLD: 0 K/UL (ref 0–0.8)
EOSINOPHIL NFR BLD: 0 % (ref 0.5–7.8)
ERYTHROCYTE [DISTWIDTH] IN BLOOD BY AUTOMATED COUNT: 14.1 % (ref 11.9–14.6)
GLOBULIN SER CALC-MCNC: 3.9 G/DL (ref 2.3–3.5)
GLUCOSE SERPL-MCNC: 149 MG/DL (ref 65–100)
HCT VFR BLD AUTO: 48.7 % (ref 35.8–46.3)
HGB BLD-MCNC: 15.3 G/DL (ref 11.7–15.4)
IMM GRANULOCYTES # BLD AUTO: 0 K/UL (ref 0–0.5)
IMM GRANULOCYTES NFR BLD AUTO: 0 % (ref 0–5)
LYMPHOCYTES # BLD: 1.3 K/UL (ref 0.5–4.6)
LYMPHOCYTES NFR BLD: 22 % (ref 13–44)
MCH RBC QN AUTO: 28.9 PG (ref 26.1–32.9)
MCHC RBC AUTO-ENTMCNC: 31.4 G/DL (ref 31.4–35)
MCV RBC AUTO: 91.9 FL (ref 79.6–97.8)
MONOCYTES # BLD: 0.3 K/UL (ref 0.1–1.3)
MONOCYTES NFR BLD: 5 % (ref 4–12)
NEUTS SEG # BLD: 4.5 K/UL (ref 1.7–8.2)
NEUTS SEG NFR BLD: 73 % (ref 43–78)
NRBC # BLD: 0 K/UL (ref 0–0.2)
PLATELET # BLD AUTO: 201 K/UL (ref 150–450)
PMV BLD AUTO: 11.2 FL (ref 9.4–12.3)
POTASSIUM SERPL-SCNC: 3.4 MMOL/L (ref 3.5–5.1)
PROT SERPL-MCNC: 7.5 G/DL (ref 6.3–8.2)
RBC # BLD AUTO: 5.3 M/UL (ref 4.05–5.2)
SODIUM SERPL-SCNC: 142 MMOL/L (ref 136–145)
WBC # BLD AUTO: 6.2 K/UL (ref 4.3–11.1)

## 2021-05-19 PROCEDURE — 80053 COMPREHEN METABOLIC PANEL: CPT

## 2021-05-19 PROCEDURE — 85025 COMPLETE CBC W/AUTO DIFF WBC: CPT

## 2021-05-19 PROCEDURE — 96413 CHEMO IV INFUSION 1 HR: CPT

## 2021-05-19 PROCEDURE — 96417 CHEMO IV INFUS EACH ADDL SEQ: CPT

## 2021-05-19 PROCEDURE — 36415 COLL VENOUS BLD VENIPUNCTURE: CPT

## 2021-05-19 PROCEDURE — 74011250636 HC RX REV CODE- 250/636: Performed by: INTERNAL MEDICINE

## 2021-05-19 RX ORDER — SODIUM CHLORIDE 9 MG/ML
25 INJECTION, SOLUTION INTRAVENOUS CONTINUOUS
Status: DISCONTINUED | OUTPATIENT
Start: 2021-05-19 | End: 2021-05-20 | Stop reason: HOSPADM

## 2021-05-19 RX ORDER — SODIUM CHLORIDE 0.9 % (FLUSH) 0.9 %
10 SYRINGE (ML) INJECTION AS NEEDED
Status: DISCONTINUED | OUTPATIENT
Start: 2021-05-19 | End: 2021-05-20 | Stop reason: HOSPADM

## 2021-05-19 RX ADMIN — Medication 10 ML: at 14:30

## 2021-05-19 RX ADMIN — SODIUM CHLORIDE 25 ML/HR: 9 INJECTION, SOLUTION INTRAVENOUS at 13:45

## 2021-05-19 RX ADMIN — SODIUM CHLORIDE 897 MG: 9 INJECTION, SOLUTION INTRAVENOUS at 14:51

## 2021-05-19 RX ADMIN — PERTUZUMAB 840 MG: 30 INJECTION, SOLUTION, CONCENTRATE INTRAVENOUS at 16:24

## 2021-05-19 NOTE — PROGRESS NOTES
Pt. Discharged via wheelchair accompanied by daughter. Tolerated chemotherapy well. No distress noted. To call physician with any problems or concerns. Understanding voiced. To return to [facility] on 6/9/21.

## 2021-05-19 NOTE — PROGRESS NOTES
5/19/2021 Saw the patient with Darwin Zimmerman NP. She is due to start HP today. She has complaints of her legs hurting. She does have some help with what sounds like CLTC but I will also ask Juan M Gómez to see the patient as well. She will start Arimidex and I have included information about the medication. We will need to update her bone density. Will continue to follow.

## 2021-05-21 ENCOUNTER — HOSPITAL ENCOUNTER (EMERGENCY)
Age: 81
Discharge: HOME | End: 2021-05-21
Attending: EMERGENCY MEDICINE
Payer: MEDICARE

## 2021-05-21 VITALS
SYSTOLIC BLOOD PRESSURE: 155 MMHG | DIASTOLIC BLOOD PRESSURE: 76 MMHG | TEMPERATURE: 98 F | HEIGHT: 63 IN | RESPIRATION RATE: 16 BRPM | HEART RATE: 65 BPM | BODY MASS INDEX: 43.59 KG/M2 | OXYGEN SATURATION: 96 % | WEIGHT: 246 LBS

## 2021-05-21 DIAGNOSIS — R19.7 DIARRHEA, UNSPECIFIED TYPE: Primary | ICD-10-CM

## 2021-05-21 LAB
ALBUMIN SERPL-MCNC: 3.2 G/DL (ref 3.2–4.6)
ALBUMIN/GLOB SERPL: 0.9 {RATIO} (ref 1.2–3.5)
ALP SERPL-CCNC: 113 U/L (ref 50–136)
ALT SERPL-CCNC: 18 U/L (ref 12–65)
ANION GAP SERPL CALC-SCNC: 5 MMOL/L (ref 7–16)
AST SERPL-CCNC: 36 U/L (ref 15–37)
BASOPHILS # BLD: 0 K/UL (ref 0–0.2)
BASOPHILS NFR BLD: 0 % (ref 0–2)
BILIRUB SERPL-MCNC: 0.7 MG/DL (ref 0.2–1.1)
BUN SERPL-MCNC: 9 MG/DL (ref 8–23)
CALCIUM SERPL-MCNC: 9.6 MG/DL (ref 8.3–10.4)
CHLORIDE SERPL-SCNC: 111 MMOL/L (ref 98–107)
CO2 SERPL-SCNC: 26 MMOL/L (ref 21–32)
CREAT SERPL-MCNC: 0.53 MG/DL (ref 0.6–1)
DIFFERENTIAL METHOD BLD: NORMAL
EOSINOPHIL # BLD: 0.1 K/UL (ref 0–0.8)
EOSINOPHIL NFR BLD: 2 % (ref 0.5–7.8)
ERYTHROCYTE [DISTWIDTH] IN BLOOD BY AUTOMATED COUNT: 14 % (ref 11.9–14.6)
GLOBULIN SER CALC-MCNC: 3.7 G/DL (ref 2.3–3.5)
GLUCOSE SERPL-MCNC: 99 MG/DL (ref 65–100)
HCT VFR BLD AUTO: 42.8 % (ref 35.8–46.3)
HGB BLD-MCNC: 14 G/DL (ref 11.7–15.4)
IMM GRANULOCYTES # BLD AUTO: 0 K/UL (ref 0–0.5)
IMM GRANULOCYTES NFR BLD AUTO: 0 % (ref 0–5)
LIPASE SERPL-CCNC: 79 U/L (ref 73–393)
LYMPHOCYTES # BLD: 1.1 K/UL (ref 0.5–4.6)
LYMPHOCYTES NFR BLD: 17 % (ref 13–44)
MAGNESIUM SERPL-MCNC: 2.1 MG/DL (ref 1.8–2.4)
MCH RBC QN AUTO: 29.3 PG (ref 26.1–32.9)
MCHC RBC AUTO-ENTMCNC: 32.7 G/DL (ref 31.4–35)
MCV RBC AUTO: 89.5 FL (ref 79.6–97.8)
MONOCYTES # BLD: 0.5 K/UL (ref 0.1–1.3)
MONOCYTES NFR BLD: 7 % (ref 4–12)
NEUTS SEG # BLD: 4.9 K/UL (ref 1.7–8.2)
NEUTS SEG NFR BLD: 74 % (ref 43–78)
NRBC # BLD: 0 K/UL (ref 0–0.2)
PLATELET # BLD AUTO: 229 K/UL (ref 150–450)
PLATELET COMMENTS,PCOM: ADEQUATE
PMV BLD AUTO: 12 FL (ref 9.4–12.3)
POTASSIUM SERPL-SCNC: 4.3 MMOL/L (ref 3.5–5.1)
PROT SERPL-MCNC: 6.9 G/DL (ref 6.3–8.2)
RBC # BLD AUTO: 4.78 M/UL (ref 4.05–5.2)
RBC MORPH BLD: NORMAL
SODIUM SERPL-SCNC: 142 MMOL/L (ref 136–145)
WBC # BLD AUTO: 6.6 K/UL (ref 4.3–11.1)
WBC MORPH BLD: NORMAL

## 2021-05-21 PROCEDURE — 74011250636 HC RX REV CODE- 250/636: Performed by: EMERGENCY MEDICINE

## 2021-05-21 PROCEDURE — 85025 COMPLETE CBC W/AUTO DIFF WBC: CPT

## 2021-05-21 PROCEDURE — 74011000250 HC RX REV CODE- 250: Performed by: EMERGENCY MEDICINE

## 2021-05-21 PROCEDURE — 74011250637 HC RX REV CODE- 250/637: Performed by: EMERGENCY MEDICINE

## 2021-05-21 PROCEDURE — 96375 TX/PRO/DX INJ NEW DRUG ADDON: CPT

## 2021-05-21 PROCEDURE — 96374 THER/PROPH/DIAG INJ IV PUSH: CPT

## 2021-05-21 PROCEDURE — 83735 ASSAY OF MAGNESIUM: CPT

## 2021-05-21 PROCEDURE — 96361 HYDRATE IV INFUSION ADD-ON: CPT

## 2021-05-21 PROCEDURE — 99284 EMERGENCY DEPT VISIT MOD MDM: CPT

## 2021-05-21 PROCEDURE — 80053 COMPREHEN METABOLIC PANEL: CPT

## 2021-05-21 PROCEDURE — 83690 ASSAY OF LIPASE: CPT

## 2021-05-21 RX ORDER — SODIUM CHLORIDE 0.9 % (FLUSH) 0.9 %
5-10 SYRINGE (ML) INJECTION EVERY 8 HOURS
Status: DISCONTINUED | OUTPATIENT
Start: 2021-05-21 | End: 2021-05-21 | Stop reason: HOSPADM

## 2021-05-21 RX ORDER — SODIUM CHLORIDE 0.9 % (FLUSH) 0.9 %
5-10 SYRINGE (ML) INJECTION AS NEEDED
Status: DISCONTINUED | OUTPATIENT
Start: 2021-05-21 | End: 2021-05-21 | Stop reason: HOSPADM

## 2021-05-21 RX ORDER — ONDANSETRON 2 MG/ML
4 INJECTION INTRAMUSCULAR; INTRAVENOUS ONCE
Status: COMPLETED | OUTPATIENT
Start: 2021-05-21 | End: 2021-05-21

## 2021-05-21 RX ORDER — DIPHENOXYLATE HYDROCHLORIDE AND ATROPINE SULFATE 2.5; .025 MG/1; MG/1
1 TABLET ORAL
Qty: 20 TABLET | Refills: 0 | Status: SHIPPED | OUTPATIENT
Start: 2021-05-21 | End: 2021-08-25

## 2021-05-21 RX ORDER — ONDANSETRON 4 MG/1
4 TABLET, ORALLY DISINTEGRATING ORAL
Qty: 20 TABLET | Refills: 0 | Status: SHIPPED | OUTPATIENT
Start: 2021-05-21 | End: 2021-08-25

## 2021-05-21 RX ORDER — DIPHENOXYLATE HYDROCHLORIDE AND ATROPINE SULFATE 2.5; .025 MG/1; MG/1
2 TABLET ORAL
Status: COMPLETED | OUTPATIENT
Start: 2021-05-21 | End: 2021-05-21

## 2021-05-21 RX ADMIN — SODIUM CHLORIDE 1000 ML: 900 INJECTION, SOLUTION INTRAVENOUS at 16:03

## 2021-05-21 RX ADMIN — ONDANSETRON 4 MG: 2 INJECTION INTRAMUSCULAR; INTRAVENOUS at 16:44

## 2021-05-21 RX ADMIN — FAMOTIDINE 40 MG: 10 INJECTION INTRAVENOUS at 16:44

## 2021-05-21 RX ADMIN — DIPHENOXYLATE HYDROCHLORIDE AND ATROPINE SULFATE 2 TABLET: 2.5; .025 TABLET ORAL at 16:44

## 2021-05-21 NOTE — ED NOTES
I have reviewed discharge instructions with the patient. The patient verbalized understanding. Patient left ED via Discharge Method: [facility] transport  Opportunity for questions and clarification provided. Patient given 0 scripts. To continue your aftercare when you leave the hospital, you may receive an automated call from our care team to check in on how you are doing. This is a free service and part of our promise to provide the best care and service to meet your aftercare needs.  If you have questions, or wish to unsubscribe from this service please call [phone]. Thank you for Choosing our [facility] Emergency Department. Pt's family visited, mom and sister  Pt's family expressed some concerns and would like to have a family meeting  They prefer to have an  ( [address]) so mom can understand as well

## 2021-05-21 NOTE — ED TRIAGE NOTES
Pt arrives via [facility] from home for complaint of diarrhea. Pt started chemo for breast cancer on Wednesday. Diarrhea started yesterday.

## 2021-05-21 NOTE — ED PROVIDER NOTES
72-year-old female presenting for evaluation of diarrhea. Reports started chemotherapy for breast cancer on Wednesday  She was told she may develop abdominal cramping and diarrhea which began today. Reports multiple episodes, watery in nature and almost hourly. She is taken nothing for it      The history is provided by the patient. Diarrhea   This is a new problem. The current episode started 6 to 12 hours ago. The problem has not changed since onset. Associated with: chemo therapy. The pain is located in the generalized abdominal region. The quality of the pain is aching. The pain is moderate. Associated symptoms include diarrhea. Nothing worsens the pain. The pain is relieved by nothing. Past workup includes no CT scan, no ultrasound, no surgery, no esophagogastroduodenoscopy, no UGI, no colonoscopy and no barium enema. Her past medical history does not include PUD, gallstones, ulcerative colitis or Crohn's disease. The patient's surgical history non-contributory.        Past Medical History:   Diagnosis Date    Arthritis     Asthma     Chronic kidney disease     Ear problems     Gastrointestinal disorder     acid reflux, hernia    Hyperlipidemia     Hypertension     Insomnia     Other ill-defined conditions(799.89)     blood clot on lung    Stroke ([facility] [address]     Thyroid disease     Tinnitus     Vitamin D deficiency        Past Surgical History:   Procedure Laterality Date    HX APPENDECTOMY      HX BREAST BIOPSY Left 04/07/2021    u/s x 3    HX GYN  [age]    c-sec    HX HEENT      t & a    HX OTHER SURGICAL      fatty tissue removed from under arm    OK APPENDECTOMY           Family History:   Problem Relation Age of Onset    No Known Problems Mother     No Known Problems Father     No Known Problems Maternal Grandmother     No Known Problems Maternal Grandfather     No Known Problems Paternal Grandmother     No Known Problems Paternal Grandfather     Breast Cancer Neg Hx        Social History     Socioeconomic History    Marital status:      Spouse name: Not on file    Number of children: Not on file    Years of education: Not on file    Highest education level: Not on file   Occupational History    Not on file   Tobacco Use    Smoking status: Never Smoker    Smokeless tobacco: Never Used   Vaping Use    Vaping Use: Never used   Substance and Sexual Activity    Alcohol use: No    Drug use: No    Sexual activity: Not Currently     Partners: Male     Birth control/protection: None   Other Topics Concern    Not on file   Social History Narrative    Not on file     Social Determinants of Health     Financial Resource Strain:     Difficulty of Paying Living Expenses:    Food Insecurity:     Worried About Running Out of Food in the Last Year:     [address] in the Last Year:    Transportation Needs:     Lack of Transportation (Medical):  Lack of Transportation (Non-Medical):    Physical Activity:     Days of Exercise per Week:     Minutes of Exercise per Session:    Stress:     Feeling of Stress :    Social Connections:     Frequency of Communication with Friends and Family:     Frequency of Social Gatherings with Friends and Family:     Attends Hoahaoism Services:     Active Member of Clubs or Organizations:     Attends Club or Organization Meetings:     Marital Status:    Intimate Partner Violence:     Fear of Current or Ex-Partner:     Emotionally Abused:     Physically Abused:     Sexually Abused: ALLERGIES: Aspirin, Ciprofloxacin, Diflucan [fluconazole], Glucophage [metformin], Motrin [ibuprofen], Neurontin [gabapentin], Nsaids (non-steroidal anti-inflammatory drug), Septra [sulfamethoprim ds], Sulfa (sulfonamide antibiotics), Sulfabenzamide, Tessalon perles [benzonatate], and Vibramycin [doxycycline calcium]    Review of Systems   Gastrointestinal: Positive for diarrhea. All other systems reviewed and are negative.       Vitals:    05/21/21 1509   BP: (!) 160/78   Pulse: 60   Resp: 16   Temp: 97.6 °F (36.4 °C)   SpO2: 94%   Weight: 111.6 kg (246 lb)   Height: 5' 3\" (1.6 m)            Physical Exam  Vitals and nursing note reviewed. Constitutional:       Appearance: She is well-developed. She is obese. HENT:      Head: Normocephalic and atraumatic. Eyes:      Conjunctiva/sclera: Conjunctivae normal.      Pupils: Pupils are equal, round, and reactive to light. Cardiovascular:      Rate and Rhythm: Normal rate and regular rhythm. Pulmonary:      Effort: Pulmonary effort is normal.      Breath sounds: Normal breath sounds. Abdominal:      General: Bowel sounds are normal.      Palpations: Abdomen is soft. Musculoskeletal:         General: Normal range of motion. Cervical back: Neck supple. Skin:     General: Skin is warm and dry. Neurological:      Mental Status: She is alert and oriented to person, place, and time. MDM  Number of Diagnoses or Management Options  Diarrhea, unspecified type  Diagnosis management comments: 56-year-old female presenting for diarrhea in the setting of chemotherapy. Patient looks clinically well. Has normal vital signs. Will check basic labs and give fluids and Lomotil and reevaluate.        Amount and/or Complexity of Data Reviewed  Clinical lab tests: ordered and reviewed (Results for orders placed or performed during the hospital encounter of 05/21/21  -CBC WITH AUTOMATED DIFF       Result                      Value             Ref Range           WBC                         6.6               4.3 - 11.1 K*       RBC                         4.78              4.05 - 5.2 M*       HGB                         14.0              11.7 - 15.4 *       HCT                         42.8              35.8 - 46.3 %       MCV                         89.5              79.6 - 97.8 *       MCH                         29.3              26.1 - 32.9 *       MCHC                        32.7              31.4 - 35.0 *       RDW                         14.0              11.9 - 14.6 %       PLATELET                    229               150 - 450 K/*       MPV                         12.0              9.4 - 12.3 FL       ABSOLUTE NRBC               0.00              0.0 - 0.2 K/*       NEUTROPHILS                 74                43 - 78 %           LYMPHOCYTES                 17                13 - 44 %           MONOCYTES                   7                 4.0 - 12.0 %        EOSINOPHILS                 2                 0.5 - 7.8 %         BASOPHILS                   0                 0.0 - 2.0 %         IMMATURE GRANULOCYTES       0                 0.0 - 5.0 %         ABS. NEUTROPHILS            4.9               1.7 - 8.2 K/*       ABS. LYMPHOCYTES            1.1               0.5 - 4.6 K/*       ABS. MONOCYTES              0.5               0.1 - 1.3 K/*       ABS. EOSINOPHILS            0.1               0.0 - 0.8 K/*       ABS. BASOPHILS              0.0               0.0 - 0.2 K/*       ABS. IMM.  GRANS.            0.0               0.0 - 0.5 K/*       RBC COMMENTS                                                  SLIGHT   ANISOCYTOSIS + POIKILOCYTOSIS          WBC COMMENTS                                                  Result Confirmed By Smear       PLATELET COMMENTS           ADEQUATE                              DF                          AUTOMATED                        -METABOLIC PANEL, COMPREHENSIVE       Result                      Value             Ref Range           Sodium                      142               136 - 145 mm*       Potassium                   4.3               3.5 - 5.1 mm*       Chloride                    111 (H)           98 - 107 mmo*       CO2                         26                21 - 32 mmol*       Anion gap                   5 (L)             7 - 16 mmol/L       Glucose                     99                65 - 100 mg/*       BUN                         9                 8 - 23 MG/DL        Creatinine                  0.53 (L)          0.6 - 1.0 MG*       GFR est AA                  >60               >60 ml/min/1*       GFR est non-AA              >60               >60 ml/min/1*       Calcium                     9.6               8.3 - 10.4 M*       Bilirubin, total            0.7               0.2 - 1.1 MG*       ALT (SGPT)                  18                12 - 65 U/L         AST (SGOT)                  36                15 - 37 U/L         Alk. phosphatase            113               50 - 136 U/L        Protein, total              6.9               6.3 - 8.2 g/*       Albumin                     3.2               3.2 - 4.6 g/*       Globulin                    3.7 (H)           2.3 - 3.5 g/*       A-G Ratio                   0.9 (L)           1.2 - 3.5      -MAGNESIUM       Result                      Value             Ref Range           Magnesium                   2.1               1.8 - 2.4 mg*  -LIPASE       Result                      Value             Ref Range           Lipase                      79                73 - 393 U/L   )    Risk of Complications, Morbidity, and/or Mortality  Presenting problems: high  Diagnostic procedures: moderate  Management options: moderate  General comments: Patient remains hemodynamically stable. Has a benign abdominal exam.  Normal vital signs and unremarkable labs. Treated with fluids, Lomotil, Zofran and discharging with similar regimen. No indication for hospitalization. I personally reviewed the patient's vital signs, laboratory tests, and/or radiological findings. I discussed these findings with the patient and their significance. I answered all questions and gave the patient clear return precautions.   The patient was discharged from the emergency department in stable condition        Patient Progress  Patient progress: improved         Procedures

## 2021-05-21 NOTE — DISCHARGE INSTRUCTIONS
Your blood work is reassuring. Use the diarrhea medication. It is an expected side effect of chemotherapy.   Return for any changes in your symptoms such as high fevers, intense abdominal pain, uncontrolled vomiting

## 2021-05-25 ENCOUNTER — HOSPITAL ENCOUNTER (EMERGENCY)
Age: 81
Discharge: HOME | End: 2021-05-26
Attending: EMERGENCY MEDICINE
Payer: MEDICARE

## 2021-05-25 DIAGNOSIS — R53.81 DEBILITY: Primary | ICD-10-CM

## 2021-05-25 DIAGNOSIS — Z59.41 FOOD INSECURITY: ICD-10-CM

## 2021-05-25 DIAGNOSIS — M17.10 ARTHRITIS OF KNEE: ICD-10-CM

## 2021-05-25 DIAGNOSIS — M16.10 HIP ARTHRITIS: ICD-10-CM

## 2021-05-25 PROCEDURE — 99285 EMERGENCY DEPT VISIT HI MDM: CPT

## 2021-05-25 PROCEDURE — 74011250637 HC RX REV CODE- 250/637: Performed by: EMERGENCY MEDICINE

## 2021-05-25 RX ORDER — ACETAMINOPHEN 500 MG
1000 TABLET ORAL
Status: COMPLETED | OUTPATIENT
Start: 2021-05-25 | End: 2021-05-25

## 2021-05-25 RX ADMIN — ACETAMINOPHEN 1000 MG: 500 TABLET ORAL at 22:43

## 2021-05-25 SDOH — ECONOMIC STABILITY - FOOD INSECURITY: FOOD INSECURITY: Z59.41

## 2021-05-25 NOTE — ED TRIAGE NOTES
Pt arrived via EMS from home with c/o [facility] her not working. Pt reports it was hot in her house. Pt has no complaints now that she in the [facility]. EMS reports contacting [employer].

## 2021-05-26 ENCOUNTER — APPOINTMENT (OUTPATIENT)
Dept: GENERAL RADIOLOGY | Age: 81
End: 2021-05-26
Attending: PHYSICIAN ASSISTANT
Payer: MEDICARE

## 2021-05-26 VITALS
HEART RATE: 66 BPM | HEIGHT: 63 IN | WEIGHT: 246 LBS | SYSTOLIC BLOOD PRESSURE: 157 MMHG | OXYGEN SATURATION: 98 % | BODY MASS INDEX: 43.59 KG/M2 | TEMPERATURE: 98 F | RESPIRATION RATE: 15 BRPM | DIASTOLIC BLOOD PRESSURE: 83 MMHG

## 2021-05-26 LAB
GLUCOSE BLD STRIP.AUTO-MCNC: 130 MG/DL (ref 65–100)
SERVICE CMNT-IMP: ABNORMAL

## 2021-05-26 PROCEDURE — 73562 X-RAY EXAM OF KNEE 3: CPT

## 2021-05-26 PROCEDURE — 74011250637 HC RX REV CODE- 250/637: Performed by: EMERGENCY MEDICINE

## 2021-05-26 PROCEDURE — 73502 X-RAY EXAM HIP UNI 2-3 VIEWS: CPT

## 2021-05-26 PROCEDURE — 82962 GLUCOSE BLOOD TEST: CPT

## 2021-05-26 RX ORDER — ALOGLIPTIN 25 MG/1
25 TABLET, FILM COATED ORAL DAILY
Status: DISCONTINUED | OUTPATIENT
Start: 2021-05-26 | End: 2021-05-26 | Stop reason: HOSPADM

## 2021-05-26 RX ORDER — ACETAMINOPHEN 500 MG
1000 TABLET ORAL
Status: DISCONTINUED | OUTPATIENT
Start: 2021-05-26 | End: 2021-05-26 | Stop reason: HOSPADM

## 2021-05-26 RX ORDER — ANASTROZOLE 1 MG/1
1 TABLET ORAL EVERY MORNING
Status: DISCONTINUED | OUTPATIENT
Start: 2021-05-26 | End: 2021-05-26 | Stop reason: HOSPADM

## 2021-05-26 RX ORDER — LEVOTHYROXINE SODIUM 50 UG/1
125 TABLET ORAL
Status: DISCONTINUED | OUTPATIENT
Start: 2021-05-26 | End: 2021-05-26 | Stop reason: HOSPADM

## 2021-05-26 RX ORDER — LISINOPRIL 5 MG/1
10 TABLET ORAL DAILY
Status: DISCONTINUED | OUTPATIENT
Start: 2021-05-26 | End: 2021-05-26 | Stop reason: HOSPADM

## 2021-05-26 RX ADMIN — ANASTROZOLE 1 MG: 1 TABLET ORAL at 08:16

## 2021-05-26 RX ADMIN — ALOGLIPTIN 25 MG: 25 TABLET, FILM COATED ORAL at 09:00

## 2021-05-26 RX ADMIN — LISINOPRIL 10 MG: 5 TABLET ORAL at 08:15

## 2021-05-26 RX ADMIN — LEVOTHYROXINE SODIUM 125 MCG: 0.05 TABLET ORAL at 06:41

## 2021-05-26 NOTE — PROGRESS NOTES
Care Management Interventions  PCP Verified by CM: Yes  Mode of Transport at Discharge: BLS  Transition of Care Consult (CM Consult): Discharge Planning, Home Health  Discharge Durable Medical Equipment: No  Physical Therapy Consult: No  Occupational Therapy Consult: No  Current Support Network: Own Home  Confirm Follow Up Transport: Family  The Plan for Transition of Care is Related to the Following Treatment Goals : Home with [facility]   The Patient and/or Patient Representative was Provided with a Choice of Provider and Agrees with the Discharge Plan?: Yes  Name of the Patient Representative Who was Provided with a Choice of Provider and Agrees with the Discharge Plan: Patient   Freedom of Choice List was Provided with Basic Dialogue that Supports the Patient's Individualized Plan of Care/Goals, Treatment Preferences and Shares the Quality Data Associated with the Providers?: Yes  Discharge Location  Discharge Placement: Home with home health          Pt seen in the ED to discuss CM needs & DCP. Pt is A&Ox4. Pt is partially dependent at home with all ADLS. Pt lives with son.  left for patient daughter. Pt has walker but requesting WC, order and referral sent to [address]. Pt has no difficulty with obtaining medications or transport. Patient states she has power but no functioning AC. Patient has [address] services but in the process of getting new aide. Patient is agreeable to [facility], referral sent to [facility] for RN PT & SW. Patient has ACO CM assisting as well. Transport via [employer]. Friend Rhiannon Mcgrath  [phone] will bring [facility] to patient's home. Patient has MOW. DCP home. No further needs noted.

## 2021-05-26 NOTE — DISCHARGE INSTRUCTIONS
Return with any fevers, vomiting, joint swelling, worsening symptoms, or additional concerns. Follow-up with your primary care doctor as needed.

## 2021-05-26 NOTE — ED NOTES
Put pt in rm 16, placed on 3L n/c oxygen, pt sts she is on 3L n/c at home, pt sts she is staying in er because her air conditioning in her house is not working

## 2021-05-26 NOTE — ED PROVIDER NOTES
81-year-old female patient with history of breast cancer presents to the ER by way of EMS for social difficulties. Main reason for call EMS was that it was hot in her house and she has no air conditioning, the 2 ceiling fans are barely working. Patient indicates she is not really had anything to eat over the last several days and states that while she does receive \"Meals on Wheels\", she cannot tolerate that \"greasy food\". She states she has had a little bit of fruit to eat yesterday, but nothing today. She has a somewhat estranged daughter who lives somewhere in [place] but patient states the daughter does nothing to help her. Patient is currently without finances to pay bills, and has not had her prescriptions other than her Arimidex in a week or so.            Past Medical History:   Diagnosis Date    Arthritis     Asthma     Chronic kidney disease     Ear problems     Gastrointestinal disorder     acid reflux, hernia    Hyperlipidemia     Hypertension     Insomnia     Other ill-defined conditions(799.89)     blood clot on lung    [facility] [address]     Thyroid disease     Tinnitus     Vitamin D deficiency        Past Surgical History:   Procedure Laterality Date    HX APPENDECTOMY      HX BREAST BIOPSY Left 04/07/2021    u/s x 3    HX GYN  [age]    c-sec    HX HEENT      t & a    HX OTHER SURGICAL      fatty tissue removed from under arm    MS APPENDECTOMY           Family History:   Problem Relation Age of Onset    No Known Problems Mother     No Known Problems Father     No Known Problems Maternal Grandmother     No Known Problems Maternal Grandfather     No Known Problems Paternal Grandmother     No Known Problems Paternal Grandfather     Breast Cancer Neg Hx        Social History     Socioeconomic History    Marital status:      Spouse name: Not on file    Number of children: Not on file    Years of education: Not on file    Highest education level: Not on file Occupational History    Not on file   Tobacco Use    Smoking status: Never Smoker    Smokeless tobacco: Never Used   Vaping Use    Vaping Use: Never used   Substance and Sexual Activity    Alcohol use: No    Drug use: No    Sexual activity: Not Currently     Partners: Male     Birth control/protection: None   Other Topics Concern    Not on file   Social History Narrative    Not on file     Social Determinants of Health     Financial Resource Strain:     Difficulty of Paying Living Expenses:    Food Insecurity:     Worried About Running Out of Food in the Last Year:     Ran Out of Food in the Last Year:    Transportation Needs:     Lack of Transportation (Medical):  Lack of Transportation (Non-Medical):    Physical Activity:     Days of Exercise per Week:     Minutes of Exercise per Session:    Stress:     Feeling of Stress :    Social Connections:     Frequency of Communication with Friends and Family:     Frequency of Social Gatherings with Friends and Family:     Attends Zoroastrian Services:     Active Member of Clubs or Organizations:     Attends Club or Organization Meetings:     Marital Status:    Intimate Partner Violence:     Fear of Current or Ex-Partner:     Emotionally Abused:     Physically Abused:     Sexually Abused: ALLERGIES: Aspirin, Ciprofloxacin, Diflucan [fluconazole], Glucophage [metformin], Motrin [ibuprofen], Neurontin [gabapentin], Nsaids (non-steroidal anti-inflammatory drug), Septra [sulfamethoprim ds], Sulfa (sulfonamide antibiotics), Sulfabenzamide, Tessalon perles [benzonatate], and Vibramycin [doxycycline calcium]    Review of Systems   Constitutional: Negative for chills and fever. HENT: Negative for rhinorrhea and sore throat. Eyes: Negative for discharge and redness. Respiratory: Negative for cough and shortness of breath. Cardiovascular: Negative for chest pain and palpitations.    Gastrointestinal: Negative for abdominal pain, diarrhea, nausea and vomiting. Genitourinary: Negative for difficulty urinating and dysuria. Musculoskeletal: Negative for arthralgias, back pain and myalgias. Skin: Negative for rash. Neurological: Negative for dizziness and headaches. All other systems reviewed and are negative. Vitals:    05/25/21 1831   BP: (!) 184/83   Pulse: 63   Resp: 16   Temp: 98.7 °F (37.1 °C)   SpO2: 94%   Weight: 111.6 kg (246 lb)   Height: 5' 3\" (1.6 m)            Physical Exam  Vitals and nursing note reviewed. Constitutional:       General: She is not in acute distress. Appearance: Normal appearance. She is well-developed. She is not ill-appearing, toxic-appearing or diaphoretic. HENT:      Head: Normocephalic and atraumatic. Right Ear: External ear normal.      Left Ear: External ear normal.      Mouth/Throat:      Mouth: Mucous membranes are moist.      Pharynx: Oropharynx is clear. No oropharyngeal exudate or posterior oropharyngeal erythema. Eyes:      General: No scleral icterus. Right eye: No discharge. Left eye: No discharge. Extraocular Movements: Extraocular movements intact. Conjunctiva/sclera: Conjunctivae normal.      Pupils: Pupils are equal, round, and reactive to light. Neck:      Thyroid: No thyromegaly. Trachea: Trachea normal.   Cardiovascular:      Rate and Rhythm: Normal rate and regular rhythm. Heart sounds: Normal heart sounds. No murmur heard. No gallop. Pulmonary:      Effort: Pulmonary effort is normal. No respiratory distress. Breath sounds: Normal breath sounds. No wheezing or rales. Abdominal:      Palpations: Abdomen is soft. There is no hepatomegaly, splenomegaly or pulsatile mass. Tenderness: There is no abdominal tenderness. There is no guarding. Musculoskeletal:         General: Normal range of motion. Cervical back: Normal range of motion and neck supple. Normal range of motion.       Left hip: Tenderness and bony tenderness present. Normal range of motion. Normal strength. Left knee: Normal range of motion. Tenderness present over the medial joint line. Legs:    Lymphadenopathy:      Cervical: No cervical adenopathy. Skin:     General: Skin is warm and dry. Neurological:      General: No focal deficit present. Mental Status: She is alert and oriented to person, place, and time. Mental status is at baseline. Motor: No abnormal muscle tone. Comments: cni 2-12 grossly   Psychiatric:         Mood and Affect: Mood normal.         Behavior: Behavior normal.          MDM  Number of Diagnoses or Management Options  Diagnosis management comments: Medical decision making note:  Elderly female presents to the ER for lack of food, findings, and air conditioning. She does not feel safe going back home this evening. We will bed her down overnight for social work to evaluate tomorrow morning  This concludes the \"medical decision making note\" part of this emergency department visit note. Amount and/or Complexity of Data Reviewed  Decide to obtain previous medical records or to obtain history from someone other than the patient: yes    Risk of Complications, Morbidity, and/or Mortality  Presenting problems: low  Diagnostic procedures: minimal  Management options: minimal    Patient Progress  Patient progress: stable         Procedures        11:28 AM patient was requesting to have her left knee and hip evaluated so I have gone to see her now. She is awaiting case management for her social difficulties. She states she cannot sleep on that stretcher as it was very hard. She has a history of arthritis but the left hip and knee are now worse. There is no swelling to the leg. No chest pain, shortness of breath, abdominal pain, dizziness, weakness, dyspnea on exertion, orthopnea, trouble with urination or bowel movements or other new symptoms. She states she would like 1000 mg of Tylenol p.o. now.  She did take some last night here that did help. Patient has no other problems at this time. The patient was observed in the ED. Results Reviewed:      Recent Results (from the past 24 hour(s))   GLUCOSE, POC    Collection Time: 05/26/21  8:10 AM   Result Value Ref Range    Glucose (POC) 130 (H) 65 - 100 mg/dL    Performed by Roxi        XR KNEE LT 3 V   Final Result   Osteoarthritic change, predominantly involving the medial and   patellofemoral compartments as well as osteopenia. XR HIP LT W OR WO PELV 2-3 VWS   Final Result   Chronic appearing changes as described without acute abnormality. Patient was given Tylenol. This appears to be a flare of her arthritis from the stretcher. No evidence of blood clot on exam. She should use ice, elevation, gentle range of motion and follow up with Ortho for recheck. Patient still awaiting case management.

## 2021-05-26 NOTE — PROGRESS NOTES
CM received call from pts female friend Morena Pritchard that pt has contacted her and she is willing to purchase a fan for pt and deliver it to her house for use, also tells CM that pts son lives with her in her house. MD and ED charge updated with this information.

## 2021-05-26 NOTE — ED NOTES
I have reviewed discharge instructions with the patient. The patient verbalized understanding. Patient left ED via Discharge Method: ambulatory to Home with [facility]. Opportunity for questions and clarification provided. Patient given 0 scripts. To continue your aftercare when you leave the hospital, you may receive an automated call from our care team to check in on how you are doing. This is a free service and part of our promise to provide the best care and service to meet your aftercare needs.  If you have questions, or wish to unsubscribe from this service please call [phone]. Thank you for Choosing our [facility] Emergency Department.

## 2021-06-09 ENCOUNTER — HOSPITAL ENCOUNTER (OUTPATIENT)
Dept: INFUSION THERAPY | Age: 81
Discharge: HOME | End: 2021-06-09
Payer: MEDICARE

## 2021-06-09 ENCOUNTER — HOSPITAL ENCOUNTER (OUTPATIENT)
Dept: LAB | Age: 81
Discharge: HOME | End: 2021-06-09
Payer: MEDICARE

## 2021-06-09 ENCOUNTER — PATIENT OUTREACH (OUTPATIENT)
Dept: CASE MANAGEMENT | Age: 81
End: 2021-06-09

## 2021-06-09 VITALS — BODY MASS INDEX: 43.74 KG/M2 | WEIGHT: 246.91 LBS

## 2021-06-09 DIAGNOSIS — C50.812 MALIGNANT NEOPLASM OF OVERLAPPING SITES OF LEFT BREAST IN FEMALE, ESTROGEN RECEPTOR POSITIVE (HCC): ICD-10-CM

## 2021-06-09 DIAGNOSIS — Z17.0 MALIGNANT NEOPLASM OF OVERLAPPING SITES OF LEFT BREAST IN FEMALE, ESTROGEN RECEPTOR POSITIVE (HCC): Primary | ICD-10-CM

## 2021-06-09 DIAGNOSIS — Z17.0 MALIGNANT NEOPLASM OF OVERLAPPING SITES OF LEFT BREAST IN FEMALE, ESTROGEN RECEPTOR POSITIVE (HCC): ICD-10-CM

## 2021-06-09 DIAGNOSIS — C50.812 MALIGNANT NEOPLASM OF OVERLAPPING SITES OF LEFT BREAST IN FEMALE, ESTROGEN RECEPTOR POSITIVE (HCC): Primary | ICD-10-CM

## 2021-06-09 LAB
ALBUMIN SERPL-MCNC: 3.6 G/DL (ref 3.2–4.6)
ALBUMIN/GLOB SERPL: 1 {RATIO} (ref 1.2–3.5)
ALP SERPL-CCNC: 147 U/L (ref 50–136)
ALT SERPL-CCNC: 19 U/L (ref 12–65)
ANION GAP SERPL CALC-SCNC: 6 MMOL/L (ref 7–16)
AST SERPL-CCNC: 14 U/L (ref 15–37)
BASOPHILS # BLD: 0 K/UL (ref 0–0.2)
BASOPHILS NFR BLD: 0 % (ref 0–2)
BILIRUB SERPL-MCNC: 1 MG/DL (ref 0.2–1.1)
BUN SERPL-MCNC: 9 MG/DL (ref 8–23)
CALCIUM SERPL-MCNC: 9.5 MG/DL (ref 8.3–10.4)
CHLORIDE SERPL-SCNC: 109 MMOL/L (ref 98–107)
CO2 SERPL-SCNC: 27 MMOL/L (ref 21–32)
CREAT SERPL-MCNC: 0.7 MG/DL (ref 0.6–1)
DIFFERENTIAL METHOD BLD: ABNORMAL
EOSINOPHIL # BLD: 0 K/UL (ref 0–0.8)
EOSINOPHIL NFR BLD: 0 % (ref 0.5–7.8)
ERYTHROCYTE [DISTWIDTH] IN BLOOD BY AUTOMATED COUNT: 14 % (ref 11.9–14.6)
GLOBULIN SER CALC-MCNC: 3.6 G/DL (ref 2.3–3.5)
GLUCOSE SERPL-MCNC: 99 MG/DL (ref 65–100)
HCT VFR BLD AUTO: 47.1 % (ref 35.8–46.3)
HGB BLD-MCNC: 15.3 G/DL (ref 11.7–15.4)
IMM GRANULOCYTES # BLD AUTO: 0 K/UL (ref 0–0.5)
IMM GRANULOCYTES NFR BLD AUTO: 0 % (ref 0–5)
LYMPHOCYTES # BLD: 1.4 K/UL (ref 0.5–4.6)
LYMPHOCYTES NFR BLD: 21 % (ref 13–44)
MAGNESIUM SERPL-MCNC: 2 MG/DL (ref 1.8–2.4)
MCH RBC QN AUTO: 29.4 PG (ref 26.1–32.9)
MCHC RBC AUTO-ENTMCNC: 32.5 G/DL (ref 31.4–35)
MCV RBC AUTO: 90.6 FL (ref 79.6–97.8)
MONOCYTES # BLD: 0.5 K/UL (ref 0.1–1.3)
MONOCYTES NFR BLD: 7 % (ref 4–12)
NEUTS SEG # BLD: 4.7 K/UL (ref 1.7–8.2)
NEUTS SEG NFR BLD: 72 % (ref 43–78)
NRBC # BLD: 0 K/UL (ref 0–0.2)
PLATELET # BLD AUTO: 174 K/UL (ref 150–450)
PMV BLD AUTO: 11.7 FL (ref 9.4–12.3)
POTASSIUM SERPL-SCNC: 3.4 MMOL/L (ref 3.5–5.1)
PROT SERPL-MCNC: 7.2 G/DL (ref 6.3–8.2)
RBC # BLD AUTO: 5.2 M/UL (ref 4.05–5.2)
SODIUM SERPL-SCNC: 142 MMOL/L (ref 136–145)
WBC # BLD AUTO: 6.6 K/UL (ref 4.3–11.1)

## 2021-06-09 PROCEDURE — 74011250636 HC RX REV CODE- 250/636: Performed by: INTERNAL MEDICINE

## 2021-06-09 PROCEDURE — 36415 COLL VENOUS BLD VENIPUNCTURE: CPT

## 2021-06-09 PROCEDURE — 85025 COMPLETE CBC W/AUTO DIFF WBC: CPT

## 2021-06-09 PROCEDURE — 96413 CHEMO IV INFUSION 1 HR: CPT

## 2021-06-09 PROCEDURE — 83735 ASSAY OF MAGNESIUM: CPT

## 2021-06-09 PROCEDURE — 80053 COMPREHEN METABOLIC PANEL: CPT

## 2021-06-09 PROCEDURE — 96417 CHEMO IV INFUS EACH ADDL SEQ: CPT

## 2021-06-09 PROCEDURE — 74011250637 HC RX REV CODE- 250/637: Performed by: INTERNAL MEDICINE

## 2021-06-09 RX ORDER — ACETAMINOPHEN 500 MG
500 TABLET ORAL ONCE
Status: DISCONTINUED | OUTPATIENT
Start: 2021-06-09 | End: 2021-06-09

## 2021-06-09 RX ORDER — SODIUM CHLORIDE 9 MG/ML
25 INJECTION, SOLUTION INTRAVENOUS CONTINUOUS
Status: DISCONTINUED | OUTPATIENT
Start: 2021-06-09 | End: 2021-06-10 | Stop reason: HOSPADM

## 2021-06-09 RX ORDER — SODIUM CHLORIDE 0.9 % (FLUSH) 0.9 %
10 SYRINGE (ML) INJECTION AS NEEDED
Status: DISCONTINUED | OUTPATIENT
Start: 2021-06-09 | End: 2021-06-11 | Stop reason: HOSPADM

## 2021-06-09 RX ORDER — ACETAMINOPHEN 325 MG/1
650 TABLET ORAL ONCE
Status: COMPLETED | OUTPATIENT
Start: 2021-06-09 | End: 2021-06-09

## 2021-06-09 RX ADMIN — PERTUZUMAB 420 MG: 30 INJECTION, SOLUTION, CONCENTRATE INTRAVENOUS at 16:04

## 2021-06-09 RX ADMIN — SODIUM CHLORIDE 25 ML/HR: 900 INJECTION, SOLUTION INTRAVENOUS at 14:37

## 2021-06-09 RX ADMIN — ACETAMINOPHEN 650 MG: 325 TABLET ORAL at 15:57

## 2021-06-09 RX ADMIN — SODIUM CHLORIDE 672 MG: 9 INJECTION, SOLUTION INTRAVENOUS at 15:23

## 2021-06-09 NOTE — PROGRESS NOTES
6/9/2021 Saw the patient with Dr Jovita Nava. She feels the breast mass is no longer palpable. She has had some diarrhea and we encouraged her to take Imodium. She had some shaking with her herceptin and we will have her premed with tylenol. She will be due for another echo in July. Will continue to follow.

## 2021-06-09 NOTE — PROGRESS NOTES
Arrived to the [facility]. Herceptin/Perjeta completed. Patient tolerated well. Any issues or concerns during appointment: None. Patient aware of next infusion appointment on 6/30 (date) at 1330 (time). Discharged via wheelchair in stable condition.

## 2021-06-30 ENCOUNTER — HOSPITAL ENCOUNTER (OUTPATIENT)
Dept: LAB | Age: 81
Discharge: HOME | End: 2021-06-30

## 2021-06-30 ENCOUNTER — HOSPITAL ENCOUNTER (OUTPATIENT)
Dept: INFUSION THERAPY | Age: 81
Discharge: HOME | End: 2021-06-30
Payer: MEDICARE

## 2021-06-30 ENCOUNTER — PATIENT OUTREACH (OUTPATIENT)
Dept: CASE MANAGEMENT | Age: 81
End: 2021-06-30

## 2021-06-30 VITALS — WEIGHT: 246.91 LBS | BODY MASS INDEX: 43.75 KG/M2 | HEIGHT: 63 IN

## 2021-06-30 DIAGNOSIS — Z17.0 MALIGNANT NEOPLASM OF OVERLAPPING SITES OF LEFT BREAST IN FEMALE, ESTROGEN RECEPTOR POSITIVE (HCC): ICD-10-CM

## 2021-06-30 DIAGNOSIS — Z17.0 MALIGNANT NEOPLASM OF OVERLAPPING SITES OF LEFT BREAST IN FEMALE, ESTROGEN RECEPTOR POSITIVE (HCC): Primary | ICD-10-CM

## 2021-06-30 DIAGNOSIS — C50.812 MALIGNANT NEOPLASM OF OVERLAPPING SITES OF LEFT BREAST IN FEMALE, ESTROGEN RECEPTOR POSITIVE (HCC): ICD-10-CM

## 2021-06-30 DIAGNOSIS — C50.812 MALIGNANT NEOPLASM OF OVERLAPPING SITES OF LEFT BREAST IN FEMALE, ESTROGEN RECEPTOR POSITIVE (HCC): Primary | ICD-10-CM

## 2021-06-30 LAB
ALBUMIN SERPL-MCNC: 3.4 G/DL (ref 3.2–4.6)
ALBUMIN/GLOB SERPL: 0.9 {RATIO} (ref 1.2–3.5)
ALP SERPL-CCNC: 135 U/L (ref 50–136)
ALT SERPL-CCNC: 17 U/L (ref 12–65)
ANION GAP SERPL CALC-SCNC: 6 MMOL/L (ref 7–16)
AST SERPL-CCNC: 14 U/L (ref 15–37)
BASOPHILS # BLD: 0 K/UL (ref 0–0.2)
BASOPHILS NFR BLD: 0 % (ref 0–2)
BILIRUB SERPL-MCNC: 0.7 MG/DL (ref 0.2–1.1)
BUN SERPL-MCNC: 7 MG/DL (ref 8–23)
CALCIUM SERPL-MCNC: 9.2 MG/DL (ref 8.3–10.4)
CHLORIDE SERPL-SCNC: 109 MMOL/L (ref 98–107)
CO2 SERPL-SCNC: 28 MMOL/L (ref 21–32)
CREAT SERPL-MCNC: 0.6 MG/DL (ref 0.6–1)
DIFFERENTIAL METHOD BLD: ABNORMAL
EOSINOPHIL # BLD: 0 K/UL (ref 0–0.8)
EOSINOPHIL NFR BLD: 0 % (ref 0.5–7.8)
ERYTHROCYTE [DISTWIDTH] IN BLOOD BY AUTOMATED COUNT: 14 % (ref 11.9–14.6)
GLOBULIN SER CALC-MCNC: 3.7 G/DL (ref 2.3–3.5)
GLUCOSE SERPL-MCNC: 97 MG/DL (ref 65–100)
HCT VFR BLD AUTO: 46.5 % (ref 35.8–46.3)
HGB BLD-MCNC: 14.5 G/DL (ref 11.7–15.4)
IMM GRANULOCYTES # BLD AUTO: 0 K/UL (ref 0–0.5)
IMM GRANULOCYTES NFR BLD AUTO: 0 % (ref 0–5)
LYMPHOCYTES # BLD: 1.4 K/UL (ref 0.5–4.6)
LYMPHOCYTES NFR BLD: 30 % (ref 13–44)
MAGNESIUM SERPL-MCNC: 2.1 MG/DL (ref 1.8–2.4)
MCH RBC QN AUTO: 28.6 PG (ref 26.1–32.9)
MCHC RBC AUTO-ENTMCNC: 31.2 G/DL (ref 31.4–35)
MCV RBC AUTO: 91.7 FL (ref 79.6–97.8)
MONOCYTES # BLD: 0.4 K/UL (ref 0.1–1.3)
MONOCYTES NFR BLD: 8 % (ref 4–12)
NEUTS SEG # BLD: 3 K/UL (ref 1.7–8.2)
NEUTS SEG NFR BLD: 62 % (ref 43–78)
NRBC # BLD: 0 K/UL (ref 0–0.2)
PLATELET # BLD AUTO: 195 K/UL (ref 150–450)
PMV BLD AUTO: 11.4 FL (ref 9.4–12.3)
POTASSIUM SERPL-SCNC: 3.4 MMOL/L (ref 3.5–5.1)
PROT SERPL-MCNC: 7.1 G/DL (ref 6.3–8.2)
RBC # BLD AUTO: 5.07 M/UL (ref 4.05–5.2)
SODIUM SERPL-SCNC: 143 MMOL/L (ref 136–145)
WBC # BLD AUTO: 4.8 K/UL (ref 4.3–11.1)

## 2021-06-30 PROCEDURE — 36415 COLL VENOUS BLD VENIPUNCTURE: CPT

## 2021-06-30 PROCEDURE — 96413 CHEMO IV INFUSION 1 HR: CPT

## 2021-06-30 PROCEDURE — 74011250636 HC RX REV CODE- 250/636: Performed by: NURSE PRACTITIONER

## 2021-06-30 PROCEDURE — 85025 COMPLETE CBC W/AUTO DIFF WBC: CPT

## 2021-06-30 PROCEDURE — 80053 COMPREHEN METABOLIC PANEL: CPT

## 2021-06-30 PROCEDURE — 83735 ASSAY OF MAGNESIUM: CPT

## 2021-06-30 PROCEDURE — 96417 CHEMO IV INFUS EACH ADDL SEQ: CPT

## 2021-06-30 RX ORDER — SODIUM CHLORIDE 0.9 % (FLUSH) 0.9 %
10 SYRINGE (ML) INJECTION AS NEEDED
Status: DISCONTINUED | OUTPATIENT
Start: 2021-06-30 | End: 2021-07-02 | Stop reason: HOSPADM

## 2021-06-30 RX ORDER — DIPHENHYDRAMINE HYDROCHLORIDE 50 MG/ML
50 INJECTION, SOLUTION INTRAMUSCULAR; INTRAVENOUS
Status: DISCONTINUED | OUTPATIENT
Start: 2021-06-30 | End: 2021-07-01 | Stop reason: HOSPADM

## 2021-06-30 RX ORDER — ACETAMINOPHEN 325 MG/1
650 TABLET ORAL ONCE
Status: DISCONTINUED | OUTPATIENT
Start: 2021-06-30 | End: 2021-07-01 | Stop reason: HOSPADM

## 2021-06-30 RX ORDER — SODIUM CHLORIDE 9 MG/ML
25 INJECTION, SOLUTION INTRAVENOUS CONTINUOUS
Status: DISCONTINUED | OUTPATIENT
Start: 2021-06-30 | End: 2021-07-01 | Stop reason: HOSPADM

## 2021-06-30 RX ORDER — SODIUM CHLORIDE 0.9 % (FLUSH) 0.9 %
10 SYRINGE (ML) INJECTION AS NEEDED
Status: DISCONTINUED | OUTPATIENT
Start: 2021-06-30 | End: 2021-07-01 | Stop reason: HOSPADM

## 2021-06-30 RX ADMIN — Medication 10 ML: at 14:00

## 2021-06-30 RX ADMIN — SODIUM CHLORIDE 672 MG: 9 INJECTION, SOLUTION INTRAVENOUS at 14:17

## 2021-06-30 RX ADMIN — PERTUZUMAB 420 MG: 30 INJECTION, SOLUTION, CONCENTRATE INTRAVENOUS at 15:04

## 2021-06-30 RX ADMIN — SODIUM CHLORIDE 25 ML/HR: 9 INJECTION, SOLUTION INTRAVENOUS at 14:00

## 2021-06-30 NOTE — PROGRESS NOTES
Pt. Discharged via wheelchair. Tolerated chemotherapy well. No distress noted. To call physician with any problems or concerns. Understanding voiced. To return to Infusions on 7/21/21.

## 2021-06-30 NOTE — PROGRESS NOTES
6/30/2021 Saw the patient with Kiara Abdullahi NP. She is due for herceptin. She says she is having some generalized itching. We encouraged her to take her Zyrtec every day. She is particularly negative to [place] today for no apparent reason. She is agreeable to move forward with treatment today. She will see Dr Katharina Damico in 3 weeks.

## 2021-07-21 ENCOUNTER — PATIENT OUTREACH (OUTPATIENT)
Dept: CASE MANAGEMENT | Age: 81
End: 2021-07-21

## 2021-07-21 ENCOUNTER — HOSPITAL ENCOUNTER (OUTPATIENT)
Dept: LAB | Age: 81
Discharge: HOME | End: 2021-07-21
Payer: MEDICARE

## 2021-07-21 ENCOUNTER — HOSPITAL ENCOUNTER (OUTPATIENT)
Dept: INFUSION THERAPY | Age: 81
Discharge: HOME | End: 2021-07-21
Payer: MEDICARE

## 2021-07-21 VITALS — BODY MASS INDEX: 43.58 KG/M2 | WEIGHT: 246 LBS

## 2021-07-21 DIAGNOSIS — C50.812 MALIGNANT NEOPLASM OF OVERLAPPING SITES OF LEFT BREAST IN FEMALE, ESTROGEN RECEPTOR POSITIVE (HCC): ICD-10-CM

## 2021-07-21 DIAGNOSIS — Z17.0 MALIGNANT NEOPLASM OF OVERLAPPING SITES OF LEFT BREAST IN FEMALE, ESTROGEN RECEPTOR POSITIVE (HCC): Primary | ICD-10-CM

## 2021-07-21 DIAGNOSIS — Z17.0 MALIGNANT NEOPLASM OF OVERLAPPING SITES OF LEFT BREAST IN FEMALE, ESTROGEN RECEPTOR POSITIVE (HCC): ICD-10-CM

## 2021-07-21 DIAGNOSIS — C50.812 MALIGNANT NEOPLASM OF OVERLAPPING SITES OF LEFT BREAST IN FEMALE, ESTROGEN RECEPTOR POSITIVE (HCC): Primary | ICD-10-CM

## 2021-07-21 LAB
ALBUMIN SERPL-MCNC: 3.4 G/DL (ref 3.2–4.6)
ALBUMIN/GLOB SERPL: 0.9 {RATIO} (ref 1.2–3.5)
ALP SERPL-CCNC: 128 U/L (ref 50–136)
ALT SERPL-CCNC: 17 U/L (ref 12–65)
ANION GAP SERPL CALC-SCNC: 6 MMOL/L (ref 7–16)
AST SERPL-CCNC: 12 U/L (ref 15–37)
BASOPHILS # BLD: 0 K/UL (ref 0–0.2)
BASOPHILS NFR BLD: 1 % (ref 0–2)
BILIRUB SERPL-MCNC: 0.8 MG/DL (ref 0.2–1.1)
BUN SERPL-MCNC: 8 MG/DL (ref 8–23)
CALCIUM SERPL-MCNC: 9.9 MG/DL (ref 8.3–10.4)
CHLORIDE SERPL-SCNC: 109 MMOL/L (ref 98–107)
CO2 SERPL-SCNC: 26 MMOL/L (ref 21–32)
CREAT SERPL-MCNC: 0.6 MG/DL (ref 0.6–1)
DIFFERENTIAL METHOD BLD: ABNORMAL
EOSINOPHIL # BLD: 0 K/UL (ref 0–0.8)
EOSINOPHIL NFR BLD: 1 % (ref 0.5–7.8)
ERYTHROCYTE [DISTWIDTH] IN BLOOD BY AUTOMATED COUNT: 14 % (ref 11.9–14.6)
GLOBULIN SER CALC-MCNC: 3.7 G/DL (ref 2.3–3.5)
GLUCOSE SERPL-MCNC: 106 MG/DL (ref 65–100)
HCT VFR BLD AUTO: 46.9 % (ref 35.8–46.3)
HGB BLD-MCNC: 15 G/DL (ref 11.7–15.4)
IMM GRANULOCYTES # BLD AUTO: 0 K/UL (ref 0–0.5)
IMM GRANULOCYTES NFR BLD AUTO: 0 % (ref 0–5)
LYMPHOCYTES # BLD: 1.8 K/UL (ref 0.5–4.6)
LYMPHOCYTES NFR BLD: 32 % (ref 13–44)
MAGNESIUM SERPL-MCNC: 2.2 MG/DL (ref 1.8–2.4)
MCH RBC QN AUTO: 29.1 PG (ref 26.1–32.9)
MCHC RBC AUTO-ENTMCNC: 32 G/DL (ref 31.4–35)
MCV RBC AUTO: 91.1 FL (ref 79.6–97.8)
MONOCYTES # BLD: 0.4 K/UL (ref 0.1–1.3)
MONOCYTES NFR BLD: 7 % (ref 4–12)
NEUTS SEG # BLD: 3.3 K/UL (ref 1.7–8.2)
NEUTS SEG NFR BLD: 60 % (ref 43–78)
NRBC # BLD: 0 K/UL (ref 0–0.2)
PLATELET # BLD AUTO: 201 K/UL (ref 150–450)
PMV BLD AUTO: 11.1 FL (ref 9.4–12.3)
POTASSIUM SERPL-SCNC: 3.3 MMOL/L (ref 3.5–5.1)
PROT SERPL-MCNC: 7.1 G/DL (ref 6.3–8.2)
RBC # BLD AUTO: 5.15 M/UL (ref 4.05–5.2)
SODIUM SERPL-SCNC: 141 MMOL/L (ref 136–145)
WBC # BLD AUTO: 5.5 K/UL (ref 4.3–11.1)

## 2021-07-21 PROCEDURE — 83735 ASSAY OF MAGNESIUM: CPT

## 2021-07-21 PROCEDURE — 96417 CHEMO IV INFUS EACH ADDL SEQ: CPT

## 2021-07-21 PROCEDURE — 36415 COLL VENOUS BLD VENIPUNCTURE: CPT

## 2021-07-21 PROCEDURE — 74011250636 HC RX REV CODE- 250/636: Performed by: INTERNAL MEDICINE

## 2021-07-21 PROCEDURE — 96413 CHEMO IV INFUSION 1 HR: CPT

## 2021-07-21 PROCEDURE — 80053 COMPREHEN METABOLIC PANEL: CPT

## 2021-07-21 PROCEDURE — 85025 COMPLETE CBC W/AUTO DIFF WBC: CPT

## 2021-07-21 RX ORDER — SODIUM CHLORIDE 9 MG/ML
25 INJECTION, SOLUTION INTRAVENOUS CONTINUOUS
Status: DISCONTINUED | OUTPATIENT
Start: 2021-07-21 | End: 2021-07-22 | Stop reason: HOSPADM

## 2021-07-21 RX ORDER — DIPHENHYDRAMINE HYDROCHLORIDE 50 MG/ML
50 INJECTION, SOLUTION INTRAMUSCULAR; INTRAVENOUS AS NEEDED
Status: DISCONTINUED | OUTPATIENT
Start: 2021-07-21 | End: 2021-07-22 | Stop reason: HOSPADM

## 2021-07-21 RX ORDER — HYDROCORTISONE SODIUM SUCCINATE 100 MG/2ML
100 INJECTION, POWDER, FOR SOLUTION INTRAMUSCULAR; INTRAVENOUS AS NEEDED
Status: DISCONTINUED | OUTPATIENT
Start: 2021-07-21 | End: 2021-07-22 | Stop reason: HOSPADM

## 2021-07-21 RX ORDER — SODIUM CHLORIDE 0.9 % (FLUSH) 0.9 %
10 SYRINGE (ML) INJECTION AS NEEDED
Status: DISCONTINUED | OUTPATIENT
Start: 2021-07-21 | End: 2021-07-22 | Stop reason: HOSPADM

## 2021-07-21 RX ADMIN — Medication 10 ML: at 15:00

## 2021-07-21 RX ADMIN — SODIUM CHLORIDE 25 ML/HR: 900 INJECTION, SOLUTION INTRAVENOUS at 15:00

## 2021-07-21 RX ADMIN — PERTUZUMAB 420 MG: 30 INJECTION, SOLUTION, CONCENTRATE INTRAVENOUS at 16:09

## 2021-07-21 RX ADMIN — SODIUM CHLORIDE 670 MG: 9 INJECTION, SOLUTION INTRAVENOUS at 15:35

## 2021-07-21 NOTE — PROGRESS NOTES
7/21/2021 Saw the patient with Dr July More. She is doing well. Dr July More reminded her how to take the imodium for diarrhea. She will proceed with herceptin/perjeta today and we will schedule a PET scan prior to her infusion in August. She had an echo 7/14 with EF of 50-55%. Will continue to follow.

## 2021-07-21 NOTE — PROGRESS NOTES
Arrived to the [facility]. Assessment completed, labs  And echo reviewed. Trafrancka/[place]inata completed. Patient tolerated well. Any issues or concerns during appointment: No.  Patient aware of next infusion appointment on 08/11/21 @1430. Discharged via w/c.

## 2021-07-30 ENCOUNTER — APPOINTMENT (OUTPATIENT)
Dept: GENERAL RADIOLOGY | Age: 81
End: 2021-07-30
Attending: EMERGENCY MEDICINE
Payer: MEDICARE

## 2021-07-30 ENCOUNTER — HOSPITAL ENCOUNTER (EMERGENCY)
Age: 81
Discharge: LEFT BEFORE DISCHARGE | End: 2021-07-30
Attending: EMERGENCY MEDICINE
Payer: MEDICARE

## 2021-07-30 VITALS
HEIGHT: 63 IN | RESPIRATION RATE: 16 BRPM | TEMPERATURE: 97.7 F | DIASTOLIC BLOOD PRESSURE: 82 MMHG | BODY MASS INDEX: 43.59 KG/M2 | SYSTOLIC BLOOD PRESSURE: 158 MMHG | HEART RATE: 60 BPM | WEIGHT: 246 LBS | OXYGEN SATURATION: 97 %

## 2021-07-30 DIAGNOSIS — R55 SYNCOPE AND COLLAPSE: Primary | ICD-10-CM

## 2021-07-30 PROCEDURE — 99283 EMERGENCY DEPT VISIT LOW MDM: CPT

## 2021-07-30 RX ORDER — SODIUM CHLORIDE 0.9 % (FLUSH) 0.9 %
5-40 SYRINGE (ML) INJECTION AS NEEDED
Status: DISCONTINUED | OUTPATIENT
Start: 2021-07-30 | End: 2021-07-31 | Stop reason: HOSPADM

## 2021-07-30 RX ORDER — SODIUM CHLORIDE 0.9 % (FLUSH) 0.9 %
5-40 SYRINGE (ML) INJECTION EVERY 8 HOURS
Status: DISCONTINUED | OUTPATIENT
Start: 2021-07-30 | End: 2021-07-31 | Stop reason: HOSPADM

## 2021-07-30 NOTE — ED TRIAGE NOTES
Pt arrives via EMS from home. Per son pt was laying in bed \"clutched her chest\" and passed out for 30 seconds. Pt denies this. Pt denies CP denies SOB. Pt a/o x 4. Pt O2 91% on room air, placed on 4 L O2 by EMS. Pt has O2 at home PRN. Pt active chemo.

## 2021-07-31 NOTE — ED PROVIDER NOTES
81-year-old white female brought in by EMS who report that her son called them because she \"passed out\". EMS is not available to assist with history. Patient's son is also not available to help with the history as he is not here and he does not have a phone. Patient is adamant that she did not pass out. She states that she feels fine and she does not want to be here. She is refusing to allow any work-up to be performed. Have tried multiple times to explain to her the risk of missing a potentially life-threatening problem however she is still refusing to allow work-up. She is alert and oriented appears of sound mind to make decision to sign out AMA. The history is provided by the patient. Other  Pertinent negatives include no chest pain, no abdominal pain, no headaches and no shortness of breath.         Past Medical History:   Diagnosis Date    Arthritis     Asthma     Chronic kidney disease     Ear problems     Gastrointestinal disorder     acid reflux, hernia    Hyperlipidemia     Hypertension     Insomnia     Other ill-defined conditions(799.89)     blood clot on lung    Stroke ([facility] [address]     Thyroid disease     Tinnitus     Vitamin D deficiency        Past Surgical History:   Procedure Laterality Date    HX APPENDECTOMY      HX BREAST BIOPSY Left 04/07/2021    u/s x 3    HX GYN  71,73    c-sec    HX HEENT      t & a    HX OTHER SURGICAL      fatty tissue removed from under arm    IL APPENDECTOMY           Family History:   Problem Relation Age of Onset    No Known Problems Mother     No Known Problems Father     No Known Problems Maternal Grandmother     No Known Problems Maternal Grandfather     No Known Problems Paternal Grandmother     No Known Problems Paternal Grandfather     Breast Cancer Neg Hx        Social History     Socioeconomic History    Marital status:      Spouse name: Not on file    Number of children: Not on file    Years of education: Not on file    Highest education level: Not on file   Occupational History    Not on file   Tobacco Use    Smoking status: Never Smoker    Smokeless tobacco: Never Used   Vaping Use    Vaping Use: Never used   Substance and Sexual Activity    Alcohol use: No    Drug use: No    Sexual activity: Not Currently     Partners: Male     Birth control/protection: None   Other Topics Concern    Not on file   Social History Narrative    Not on file     Social Determinants of Health     Financial Resource Strain:     Difficulty of Paying Living Expenses:    Food Insecurity:     Worried About Running Out of Food in the Last Year:     Ran Out of Food in the Last Year:    Transportation Needs:     Lack of Transportation (Medical):  Lack of Transportation (Non-Medical):    Physical Activity:     Days of Exercise per Week:     Minutes of Exercise per Session:    Stress:     Feeling of Stress :    Social Connections:     Frequency of Communication with Friends and Family:     Frequency of Social Gatherings with Friends and Family:     Attends Advent Services:     Active Member of Clubs or Organizations:     Attends Club or Organization Meetings:     Marital Status:    Intimate Partner Violence:     Fear of Current or Ex-Partner:     Emotionally Abused:     Physically Abused:     Sexually Abused: ALLERGIES: Aspirin, Ciprofloxacin, Diflucan [fluconazole], Glucophage [metformin], Motrin [ibuprofen], Neurontin [gabapentin], Nsaids (non-steroidal anti-inflammatory drug), Septra [sulfamethoprim ds], Sulfa (sulfonamide antibiotics), Sulfabenzamide, Tessalon perles [benzonatate], and Vibramycin [doxycycline calcium]    Review of Systems   Constitutional: Negative for fever. HENT: Negative for congestion. Respiratory: Negative for cough and shortness of breath. Cardiovascular: Negative for chest pain. Gastrointestinal: Negative for abdominal pain and vomiting. Genitourinary: Negative for dysuria. Musculoskeletal: Negative for back pain. Skin: Negative for rash. Neurological: Negative for headaches. All other systems reviewed and are negative. Vitals:    07/30/21 1921   BP: (!) 158/82   Pulse: 60   Resp: 16   Temp: 97.7 °F (36.5 °C)   SpO2: 97%   Weight: 111.6 kg (246 lb)   Height: 5' 3\" (1.6 m)            Physical Exam  Vitals and nursing note reviewed. Constitutional:       General: She is not in acute distress. Appearance: Normal appearance. She is not toxic-appearing. HENT:      Head: Normocephalic and atraumatic. Nose: Nose normal.      Mouth/Throat:      Mouth: Mucous membranes are moist.      Pharynx: Oropharynx is clear. Eyes:      Pupils: Pupils are equal, round, and reactive to light. Cardiovascular:      Rate and Rhythm: Normal rate and regular rhythm. Heart sounds: No murmur heard. Pulmonary:      Effort: Pulmonary effort is normal.      Breath sounds: Normal breath sounds. No wheezing or rales. Abdominal:      General: There is no distension. Palpations: Abdomen is soft. Tenderness: There is no abdominal tenderness. There is no guarding. Musculoskeletal:         General: No swelling or tenderness. Normal range of motion. Cervical back: Normal range of motion and neck supple. Skin:     General: Skin is warm and dry. Neurological:      General: No focal deficit present. Mental Status: She is alert and oriented to person, place, and time. Psychiatric:         Mood and Affect: Mood normal.         Behavior: Behavior normal.          MDM  Number of Diagnoses or Management Options  Diagnosis management comments: Patient has normal exam and reassuring vital signs. Unable to perform any blood work EKG or imaging due to patient refusal.  Patient will sign out AMA.          Procedures

## 2021-07-31 NOTE — ED NOTES
Pt's IV removed from left hand. Tip intact. Pt taken to waiting room via wheelchair to wait for her daughter's arrival to come get her. NASIM paperwork signed.

## 2021-08-09 ENCOUNTER — HOSPITAL ENCOUNTER (OUTPATIENT)
Dept: PET IMAGING | Age: 81
Discharge: HOME | End: 2021-08-09
Payer: MEDICARE

## 2021-08-09 DIAGNOSIS — Z17.0 MALIGNANT NEOPLASM OF OVERLAPPING SITES OF LEFT BREAST IN FEMALE, ESTROGEN RECEPTOR POSITIVE (HCC): ICD-10-CM

## 2021-08-09 DIAGNOSIS — C50.812 MALIGNANT NEOPLASM OF OVERLAPPING SITES OF LEFT BREAST IN FEMALE, ESTROGEN RECEPTOR POSITIVE (HCC): ICD-10-CM

## 2021-08-09 LAB
GLUCOSE BLD STRIP.AUTO-MCNC: 91 MG/DL (ref 65–100)
SERVICE CMNT-IMP: NORMAL

## 2021-08-09 PROCEDURE — 74011000636 HC RX REV CODE- 636: Performed by: INTERNAL MEDICINE

## 2021-08-09 PROCEDURE — A9552 F18 FDG: HCPCS

## 2021-08-09 PROCEDURE — 82962 GLUCOSE BLOOD TEST: CPT

## 2021-08-09 RX ORDER — SODIUM CHLORIDE 0.9 % (FLUSH) 0.9 %
10 SYRINGE (ML) INJECTION
Status: COMPLETED | OUTPATIENT
Start: 2021-08-09 | End: 2021-08-09

## 2021-08-09 RX ORDER — FLUDEOXYGLUCOSE F-18 200 MCI/ML
10 INJECTION INTRAVENOUS ONCE
Status: COMPLETED | OUTPATIENT
Start: 2021-08-09 | End: 2021-08-09

## 2021-08-09 RX ADMIN — FLUDEOXYGLUCOSE F-18 16.8 MILLICURIE: 200 INJECTION INTRAVENOUS at 11:49

## 2021-08-09 RX ADMIN — Medication 10 ML: at 11:49

## 2021-08-09 RX ADMIN — DIATRIZOATE MEGLUMINE AND DIATRIZOATE SODIUM 10 ML: 660; 100 LIQUID ORAL; RECTAL at 11:49

## 2021-08-11 ENCOUNTER — HOSPITAL ENCOUNTER (OUTPATIENT)
Dept: INFUSION THERAPY | Age: 81
Discharge: HOME | End: 2021-08-11
Payer: MEDICARE

## 2021-08-11 ENCOUNTER — HOSPITAL ENCOUNTER (OUTPATIENT)
Dept: LAB | Age: 81
Discharge: HOME | End: 2021-08-11

## 2021-08-11 ENCOUNTER — PATIENT OUTREACH (OUTPATIENT)
Dept: CASE MANAGEMENT | Age: 81
End: 2021-08-11

## 2021-08-11 VITALS — HEIGHT: 63 IN | WEIGHT: 246 LBS | BODY MASS INDEX: 43.59 KG/M2

## 2021-08-11 DIAGNOSIS — C50.812 MALIGNANT NEOPLASM OF OVERLAPPING SITES OF LEFT BREAST IN FEMALE, ESTROGEN RECEPTOR POSITIVE (HCC): Primary | ICD-10-CM

## 2021-08-11 DIAGNOSIS — Z17.0 MALIGNANT NEOPLASM OF OVERLAPPING SITES OF LEFT BREAST IN FEMALE, ESTROGEN RECEPTOR POSITIVE (HCC): Primary | ICD-10-CM

## 2021-08-11 PROCEDURE — 96417 CHEMO IV INFUS EACH ADDL SEQ: CPT

## 2021-08-11 PROCEDURE — 96413 CHEMO IV INFUSION 1 HR: CPT

## 2021-08-11 PROCEDURE — 74011250636 HC RX REV CODE- 250/636: Performed by: INTERNAL MEDICINE

## 2021-08-11 RX ORDER — SODIUM CHLORIDE 9 MG/ML
25 INJECTION, SOLUTION INTRAVENOUS CONTINUOUS
Status: DISCONTINUED | OUTPATIENT
Start: 2021-08-11 | End: 2021-08-12 | Stop reason: HOSPADM

## 2021-08-11 RX ADMIN — SODIUM CHLORIDE 25 ML/HR: 900 INJECTION, SOLUTION INTRAVENOUS at 15:45

## 2021-08-11 RX ADMIN — SODIUM CHLORIDE 670 MG: 9 INJECTION, SOLUTION INTRAVENOUS at 16:02

## 2021-08-11 RX ADMIN — PERTUZUMAB 420 MG: 30 INJECTION, SOLUTION, CONCENTRATE INTRAVENOUS at 16:38

## 2021-08-11 NOTE — PROGRESS NOTES
8/11/2021 Saw the patient with Dr Josue Saucedo. She had a PET which does show improvement. Dr Josue Saucedo discussed with her that he wants to continue with herceptin/Perjeta and the Arimidex. She had an echo 7/16 which shows EF of 50-55%. I showed her the port book and we discussed placement but she is still hesitant to move forward with port. Will continue to follow.

## 2021-08-11 NOTE — PROGRESS NOTES
Arrived to the [facility]. 1031 Karina Kern completed. Patient tolerated well. Observed patient x 30 minutes, no reactions. Any issues or concerns during appointment: None. Patient aware of next infusion appointment on 9/1 (date) at 1400 (time). Patient aware of next lab and [facility] office visit on 9/22 (date) at 1200 (time). Discharged via wheelchair in stable condition.

## 2021-09-01 ENCOUNTER — HOSPITAL ENCOUNTER (OUTPATIENT)
Dept: INFUSION THERAPY | Age: 81
Discharge: HOME | End: 2021-09-01
Payer: MEDICARE

## 2021-09-01 VITALS
HEART RATE: 55 BPM | BODY MASS INDEX: 37.09 KG/M2 | DIASTOLIC BLOOD PRESSURE: 71 MMHG | WEIGHT: 209.4 LBS | RESPIRATION RATE: 18 BRPM | OXYGEN SATURATION: 94 % | TEMPERATURE: 98 F | SYSTOLIC BLOOD PRESSURE: 113 MMHG

## 2021-09-01 DIAGNOSIS — Z17.0 MALIGNANT NEOPLASM OF OVERLAPPING SITES OF LEFT BREAST IN FEMALE, ESTROGEN RECEPTOR POSITIVE (HCC): Primary | ICD-10-CM

## 2021-09-01 DIAGNOSIS — C50.812 MALIGNANT NEOPLASM OF OVERLAPPING SITES OF LEFT BREAST IN FEMALE, ESTROGEN RECEPTOR POSITIVE (HCC): Primary | ICD-10-CM

## 2021-09-01 PROCEDURE — 96413 CHEMO IV INFUSION 1 HR: CPT

## 2021-09-01 PROCEDURE — 96417 CHEMO IV INFUS EACH ADDL SEQ: CPT

## 2021-09-01 PROCEDURE — 74011250636 HC RX REV CODE- 250/636: Performed by: INTERNAL MEDICINE

## 2021-09-01 RX ORDER — SODIUM CHLORIDE 0.9 % (FLUSH) 0.9 %
10 SYRINGE (ML) INJECTION AS NEEDED
Status: DISCONTINUED | OUTPATIENT
Start: 2021-09-01 | End: 2021-09-02 | Stop reason: HOSPADM

## 2021-09-01 RX ORDER — SODIUM CHLORIDE 9 MG/ML
25 INJECTION, SOLUTION INTRAVENOUS CONTINUOUS
Status: DISCONTINUED | OUTPATIENT
Start: 2021-09-01 | End: 2021-09-02 | Stop reason: HOSPADM

## 2021-09-01 RX ADMIN — SODIUM CHLORIDE 25 ML/HR: 900 INJECTION, SOLUTION INTRAVENOUS at 14:49

## 2021-09-01 RX ADMIN — SODIUM CHLORIDE 569 MG: 9 INJECTION, SOLUTION INTRAVENOUS at 15:22

## 2021-09-01 RX ADMIN — PERTUZUMAB 420 MG: 30 INJECTION, SOLUTION, CONCENTRATE INTRAVENOUS at 16:05

## 2021-09-01 NOTE — ADDENDUM NOTE
Encounter addended by: Efren Gonzalez, [facility] on: 9/1/2021 2:50 PM   Actions taken: i-Vent created or edited

## 2021-09-01 NOTE — PROGRESS NOTES
Arrived to the [facility]. Herceptin/Perjeta completed. Patient tolerated well. Any issues or concerns during appointment: None. Patient aware of next infusion appointment on 9/22 (date) at 1330 (time). Patient aware of next lab and [facility] office visit on 9/22 (date) at 1200 (time). Discharged via wheelchair in stable condition.

## 2021-10-05 ENCOUNTER — HOSPITAL ENCOUNTER (OUTPATIENT)
Dept: INFUSION THERAPY | Age: 81
Discharge: HOME | End: 2021-10-05

## 2021-10-05 ENCOUNTER — PATIENT OUTREACH (OUTPATIENT)
Dept: CASE MANAGEMENT | Age: 81
End: 2021-10-05

## 2021-10-05 ENCOUNTER — HOSPITAL ENCOUNTER (OUTPATIENT)
Dept: LAB | Age: 81
Discharge: HOME | End: 2021-10-05
Payer: MEDICARE

## 2021-10-05 DIAGNOSIS — C50.812 MALIGNANT NEOPLASM OF OVERLAPPING SITES OF LEFT BREAST IN FEMALE, ESTROGEN RECEPTOR POSITIVE (HCC): ICD-10-CM

## 2021-10-05 DIAGNOSIS — Z17.0 MALIGNANT NEOPLASM OF OVERLAPPING SITES OF LEFT BREAST IN FEMALE, ESTROGEN RECEPTOR POSITIVE (HCC): ICD-10-CM

## 2021-10-05 LAB
ALBUMIN SERPL-MCNC: 3.3 G/DL (ref 3.2–4.6)
ALBUMIN/GLOB SERPL: 0.9 {RATIO} (ref 1.2–3.5)
ALP SERPL-CCNC: 117 U/L (ref 50–136)
ALT SERPL-CCNC: 15 U/L (ref 12–65)
ANION GAP SERPL CALC-SCNC: 8 MMOL/L (ref 7–16)
AST SERPL-CCNC: 16 U/L (ref 15–37)
BASOPHILS # BLD: 0 K/UL (ref 0–0.2)
BASOPHILS NFR BLD: 0 % (ref 0–2)
BILIRUB SERPL-MCNC: 1.1 MG/DL (ref 0.2–1.1)
BUN SERPL-MCNC: 7 MG/DL (ref 8–23)
CALCIUM SERPL-MCNC: 9.6 MG/DL (ref 8.3–10.4)
CHLORIDE SERPL-SCNC: 108 MMOL/L (ref 98–107)
CO2 SERPL-SCNC: 26 MMOL/L (ref 21–32)
CREAT SERPL-MCNC: 0.6 MG/DL (ref 0.6–1)
DIFFERENTIAL METHOD BLD: NORMAL
EOSINOPHIL # BLD: 0 K/UL (ref 0–0.8)
EOSINOPHIL NFR BLD: 1 % (ref 0.5–7.8)
ERYTHROCYTE [DISTWIDTH] IN BLOOD BY AUTOMATED COUNT: 13.7 % (ref 11.9–14.6)
GLOBULIN SER CALC-MCNC: 3.7 G/DL (ref 2.3–3.5)
GLUCOSE SERPL-MCNC: 103 MG/DL (ref 65–100)
HCT VFR BLD AUTO: 46.1 % (ref 35.8–46.3)
HGB BLD-MCNC: 14.5 G/DL (ref 11.7–15.4)
IMM GRANULOCYTES # BLD AUTO: 0 K/UL (ref 0–0.5)
IMM GRANULOCYTES NFR BLD AUTO: 0 % (ref 0–5)
LYMPHOCYTES # BLD: 1.7 K/UL (ref 0.5–4.6)
LYMPHOCYTES NFR BLD: 33 % (ref 13–44)
MAGNESIUM SERPL-MCNC: 2 MG/DL (ref 1.8–2.4)
MCH RBC QN AUTO: 28.3 PG (ref 26.1–32.9)
MCHC RBC AUTO-ENTMCNC: 31.5 G/DL (ref 31.4–35)
MCV RBC AUTO: 90 FL (ref 79.6–97.8)
MONOCYTES # BLD: 0.3 K/UL (ref 0.1–1.3)
MONOCYTES NFR BLD: 7 % (ref 4–12)
NEUTS SEG # BLD: 3.1 K/UL (ref 1.7–8.2)
NEUTS SEG NFR BLD: 60 % (ref 43–78)
NRBC # BLD: 0 K/UL (ref 0–0.2)
PLATELET # BLD AUTO: 193 K/UL (ref 150–450)
PMV BLD AUTO: 11.1 FL (ref 9.4–12.3)
POTASSIUM SERPL-SCNC: 3.5 MMOL/L (ref 3.5–5.1)
PROT SERPL-MCNC: 7 G/DL (ref 6.3–8.2)
RBC # BLD AUTO: 5.12 M/UL (ref 4.05–5.2)
SODIUM SERPL-SCNC: 142 MMOL/L (ref 136–145)
WBC # BLD AUTO: 5.2 K/UL (ref 4.3–11.1)

## 2021-10-05 PROCEDURE — 85025 COMPLETE CBC W/AUTO DIFF WBC: CPT

## 2021-10-05 PROCEDURE — 36415 COLL VENOUS BLD VENIPUNCTURE: CPT

## 2021-10-05 PROCEDURE — 83735 ASSAY OF MAGNESIUM: CPT

## 2021-10-05 PROCEDURE — 80053 COMPREHEN METABOLIC PANEL: CPT

## 2021-10-05 NOTE — PROGRESS NOTES
10/5/21 saw pt today with STEFAN Deng for follow up breast cancer. On arimidex, tolerating well. Here for herceptin/perjeta. PO intake is good. Denies any issues. She is not able to stay for infusion today, wants rescheduled to 10/13. Will need echo prior to 11/3 infusion. Encouraged to call with any concerns. Navigation will continue to follow.
normal/soft/nontender/no distention/no masses palpable/bowel sounds normal

## 2021-10-13 ENCOUNTER — HOSPITAL ENCOUNTER (OUTPATIENT)
Dept: INFUSION THERAPY | Age: 81
Discharge: HOME | End: 2021-10-13
Payer: MEDICARE

## 2021-10-13 VITALS
DIASTOLIC BLOOD PRESSURE: 84 MMHG | TEMPERATURE: 99 F | HEART RATE: 60 BPM | BODY MASS INDEX: 37.39 KG/M2 | SYSTOLIC BLOOD PRESSURE: 162 MMHG | RESPIRATION RATE: 18 BRPM | HEIGHT: 63 IN | WEIGHT: 211 LBS | OXYGEN SATURATION: 94 %

## 2021-10-13 DIAGNOSIS — C50.812 MALIGNANT NEOPLASM OF OVERLAPPING SITES OF LEFT BREAST IN FEMALE, ESTROGEN RECEPTOR POSITIVE (HCC): Primary | ICD-10-CM

## 2021-10-13 DIAGNOSIS — Z17.0 MALIGNANT NEOPLASM OF OVERLAPPING SITES OF LEFT BREAST IN FEMALE, ESTROGEN RECEPTOR POSITIVE (HCC): Primary | ICD-10-CM

## 2021-10-13 PROCEDURE — 74011250636 HC RX REV CODE- 250/636: Performed by: NURSE PRACTITIONER

## 2021-10-13 PROCEDURE — 96417 CHEMO IV INFUS EACH ADDL SEQ: CPT

## 2021-10-13 PROCEDURE — 96413 CHEMO IV INFUSION 1 HR: CPT

## 2021-10-13 RX ORDER — SODIUM CHLORIDE 9 MG/ML
25 INJECTION, SOLUTION INTRAVENOUS CONTINUOUS
Status: DISCONTINUED | OUTPATIENT
Start: 2021-10-13 | End: 2021-10-14 | Stop reason: HOSPADM

## 2021-10-13 RX ORDER — SODIUM CHLORIDE 0.9 % (FLUSH) 0.9 %
10 SYRINGE (ML) INJECTION AS NEEDED
Status: DISCONTINUED | OUTPATIENT
Start: 2021-10-13 | End: 2021-10-14 | Stop reason: HOSPADM

## 2021-10-13 RX ADMIN — Medication 10 ML: at 15:10

## 2021-10-13 RX ADMIN — SODIUM CHLORIDE 573 MG: 9 INJECTION, SOLUTION INTRAVENOUS at 15:28

## 2021-10-13 RX ADMIN — SODIUM CHLORIDE 25 ML/HR: 9 INJECTION, SOLUTION INTRAVENOUS at 15:15

## 2021-10-13 RX ADMIN — Medication 10 ML: at 16:45

## 2021-10-13 RX ADMIN — PERTUZUMAB 420 MG: 30 INJECTION, SOLUTION, CONCENTRATE INTRAVENOUS at 16:05

## 2021-10-13 NOTE — PROGRESS NOTES
Arrived to the [facility]. Herceptin/Perjeta completed. Patient tolerated well. Any issues or concerns during appointment: none. Patient aware of next infusion appointment on 11/3 (date) at 1:30 PM (time). Discharged via w/c.

## 2021-11-03 ENCOUNTER — HOSPITAL ENCOUNTER (OUTPATIENT)
Dept: INFUSION THERAPY | Age: 81
Discharge: HOME | End: 2021-11-03
Payer: MEDICARE

## 2021-11-03 VITALS
HEART RATE: 63 BPM | BODY MASS INDEX: 36.63 KG/M2 | WEIGHT: 206.8 LBS | RESPIRATION RATE: 18 BRPM | OXYGEN SATURATION: 98 % | TEMPERATURE: 97.5 F | SYSTOLIC BLOOD PRESSURE: 145 MMHG | DIASTOLIC BLOOD PRESSURE: 69 MMHG

## 2021-11-03 DIAGNOSIS — C50.812 MALIGNANT NEOPLASM OF OVERLAPPING SITES OF LEFT BREAST IN FEMALE, ESTROGEN RECEPTOR POSITIVE (HCC): Primary | ICD-10-CM

## 2021-11-03 DIAGNOSIS — Z17.0 MALIGNANT NEOPLASM OF OVERLAPPING SITES OF LEFT BREAST IN FEMALE, ESTROGEN RECEPTOR POSITIVE (HCC): Primary | ICD-10-CM

## 2021-11-03 PROCEDURE — 96413 CHEMO IV INFUSION 1 HR: CPT

## 2021-11-03 PROCEDURE — 96417 CHEMO IV INFUS EACH ADDL SEQ: CPT

## 2021-11-03 PROCEDURE — 74011250636 HC RX REV CODE- 250/636: Performed by: NURSE PRACTITIONER

## 2021-11-03 RX ORDER — SODIUM CHLORIDE 0.9 % (FLUSH) 0.9 %
10 SYRINGE (ML) INJECTION AS NEEDED
Status: DISCONTINUED | OUTPATIENT
Start: 2021-11-03 | End: 2021-11-04 | Stop reason: HOSPADM

## 2021-11-03 RX ORDER — SODIUM CHLORIDE 9 MG/ML
25 INJECTION, SOLUTION INTRAVENOUS CONTINUOUS
Status: DISCONTINUED | OUTPATIENT
Start: 2021-11-03 | End: 2021-11-04 | Stop reason: HOSPADM

## 2021-11-03 RX ADMIN — PERTUZUMAB 420 MG: 30 INJECTION, SOLUTION, CONCENTRATE INTRAVENOUS at 15:42

## 2021-11-03 RX ADMIN — SODIUM CHLORIDE 25 ML/HR: 900 INJECTION, SOLUTION INTRAVENOUS at 14:33

## 2021-11-03 RX ADMIN — SODIUM CHLORIDE 563 MG: 9 INJECTION, SOLUTION INTRAVENOUS at 15:01

## 2021-11-03 NOTE — ADDENDUM NOTE
Encounter addended by: PA [facility] [place] on: 11/3/2021 2:37 PM   Actions taken: i-Vent created or edited

## 2021-11-03 NOTE — PROGRESS NOTES
[facility] Infusion today without difficulty. Patient discharged via w/c accompanied by sitter. Instructed to notify physician of any problems, questions or concerns after discharge. Next appointment is 11/24/2021 at 1400 with Infusion with labs at [phone] followed by OV at 1400.

## 2021-11-24 ENCOUNTER — HOSPITAL ENCOUNTER (OUTPATIENT)
Dept: INFUSION THERAPY | Age: 81
Discharge: HOME | End: 2021-11-24
Payer: MEDICARE

## 2021-11-24 ENCOUNTER — HOSPITAL ENCOUNTER (OUTPATIENT)
Dept: INFUSION THERAPY | Age: 81
End: 2021-11-24

## 2021-11-24 ENCOUNTER — PATIENT OUTREACH (OUTPATIENT)
Dept: CASE MANAGEMENT | Age: 81
End: 2021-11-24

## 2021-11-24 ENCOUNTER — HOSPITAL ENCOUNTER (OUTPATIENT)
Dept: LAB | Age: 81
Discharge: HOME | End: 2021-11-24
Payer: MEDICARE

## 2021-11-24 VITALS — BODY MASS INDEX: 37.62 KG/M2 | WEIGHT: 212.4 LBS

## 2021-11-24 DIAGNOSIS — Z17.0 MALIGNANT NEOPLASM OF OVERLAPPING SITES OF LEFT BREAST IN FEMALE, ESTROGEN RECEPTOR POSITIVE (HCC): Primary | ICD-10-CM

## 2021-11-24 DIAGNOSIS — C50.812 MALIGNANT NEOPLASM OF OVERLAPPING SITES OF LEFT BREAST IN FEMALE, ESTROGEN RECEPTOR POSITIVE (HCC): Primary | ICD-10-CM

## 2021-11-24 DIAGNOSIS — Z17.0 MALIGNANT NEOPLASM OF OVERLAPPING SITES OF LEFT BREAST IN FEMALE, ESTROGEN RECEPTOR POSITIVE (HCC): ICD-10-CM

## 2021-11-24 DIAGNOSIS — C50.812 MALIGNANT NEOPLASM OF OVERLAPPING SITES OF LEFT BREAST IN FEMALE, ESTROGEN RECEPTOR POSITIVE (HCC): ICD-10-CM

## 2021-11-24 LAB
ALBUMIN SERPL-MCNC: 3.4 G/DL (ref 3.2–4.6)
ALBUMIN/GLOB SERPL: 0.9 {RATIO} (ref 1.2–3.5)
ALP SERPL-CCNC: 122 U/L (ref 50–136)
ALT SERPL-CCNC: 19 U/L (ref 12–65)
ANION GAP SERPL CALC-SCNC: 4 MMOL/L (ref 7–16)
AST SERPL-CCNC: 14 U/L (ref 15–37)
BASOPHILS # BLD: 0 K/UL (ref 0–0.2)
BASOPHILS NFR BLD: 0 % (ref 0–2)
BILIRUB SERPL-MCNC: 0.7 MG/DL (ref 0.2–1.1)
BUN SERPL-MCNC: 10 MG/DL (ref 8–23)
CALCIUM SERPL-MCNC: 10 MG/DL (ref 8.3–10.4)
CHLORIDE SERPL-SCNC: 110 MMOL/L (ref 98–107)
CO2 SERPL-SCNC: 28 MMOL/L (ref 21–32)
CREAT SERPL-MCNC: 0.7 MG/DL (ref 0.6–1)
DIFFERENTIAL METHOD BLD: ABNORMAL
EOSINOPHIL # BLD: 0 K/UL (ref 0–0.8)
EOSINOPHIL NFR BLD: 0 % (ref 0.5–7.8)
ERYTHROCYTE [DISTWIDTH] IN BLOOD BY AUTOMATED COUNT: 13.9 % (ref 11.9–14.6)
GLOBULIN SER CALC-MCNC: 3.6 G/DL (ref 2.3–3.5)
GLUCOSE SERPL-MCNC: 105 MG/DL (ref 65–100)
HCT VFR BLD AUTO: 47.7 % (ref 35.8–46.3)
HGB BLD-MCNC: 14.9 G/DL (ref 11.7–15.4)
IMM GRANULOCYTES # BLD AUTO: 0 K/UL (ref 0–0.5)
IMM GRANULOCYTES NFR BLD AUTO: 0 % (ref 0–5)
LYMPHOCYTES # BLD: 1.7 K/UL (ref 0.5–4.6)
LYMPHOCYTES NFR BLD: 31 % (ref 13–44)
MAGNESIUM SERPL-MCNC: 2.3 MG/DL (ref 1.8–2.4)
MCH RBC QN AUTO: 28.5 PG (ref 26.1–32.9)
MCHC RBC AUTO-ENTMCNC: 31.2 G/DL (ref 31.4–35)
MCV RBC AUTO: 91.4 FL (ref 79.6–97.8)
MONOCYTES # BLD: 0.3 K/UL (ref 0.1–1.3)
MONOCYTES NFR BLD: 5 % (ref 4–12)
NEUTS SEG # BLD: 3.3 K/UL (ref 1.7–8.2)
NEUTS SEG NFR BLD: 63 % (ref 43–78)
NRBC # BLD: 0 K/UL (ref 0–0.2)
PLATELET # BLD AUTO: 204 K/UL (ref 150–450)
PMV BLD AUTO: 11.4 FL (ref 9.4–12.3)
POTASSIUM SERPL-SCNC: 3.4 MMOL/L (ref 3.5–5.1)
PROT SERPL-MCNC: 7 G/DL (ref 6.3–8.2)
RBC # BLD AUTO: 5.22 M/UL (ref 4.05–5.2)
SODIUM SERPL-SCNC: 142 MMOL/L (ref 136–145)
WBC # BLD AUTO: 5.3 K/UL (ref 4.3–11.1)

## 2021-11-24 PROCEDURE — 36415 COLL VENOUS BLD VENIPUNCTURE: CPT

## 2021-11-24 PROCEDURE — 96413 CHEMO IV INFUSION 1 HR: CPT

## 2021-11-24 PROCEDURE — 83735 ASSAY OF MAGNESIUM: CPT

## 2021-11-24 PROCEDURE — 85025 COMPLETE CBC W/AUTO DIFF WBC: CPT

## 2021-11-24 PROCEDURE — 80053 COMPREHEN METABOLIC PANEL: CPT

## 2021-11-24 PROCEDURE — 74011250636 HC RX REV CODE- 250/636: Performed by: INTERNAL MEDICINE

## 2021-11-24 PROCEDURE — 96417 CHEMO IV INFUS EACH ADDL SEQ: CPT

## 2021-11-24 RX ORDER — SODIUM CHLORIDE 9 MG/ML
25 INJECTION, SOLUTION INTRAVENOUS CONTINUOUS
Status: DISCONTINUED | OUTPATIENT
Start: 2021-11-24 | End: 2021-11-25 | Stop reason: HOSPADM

## 2021-11-24 RX ORDER — SODIUM CHLORIDE 0.9 % (FLUSH) 0.9 %
10 SYRINGE (ML) INJECTION AS NEEDED
Status: DISCONTINUED | OUTPATIENT
Start: 2021-11-24 | End: 2021-11-25 | Stop reason: HOSPADM

## 2021-11-24 RX ADMIN — Medication 10 ML: at 16:00

## 2021-11-24 RX ADMIN — SODIUM CHLORIDE 100 ML/HR: 9 INJECTION, SOLUTION INTRAVENOUS at 14:05

## 2021-11-24 RX ADMIN — Medication 10 ML: at 15:18

## 2021-11-24 RX ADMIN — PERTUZUMAB 420 MG: 30 INJECTION, SOLUTION, CONCENTRATE INTRAVENOUS at 15:20

## 2021-11-24 RX ADMIN — SODIUM CHLORIDE 578 MG: 9 INJECTION, SOLUTION INTRAVENOUS at 14:46

## 2021-11-24 NOTE — PROGRESS NOTES
Pt arrived via wheelchair today at 1350, to receive IV trazimera and perjeta. Pt tolerated without difficulty. Patient discharged via wheelchair accompanied by staff. Instructed to notify physician of any problems, questions or concerns. Allowed opportunity for patient/family to ask questions. Verbalized understanding. Next appointment is December 15 at 1100 with Julia Lawson.

## 2021-11-24 NOTE — PROGRESS NOTES
11/24/2021 Saw the patient with Dr Liz Smith. She is taking Arimidex. She is doing well on there Herceptin/Perjeta. Her echo was normal on 11/1. Encouraged her to use the Imodium for diarrhea which we have encouraged often. Navigation will not continue to follow.

## 2021-12-15 ENCOUNTER — HOSPITAL ENCOUNTER (OUTPATIENT)
Dept: INFUSION THERAPY | Age: 81
Discharge: HOME | End: 2021-12-15
Payer: MEDICARE

## 2021-12-15 VITALS
RESPIRATION RATE: 18 BRPM | TEMPERATURE: 98.7 F | BODY MASS INDEX: 36.85 KG/M2 | DIASTOLIC BLOOD PRESSURE: 115 MMHG | WEIGHT: 208 LBS | HEART RATE: 54 BPM | OXYGEN SATURATION: 95 % | SYSTOLIC BLOOD PRESSURE: 162 MMHG

## 2021-12-15 DIAGNOSIS — Z17.0 MALIGNANT NEOPLASM OF OVERLAPPING SITES OF LEFT BREAST IN FEMALE, ESTROGEN RECEPTOR POSITIVE (HCC): Primary | ICD-10-CM

## 2021-12-15 DIAGNOSIS — C50.812 MALIGNANT NEOPLASM OF OVERLAPPING SITES OF LEFT BREAST IN FEMALE, ESTROGEN RECEPTOR POSITIVE (HCC): Primary | ICD-10-CM

## 2021-12-15 PROCEDURE — 96417 CHEMO IV INFUS EACH ADDL SEQ: CPT

## 2021-12-15 PROCEDURE — 74011250636 HC RX REV CODE- 250/636: Performed by: INTERNAL MEDICINE

## 2021-12-15 PROCEDURE — 96413 CHEMO IV INFUSION 1 HR: CPT

## 2021-12-15 RX ORDER — SODIUM CHLORIDE 9 MG/ML
25 INJECTION, SOLUTION INTRAVENOUS CONTINUOUS
Status: ACTIVE | OUTPATIENT
Start: 2021-12-15 | End: 2021-12-15

## 2021-12-15 RX ORDER — SODIUM CHLORIDE 0.9 % (FLUSH) 0.9 %
10 SYRINGE (ML) INJECTION AS NEEDED
Status: ACTIVE | OUTPATIENT
Start: 2021-12-15 | End: 2021-12-15

## 2021-12-15 RX ADMIN — Medication 10 ML: at 11:45

## 2021-12-15 RX ADMIN — PERTUZUMAB 420 MG: 30 INJECTION, SOLUTION, CONCENTRATE INTRAVENOUS at 13:30

## 2021-12-15 RX ADMIN — SODIUM CHLORIDE 25 ML/HR: 9 INJECTION, SOLUTION INTRAVENOUS at 11:45

## 2021-12-15 RX ADMIN — Medication 10 ML: at 14:40

## 2021-12-15 RX ADMIN — SODIUM CHLORIDE 565 MG: 9 INJECTION, SOLUTION INTRAVENOUS at 12:48

## 2021-12-15 NOTE — PROGRESS NOTES
Arrived to the [facility]. Tatianna/Elise completed. Patient tolerated well. Any issues or concerns during appointment: Patient's blood pressure elevated (188/87, HR 51). When rechecked, it remained elevated (162/115, HR 54). Patient asymptomatic. Patient has antihypertensive on her med list, but patient states she does not take this. [id number] [facility], NP notified. Per NP, patient should contact prescribing physician (Dr. Marisa Salazar, PCP). Patient adamantly refused, stating there is nothing wrong with her blood pressure. Patient educated on hypertension. Patient aware of next infusion appointment on 1/5/2022 (date) at 1:30 pm (time). Patient aware of next lab and [facility] office visit on 1/5/2022 (date) at 12:30 pm (time). Discharged via wheelchair.

## 2022-01-05 ENCOUNTER — HOSPITAL ENCOUNTER (OUTPATIENT)
Dept: INFUSION THERAPY | Age: 82
Discharge: HOME | End: 2022-01-05
Payer: COMMERCIAL

## 2022-01-05 ENCOUNTER — HOSPITAL ENCOUNTER (OUTPATIENT)
Dept: LAB | Age: 82
Discharge: HOME | End: 2022-01-05
Payer: COMMERCIAL

## 2022-01-05 VITALS — BODY MASS INDEX: 35.43 KG/M2 | WEIGHT: 200 LBS

## 2022-01-05 DIAGNOSIS — C50.812 MALIGNANT NEOPLASM OF OVERLAPPING SITES OF LEFT BREAST IN FEMALE, ESTROGEN RECEPTOR POSITIVE (HCC): ICD-10-CM

## 2022-01-05 DIAGNOSIS — Z17.0 MALIGNANT NEOPLASM OF OVERLAPPING SITES OF LEFT BREAST IN FEMALE, ESTROGEN RECEPTOR POSITIVE (HCC): Primary | ICD-10-CM

## 2022-01-05 DIAGNOSIS — Z17.0 MALIGNANT NEOPLASM OF OVERLAPPING SITES OF LEFT BREAST IN FEMALE, ESTROGEN RECEPTOR POSITIVE (HCC): ICD-10-CM

## 2022-01-05 DIAGNOSIS — C50.812 MALIGNANT NEOPLASM OF OVERLAPPING SITES OF LEFT BREAST IN FEMALE, ESTROGEN RECEPTOR POSITIVE (HCC): Primary | ICD-10-CM

## 2022-01-05 LAB
ALBUMIN SERPL-MCNC: 3.1 G/DL (ref 3.2–4.6)
ALBUMIN/GLOB SERPL: 0.8 {RATIO} (ref 1.2–3.5)
ALP SERPL-CCNC: 125 U/L (ref 50–136)
ALT SERPL-CCNC: 18 U/L (ref 12–65)
ANION GAP SERPL CALC-SCNC: 7 MMOL/L (ref 7–16)
AST SERPL-CCNC: 16 U/L (ref 15–37)
BASOPHILS # BLD: 0 K/UL (ref 0–0.2)
BASOPHILS NFR BLD: 1 % (ref 0–2)
BILIRUB SERPL-MCNC: 0.6 MG/DL (ref 0.2–1.1)
BUN SERPL-MCNC: 9 MG/DL (ref 8–23)
CALCIUM SERPL-MCNC: 9.6 MG/DL (ref 8.3–10.4)
CHLORIDE SERPL-SCNC: 107 MMOL/L (ref 98–107)
CO2 SERPL-SCNC: 30 MMOL/L (ref 21–32)
CREAT SERPL-MCNC: 0.8 MG/DL (ref 0.6–1)
DIFFERENTIAL METHOD BLD: ABNORMAL
EOSINOPHIL # BLD: 0 K/UL (ref 0–0.8)
EOSINOPHIL NFR BLD: 1 % (ref 0.5–7.8)
ERYTHROCYTE [DISTWIDTH] IN BLOOD BY AUTOMATED COUNT: 13.5 % (ref 11.9–14.6)
GLOBULIN SER CALC-MCNC: 3.8 G/DL (ref 2.3–3.5)
GLUCOSE SERPL-MCNC: 109 MG/DL (ref 65–100)
HCT VFR BLD AUTO: 50.5 % (ref 35.8–46.3)
HGB BLD-MCNC: 15.7 G/DL (ref 11.7–15.4)
IMM GRANULOCYTES # BLD AUTO: 0 K/UL (ref 0–0.5)
IMM GRANULOCYTES NFR BLD AUTO: 0 % (ref 0–5)
LYMPHOCYTES # BLD: 1.4 K/UL (ref 0.5–4.6)
LYMPHOCYTES NFR BLD: 24 % (ref 13–44)
MAGNESIUM SERPL-MCNC: 2.1 MG/DL (ref 1.8–2.4)
MCH RBC QN AUTO: 28.4 PG (ref 26.1–32.9)
MCHC RBC AUTO-ENTMCNC: 31.1 G/DL (ref 31.4–35)
MCV RBC AUTO: 91.5 FL (ref 79.6–97.8)
MONOCYTES # BLD: 0.2 K/UL (ref 0.1–1.3)
MONOCYTES NFR BLD: 4 % (ref 4–12)
NEUTS SEG # BLD: 4.2 K/UL (ref 1.7–8.2)
NEUTS SEG NFR BLD: 71 % (ref 43–78)
NRBC # BLD: 0 K/UL (ref 0–0.2)
PLATELET # BLD AUTO: 222 K/UL (ref 150–450)
PMV BLD AUTO: 11 FL (ref 9.4–12.3)
POTASSIUM SERPL-SCNC: 3.2 MMOL/L (ref 3.5–5.1)
PROT SERPL-MCNC: 6.9 G/DL (ref 6.3–8.2)
RBC # BLD AUTO: 5.52 M/UL (ref 4.05–5.2)
SODIUM SERPL-SCNC: 144 MMOL/L (ref 136–145)
WBC # BLD AUTO: 6 K/UL (ref 4.3–11.1)

## 2022-01-05 PROCEDURE — 96417 CHEMO IV INFUS EACH ADDL SEQ: CPT

## 2022-01-05 PROCEDURE — 96413 CHEMO IV INFUSION 1 HR: CPT

## 2022-01-05 PROCEDURE — 36415 COLL VENOUS BLD VENIPUNCTURE: CPT

## 2022-01-05 PROCEDURE — 85025 COMPLETE CBC W/AUTO DIFF WBC: CPT

## 2022-01-05 PROCEDURE — 83735 ASSAY OF MAGNESIUM: CPT

## 2022-01-05 PROCEDURE — 74011250636 HC RX REV CODE- 250/636: Performed by: NURSE PRACTITIONER

## 2022-01-05 PROCEDURE — 74011250637 HC RX REV CODE- 250/637: Performed by: NURSE PRACTITIONER

## 2022-01-05 PROCEDURE — 74011000250 HC RX REV CODE- 250: Performed by: NURSE PRACTITIONER

## 2022-01-05 PROCEDURE — 80053 COMPREHEN METABOLIC PANEL: CPT

## 2022-01-05 RX ORDER — SODIUM CHLORIDE 0.9 % (FLUSH) 0.9 %
10 SYRINGE (ML) INJECTION AS NEEDED
Status: DISCONTINUED | OUTPATIENT
Start: 2022-01-05 | End: 2022-01-06 | Stop reason: HOSPADM

## 2022-01-05 RX ORDER — POTASSIUM CHLORIDE 750 MG/1
40 TABLET, EXTENDED RELEASE ORAL
Status: COMPLETED | OUTPATIENT
Start: 2022-01-05 | End: 2022-01-05

## 2022-01-05 RX ORDER — SODIUM CHLORIDE 9 MG/ML
25 INJECTION, SOLUTION INTRAVENOUS CONTINUOUS
Status: DISCONTINUED | OUTPATIENT
Start: 2022-01-05 | End: 2022-01-06 | Stop reason: HOSPADM

## 2022-01-05 RX ADMIN — SODIUM CHLORIDE, PRESERVATIVE FREE 10 ML: 5 INJECTION INTRAVENOUS at 13:55

## 2022-01-05 RX ADMIN — SODIUM CHLORIDE, PRESERVATIVE FREE 10 ML: 5 INJECTION INTRAVENOUS at 16:18

## 2022-01-05 RX ADMIN — POTASSIUM CHLORIDE 40 MEQ: 10 TABLET, EXTENDED RELEASE ORAL at 14:40

## 2022-01-05 RX ADMIN — SODIUM CHLORIDE 25 ML/HR: 9 INJECTION, SOLUTION INTRAVENOUS at 13:55

## 2022-01-05 RX ADMIN — SODIUM CHLORIDE 544 MG: 9 INJECTION, SOLUTION INTRAVENOUS at 14:35

## 2022-01-05 RX ADMIN — PERTUZUMAB 420 MG: 30 INJECTION, SOLUTION, CONCENTRATE INTRAVENOUS at 15:18

## 2022-01-05 NOTE — PROGRESS NOTES
Arrived to the [facility]. Tatianna/Elise completed. Patient tolerated well. Any issues or concerns during appointment: K 3.2. Per Sowmya, N,P 40mEq KCl given PO. Patient aware of next infusion appointment on 1/26/2022 (date) at 6 am (time). Patient aware of next lab and [facility] office visit on 2/16/2022 (date) at 12:40 pm (time). Patient instructed to call provider with temperature of 100.4 or greater or nausea/vomiting/ diarrhea or pain not controlled by medications  Discharged via wheelchair.

## 2022-01-05 NOTE — ADDENDUM NOTE
Encounter addended by: PA [facility] [place] on: 1/5/2022 2:08 PM   Actions taken: iNanette created or edited

## 2022-01-26 ENCOUNTER — HOSPITAL ENCOUNTER (OUTPATIENT)
Dept: INFUSION THERAPY | Age: 82
Discharge: HOME | End: 2022-01-26
Payer: COMMERCIAL

## 2022-01-26 VITALS
OXYGEN SATURATION: 93 % | DIASTOLIC BLOOD PRESSURE: 81 MMHG | WEIGHT: 199 LBS | TEMPERATURE: 97.8 F | SYSTOLIC BLOOD PRESSURE: 130 MMHG | BODY MASS INDEX: 35.25 KG/M2 | HEART RATE: 80 BPM | RESPIRATION RATE: 18 BRPM

## 2022-01-26 DIAGNOSIS — C50.812 MALIGNANT NEOPLASM OF OVERLAPPING SITES OF LEFT BREAST IN FEMALE, ESTROGEN RECEPTOR POSITIVE (HCC): Primary | ICD-10-CM

## 2022-01-26 DIAGNOSIS — Z17.0 MALIGNANT NEOPLASM OF OVERLAPPING SITES OF LEFT BREAST IN FEMALE, ESTROGEN RECEPTOR POSITIVE (HCC): Primary | ICD-10-CM

## 2022-01-26 PROCEDURE — 96413 CHEMO IV INFUSION 1 HR: CPT

## 2022-01-26 PROCEDURE — 96417 CHEMO IV INFUS EACH ADDL SEQ: CPT

## 2022-01-26 PROCEDURE — 74011250636 HC RX REV CODE- 250/636: Performed by: NURSE PRACTITIONER

## 2022-01-26 RX ORDER — SODIUM CHLORIDE 9 MG/ML
25 INJECTION, SOLUTION INTRAVENOUS CONTINUOUS
Status: ACTIVE | OUTPATIENT
Start: 2022-01-26 | End: 2022-01-26

## 2022-01-26 RX ORDER — SODIUM CHLORIDE 0.9 % (FLUSH) 0.9 %
10 SYRINGE (ML) INJECTION AS NEEDED
Status: DISCONTINUED | OUTPATIENT
Start: 2022-01-26 | End: 2022-01-28 | Stop reason: HOSPADM

## 2022-01-26 RX ADMIN — SODIUM CHLORIDE 25 ML/HR: 9 INJECTION, SOLUTION INTRAVENOUS at 11:54

## 2022-01-26 RX ADMIN — PERTUZUMAB 420 MG: 30 INJECTION, SOLUTION, CONCENTRATE INTRAVENOUS at 12:30

## 2022-01-26 RX ADMIN — Medication 10 ML: at 11:33

## 2022-01-26 RX ADMIN — Medication 10 ML: at 13:01

## 2022-01-26 RX ADMIN — SODIUM CHLORIDE 542 MG: 9 INJECTION, SOLUTION INTRAVENOUS at 11:54

## 2022-01-26 NOTE — PROGRESS NOTES
Arrived to the [facility]. Herceptin and Perjeta completed. Patient tolerated without problems. Any issues or concerns during appointment: patient verbally berates nurses. Patient aware of next infusion appointment on 2/16/22 (date) at 1400 (time). Patient instructed to call provider with temperature of 100.4 or greater or nausea/vomiting/ diarrhea or pain not controlled by medications  Discharged via [facility] with April, caregiver.

## 2022-02-16 ENCOUNTER — HOSPITAL ENCOUNTER (OUTPATIENT)
Dept: LAB | Age: 82
Discharge: HOME | End: 2022-02-16
Payer: MEDICARE

## 2022-02-16 ENCOUNTER — HOSPITAL ENCOUNTER (OUTPATIENT)
Dept: INFUSION THERAPY | Age: 82
Discharge: HOME | End: 2022-02-16
Payer: MEDICARE

## 2022-02-16 DIAGNOSIS — C77.3 BREAST CANCER METASTASIZED TO AXILLARY LYMPH NODE, UNSPECIFIED LATERALITY (HCC): ICD-10-CM

## 2022-02-16 DIAGNOSIS — C50.919 BREAST CANCER METASTASIZED TO AXILLARY LYMPH NODE, UNSPECIFIED LATERALITY (HCC): ICD-10-CM

## 2022-02-16 DIAGNOSIS — C50.812 MALIGNANT NEOPLASM OF OVERLAPPING SITES OF LEFT BREAST IN FEMALE, ESTROGEN RECEPTOR POSITIVE (HCC): Primary | ICD-10-CM

## 2022-02-16 DIAGNOSIS — Z17.0 MALIGNANT NEOPLASM OF OVERLAPPING SITES OF LEFT BREAST IN FEMALE, ESTROGEN RECEPTOR POSITIVE (HCC): Primary | ICD-10-CM

## 2022-02-16 LAB
ALBUMIN SERPL-MCNC: 3 G/DL (ref 3.2–4.6)
ALBUMIN/GLOB SERPL: 0.9 {RATIO} (ref 1.2–3.5)
ALP SERPL-CCNC: 121 U/L (ref 50–136)
ALT SERPL-CCNC: 19 U/L (ref 12–65)
ANION GAP SERPL CALC-SCNC: 3 MMOL/L (ref 7–16)
AST SERPL-CCNC: 20 U/L (ref 15–37)
BASOPHILS # BLD: 0 K/UL (ref 0–0.2)
BASOPHILS NFR BLD: 0 % (ref 0–2)
BILIRUB SERPL-MCNC: 0.6 MG/DL (ref 0.2–1.1)
BUN SERPL-MCNC: 9 MG/DL (ref 8–23)
CALCIUM SERPL-MCNC: 8.8 MG/DL (ref 8.3–10.4)
CHLORIDE SERPL-SCNC: 112 MMOL/L (ref 98–107)
CO2 SERPL-SCNC: 27 MMOL/L (ref 21–32)
CREAT SERPL-MCNC: 0.6 MG/DL (ref 0.6–1)
DIFFERENTIAL METHOD BLD: ABNORMAL
EOSINOPHIL # BLD: 0 K/UL (ref 0–0.8)
EOSINOPHIL NFR BLD: 1 % (ref 0.5–7.8)
ERYTHROCYTE [DISTWIDTH] IN BLOOD BY AUTOMATED COUNT: 14.6 % (ref 11.9–14.6)
GLOBULIN SER CALC-MCNC: 3.5 G/DL (ref 2.3–3.5)
GLUCOSE SERPL-MCNC: 98 MG/DL (ref 65–100)
HCT VFR BLD AUTO: 46.4 % (ref 35.8–46.3)
HGB BLD-MCNC: 14.3 G/DL (ref 11.7–15.4)
IMM GRANULOCYTES # BLD AUTO: 0 K/UL (ref 0–0.5)
IMM GRANULOCYTES NFR BLD AUTO: 0 % (ref 0–5)
LYMPHOCYTES # BLD: 1.6 K/UL (ref 0.5–4.6)
LYMPHOCYTES NFR BLD: 30 % (ref 13–44)
MAGNESIUM SERPL-MCNC: 2.1 MG/DL (ref 1.8–2.4)
MCH RBC QN AUTO: 28 PG (ref 26.1–32.9)
MCHC RBC AUTO-ENTMCNC: 30.8 G/DL (ref 31.4–35)
MCV RBC AUTO: 91 FL (ref 79.6–97.8)
MONOCYTES # BLD: 0.3 K/UL (ref 0.1–1.3)
MONOCYTES NFR BLD: 6 % (ref 4–12)
NEUTS SEG # BLD: 3.3 K/UL (ref 1.7–8.2)
NEUTS SEG NFR BLD: 63 % (ref 43–78)
NRBC # BLD: 0 K/UL (ref 0–0.2)
PLATELET # BLD AUTO: 185 K/UL (ref 150–450)
PMV BLD AUTO: 10.8 FL (ref 9.4–12.3)
POTASSIUM SERPL-SCNC: 3.4 MMOL/L (ref 3.5–5.1)
PROT SERPL-MCNC: 6.5 G/DL (ref 6.3–8.2)
RBC # BLD AUTO: 5.1 M/UL (ref 4.05–5.2)
SODIUM SERPL-SCNC: 142 MMOL/L (ref 136–145)
WBC # BLD AUTO: 5.3 K/UL (ref 4.3–11.1)

## 2022-02-16 PROCEDURE — 36415 COLL VENOUS BLD VENIPUNCTURE: CPT

## 2022-02-16 PROCEDURE — 74011000250 HC RX REV CODE- 250: Performed by: INTERNAL MEDICINE

## 2022-02-16 PROCEDURE — 83735 ASSAY OF MAGNESIUM: CPT

## 2022-02-16 PROCEDURE — 80053 COMPREHEN METABOLIC PANEL: CPT

## 2022-02-16 PROCEDURE — 74011250636 HC RX REV CODE- 250/636: Performed by: INTERNAL MEDICINE

## 2022-02-16 PROCEDURE — 85025 COMPLETE CBC W/AUTO DIFF WBC: CPT

## 2022-02-16 PROCEDURE — 96413 CHEMO IV INFUSION 1 HR: CPT

## 2022-02-16 PROCEDURE — 96417 CHEMO IV INFUS EACH ADDL SEQ: CPT

## 2022-02-16 RX ORDER — SODIUM CHLORIDE 9 MG/ML
25 INJECTION, SOLUTION INTRAVENOUS CONTINUOUS
Status: DISCONTINUED | OUTPATIENT
Start: 2022-02-16 | End: 2022-02-17 | Stop reason: HOSPADM

## 2022-02-16 RX ORDER — SODIUM CHLORIDE 0.9 % (FLUSH) 0.9 %
10 SYRINGE (ML) INJECTION AS NEEDED
Status: DISCONTINUED | OUTPATIENT
Start: 2022-02-16 | End: 2022-02-17 | Stop reason: HOSPADM

## 2022-02-16 RX ADMIN — SODIUM CHLORIDE 25 ML/HR: 900 INJECTION, SOLUTION INTRAVENOUS at 15:00

## 2022-02-16 RX ADMIN — PERTUZUMAB 420 MG: 30 INJECTION, SOLUTION, CONCENTRATE INTRAVENOUS at 15:47

## 2022-02-16 RX ADMIN — SODIUM CHLORIDE, PRESERVATIVE FREE 10 ML: 5 INJECTION INTRAVENOUS at 15:06

## 2022-02-16 RX ADMIN — SODIUM CHLORIDE 544 MG: 9 INJECTION, SOLUTION INTRAVENOUS at 15:04

## 2022-02-16 NOTE — ADDENDUM NOTE
Encounter addended by: Satish Aquino, [address] [facility] on: 2/16/2022 2:51 PM   Actions taken: i-Vent created or edited

## 2022-02-16 NOTE — PROGRESS NOTES
Arrived to the [facility]. Herceptin/perjeta completed. Patient tolerated well. Any issues or concerns during appointment: none. Patient aware of next infusion appointment on 3/9/22 (date) at 1400 (time). Patient aware of next lab and [facility] office visit on 3/9/22 (date) at 200 (time). Patient instructed to call provider with temperature of 100.4 or greater or nausea/vomiting/ diarrhea or pain not controlled by medications  Discharged via wheelchair accompanied by sitter. Pt refused to stay during wait period after infusion.

## 2022-03-02 ENCOUNTER — HOSPITAL ENCOUNTER (OUTPATIENT)
Dept: PET IMAGING | Age: 82
Discharge: HOME | End: 2022-03-02
Payer: MEDICARE

## 2022-03-02 DIAGNOSIS — C77.3 BREAST CANCER METASTASIZED TO AXILLARY LYMPH NODE, UNSPECIFIED LATERALITY (HCC): ICD-10-CM

## 2022-03-02 DIAGNOSIS — C50.919 BREAST CANCER METASTASIZED TO AXILLARY LYMPH NODE, UNSPECIFIED LATERALITY (HCC): ICD-10-CM

## 2022-03-02 LAB
GLUCOSE BLD STRIP.AUTO-MCNC: 89 MG/DL (ref 65–100)
SERVICE CMNT-IMP: NORMAL

## 2022-03-02 PROCEDURE — A9552 F18 FDG: HCPCS

## 2022-03-02 PROCEDURE — 74011000636 HC RX REV CODE- 636: Performed by: INTERNAL MEDICINE

## 2022-03-02 PROCEDURE — 82962 GLUCOSE BLOOD TEST: CPT

## 2022-03-02 RX ORDER — SODIUM CHLORIDE 0.9 % (FLUSH) 0.9 %
10 SYRINGE (ML) INJECTION
Status: COMPLETED | OUTPATIENT
Start: 2022-03-02 | End: 2022-03-02

## 2022-03-02 RX ORDER — FLUDEOXYGLUCOSE F18 300 MCI/ML
10 INJECTION INTRAVENOUS ONCE
Status: COMPLETED | OUTPATIENT
Start: 2022-03-02 | End: 2022-03-02

## 2022-03-02 RX ADMIN — DIATRIZOATE MEGLUMINE AND DIATRIZOATE SODIUM 10 ML: 660; 100 LIQUID ORAL; RECTAL at 12:22

## 2022-03-02 RX ADMIN — Medication 20 ML: at 12:22

## 2022-03-02 RX ADMIN — FLUDEOXYGLUCOSE F18 12.28 MILLICURIE: 300 INJECTION INTRAVENOUS at 12:22

## 2022-03-09 ENCOUNTER — HOSPITAL ENCOUNTER (OUTPATIENT)
Dept: LAB | Age: 82
Discharge: HOME | End: 2022-03-09
Payer: MEDICARE

## 2022-03-09 ENCOUNTER — HOSPITAL ENCOUNTER (OUTPATIENT)
Dept: INFUSION THERAPY | Age: 82
Discharge: HOME | End: 2022-03-09

## 2022-03-09 DIAGNOSIS — C50.919 BREAST CANCER METASTASIZED TO AXILLARY LYMPH NODE, UNSPECIFIED LATERALITY (HCC): ICD-10-CM

## 2022-03-09 DIAGNOSIS — C50.812 MALIGNANT NEOPLASM OF OVERLAPPING SITES OF LEFT BREAST IN FEMALE, ESTROGEN RECEPTOR POSITIVE (HCC): ICD-10-CM

## 2022-03-09 DIAGNOSIS — C77.3 BREAST CANCER METASTASIZED TO AXILLARY LYMPH NODE, UNSPECIFIED LATERALITY (HCC): ICD-10-CM

## 2022-03-09 DIAGNOSIS — Z17.0 MALIGNANT NEOPLASM OF OVERLAPPING SITES OF LEFT BREAST IN FEMALE, ESTROGEN RECEPTOR POSITIVE (HCC): ICD-10-CM

## 2022-03-09 LAB
ALBUMIN SERPL-MCNC: 3.2 G/DL (ref 3.2–4.6)
ALBUMIN/GLOB SERPL: 0.9 {RATIO} (ref 1.2–3.5)
ALP SERPL-CCNC: 120 U/L (ref 50–136)
ALT SERPL-CCNC: 22 U/L (ref 12–65)
ANION GAP SERPL CALC-SCNC: 3 MMOL/L (ref 7–16)
AST SERPL-CCNC: 21 U/L (ref 15–37)
BASOPHILS # BLD: 0 K/UL (ref 0–0.2)
BASOPHILS NFR BLD: 0 % (ref 0–2)
BILIRUB SERPL-MCNC: 0.5 MG/DL (ref 0.2–1.1)
BUN SERPL-MCNC: 11 MG/DL (ref 8–23)
CALCIUM SERPL-MCNC: 9.1 MG/DL (ref 8.3–10.4)
CHLORIDE SERPL-SCNC: 111 MMOL/L (ref 98–107)
CO2 SERPL-SCNC: 28 MMOL/L (ref 21–32)
CREAT SERPL-MCNC: 0.7 MG/DL (ref 0.6–1)
DIFFERENTIAL METHOD BLD: ABNORMAL
EOSINOPHIL # BLD: 0 K/UL (ref 0–0.8)
EOSINOPHIL NFR BLD: 0 % (ref 0.5–7.8)
ERYTHROCYTE [DISTWIDTH] IN BLOOD BY AUTOMATED COUNT: 14.6 % (ref 11.9–14.6)
GLOBULIN SER CALC-MCNC: 3.5 G/DL (ref 2.3–3.5)
GLUCOSE SERPL-MCNC: 115 MG/DL (ref 65–100)
HCT VFR BLD AUTO: 45.7 % (ref 35.8–46.3)
HGB BLD-MCNC: 14.4 G/DL (ref 11.7–15.4)
IMM GRANULOCYTES # BLD AUTO: 0 K/UL (ref 0–0.5)
IMM GRANULOCYTES NFR BLD AUTO: 0 % (ref 0–5)
LYMPHOCYTES # BLD: 2 K/UL (ref 0.5–4.6)
LYMPHOCYTES NFR BLD: 35 % (ref 13–44)
MAGNESIUM SERPL-MCNC: 1.8 MG/DL (ref 1.8–2.4)
MCH RBC QN AUTO: 28.4 PG (ref 26.1–32.9)
MCHC RBC AUTO-ENTMCNC: 31.5 G/DL (ref 31.4–35)
MCV RBC AUTO: 90.1 FL (ref 79.6–97.8)
MONOCYTES # BLD: 0.3 K/UL (ref 0.1–1.3)
MONOCYTES NFR BLD: 6 % (ref 4–12)
NEUTS SEG # BLD: 3.4 K/UL (ref 1.7–8.2)
NEUTS SEG NFR BLD: 59 % (ref 43–78)
NRBC # BLD: 0 K/UL (ref 0–0.2)
PLATELET # BLD AUTO: 190 K/UL (ref 150–450)
PMV BLD AUTO: 11.1 FL (ref 9.4–12.3)
POTASSIUM SERPL-SCNC: 3.4 MMOL/L (ref 3.5–5.1)
PROT SERPL-MCNC: 6.7 G/DL (ref 6.3–8.2)
RBC # BLD AUTO: 5.07 M/UL (ref 4.05–5.2)
SODIUM SERPL-SCNC: 142 MMOL/L (ref 136–145)
WBC # BLD AUTO: 5.7 K/UL (ref 4.3–11.1)

## 2022-03-09 PROCEDURE — 80053 COMPREHEN METABOLIC PANEL: CPT

## 2022-03-09 PROCEDURE — 83735 ASSAY OF MAGNESIUM: CPT

## 2022-03-09 PROCEDURE — 85025 COMPLETE CBC W/AUTO DIFF WBC: CPT

## 2022-03-09 PROCEDURE — 36415 COLL VENOUS BLD VENIPUNCTURE: CPT

## 2022-03-18 PROBLEM — E78.2 MIXED HYPERLIPIDEMIA: Status: ACTIVE | Noted: 2017-12-07

## 2022-03-18 PROBLEM — R07.81 PLEURITIC CHEST PAIN: Status: ACTIVE | Noted: 2020-01-19

## 2022-03-19 PROBLEM — R10.84 CHRONIC GENERALIZED ABDOMINAL PAIN: Status: ACTIVE | Noted: 2018-05-15

## 2022-03-19 PROBLEM — U07.1 COVID-19 VIRUS INFECTION: Status: ACTIVE | Noted: 2020-07-01

## 2022-03-19 PROBLEM — G89.29 CHRONIC GENERALIZED ABDOMINAL PAIN: Status: ACTIVE | Noted: 2018-05-15

## 2022-03-19 PROBLEM — G47.34 NOCTURNAL HYPOXIA: Status: ACTIVE | Noted: 2018-03-08

## 2022-03-19 PROBLEM — Z74.09 POOR MOBILITY: Status: ACTIVE | Noted: 2017-10-20

## 2022-03-19 PROBLEM — Z17.0 MALIGNANT NEOPLASM OF OVERLAPPING SITES OF LEFT BREAST IN FEMALE, ESTROGEN RECEPTOR POSITIVE (HCC): Status: ACTIVE | Noted: 2021-05-04

## 2022-03-19 PROBLEM — R13.19 ESOPHAGEAL DYSPHAGIA: Status: ACTIVE | Noted: 2020-05-20

## 2022-03-19 PROBLEM — R06.02 SHORTNESS OF BREATH: Status: ACTIVE | Noted: 2020-01-19

## 2022-03-19 PROBLEM — E87.6 HYPOKALEMIA: Status: ACTIVE | Noted: 2020-01-21

## 2022-03-19 PROBLEM — E86.0 DEHYDRATION: Status: ACTIVE | Noted: 2020-07-01

## 2022-03-19 PROBLEM — I51.7 CARDIOMEGALY: Status: ACTIVE | Noted: 2020-01-19

## 2022-03-19 PROBLEM — Z91.148 MEDICATION NONADHERENCE DUE TO PSYCHOSOCIAL PROBLEM: Status: ACTIVE | Noted: 2019-10-22

## 2022-03-19 PROBLEM — J30.9 ALLERGIC RHINITIS: Status: ACTIVE | Noted: 2019-10-22

## 2022-03-19 PROBLEM — C50.812 MALIGNANT NEOPLASM OF OVERLAPPING SITES OF LEFT BREAST IN FEMALE, ESTROGEN RECEPTOR POSITIVE (HCC): Status: ACTIVE | Noted: 2021-05-04

## 2022-03-19 PROBLEM — E11.49 DIABETES MELLITUS TYPE 2 WITH NEUROLOGICAL MANIFESTATIONS (HCC): Status: ACTIVE | Noted: 2017-09-23

## 2022-03-19 PROBLEM — N39.41 URGE INCONTINENCE OF URINE: Status: ACTIVE | Noted: 2018-05-15

## 2022-03-19 PROBLEM — Z91.14 MEDICATION NONADHERENCE DUE TO PSYCHOSOCIAL PROBLEM: Status: ACTIVE | Noted: 2019-10-22

## 2022-03-19 PROBLEM — Z65.9 MEDICATION NONADHERENCE DUE TO PSYCHOSOCIAL PROBLEM: Status: ACTIVE | Noted: 2019-10-22

## 2022-03-20 PROBLEM — M25.562 CHRONIC PAIN OF LEFT KNEE: Status: ACTIVE | Noted: 2018-05-15

## 2022-03-20 PROBLEM — G89.29 CHRONIC PAIN OF LEFT KNEE: Status: ACTIVE | Noted: 2018-05-15

## 2022-03-20 PROBLEM — R11.2 NAUSEA AND VOMITING: Status: ACTIVE | Noted: 2020-07-02

## 2022-03-20 PROBLEM — H93.13 TINNITUS, BILATERAL: Status: ACTIVE | Noted: 2017-09-14

## 2022-03-30 ENCOUNTER — HOSPITAL ENCOUNTER (OUTPATIENT)
Dept: LAB | Age: 82
Discharge: HOME | End: 2022-03-30
Payer: MEDICARE

## 2022-03-30 ENCOUNTER — HOSPITAL ENCOUNTER (OUTPATIENT)
Dept: INFUSION THERAPY | Age: 82
Discharge: HOME | End: 2022-03-30
Payer: MEDICARE

## 2022-03-30 DIAGNOSIS — C50.812 MALIGNANT NEOPLASM OF OVERLAPPING SITES OF LEFT BREAST IN FEMALE, ESTROGEN RECEPTOR POSITIVE (HCC): Primary | ICD-10-CM

## 2022-03-30 DIAGNOSIS — C77.3 BREAST CANCER METASTASIZED TO AXILLARY LYMPH NODE, UNSPECIFIED LATERALITY (HCC): ICD-10-CM

## 2022-03-30 DIAGNOSIS — Z17.0 MALIGNANT NEOPLASM OF OVERLAPPING SITES OF LEFT BREAST IN FEMALE, ESTROGEN RECEPTOR POSITIVE (HCC): Primary | ICD-10-CM

## 2022-03-30 DIAGNOSIS — C50.919 BREAST CANCER METASTASIZED TO AXILLARY LYMPH NODE, UNSPECIFIED LATERALITY (HCC): ICD-10-CM

## 2022-03-30 LAB
ALBUMIN SERPL-MCNC: 3.5 G/DL (ref 3.2–4.6)
ALBUMIN/GLOB SERPL: 1 {RATIO} (ref 1.2–3.5)
ALP SERPL-CCNC: 119 U/L (ref 50–136)
ALT SERPL-CCNC: 20 U/L (ref 12–65)
ANION GAP SERPL CALC-SCNC: 5 MMOL/L (ref 7–16)
AST SERPL-CCNC: 16 U/L (ref 15–37)
BASOPHILS # BLD: 0 K/UL (ref 0–0.2)
BASOPHILS NFR BLD: 0 % (ref 0–2)
BILIRUB SERPL-MCNC: 1.1 MG/DL (ref 0.2–1.1)
BUN SERPL-MCNC: 11 MG/DL (ref 8–23)
CALCIUM SERPL-MCNC: 9.7 MG/DL (ref 8.3–10.4)
CHLORIDE SERPL-SCNC: 110 MMOL/L (ref 98–107)
CO2 SERPL-SCNC: 27 MMOL/L (ref 21–32)
CREAT SERPL-MCNC: 0.8 MG/DL (ref 0.6–1)
DIFFERENTIAL METHOD BLD: ABNORMAL
EOSINOPHIL # BLD: 0 K/UL (ref 0–0.8)
EOSINOPHIL NFR BLD: 0 % (ref 0.5–7.8)
ERYTHROCYTE [DISTWIDTH] IN BLOOD BY AUTOMATED COUNT: 14.6 % (ref 11.9–14.6)
GLOBULIN SER CALC-MCNC: 3.5 G/DL (ref 2.3–3.5)
GLUCOSE SERPL-MCNC: 121 MG/DL (ref 65–100)
HCT VFR BLD AUTO: 46.1 % (ref 35.8–46.3)
HGB BLD-MCNC: 14.5 G/DL (ref 11.7–15.4)
IMM GRANULOCYTES # BLD AUTO: 0 K/UL (ref 0–0.5)
IMM GRANULOCYTES NFR BLD AUTO: 0 % (ref 0–5)
LYMPHOCYTES # BLD: 1.8 K/UL (ref 0.5–4.6)
LYMPHOCYTES NFR BLD: 25 % (ref 13–44)
MAGNESIUM SERPL-MCNC: 2.1 MG/DL (ref 1.8–2.4)
MCH RBC QN AUTO: 28.8 PG (ref 26.1–32.9)
MCHC RBC AUTO-ENTMCNC: 31.5 G/DL (ref 31.4–35)
MCV RBC AUTO: 91.5 FL (ref 79.6–97.8)
MONOCYTES # BLD: 0.4 K/UL (ref 0.1–1.3)
MONOCYTES NFR BLD: 5 % (ref 4–12)
NEUTS SEG # BLD: 4.9 K/UL (ref 1.7–8.2)
NEUTS SEG NFR BLD: 70 % (ref 43–78)
NRBC # BLD: 0 K/UL (ref 0–0.2)
PLATELET # BLD AUTO: 170 K/UL (ref 150–450)
PLATELET COMMENTS,PCOM: ADEQUATE
PMV BLD AUTO: 11 FL (ref 9.4–12.3)
POTASSIUM SERPL-SCNC: 3.6 MMOL/L (ref 3.5–5.1)
PROT SERPL-MCNC: 7 G/DL (ref 6.3–8.2)
RBC # BLD AUTO: 5.04 M/UL (ref 4.05–5.2)
RBC MORPH BLD: ABNORMAL
SODIUM SERPL-SCNC: 142 MMOL/L (ref 136–145)
WBC # BLD AUTO: 7.1 K/UL (ref 4.3–11.1)
WBC MORPH BLD: ABNORMAL

## 2022-03-30 PROCEDURE — 96413 CHEMO IV INFUSION 1 HR: CPT

## 2022-03-30 PROCEDURE — 74011000250 HC RX REV CODE- 250: Performed by: INTERNAL MEDICINE

## 2022-03-30 PROCEDURE — 80053 COMPREHEN METABOLIC PANEL: CPT

## 2022-03-30 PROCEDURE — 36415 COLL VENOUS BLD VENIPUNCTURE: CPT

## 2022-03-30 PROCEDURE — 83735 ASSAY OF MAGNESIUM: CPT

## 2022-03-30 PROCEDURE — 74011250636 HC RX REV CODE- 250/636: Performed by: INTERNAL MEDICINE

## 2022-03-30 PROCEDURE — 96417 CHEMO IV INFUS EACH ADDL SEQ: CPT

## 2022-03-30 PROCEDURE — 85025 COMPLETE CBC W/AUTO DIFF WBC: CPT

## 2022-03-30 RX ORDER — SODIUM CHLORIDE 0.9 % (FLUSH) 0.9 %
10 SYRINGE (ML) INJECTION AS NEEDED
Status: DISCONTINUED | OUTPATIENT
Start: 2022-03-30 | End: 2022-03-31 | Stop reason: HOSPADM

## 2022-03-30 RX ORDER — SODIUM CHLORIDE 9 MG/ML
25 INJECTION, SOLUTION INTRAVENOUS CONTINUOUS
Status: DISCONTINUED | OUTPATIENT
Start: 2022-03-30 | End: 2022-03-31 | Stop reason: HOSPADM

## 2022-03-30 RX ADMIN — SODIUM CHLORIDE 569 MG: 9 INJECTION, SOLUTION INTRAVENOUS at 14:17

## 2022-03-30 RX ADMIN — SODIUM CHLORIDE 25 ML/HR: 9 INJECTION, SOLUTION INTRAVENOUS at 14:05

## 2022-03-30 RX ADMIN — PERTUZUMAB 420 MG: 30 INJECTION, SOLUTION, CONCENTRATE INTRAVENOUS at 15:10

## 2022-03-30 RX ADMIN — SODIUM CHLORIDE, PRESERVATIVE FREE 10 ML: 5 INJECTION INTRAVENOUS at 16:10

## 2022-03-30 NOTE — PROGRESS NOTES
Arrived to the [facility]. Herceptin/Perjeta completed. Patient tolerated well. Any issues or concerns during appointment: none. Patient instructed to call provider with temperature of 100.4 or greater or nausea/vomiting/ diarrhea or pain not controlled by medications. Discharged via wheelchair.

## 2022-04-12 ENCOUNTER — TELEPHONE (OUTPATIENT)
Dept: OTHER | Facility: CLINIC | Age: 82
End: 2022-04-12

## 2022-04-12 ENCOUNTER — NURSE TRIAGE (OUTPATIENT)
Dept: OTHER | Facility: CLINIC | Age: 82
End: 2022-04-12

## 2022-04-12 NOTE — TELEPHONE ENCOUNTER
Received call from Kanwal Samuels at [facility] with Red Flag Complaint. Subjective: Caller states \"She went to ER on Saturday and said her b/p was real high. Last night after I left she fell. She's been in bed all day. I left to run some errands and she called saying her nose was bleeding and she got lightheaded\"    Limited triage due to caller not with patient at time of call     Current Symptoms: Nosebleed, lightheaded     Onset: Today      Associated Symptoms: NA    Pain Severity: Legs are giving her trouble    Temperature: Denies fever per aide- always cold     What has been tried: Nothing     LMP: NA Pregnant: NA    Recommended disposition: See PCP within 3 Days    Care advice provided, patient verbalizes understanding; denies any other questions or concerns; instructed to call back for any new or worsening symptoms. Patient/Caller agrees with recommended disposition; writer provided warm transfer to Becki Bianchi at [facility] for appointment scheduling. Attention Provider: Thank you for allowing me to participate in the care of your patient. The patient was connected to triage in response to information provided to the [facility]. Please do not respond through this encounter as the response is not directed to a shared pool.     Reason for Disposition   MILD dizziness (e.g., walking normally) AND has NOT been evaluated by physician for this (Exception: dizziness caused by heat exposure, sudden standing, or poor fluid intake)    Protocols used: DIZZINESS-ADULT-[place]

## 2022-04-12 NOTE — TELEPHONE ENCOUNTER
Received call from [facility] with Red Flag Complaint. April, patient's aid, is calling, patient is nearby on speaker phone. Subjective: Caller states \"she went to ED Saturday, her BP was high. Last night, patient fell. April left today to go to the store, and patient called and said her nose was bleeding, and was dizzy, and fell again. \" She was given meclizine at the ER, but it makes her nose bleed when she takes it. Current Symptoms: headache    Onset: 4 days ago; sudden    Associated Symptoms: reduced appetite    Pain Severity: 5/10; aching; intermittent    Temperature: denies fever n/a    What has been tried: meclizine    LMP: NA Pregnant: NA    Recommended disposition: Go to ED Now    Care advice provided, patient verbalizes understanding; denies any other questions or concerns; instructed to call back for any new or worsening symptoms. Caregiver states she will call 911 to transport patient to ED. Attention Provider: Thank you for allowing me to participate in the care of your patient. The patient was connected to triage in response to information provided to the [facility]. Please do not respond through this encounter as the response is not directed to a shared pool.       Reason for Disposition   Spinning or tilting sensation (vertigo) present now and one or more stroke risk factors (i.e., hypertension, diabetes mellitus, prior stroke/TIA, heart attack, age over 61) (Exception: prior physician evaluation for this AND no different/worse than usual)    Protocols used: DIZZINESS-ADULT-[place]

## 2022-04-20 ENCOUNTER — HOSPITAL ENCOUNTER (OUTPATIENT)
Dept: INFUSION THERAPY | Age: 82
Discharge: HOME | End: 2022-04-20
Payer: MEDICARE

## 2022-04-20 VITALS
TEMPERATURE: 98.9 F | WEIGHT: 204 LBS | HEART RATE: 57 BPM | SYSTOLIC BLOOD PRESSURE: 141 MMHG | DIASTOLIC BLOOD PRESSURE: 75 MMHG | OXYGEN SATURATION: 95 % | BODY MASS INDEX: 36.14 KG/M2 | RESPIRATION RATE: 16 BRPM

## 2022-04-20 DIAGNOSIS — C50.812 MALIGNANT NEOPLASM OF OVERLAPPING SITES OF LEFT BREAST IN FEMALE, ESTROGEN RECEPTOR POSITIVE (HCC): Primary | ICD-10-CM

## 2022-04-20 DIAGNOSIS — Z17.0 MALIGNANT NEOPLASM OF OVERLAPPING SITES OF LEFT BREAST IN FEMALE, ESTROGEN RECEPTOR POSITIVE (HCC): Primary | ICD-10-CM

## 2022-04-20 PROCEDURE — 96413 CHEMO IV INFUSION 1 HR: CPT

## 2022-04-20 PROCEDURE — 74011000250 HC RX REV CODE- 250: Performed by: NURSE PRACTITIONER

## 2022-04-20 PROCEDURE — 74011250636 HC RX REV CODE- 250/636: Performed by: NURSE PRACTITIONER

## 2022-04-20 PROCEDURE — 96417 CHEMO IV INFUS EACH ADDL SEQ: CPT

## 2022-04-20 RX ORDER — SODIUM CHLORIDE 0.9 % (FLUSH) 0.9 %
10 SYRINGE (ML) INJECTION AS NEEDED
Status: DISCONTINUED | OUTPATIENT
Start: 2022-04-20 | End: 2022-04-21 | Stop reason: HOSPADM

## 2022-04-20 RX ORDER — SODIUM CHLORIDE 9 MG/ML
25 INJECTION, SOLUTION INTRAVENOUS CONTINUOUS
Status: DISCONTINUED | OUTPATIENT
Start: 2022-04-20 | End: 2022-04-21 | Stop reason: HOSPADM

## 2022-04-20 RX ADMIN — PERTUZUMAB 420 MG: 30 INJECTION, SOLUTION, CONCENTRATE INTRAVENOUS at 15:20

## 2022-04-20 RX ADMIN — SODIUM CHLORIDE, PRESERVATIVE FREE 10 ML: 5 INJECTION INTRAVENOUS at 13:50

## 2022-04-20 RX ADMIN — SODIUM CHLORIDE, PRESERVATIVE FREE 10 ML: 5 INJECTION INTRAVENOUS at 15:55

## 2022-04-20 RX ADMIN — SODIUM CHLORIDE 554 MG: 9 INJECTION, SOLUTION INTRAVENOUS at 14:45

## 2022-04-20 RX ADMIN — SODIUM CHLORIDE 25 ML/HR: 9 INJECTION, SOLUTION INTRAVENOUS at 13:50

## 2022-04-20 NOTE — ADDENDUM NOTE
Encounter addended by: PA [facility] [place] on: 4/20/2022 2:02 PM   Actions taken: i-Vent created or edited

## 2022-04-20 NOTE — PROGRESS NOTES
4/20/2022  Pt to Infusion via w/c c/o generalized weakness/chronic pain. Pt received treatment per order, tolerated well. Aware of next Infusion appt on [email or web address]. Patient instructed to call provider with temperature of 100.4 or greater,  nausea/vomiting/diarrhea, pain not controlled by medications or any other issues/concerns. Discharged home with aid without complaints. Lance Villeda

## 2022-04-22 DIAGNOSIS — Z51.81 ENCOUNTER FOR MONITORING CARDIOTOXIC DRUG THERAPY: Primary | ICD-10-CM

## 2022-04-22 DIAGNOSIS — Z79.899 ENCOUNTER FOR MONITORING CARDIOTOXIC DRUG THERAPY: Primary | ICD-10-CM

## 2022-04-28 PROBLEM — J44.9 CHRONIC OBSTRUCTIVE PULMONARY DISEASE, UNSPECIFIED COPD TYPE (HCC): Status: ACTIVE | Noted: 2022-04-28

## 2022-04-29 PROBLEM — R13.12 OROPHARYNGEAL DYSPHAGIA: Status: ACTIVE | Noted: 2022-04-29

## 2022-04-29 PROBLEM — Z72.0 SNUFF USER: Status: ACTIVE | Noted: 2022-04-29

## 2022-04-29 PROBLEM — E03.9 ACQUIRED HYPOTHYROIDISM: Status: ACTIVE | Noted: 2022-04-29

## 2022-04-29 PROBLEM — H93.11 TINNITUS, RIGHT: Status: ACTIVE | Noted: 2017-09-14

## 2022-04-29 PROBLEM — R09.89 TONSIL SYMPTOM: Status: ACTIVE | Noted: 2022-04-29

## 2022-05-11 ENCOUNTER — HOSPITAL ENCOUNTER (OUTPATIENT)
Dept: LAB | Age: 82
Discharge: HOME | End: 2022-05-11
Payer: MEDICARE

## 2022-05-11 ENCOUNTER — HOSPITAL ENCOUNTER (OUTPATIENT)
Dept: INFUSION THERAPY | Age: 82
Discharge: HOME | End: 2022-05-11
Payer: MEDICARE

## 2022-05-11 DIAGNOSIS — C50.812 MALIGNANT NEOPLASM OF OVERLAPPING SITES OF LEFT BREAST IN FEMALE, ESTROGEN RECEPTOR POSITIVE (HCC): Primary | ICD-10-CM

## 2022-05-11 DIAGNOSIS — C50.919 BREAST CANCER METASTASIZED TO AXILLARY LYMPH NODE, UNSPECIFIED LATERALITY (HCC): ICD-10-CM

## 2022-05-11 DIAGNOSIS — Z17.0 MALIGNANT NEOPLASM OF OVERLAPPING SITES OF LEFT BREAST IN FEMALE, ESTROGEN RECEPTOR POSITIVE (HCC): Primary | ICD-10-CM

## 2022-05-11 DIAGNOSIS — C77.3 BREAST CANCER METASTASIZED TO AXILLARY LYMPH NODE, UNSPECIFIED LATERALITY (HCC): ICD-10-CM

## 2022-05-11 LAB
ALBUMIN SERPL-MCNC: 3.4 G/DL (ref 3.2–4.6)
ALBUMIN/GLOB SERPL: 0.9 {RATIO} (ref 1.2–3.5)
ALP SERPL-CCNC: 123 U/L (ref 50–136)
ALT SERPL-CCNC: 21 U/L (ref 12–65)
ANION GAP SERPL CALC-SCNC: 4 MMOL/L (ref 7–16)
AST SERPL-CCNC: 15 U/L (ref 15–37)
BASOPHILS # BLD: 0 K/UL (ref 0–0.2)
BASOPHILS NFR BLD: 1 % (ref 0–2)
BILIRUB SERPL-MCNC: 0.9 MG/DL (ref 0.2–1.1)
BUN SERPL-MCNC: 11 MG/DL (ref 8–23)
CALCIUM SERPL-MCNC: 9.7 MG/DL (ref 8.3–10.4)
CHLORIDE SERPL-SCNC: 111 MMOL/L (ref 98–107)
CO2 SERPL-SCNC: 29 MMOL/L (ref 21–32)
CREAT SERPL-MCNC: 0.7 MG/DL (ref 0.6–1)
DIFFERENTIAL METHOD BLD: ABNORMAL
EOSINOPHIL # BLD: 0.2 K/UL (ref 0–0.8)
EOSINOPHIL NFR BLD: 2 % (ref 0.5–7.8)
ERYTHROCYTE [DISTWIDTH] IN BLOOD BY AUTOMATED COUNT: 13.8 % (ref 11.9–14.6)
GLOBULIN SER CALC-MCNC: 3.6 G/DL (ref 2.3–3.5)
GLUCOSE SERPL-MCNC: 98 MG/DL (ref 65–100)
HCT VFR BLD AUTO: 47.3 % (ref 35.8–46.3)
HGB BLD-MCNC: 15 G/DL (ref 11.7–15.4)
IMM GRANULOCYTES # BLD AUTO: 0 K/UL (ref 0–0.5)
IMM GRANULOCYTES NFR BLD AUTO: 0 % (ref 0–5)
LYMPHOCYTES # BLD: 1.9 K/UL (ref 0.5–4.6)
LYMPHOCYTES NFR BLD: 30 % (ref 13–44)
MAGNESIUM SERPL-MCNC: 2 MG/DL (ref 1.8–2.4)
MCH RBC QN AUTO: 28.7 PG (ref 26.1–32.9)
MCHC RBC AUTO-ENTMCNC: 31.7 G/DL (ref 31.4–35)
MCV RBC AUTO: 90.6 FL (ref 79.6–97.8)
MONOCYTES # BLD: 0.4 K/UL (ref 0.1–1.3)
MONOCYTES NFR BLD: 6 % (ref 4–12)
NEUTS SEG # BLD: 3.8 K/UL (ref 1.7–8.2)
NEUTS SEG NFR BLD: 61 % (ref 43–78)
NRBC # BLD: 0 K/UL (ref 0–0.2)
PLATELET # BLD AUTO: 188 K/UL (ref 150–450)
PMV BLD AUTO: 11 FL (ref 9.4–12.3)
POTASSIUM SERPL-SCNC: 3.7 MMOL/L (ref 3.5–5.1)
PROT SERPL-MCNC: 7 G/DL (ref 6.3–8.2)
RBC # BLD AUTO: 5.22 M/UL (ref 4.05–5.2)
SODIUM SERPL-SCNC: 144 MMOL/L (ref 136–145)
WBC # BLD AUTO: 6.2 K/UL (ref 4.3–11.1)

## 2022-05-11 PROCEDURE — 96413 CHEMO IV INFUSION 1 HR: CPT

## 2022-05-11 PROCEDURE — 74011250636 HC RX REV CODE- 250/636: Performed by: INTERNAL MEDICINE

## 2022-05-11 PROCEDURE — 96417 CHEMO IV INFUS EACH ADDL SEQ: CPT

## 2022-05-11 PROCEDURE — 80053 COMPREHEN METABOLIC PANEL: CPT

## 2022-05-11 PROCEDURE — 83735 ASSAY OF MAGNESIUM: CPT

## 2022-05-11 PROCEDURE — 36415 COLL VENOUS BLD VENIPUNCTURE: CPT

## 2022-05-11 PROCEDURE — 85025 COMPLETE CBC W/AUTO DIFF WBC: CPT

## 2022-05-11 RX ORDER — SODIUM CHLORIDE 9 MG/ML
25 INJECTION, SOLUTION INTRAVENOUS CONTINUOUS
Status: ACTIVE | OUTPATIENT
Start: 2022-05-11 | End: 2022-05-11

## 2022-05-11 RX ADMIN — PERTUZUMAB 420 MG: 30 INJECTION, SOLUTION, CONCENTRATE INTRAVENOUS at 12:49

## 2022-05-11 RX ADMIN — SODIUM CHLORIDE 554 MG: 9 INJECTION, SOLUTION INTRAVENOUS at 12:14

## 2022-05-11 RX ADMIN — SODIUM CHLORIDE 25 ML/HR: 900 INJECTION, SOLUTION INTRAVENOUS at 12:00

## 2022-05-11 NOTE — PROGRESS NOTES
Arrived to the [facility]. Assessment completed, labs reviewed. Trazimera/Augustinata completed. Patient tolerated without problems. Any issues or concerns during appointment: None   Patient stayed for 30 min observation post infusion with no problems noted  Patient instructed to call provider with temperature of 100.4 or greater or nausea/vomiting/ diarrhea or pain not controlled by medications  Instructed to call Dr Cierra Walter with any side effects or other concerns  Patient aware of next infusion appointment on 6/1/22(date) at 11 AM (time).   Discharged via W/C for caregiver to transport home

## 2022-05-11 NOTE — ADDENDUM NOTE
Encounter addended by: Sly Washington RPH on: 5/11/2022 12:01 PM   Actions taken: i-Sarah created or edited

## 2022-05-12 DIAGNOSIS — Z17.0 MALIGNANT NEOPLASM OF OVERLAPPING SITES OF LEFT BREAST IN FEMALE, ESTROGEN RECEPTOR POSITIVE (HCC): Primary | ICD-10-CM

## 2022-05-12 DIAGNOSIS — C50.812 MALIGNANT NEOPLASM OF OVERLAPPING SITES OF LEFT BREAST IN FEMALE, ESTROGEN RECEPTOR POSITIVE (HCC): Primary | ICD-10-CM

## 2022-06-01 ENCOUNTER — HOSPITAL ENCOUNTER (OUTPATIENT)
Dept: INFUSION THERAPY | Age: 82
Discharge: HOME | End: 2022-06-01
Payer: MEDICARE

## 2022-06-01 ENCOUNTER — HOSPITAL ENCOUNTER (OUTPATIENT)
Dept: INFUSION THERAPY | Age: 82
End: 2022-06-01

## 2022-06-01 VITALS
SYSTOLIC BLOOD PRESSURE: 129 MMHG | HEART RATE: 54 BPM | HEIGHT: 63 IN | TEMPERATURE: 98 F | WEIGHT: 198.7 LBS | BODY MASS INDEX: 35.21 KG/M2 | RESPIRATION RATE: 18 BRPM | OXYGEN SATURATION: 95 % | DIASTOLIC BLOOD PRESSURE: 74 MMHG

## 2022-06-01 DIAGNOSIS — Z17.0 MALIGNANT NEOPLASM OF OVERLAPPING SITES OF LEFT BREAST IN FEMALE, ESTROGEN RECEPTOR POSITIVE (HCC): Primary | ICD-10-CM

## 2022-06-01 DIAGNOSIS — C50.812 MALIGNANT NEOPLASM OF OVERLAPPING SITES OF LEFT BREAST IN FEMALE, ESTROGEN RECEPTOR POSITIVE (HCC): Primary | ICD-10-CM

## 2022-06-01 PROCEDURE — 2580000003 HC RX 258: Performed by: NURSE PRACTITIONER

## 2022-06-01 PROCEDURE — 6360000002 HC RX W HCPCS: Performed by: NURSE PRACTITIONER

## 2022-06-01 PROCEDURE — 96413 CHEMO IV INFUSION 1 HR: CPT

## 2022-06-01 PROCEDURE — 96417 CHEMO IV INFUS EACH ADDL SEQ: CPT

## 2022-06-01 RX ORDER — MEPERIDINE HYDROCHLORIDE 25 MG/ML
12.5 INJECTION INTRAMUSCULAR; INTRAVENOUS; SUBCUTANEOUS PRN
Status: CANCELLED | OUTPATIENT
Start: 2022-06-01

## 2022-06-01 RX ORDER — SODIUM CHLORIDE 9 MG/ML
5-250 INJECTION, SOLUTION INTRAVENOUS PRN
Status: CANCELLED | OUTPATIENT
Start: 2022-06-01

## 2022-06-01 RX ORDER — ALBUTEROL SULFATE 90 UG/1
4 AEROSOL, METERED RESPIRATORY (INHALATION) PRN
Status: CANCELLED | OUTPATIENT
Start: 2022-06-01

## 2022-06-01 RX ORDER — ONDANSETRON 2 MG/ML
8 INJECTION INTRAMUSCULAR; INTRAVENOUS
Status: CANCELLED | OUTPATIENT
Start: 2022-06-01

## 2022-06-01 RX ORDER — ACETAMINOPHEN 325 MG/1
650 TABLET ORAL
Status: CANCELLED | OUTPATIENT
Start: 2022-06-01

## 2022-06-01 RX ORDER — SODIUM CHLORIDE 9 MG/ML
INJECTION, SOLUTION INTRAVENOUS CONTINUOUS
Status: CANCELLED | OUTPATIENT
Start: 2022-06-01

## 2022-06-01 RX ORDER — DIPHENHYDRAMINE HYDROCHLORIDE 50 MG/ML
50 INJECTION INTRAMUSCULAR; INTRAVENOUS
Status: CANCELLED | OUTPATIENT
Start: 2022-06-01

## 2022-06-01 RX ORDER — HEPARIN SODIUM (PORCINE) LOCK FLUSH IV SOLN 100 UNIT/ML 100 UNIT/ML
500 SOLUTION INTRAVENOUS PRN
Status: CANCELLED | OUTPATIENT
Start: 2022-06-01

## 2022-06-01 RX ORDER — EPINEPHRINE 1 MG/ML
0.3 INJECTION, SOLUTION, CONCENTRATE INTRAVENOUS PRN
Status: CANCELLED | OUTPATIENT
Start: 2022-06-01

## 2022-06-01 RX ORDER — SODIUM CHLORIDE 9 MG/ML
5-250 INJECTION, SOLUTION INTRAVENOUS PRN
Status: DISCONTINUED | OUTPATIENT
Start: 2022-06-01 | End: 2022-06-02 | Stop reason: HOSPADM

## 2022-06-01 RX ORDER — SODIUM CHLORIDE 0.9 % (FLUSH) 0.9 %
5-40 SYRINGE (ML) INJECTION PRN
Status: DISCONTINUED | OUTPATIENT
Start: 2022-06-01 | End: 2022-06-02 | Stop reason: HOSPADM

## 2022-06-01 RX ORDER — SODIUM CHLORIDE 9 MG/ML
5-40 INJECTION INTRAVENOUS PRN
Status: CANCELLED | OUTPATIENT
Start: 2022-06-01

## 2022-06-01 RX ADMIN — SODIUM CHLORIDE 25 ML/HR: 9 INJECTION, SOLUTION INTRAVENOUS at 11:50

## 2022-06-01 RX ADMIN — SODIUM CHLORIDE, PRESERVATIVE FREE 10 ML: 5 INJECTION INTRAVENOUS at 11:50

## 2022-06-01 RX ADMIN — SODIUM CHLORIDE 546 MG: 9 INJECTION, SOLUTION INTRAVENOUS at 11:56

## 2022-06-01 RX ADMIN — PERTUZUMAB 420 MG: 30 INJECTION, SOLUTION, CONCENTRATE INTRAVENOUS at 12:33

## 2022-06-01 RX ADMIN — SODIUM CHLORIDE, PRESERVATIVE FREE 10 ML: 5 INJECTION INTRAVENOUS at 13:08

## 2022-06-02 DIAGNOSIS — R19.7 DIARRHEA, UNSPECIFIED: ICD-10-CM

## 2022-06-02 RX ORDER — CHOLESTYRAMINE 4 G/9G
POWDER, FOR SUSPENSION ORAL
Qty: 378 G | Refills: 1 | Status: SHIPPED | OUTPATIENT
Start: 2022-06-02 | End: 2022-06-30

## 2022-06-07 ENCOUNTER — HOSPITAL ENCOUNTER (EMERGENCY)
Age: 82
Discharge: HOME | End: 2022-06-08
Attending: EMERGENCY MEDICINE
Payer: MEDICARE

## 2022-06-07 DIAGNOSIS — R11.0 NAUSEA: Primary | ICD-10-CM

## 2022-06-07 PROCEDURE — 99283 EMERGENCY DEPT VISIT LOW MDM: CPT

## 2022-06-07 RX ORDER — SODIUM CHLORIDE 0.9 % (FLUSH) 0.9 %
3 SYRINGE (ML) INJECTION EVERY 8 HOURS
Status: DISCONTINUED | OUTPATIENT
Start: 2022-06-07 | End: 2022-06-08 | Stop reason: HOSPADM

## 2022-06-07 RX ORDER — IPRATROPIUM BROMIDE 42 UG/1
2 SPRAY, METERED NASAL 4 TIMES DAILY
Qty: 15 ML | Refills: 2 | Status: SHIPPED | OUTPATIENT
Start: 2022-06-07

## 2022-06-07 ASSESSMENT — ENCOUNTER SYMPTOMS
SHORTNESS OF BREATH: 0
ABDOMINAL PAIN: 0
VOMITING: 0
FACIAL SWELLING: 0
NAUSEA: 1
DIARRHEA: 0

## 2022-06-07 NOTE — TELEPHONE ENCOUNTER
----- Message from [facility] sent at 6/6/2022  5:05 PM EDT -----  Subject: Message to Provider    QUESTIONS  Information for Provider? pt is asking for her inhaler to be sent to   pharmacy. She did not know what the name of it was and I couldn't find one   in her medication list.   ---------------------------------------------------------------------------  --------------  CALL BACK INFO  What is the best way for the office to contact you? Do not leave any   message, patient will call back for answer  Preferred Call Back Phone Number? [phone]  ---------------------------------------------------------------------------  --------------  SCRIPT ANSWERS  Relationship to Patient?  Self

## 2022-06-08 VITALS
DIASTOLIC BLOOD PRESSURE: 78 MMHG | HEART RATE: 52 BPM | HEIGHT: 63 IN | OXYGEN SATURATION: 98 % | WEIGHT: 198 LBS | TEMPERATURE: 98.1 F | BODY MASS INDEX: 35.08 KG/M2 | SYSTOLIC BLOOD PRESSURE: 164 MMHG | RESPIRATION RATE: 16 BRPM

## 2022-06-08 LAB
ALBUMIN SERPL-MCNC: 3.4 G/DL (ref 3.2–4.6)
ALBUMIN/GLOB SERPL: 0.9 {RATIO} (ref 1.2–3.5)
ALP SERPL-CCNC: 118 U/L (ref 50–136)
ALT SERPL-CCNC: 16 U/L (ref 12–65)
ANION GAP SERPL CALC-SCNC: 5 MMOL/L (ref 7–16)
APPEARANCE UR: CLEAR
AST SERPL-CCNC: 16 U/L (ref 15–37)
BACTERIA URNS QL MICRO: NORMAL /HPF
BASOPHILS # BLD: 0 K/UL (ref 0–0.2)
BASOPHILS NFR BLD: 0 % (ref 0–2)
BILIRUB SERPL-MCNC: 0.8 MG/DL (ref 0.2–1.1)
BILIRUB UR QL: NEGATIVE
BILIRUB UR QL: NEGATIVE
BUN SERPL-MCNC: 11 MG/DL (ref 8–23)
CALCIUM SERPL-MCNC: 10.2 MG/DL (ref 8.3–10.4)
CASTS URNS QL MICRO: 0 /LPF
CHLORIDE SERPL-SCNC: 110 MMOL/L (ref 98–107)
CO2 SERPL-SCNC: 28 MMOL/L (ref 21–32)
COLOR UR: YELLOW
CREAT SERPL-MCNC: 0.6 MG/DL (ref 0.6–1)
CRYSTALS URNS QL MICRO: 0 /LPF
DIFFERENTIAL METHOD BLD: ABNORMAL
EOSINOPHIL # BLD: 0 K/UL (ref 0–0.8)
EOSINOPHIL NFR BLD: 0 % (ref 0.5–7.8)
EPI CELLS #/AREA URNS HPF: NORMAL /HPF
ERYTHROCYTE [DISTWIDTH] IN BLOOD BY AUTOMATED COUNT: 13.9 % (ref 11.9–14.6)
GLOBULIN SER CALC-MCNC: 3.6 G/DL (ref 2.3–3.5)
GLUCOSE SERPL-MCNC: 93 MG/DL (ref 65–100)
GLUCOSE UR QL STRIP.AUTO: NEGATIVE MG/DL
GLUCOSE UR STRIP.AUTO-MCNC: NEGATIVE MG/DL
HCT VFR BLD AUTO: 48 % (ref 35.8–46.3)
HGB BLD-MCNC: 15 G/DL (ref 11.7–15.4)
HGB UR QL STRIP: NEGATIVE
IMM GRANULOCYTES # BLD AUTO: 0 K/UL (ref 0–0.5)
IMM GRANULOCYTES NFR BLD AUTO: 0 % (ref 0–5)
KETONES UR QL STRIP.AUTO: NEGATIVE MG/DL
KETONES UR-MCNC: NEGATIVE MG/DL
LEUKOCYTE ESTERASE UR QL STRIP.AUTO: ABNORMAL
LEUKOCYTE ESTERASE UR QL STRIP: ABNORMAL
LIPASE SERPL-CCNC: 108 U/L (ref 73–393)
LYMPHOCYTES # BLD: 2.4 K/UL (ref 0.5–4.6)
LYMPHOCYTES NFR BLD: 40 % (ref 13–44)
MCH RBC QN AUTO: 28.8 PG (ref 26.1–32.9)
MCHC RBC AUTO-ENTMCNC: 31.3 G/DL (ref 31.4–35)
MCV RBC AUTO: 92.3 FL (ref 79.6–97.8)
MONOCYTES # BLD: 0.4 K/UL (ref 0.1–1.3)
MONOCYTES NFR BLD: 6 % (ref 4–12)
MUCOUS THREADS URNS QL MICRO: 0 /LPF
NEUTS SEG # BLD: 3.2 K/UL (ref 1.7–8.2)
NEUTS SEG NFR BLD: 54 % (ref 43–78)
NITRITE UR QL STRIP.AUTO: NEGATIVE
NITRITE UR QL: NEGATIVE
NRBC # BLD: 0 K/UL (ref 0–0.2)
PH UR STRIP: 6.5 [PH] (ref 5–9)
PH UR: 7 [PH] (ref 5–9)
PLATELET # BLD AUTO: 182 K/UL (ref 150–450)
PMV BLD AUTO: 10.9 FL (ref 9.4–12.3)
POTASSIUM SERPL-SCNC: 3.9 MMOL/L (ref 3.5–5.1)
PROT SERPL-MCNC: 7 G/DL (ref 6.3–8.2)
PROT UR QL: NEGATIVE MG/DL
PROT UR STRIP-MCNC: NEGATIVE MG/DL
RBC # BLD AUTO: 5.2 M/UL (ref 4.05–5.2)
RBC # UR STRIP: NEGATIVE /UL
RBC #/AREA URNS HPF: 0 /HPF
SERVICE CMNT-IMP: ABNORMAL
SODIUM SERPL-SCNC: 143 MMOL/L (ref 136–145)
SP GR UR REFRACTOMETRY: 1.01 (ref 1–1.02)
SP GR UR: 1.01 (ref 1–1.02)
UROBILINOGEN UR QL STRIP.AUTO: 0.2 EU/DL (ref 0.2–1)
UROBILINOGEN UR QL: 0.2 EU/DL (ref 0.2–1)
WBC # BLD AUTO: 6 K/UL (ref 4.3–11.1)
WBC URNS QL MICRO: NORMAL /HPF

## 2022-06-08 PROCEDURE — 81003 URINALYSIS AUTO W/O SCOPE: CPT

## 2022-06-08 PROCEDURE — 85025 COMPLETE CBC W/AUTO DIFF WBC: CPT

## 2022-06-08 PROCEDURE — 83690 ASSAY OF LIPASE: CPT

## 2022-06-08 PROCEDURE — 80053 COMPREHEN METABOLIC PANEL: CPT

## 2022-06-08 PROCEDURE — 81001 URINALYSIS AUTO W/SCOPE: CPT

## 2022-06-08 PROCEDURE — 81015 MICROSCOPIC EXAM OF URINE: CPT

## 2022-06-08 NOTE — ED TRIAGE NOTES
Pt arrives to ED via [facility] for reports of abdominal pain. VSS in route. Pt a&xo4.

## 2022-06-08 NOTE — ED NOTES
I have reviewed discharge instructions with the patient. The patient verbalized understanding. Patient left ED via Discharge Method: ambulatory to Home with Mayda Jaguar marquise who has been called to transport her home as she has no walker and can not ambulate with out. Opportunity for questions and clarification provided. Patient given 0 scripts. To continue your aftercare when you leave the hospital, you may receive an automated call from our care team to check in on how you are doing. This is a free service and part of our promise to provide the best care and service to meet your aftercare needs.  If you have questions, or wish to unsubscribe from this service please call [phone]. Thank you for Choosing our [facility] Emergency Department.         Angel Fregoso RN  06/08/22 0155
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
## 2022-06-14 RX ORDER — NYSTATIN 100000 [USP'U]/G
POWDER TOPICAL 4 TIMES DAILY
Qty: 60 G | Refills: 5 | Status: SHIPPED | OUTPATIENT
Start: 2022-06-14

## 2022-06-17 ENCOUNTER — HOSPITAL ENCOUNTER (EMERGENCY)
Age: 82
Discharge: HOME | End: 2022-06-18
Attending: EMERGENCY MEDICINE
Payer: MEDICARE

## 2022-06-17 DIAGNOSIS — R39.198 DIFFICULTY URINATING: ICD-10-CM

## 2022-06-17 DIAGNOSIS — I10 ESSENTIAL HYPERTENSION: Primary | ICD-10-CM

## 2022-06-17 LAB
BILIRUB UR QL: NEGATIVE
GLUCOSE UR QL STRIP.AUTO: NEGATIVE MG/DL
KETONES UR-MCNC: NEGATIVE MG/DL
LEUKOCYTE ESTERASE UR QL STRIP: NEGATIVE
NITRITE UR QL: NEGATIVE
PH UR: 7 [PH] (ref 5–9)
PROT UR QL: NEGATIVE MG/DL
RBC # UR STRIP: ABNORMAL /UL
SERVICE CMNT-IMP: ABNORMAL
SP GR UR: 1.02 (ref 1–1.02)
UROBILINOGEN UR QL: 0.2 EU/DL (ref 0.2–1)

## 2022-06-17 PROCEDURE — 83690 ASSAY OF LIPASE: CPT

## 2022-06-17 PROCEDURE — 85025 COMPLETE CBC W/AUTO DIFF WBC: CPT

## 2022-06-17 PROCEDURE — 99284 EMERGENCY DEPT VISIT MOD MDM: CPT

## 2022-06-17 PROCEDURE — 80053 COMPREHEN METABOLIC PANEL: CPT

## 2022-06-17 PROCEDURE — 81003 URINALYSIS AUTO W/O SCOPE: CPT

## 2022-06-17 RX ORDER — CLONIDINE HYDROCHLORIDE 0.1 MG/1
0.2 TABLET ORAL
Status: COMPLETED | OUTPATIENT
Start: 2022-06-18 | End: 2022-06-18

## 2022-06-17 RX ORDER — SODIUM CHLORIDE 0.9 % (FLUSH) 0.9 %
3 SYRINGE (ML) INJECTION EVERY 8 HOURS
Status: DISCONTINUED | OUTPATIENT
Start: 2022-06-17 | End: 2022-06-18 | Stop reason: HOSPADM

## 2022-06-17 ASSESSMENT — ENCOUNTER SYMPTOMS
NAUSEA: 1
SHORTNESS OF BREATH: 1
DIARRHEA: 0
BACK PAIN: 1
VOMITING: 0
ABDOMINAL PAIN: 0
FACIAL SWELLING: 0

## 2022-06-18 VITALS
SYSTOLIC BLOOD PRESSURE: 128 MMHG | DIASTOLIC BLOOD PRESSURE: 82 MMHG | TEMPERATURE: 97.4 F | OXYGEN SATURATION: 95 % | HEART RATE: 65 BPM | RESPIRATION RATE: 16 BRPM

## 2022-06-18 LAB
ALBUMIN SERPL-MCNC: 3.5 G/DL (ref 3.2–4.6)
ALBUMIN/GLOB SERPL: 1 {RATIO} (ref 1.2–3.5)
ALP SERPL-CCNC: 125 U/L (ref 50–136)
ALT SERPL-CCNC: 23 U/L (ref 12–65)
ANION GAP SERPL CALC-SCNC: 4 MMOL/L (ref 7–16)
AST SERPL-CCNC: 24 U/L (ref 15–37)
BASOPHILS # BLD: 0 K/UL (ref 0–0.2)
BASOPHILS NFR BLD: 0 % (ref 0–2)
BILIRUB SERPL-MCNC: 0.9 MG/DL (ref 0.2–1.1)
BUN SERPL-MCNC: 9 MG/DL (ref 8–23)
CALCIUM SERPL-MCNC: 10.3 MG/DL (ref 8.3–10.4)
CHLORIDE SERPL-SCNC: 110 MMOL/L (ref 98–107)
CO2 SERPL-SCNC: 30 MMOL/L (ref 21–32)
CREAT SERPL-MCNC: 0.7 MG/DL (ref 0.6–1)
DIFFERENTIAL METHOD BLD: ABNORMAL
EKG ATRIAL RATE: 57 BPM
EKG DIAGNOSIS: NORMAL
EKG P AXIS: 63 DEGREES
EKG P-R INTERVAL: 219 MS
EKG Q-T INTERVAL: 421 MS
EKG QRS DURATION: 102 MS
EKG QTC CALCULATION (BAZETT): 410 MS
EKG R AXIS: -28 DEGREES
EKG T AXIS: 90 DEGREES
EKG VENTRICULAR RATE: 57 BPM
EOSINOPHIL # BLD: 0.1 K/UL (ref 0–0.8)
EOSINOPHIL NFR BLD: 2 % (ref 0.5–7.8)
ERYTHROCYTE [DISTWIDTH] IN BLOOD BY AUTOMATED COUNT: 14.1 % (ref 11.9–14.6)
GLOBULIN SER CALC-MCNC: 3.5 G/DL (ref 2.3–3.5)
GLUCOSE SERPL-MCNC: 100 MG/DL (ref 65–100)
HCT VFR BLD AUTO: 47.2 % (ref 35.8–46.3)
HGB BLD-MCNC: 15 G/DL (ref 11.7–15.4)
IMM GRANULOCYTES # BLD AUTO: 0 K/UL (ref 0–0.5)
IMM GRANULOCYTES NFR BLD AUTO: 0 % (ref 0–5)
LIPASE SERPL-CCNC: 98 U/L (ref 73–393)
LYMPHOCYTES # BLD: 2.6 K/UL (ref 0.5–4.6)
LYMPHOCYTES NFR BLD: 42 % (ref 13–44)
MCH RBC QN AUTO: 29 PG (ref 26.1–32.9)
MCHC RBC AUTO-ENTMCNC: 31.8 G/DL (ref 31.4–35)
MCV RBC AUTO: 91.1 FL (ref 79.6–97.8)
MONOCYTES # BLD: 0.3 K/UL (ref 0.1–1.3)
MONOCYTES NFR BLD: 6 % (ref 4–12)
NEUTS SEG # BLD: 3 K/UL (ref 1.7–8.2)
NEUTS SEG NFR BLD: 50 % (ref 43–78)
NRBC # BLD: 0 K/UL (ref 0–0.2)
PLATELET # BLD AUTO: 208 K/UL (ref 150–450)
PMV BLD AUTO: 11.5 FL (ref 9.4–12.3)
POTASSIUM SERPL-SCNC: 3.8 MMOL/L (ref 3.5–5.1)
PROT SERPL-MCNC: 7 G/DL (ref 6.3–8.2)
RBC # BLD AUTO: 5.18 M/UL (ref 4.05–5.2)
SODIUM SERPL-SCNC: 144 MMOL/L (ref 136–145)
WBC # BLD AUTO: 6.1 K/UL (ref 4.3–11.1)

## 2022-06-18 PROCEDURE — 6370000000 HC RX 637 (ALT 250 FOR IP): Performed by: EMERGENCY MEDICINE

## 2022-06-18 PROCEDURE — 93005 ELECTROCARDIOGRAM TRACING: CPT | Performed by: EMERGENCY MEDICINE

## 2022-06-18 RX ADMIN — CLONIDINE HYDROCHLORIDE 0.2 MG: 0.1 TABLET ORAL at 00:01

## 2022-06-18 NOTE — ED PROVIDER NOTES
[facility] Emergency Department Provider Note                   PCP:                None Provider               Age: 81 y.o. Sex: female       ICD-10-CM    1. Essential hypertension  I10    2. Difficulty urinating  R39.198        DISPOSITION Decision To Discharge 06/18/2022 01:14:01 AM       New Prescriptions    No medications on file       Orders Placed This Encounter   Procedures    CBC with Auto Differential    CMP    Lipase    Diet NPO    POCT Urine Dipstick    POCT Urinalysis no Micro    EKG 12 Lead    Saline lock Jordan Felipe MD 1:15 AM      MDM  Number of Diagnoses or Management Options  Difficulty urinating  Essential hypertension  Diagnosis management comments: I wore appropriate PPE throughout this patient's ED visit. Rosario Kemp MD, 11:38 PM    1:15 AM  Blood pressure improved. No UTI. Labs unremarkable. Amount and/or Complexity of Data Reviewed  Clinical lab tests: ordered and reviewed  Tests in the radiology section of CPT®: ordered and reviewed  Tests in the medicine section of CPT®: reviewed and ordered  Review and summarize past medical records: yes  Independent visualization of images, tracings, or specimens: yes         Renee Briceño is a 81 y.o. female who presents to the Emergency Department with chief complaint of    Chief Complaint   Patient presents with    Hip Pain      81-year-old female with history of diabetes, asthma, breast cancer, chronic kidney disease, high cholesterol, hypertension, stroke, thyroid disease, reflux, hernia presents with multiple complaints. Upon walking to the room, she tells me she is here because her blood pressure is elevated in the 200s. She states this is happened many times before. She is on a \"brown pill\" for her blood pressure. She denies missing any doses and did not double up for elevated blood pressure tonight. She denies any new chest pain or focal numbness or weakness.   She reports chronic shortness of breath and wears oxygen at night. She also states that her \"kidneys were messed up\" yesterday. She states she was not urinating normally so she drank some cranberry juice. Since then, she has had frequent urination. She denies any burning with urination or blood in urine. She has chronic back pain that is unchanged. Also has chronic hip pain and is wheelchair-bound. No fever. All other systems reviewed and are negative. Review of Systems   Constitutional: Negative for fever. HENT: Negative for congestion and facial swelling. Eyes: Negative for visual disturbance. Respiratory: Positive for shortness of breath. Cardiovascular: Negative for chest pain. Gastrointestinal: Positive for nausea. Negative for abdominal pain, diarrhea and vomiting. Genitourinary: Positive for difficulty urinating and frequency. Negative for hematuria. Musculoskeletal: Positive for arthralgias and back pain. Negative for joint swelling. Skin: Negative for rash. Neurological: Negative for speech difficulty and headaches. Psychiatric/Behavioral: Negative for confusion. All other systems reviewed and are negative.       Past Medical History:   Diagnosis Date    Arthritis     Asthma     Breast cancer ([facility] [address]     Chronic kidney disease     Ear problems     Gastrointestinal disorder     acid reflux, hernia    Hyperlipidemia     Hypertension     Insomnia     Other ill-defined conditions(799.89)     blood clot on lung    Stroke ([address]     Thyroid disease     Tinnitus     Vitamin D deficiency         Past Surgical History:   Procedure Laterality Date    APPENDECTOMY      BREAST BIOPSY Left 04/07/2021    u/s x 3    GYN  71,72    c-sec    HEENT      t & a    OTHER SURGICAL HISTORY      fatty tissue removed from under arm    MD APPENDECTOMY      US BREAST BIOPSY NEEDLE ADDITIONAL LEFT Left 4/7/2021    US BREAST BIOPSY NEEDLE ADDITIONAL LEFT 4/7/2021 [facility]    US BREAST NEEDLE BIOPSY LEFT Left 4/7/2021    US BREAST NEEDLE BIOPSY LEFT 4/7/2021 [facility]    US LYMPH NODE BIOPSY  4/7/2021    US LYMPH NODE BIOPSY 4/7/2021 [facility]        Family History   Problem Relation Age of Onset    No Known Problems Mother     No Known Problems Father     No Known Problems Maternal Grandmother     No Known Problems Maternal Grandfather     No Known Problems Paternal Grandmother     No Known Problems Paternal Grandfather     Breast Cancer Neg Hx            Social Connections:     Frequency of Communication with Friends and Family: Not on file    Frequency of Social Gatherings with Friends and Family: Not on file    Attends Latter-day Services: Not on file    Active Member of [address] [place] [address] [employer] or Organizations: Not on file    Attends Club or Organization Meetings: Not on file    Marital Status: Not on file        Allergies   Allergen Reactions    Aspirin Other (See Comments)    Ciprofloxacin Other (See Comments)    Doxycycline Calcium Other (See Comments)    Fluconazole Other (See Comments)    Ibuprofen Other (See Comments)    Metformin Other (See Comments)    Sulfa Antibiotics Hives and Other (See Comments)    Sulfabenzamide Other (See Comments)    Benzonatate Rash    Gabapentin Anxiety        Vitals signs and nursing note reviewed. Patient Vitals for the past 4 hrs:   Temp Pulse Resp BP SpO2   06/18/22 0100 -- -- -- 128/82 95 %   06/18/22 0001 -- -- -- (!) 207/87 --   06/17/22 2245 -- -- -- -- 97 %   06/17/22 2230 97.4 °F (36.3 °C) 65 16 (!) 160/43 96 %          Physical Exam  Vitals and nursing note reviewed. Constitutional:       Appearance: Normal appearance. She is well-developed. HENT:      Head: Normocephalic and atraumatic. Nose: Nose normal.      Mouth/Throat:      Mouth: Mucous membranes are moist.   Eyes:      Extraocular Movements: Extraocular movements intact. Pupils: Pupils are equal, round, and reactive to light.    Cardiovascular:      Rate and Rhythm: Normal rate and regular rhythm. Pulmonary:      Effort: Pulmonary effort is normal. No respiratory distress. Abdominal:      General: Abdomen is flat. There is no distension. Musculoskeletal:         General: Normal range of motion. Skin:     General: Skin is warm and dry. Neurological:      General: No focal deficit present. Mental Status: She is alert. Mental status is at baseline. Psychiatric:         Mood and Affect: Mood normal.          Procedures    ED EKG Interpretation  EKG was interpreted in the absence of a cardiologist.    Rate: Rate: Normal  EKG Interpretation: EKG Interpretation: non-specific EKG  ST Segments: Nonspecific ST segments - NO STEMI    Labs Reviewed   CBC WITH AUTO DIFFERENTIAL - Abnormal; Notable for the following components:       Result Value    Hematocrit 47.2 (*)     All other components within normal limits   COMPREHENSIVE METABOLIC PANEL - Abnormal; Notable for the following components:    Chloride 110 (*)     Anion Gap 4 (*)     Albumin/Globulin Ratio 1.0 (*)     All other components within normal limits   POCT URINALYSIS DIPSTICK - Abnormal; Notable for the following components:    Specific Gravity, Urine, POC 1.025 (*)     Blood, UA POC Trace Intact (*)     All other components within normal limits   LIPASE        No orders to display                            Voice dictation software was used during the making of this note. This software is not perfect and grammatical and other typographical errors may be present. This note has not been completely proofread for errors.      Tasha Alfonso MD  06/18/22 1745

## 2022-06-18 NOTE — ED TRIAGE NOTES
Pt arrived via EMS from home c/o right sided flank pain that started today. Pt states that she has issues with her kidneys.  Denies NVD

## 2022-06-21 NOTE — PROGRESS NOTES
[address] [place] [address] Hematology and Oncology: Office Visit Established Patient     Chief Complaint:    Chief Complaint   Patient presents with    Follow-up      History of Present Illness:  Ms. Fariba Kurtz  is a 81 y.o. female who presents  today for follow-up regarding breast cancer. She presented to [facility] on 4/5/21 for evaluation of a palpable, painful lump in her left breast. Bilateral diagnostic digital mammography and left breast ultrasound identified an irregular mass, very  suspicious for malignancy located at the 1:30 position in the posterior left breast; a second smaller mass also suspicious for malignancy at the 6:30 position in the left breast; a dysmorphic left axillary lymph node suspicious for metastatic disease;  and innumerable new microcalcifications occupying much of the lateral left breast, suspicious for associated DCIS. Recommended core needle biopsies and marker clip placements for the palpable 2.3 cm posterior 1:30 mass, the 1.8 cm posterior 6:30 nodule,  and a dystrophic axillary lymph node were performed on 4/7/20. 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 She was referred to [facility], per [address] [place] [address], for oncology evaluation and treatment of newly diagnosed breast cancer.   We recommended proceeding with PET/CT, as well as IHC on the axillary lymph node  to determine whether this was HER2 positive as well. PET/CT shows no distant metastatic disease. I discussed the case with Dr. Nora Moffett at tumor board. Normally, for node positive HER2 positive  breast cancer, we would recommend neoadjuvant chemoimmunotherapy. However, she is unwilling to consider cytotoxic therapy, which is not unreasonable given her age and comorbidities. Since she has only locoregional disease, we should then consider mastectomy  and node dissection for definitive therapy. She was also very reluctant to consider any surgery because of her history of intolerance to anesthesia, but we were able to convince her to at least meet with Dr. Nora Moffett. However, after discussion with  him, she declined any local therapy and was only willing to consider systemic treatment. Therefore, we recommended the PERTAIN regimen for \"unresectable\" disease, Arimidex + HP. She returns today for follow up and next HP. She was seen in the ER on 6/17 for an episode of hypertension at home - normal today. HR at 48, around BL for patient based on review and asymptomatic currently. She denies any other new issues/concerns. Her diarrhea has been better, she did try one dose of the Questran, and it caused constipation so she did not try this again. She denies any issues with eating/drinking. Ongoing pain, previously had injections in her leg/back and wishes to have this done again. She denies any respiratory concerns. She denies any fevers or recent infections. Review of Systems:   Constitutional: Positive for fatigue. HENT: Negative. Eyes: Negative. Respiratory: Negative. Cardiovascular: Negative. Gastrointestinal: Positive for diarrhea. Genitourinary: Negative. Musculoskeletal: Negative. Skin: Negative. Neurological: Negative. Endo/Heme/Allergies: Negative. Psychiatric/Behavioral: Negative. All other systems reviewed and are negative. Allergies   Allergen Reactions    Aspirin Other (See Comments)    Ciprofloxacin Other (See Comments)    Doxycycline Calcium Other (See Comments)    Fluconazole Other (See Comments)    Ibuprofen Other (See Comments)    Metformin Other (See Comments)    Sulfa Antibiotics Hives and Other (See Comments)    Sulfabenzamide Other (See Comments)    Benzonatate Rash    Gabapentin Anxiety     Past Medical History:   Diagnosis Date    Arthritis     Asthma     Breast [facility] [address]     Chronic kidney disease     Ear problems     Gastrointestinal disorder     acid reflux, hernia    Hyperlipidemia     Hypertension     Insomnia     Other ill-defined conditions(799.89)     blood clot on lung    Stroke ([facility] [address]     Thyroid disease     Tinnitus     Vitamin D deficiency      Past Surgical History:   Procedure Laterality Date    APPENDECTOMY      BREAST BIOPSY Left 04/07/2021    u/s x 3    GYN  71,73    c-sec    HEENT      t & a    OTHER SURGICAL HISTORY      fatty tissue removed from under arm    NM APPENDECTOMY      US BREAST BIOPSY NEEDLE ADDITIONAL LEFT Left 4/7/2021    US BREAST BIOPSY NEEDLE ADDITIONAL LEFT 4/7/2021 [facility]    US BREAST NEEDLE BIOPSY LEFT Left 4/7/2021    US BREAST NEEDLE BIOPSY LEFT 4/7/2021 [facility]    US LYMPH NODE BIOPSY  4/7/2021    US LYMPH NODE BIOPSY 4/7/2021 [facility]     Family History   Problem Relation Age of Onset    No Known Problems Mother     No Known Problems Father     No Known Problems Maternal Grandmother     No Known Problems Maternal Grandfather     No Known Problems Paternal Grandmother     No Known Problems Paternal Grandfather     Breast Cancer Neg Hx      Social History     Socioeconomic History    Marital status:       Spouse name: Not on file    Number of children: Not on file    Years of education: Not on file    Highest education level: Not on file Occupational History    Not on file   Tobacco Use    Smoking status: Never Smoker    Smokeless tobacco: Never Used   Substance and Sexual Activity    Alcohol use: No    Drug use: No    Sexual activity: Not on file   Other Topics Concern    Not on file   Social History Narrative    Not on file     Social Determinants of Health     Financial Resource Strain:     Difficulty of Paying Living Expenses: Not on file   Food Insecurity:     Worried About Running Out of Food in the Last Year: Not on file    Tyrese of Food in the Last Year: Not on file   Transportation Needs:     Lack of Transportation (Medical): Not on file    Lack of Transportation (Non-Medical):  Not on file   Physical Activity:     Days of Exercise per Week: Not on file    Minutes of Exercise per Session: Not on file   Stress:     Feeling of Stress : Not on file   Social Connections:     Frequency of Communication with Friends and Family: Not on file    Frequency of Social Gatherings with Friends and Family: Not on file    Attends Shinto Services: Not on file    Active Member of [address] [place] [address] or Organizations: Not on file    Attends Club or Organization Meetings: Not on file    Marital Status: Not on file   Intimate Partner Violence:     Fear of Current or Ex-Partner: Not on file    Emotionally Abused: Not on file    Physically Abused: Not on file    Sexually Abused: Not on file   Housing Stability:     Unable to Pay for Housing in the Last Year: Not on file    Number of [place] in the Last Year: Not on file    Unstable Housing in the Last Year: Not on file     Current Outpatient Medications   Medication Sig Dispense Refill    nystatin (MYCOSTATIN) 654748 UNIT/GM powder Apply topically 4 times daily 60 g 5    ipratropium (ATROVENT) 0.06 % nasal spray 2 sprays by Nasal route 4 times daily INSTILL 2 SPRAYS IN EACH NOSTRIL 4 TIMES A DAY 15 mL 2    cholestyramine (QUESTRAN) 4 GM/DOSE powder TAKE 4 G BY MOUTH THREE (3) TIMES DAILY (WITH MEALS) FOR 30 DAYS. 378 g 1    anastrozole (ARIMIDEX) 1 MG tablet Take 1 mg by mouth daily      atorvastatin (LIPITOR) 20 MG tablet Take 20 mg by mouth daily      levothyroxine (SYNTHROID) 112 MCG tablet Take 112 mcg by mouth every morning (before breakfast)      lisinopril (PRINIVIL;ZESTRIL) 10 MG tablet Take 10 mg by mouth daily      naftifine (NAFTIN) 1 % cream Apply topically daily      SITagliptin (JANUVIA) 100 MG tablet TAKE 1 TABLET BY MOUTH EVERY DAY      temazepam (RESTORIL) 30 MG capsule Take 30 mg by mouth. No current facility-administered medications for this visit. OBJECTIVE:  Visit Vitals  Vitals:    06/22/22 1044   BP: (!) 148/71   Site: Left Upper Arm   Position: Sitting   Cuff Size: Small Adult   Pulse: (!) 48   Resp: 18   Temp: 98.3 °F (36.8 °C)   TempSrc: Oral   SpO2: 92%           Physical Exam:     Constitutional:  Well developed, well nourished female in no acute  distress, sitting comfortably in the wheelchair. HEENT:  Normocephalic and atraumatic. Sclerae anicteric. Neck supple without JVD. No thyromegaly present. Lymph node     Deferred      Skin  Warm and dry. No bruising noted. No erythema. No pallor. +few very small scattered maculopapular lesions to BUE and RLE with mild excoriation. Respiratory  Lungs are clear to auscultation bilaterally without wheezes, rales or rhonchi, normal air exchange without accessory muscle use. CVS  Normal rate, regular rhythm and normal S1 and S2. No murmurs, gallops, or rubs. Neuro  Grossly nonfocal with no obvious sensory or motor deficits. MSK  Normal range of motion in general.  Trace edema BLE and no tenderness. Psych  Appropriate mood and affect.           Labs:  Hospital Outpatient Visit on 06/22/2022   Component Date Value Ref Range Status    WBC 06/22/2022 6.1  4.3 - 11.1 K/uL Final    RBC 06/22/2022 5.13  4.05 - 5.2 M/uL Final    Hemoglobin 06/22/2022 14.7  11.7 - 15.4 g/dL Final    Hematocrit 06/22/2022 46.7* 35.8 - 46.3 % Final    MCV 06/22/2022 91.0  79.6 - 97.8 FL Final    MCH 06/22/2022 28.7  26.1 - 32.9 PG Final    MCHC 06/22/2022 31.5  31.4 - 35.0 g/dL Final    RDW 06/22/2022 13.9  11.9 - 14.6 % Final    Platelets 67/00/6914 188  150 - 450 K/uL Final    MPV 06/22/2022 11.2  9.4 - 12.3 FL Final    nRBC 06/22/2022 0.00  0.0 - 0.2 K/uL Final    **Note: Absolute NRBC parameter is now reported with Hemogram**    Differential Type 06/22/2022 AUTOMATED    Final    Seg Neutrophils 06/22/2022 58  43 - 78 % Final    Lymphocytes 06/22/2022 34  13 - 44 % Final    Monocytes 06/22/2022 6  4.0 - 12.0 % Final    Eosinophils % 06/22/2022 2  0.5 - 7.8 % Final    Basophils 06/22/2022 0  0.0 - 2.0 % Final    Immature Granulocytes 06/22/2022 0  0.0 - 5.0 % Final    Segs Absolute 06/22/2022 3.5  1.7 - 8.2 K/UL Final    Absolute Lymph # 06/22/2022 2.1  0.5 - 4.6 K/UL Final    Absolute Mono # 06/22/2022 0.4  0.1 - 1.3 K/UL Final    Absolute Eos # 06/22/2022 0.1  0.0 - 0.8 K/UL Final    Basophils Absolute 06/22/2022 0.0  0.0 - 0.2 K/UL Final    Absolute Immature Granulocyte 06/22/2022 0.0  0.0 - 0.5 K/UL Final    Sodium 06/22/2022 142  136 - 145 mmol/L Final    Potassium 06/22/2022 3.9  3.5 - 5.1 mmol/L Final    Chloride 06/22/2022 109* 98 - 107 mmol/L Final    CO2 06/22/2022 27  21 - 32 mmol/L Final    Anion Gap 06/22/2022 6* 7 - 16 mmol/L Final    Glucose 06/22/2022 99  65 - 100 mg/dL Final    BUN 06/22/2022 6* 8 - 23 MG/DL Final    CREATININE 06/22/2022 0.70  0.6 - 1.0 MG/DL Final    GFR  06/22/2022 >60  >60 ml/min/1.73m2 Final    GFR Non- 06/22/2022 >60  >60 ml/min/1.73m2 Final    Comment:      Estimated GFR is calculated using the Modification of Diet in Renal Disease (MDRD) Study equation, reported for both  Americans (GFRAA) and non- Americans (GFRNA), and normalized to 1.73m2 body surface area.  The physician must decide which value applies to the patient. The MDRD study equation should only be used in individuals age 25 or older. It has not been validated for the following: pregnant women, patients with serious comorbid conditions,or on certain medications, or persons with extremes of body size, muscle mass, or nutritional status.  Calcium 06/22/2022 10.0  8.3 - 10.4 MG/DL Final    Total Bilirubin 06/22/2022 0.9  0.2 - 1.1 MG/DL Final    ALT 06/22/2022 20  12 - 65 U/L Final    AST 06/22/2022 16  15 - 37 U/L Final    Alk Phosphatase 06/22/2022 115  50 - 136 U/L Final    Total Protein 06/22/2022 7.1  6.3 - 8.2 g/dL Final    Albumin 06/22/2022 3.6  3.2 - 4.6 g/dL Final    Globulin 06/22/2022 3.5  2.3 - 3.5 g/dL Final    Albumin/Globulin Ratio 06/22/2022 1.0* 1.2 - 3.5   Final    Magnesium 06/22/2022 2.1  1.8 - 2.4 mg/dL Final         Imaging:   PET/CT         Indication: Left breast cancer restaging       Radiopharmaceutical: 16.8 mCi F18-FDG, intravenously.       Technique: Imaging was performed from the skull through the proximal thighs   using routine PET/CT acquisition protocol. Imaging was performed approximately   60 minutes post injection. Oral contrast was administered. Radiation dose   reduction techniques were used for this study:  Our CT scanners use one or all   of the following: Automated exposure control, adjustment of the mA and/or kVp   according to patient's size, iterative reconstruction.           Serum glucose: 91 mg/dL prior to injection.       Comparison studies: PET/CT 4/23/2021       Findings:       Head and Neck: No enlarged or hypermetabolic cervical chain nodes.       Chest: No discrete lung nodule. No mediastinal mass or lymphadenopathy.       Abdomen/Pelvis: No enlarged or hypermetabolic lymph nodes of the abdomen or   pelvis.  No worrisome focal uptake of the abdominal viscera.       Bones and soft tissues: Interval improved tracer uptake associated with   multifocal masses inferiorly in the left breast. Interval resolution of   previously faintly hypermetabolic lymph node of the left inferior axilla.       No aggressive osseous lesion.       IMPRESSION   1. Interval improved tracer uptake associated with multifocal masses inferiorly   in the left breast.   2.  Interval resolution of previously hypermetabolic lymph node within the left   Axilla. PET/CT: 4/23/2021       INDICATION: Initial staging of breast cancer.       TECHNIQUE: After oral administration of gastroview and intravenous   administration of 12.76 mCi of F18 FDG, noncontrast CT images were obtained for   attenuation correction and for fusion with emission PET images. A series of   overlapping emission PET images were then obtained beginning 60 minutes after   injection of FDG. The area imaged spanned the region from the skull base to the   mid thighs. All CT scans performed at this facility use one or all of the   following: Automated exposure control, adjustment of the mA and/or kVp according   to patient's size, iterative reconstruction.        COMPARISON: Bilateral diagnostic mammogram 4/5/2021       FINDINGS:    NECK/CHEST:   No worrisome activity is seen in the neck. No adenopathy or aggressive osseous   lesion is suggested on limited CT images.       Abnormal activity is seen within the patient's known outer left breast masses   with the greater activity occurring in the more lateral mass with maximum SUV of   5.2 g/mL. And additional activity is seen within the more medial left breast   lesion with a maximum SUV of 3.3 g/mL and focal appearing activity is seen   associated with a mildly dysmorphic left axillary lymph node with an adjacent   biopsy clip seen on image 90 with a maximum SUV of 2.5 g/mL consistent with the   patient's known bhavana metastasis. No worrisome activity is otherwise seen. No   suspicious pulmonary lesion is seen. The lungs appear hypoaerated.  Linear   densities are seen which may represent scarring or atelectasis. No adenopathy or   aggressive osseous lesion is seen.       ABDOMEN/PELVIS:   No worrisome activity is seen below the diaphragms. Retroperitoneal focal   activity is seen on PET/CT image 181 although this corresponds to the right   ureter suggesting benign excreted tracer within the right ureter. No adenopathy,   or aggressive osseous lesion is suggested.       IMPRESSION   1. Abnormal activity within the patient's known multiple left breast masses as   well as dysmorphic left axillary lymph node consistent with the patient's known   disease. No additional metabolically active disease is seen in the neck, chest,   abdomen, or pelvis. BILATERAL DIAGNOSTIC DIGITAL MAMMOGRAPHY,   LEFT BREAST SONOGRAPHY:       CLINICAL HISTORY:  80-year-old with no family history of breast cancer presents   for evaluation of a painful, palpable lump in the left breast.       COMPARISON:  May 22, 2013 from [facility].       BILATERAL MAMMOGRAM:  A metallic skin marker was placed at the site of left   palpable concern. Bilateral craniocaudal and mediolateral oblique as well as   left lateral views were technically limited by the patient's inability to   cooperate with standard positioning. There is scattered fibroglandular tissue   bilaterally. Right lower mid breast nodule remains stable, and there has been   interval coarsening of right upper outer quadrant calcifications, suggesting a   benign etiology. However on the left, there is a new spiculated mass at the   site of posterior 1:30 palpable concern as well as a second lobular nodule at   the left posterior 6:30 position which are suspicious for malignancy.   Moreover   innumerable new, mildly pleomorphic microcalcifications occupying most of the   lateral left breast are suspicious for associated DCIS.       LEFT ULTRASOUND:  Images from careful ultrasound evaluation of the area of   palpable concern at the 1:30 position approximately 20 cm from the nipple demonstrate a 2.3 cm irregular hypoechoic mass with dense acoustical shadowing   which is very suspicious for malignancy. There is also a densely shadowing 1.2   cm hypoechoic mass at the 6:30 position 12 cm from the nipple which is also   suspicious for malignancy. A 1.1 cm dysmorphic axillary lymph node with   effacement of the fatty hilum suggests metastatic disease as well.       IMPRESSION       1. PALPABLE LEFT POSTERIOR 1:30 IRREGULAR MASS IS VERY SUSPICIOUS FOR   MALIGNANCY, AND THERE IS A SECOND LEFT 6:30 SMALLER MASS ALSO SUSPICIOUS FOR   MALIGNANCY AND A DYSMORPHIC LEFT AXILLARY LYMPH NODE SUSPICIOUS FOR METASTATIC   DISEASE. BIOPSIES ARE RECOMMENDED.       2. INNUMERABLE NEW MICROCALCIFICATIONS OCCUPYING MUCH OF THE LATERAL LEFT   BREAST ARE ALSO SUSPICIOUS FOR ASSOCIATED DCIS.         BI-RADS Assessment Category 5: Highly suggestive of malignancy- Appropriate   action should be taken. Pathology:             ASSESSMENT:    ICD-10-CM    1. Malignant neoplasm of overlapping sites of left breast in female, estrogen receptor positive ([facility] [address]  C50.812 CBC with Auto Differential    Z17.0 Comprehensive Metabolic Panel     Magnesium     CBC with Auto Differential     Comprehensive Metabolic Panel     Magnesium   2.  Encounter for monitoring cardiotoxic drug therapy  Z51.81     Z79.899        Patient Active Problem List   Diagnosis    Mixed incontinence    Insomnia    Vitamin D deficiency    Diabetes mellitus type 2, controlled ([address]    Pleuritic chest pain    Mixed hyperlipidemia    Urge incontinence of urine    Allergic rhinitis    Cardiomegaly    Asthma    Primary osteoarthritis involving multiple joints    Nocturnal hypoxia    Shortness of breath    Hypokalemia    History of abnormal mammogram    Congenital hypothyroidism without goiter    IBS (irritable bowel syndrome)    Benign essential HTN    Chronic pelvic pain in female    Medication nonadherence due to psychosocial problem  BMI 40.0-44.9, adult (HCC)    Bradycardia    Esophageal dysphagia    Malignant neoplasm of overlapping sites of left breast in female, estrogen receptor positive ([facility] [address]    COVID-19 virus infection    Dehydration    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 Breast cancer: two separate nodules with dominant left UOQ nodule 2.3 cm and HER2 positive, other nodule left LIQ 1.2 cm and HER2 negative. Both low grade and ER positive, weakly CT positive. Axillary node positive for macrometastatic carcinoma, also  HER2 positive. PET/CT shows no distant metastatic disease. I discussed the case with Dr. Debi Rocha at tumor board. Normally, for node positive HER2 positive breast cancer, we would recommend neoadjuvant  chemoimmunotherapy. However, she is unwilling to consider cytotoxic therapy, which is not unreasonable given her age and comorbidities. Since she has only locoregional disease, we should then consider mastectomy and node dissection for definitive therapy. She was also very reluctant to consider any surgery because of her history of intolerance to anesthesia, but we were able to convince her to at least meet with Dr. Debi Rocha. However, after discussion with him, she declined any local therapy and was  only willing to consider systemic treatment. Therefore, we recommended the PERTAIN regimen for \"unresectable\" disease, Arimidex + HP.   PET/CT reviewed after 4 cycles shows response with decrease in both breast masses and decreased axillary lymph node,  no new areas of metastatic disease noted. We once again noted that without surgery, we would eventually expect progression, but for now AI+HP is working well and can be continued as long as efficacy  and tolerance maintained. She returns today for follow up and next HP. She was seen in the ER on 6/17 for an episode of hypertension at home - normal today. HR at 48, around BL for patient based on review and asymptomatic currently. She denies any other new issues/concerns. Her diarrhea has been better, she did try one dose of the Questran, and it caused constipation so she did not try this again. She denies any issues with eating/drinking. Ongoing pain, previously had injections in her leg/back and wishes to have this done again. She denies any respiratory concerns. She denies any fevers or recent infections. Labs reviewed and adequate for treatment. Echo reviewed from 6/15/22 - EF 60-65%. Proceed with HP as planned, continue Arimidex daily. Next echo due 9/2022. Return in 3 weeks for HP alone, 6 weeks for OV.              TABATHA Jones  [facility]  [address], [address] [facility]  Office : [phone]  Fax : [phone]

## 2022-06-22 ENCOUNTER — OFFICE VISIT (OUTPATIENT)
Dept: ONCOLOGY | Age: 82
End: 2022-06-22
Payer: MEDICARE

## 2022-06-22 ENCOUNTER — HOSPITAL ENCOUNTER (OUTPATIENT)
Dept: INFUSION THERAPY | Age: 82
Discharge: HOME | End: 2022-06-22
Payer: MEDICARE

## 2022-06-22 ENCOUNTER — HOSPITAL ENCOUNTER (OUTPATIENT)
Dept: LAB | Age: 82
Discharge: HOME | End: 2022-06-25
Payer: MEDICARE

## 2022-06-22 VITALS
HEART RATE: 48 BPM | SYSTOLIC BLOOD PRESSURE: 148 MMHG | TEMPERATURE: 98.3 F | OXYGEN SATURATION: 92 % | DIASTOLIC BLOOD PRESSURE: 71 MMHG | RESPIRATION RATE: 18 BRPM

## 2022-06-22 VITALS — HEART RATE: 55 BPM | BODY MASS INDEX: 35.08 KG/M2 | WEIGHT: 198 LBS | HEIGHT: 63 IN

## 2022-06-22 DIAGNOSIS — C50.812 MALIGNANT NEOPLASM OF OVERLAPPING SITES OF LEFT BREAST IN FEMALE, ESTROGEN RECEPTOR POSITIVE (HCC): ICD-10-CM

## 2022-06-22 DIAGNOSIS — Z51.81 ENCOUNTER FOR MONITORING CARDIOTOXIC DRUG THERAPY: ICD-10-CM

## 2022-06-22 DIAGNOSIS — Z17.0 MALIGNANT NEOPLASM OF OVERLAPPING SITES OF LEFT BREAST IN FEMALE, ESTROGEN RECEPTOR POSITIVE (HCC): Primary | ICD-10-CM

## 2022-06-22 DIAGNOSIS — C50.812 MALIGNANT NEOPLASM OF OVERLAPPING SITES OF LEFT BREAST IN FEMALE, ESTROGEN RECEPTOR POSITIVE (HCC): Primary | ICD-10-CM

## 2022-06-22 DIAGNOSIS — Z17.0 MALIGNANT NEOPLASM OF OVERLAPPING SITES OF LEFT BREAST IN FEMALE, ESTROGEN RECEPTOR POSITIVE (HCC): ICD-10-CM

## 2022-06-22 DIAGNOSIS — Z79.899 ENCOUNTER FOR MONITORING CARDIOTOXIC DRUG THERAPY: ICD-10-CM

## 2022-06-22 LAB
ALBUMIN SERPL-MCNC: 3.6 G/DL (ref 3.2–4.6)
ALBUMIN/GLOB SERPL: 1 {RATIO} (ref 1.2–3.5)
ALP SERPL-CCNC: 115 U/L (ref 50–136)
ALT SERPL-CCNC: 20 U/L (ref 12–65)
ANION GAP SERPL CALC-SCNC: 6 MMOL/L (ref 7–16)
AST SERPL-CCNC: 16 U/L (ref 15–37)
BASOPHILS # BLD: 0 K/UL (ref 0–0.2)
BASOPHILS NFR BLD: 0 % (ref 0–2)
BILIRUB SERPL-MCNC: 0.9 MG/DL (ref 0.2–1.1)
BUN SERPL-MCNC: 6 MG/DL (ref 8–23)
CALCIUM SERPL-MCNC: 10 MG/DL (ref 8.3–10.4)
CHLORIDE SERPL-SCNC: 109 MMOL/L (ref 98–107)
CO2 SERPL-SCNC: 27 MMOL/L (ref 21–32)
CREAT SERPL-MCNC: 0.7 MG/DL (ref 0.6–1)
DIFFERENTIAL METHOD BLD: ABNORMAL
EOSINOPHIL # BLD: 0.1 K/UL (ref 0–0.8)
EOSINOPHIL NFR BLD: 2 % (ref 0.5–7.8)
ERYTHROCYTE [DISTWIDTH] IN BLOOD BY AUTOMATED COUNT: 13.9 % (ref 11.9–14.6)
GLOBULIN SER CALC-MCNC: 3.5 G/DL (ref 2.3–3.5)
GLUCOSE SERPL-MCNC: 99 MG/DL (ref 65–100)
HCT VFR BLD AUTO: 46.7 % (ref 35.8–46.3)
HGB BLD-MCNC: 14.7 G/DL (ref 11.7–15.4)
IMM GRANULOCYTES # BLD AUTO: 0 K/UL (ref 0–0.5)
IMM GRANULOCYTES NFR BLD AUTO: 0 % (ref 0–5)
LYMPHOCYTES # BLD: 2.1 K/UL (ref 0.5–4.6)
LYMPHOCYTES NFR BLD: 34 % (ref 13–44)
MAGNESIUM SERPL-MCNC: 2.1 MG/DL (ref 1.8–2.4)
MCH RBC QN AUTO: 28.7 PG (ref 26.1–32.9)
MCHC RBC AUTO-ENTMCNC: 31.5 G/DL (ref 31.4–35)
MCV RBC AUTO: 91 FL (ref 79.6–97.8)
MONOCYTES # BLD: 0.4 K/UL (ref 0.1–1.3)
MONOCYTES NFR BLD: 6 % (ref 4–12)
NEUTS SEG # BLD: 3.5 K/UL (ref 1.7–8.2)
NEUTS SEG NFR BLD: 58 % (ref 43–78)
NRBC # BLD: 0 K/UL (ref 0–0.2)
PLATELET # BLD AUTO: 188 K/UL (ref 150–450)
PMV BLD AUTO: 11.2 FL (ref 9.4–12.3)
POTASSIUM SERPL-SCNC: 3.9 MMOL/L (ref 3.5–5.1)
PROT SERPL-MCNC: 7.1 G/DL (ref 6.3–8.2)
RBC # BLD AUTO: 5.13 M/UL (ref 4.05–5.2)
SODIUM SERPL-SCNC: 142 MMOL/L (ref 136–145)
WBC # BLD AUTO: 6.1 K/UL (ref 4.3–11.1)

## 2022-06-22 PROCEDURE — G8427 DOCREV CUR MEDS BY ELIG CLIN: HCPCS | Performed by: NURSE PRACTITIONER

## 2022-06-22 PROCEDURE — G8400 PT W/DXA NO RESULTS DOC: HCPCS | Performed by: NURSE PRACTITIONER

## 2022-06-22 PROCEDURE — 2580000003 HC RX 258: Performed by: NURSE PRACTITIONER

## 2022-06-22 PROCEDURE — 6360000002 HC RX W HCPCS: Performed by: NURSE PRACTITIONER

## 2022-06-22 PROCEDURE — 1123F ACP DISCUSS/DSCN MKR DOCD: CPT | Performed by: NURSE PRACTITIONER

## 2022-06-22 PROCEDURE — 83735 ASSAY OF MAGNESIUM: CPT

## 2022-06-22 PROCEDURE — 99214 OFFICE O/P EST MOD 30 MIN: CPT | Performed by: NURSE PRACTITIONER

## 2022-06-22 PROCEDURE — 36415 COLL VENOUS BLD VENIPUNCTURE: CPT

## 2022-06-22 PROCEDURE — 1036F TOBACCO NON-USER: CPT | Performed by: NURSE PRACTITIONER

## 2022-06-22 PROCEDURE — 85025 COMPLETE CBC W/AUTO DIFF WBC: CPT

## 2022-06-22 PROCEDURE — 1090F PRES/ABSN URINE INCON ASSESS: CPT | Performed by: NURSE PRACTITIONER

## 2022-06-22 PROCEDURE — 96417 CHEMO IV INFUS EACH ADDL SEQ: CPT

## 2022-06-22 PROCEDURE — G8417 CALC BMI ABV UP PARAM F/U: HCPCS | Performed by: NURSE PRACTITIONER

## 2022-06-22 PROCEDURE — 80053 COMPREHEN METABOLIC PANEL: CPT

## 2022-06-22 PROCEDURE — 96413 CHEMO IV INFUSION 1 HR: CPT

## 2022-06-22 RX ORDER — DIPHENHYDRAMINE HYDROCHLORIDE 50 MG/ML
50 INJECTION INTRAMUSCULAR; INTRAVENOUS
Status: CANCELLED | OUTPATIENT
Start: 2022-07-13

## 2022-06-22 RX ORDER — ALBUTEROL SULFATE 90 UG/1
4 AEROSOL, METERED RESPIRATORY (INHALATION) PRN
Status: CANCELLED | OUTPATIENT
Start: 2022-06-22

## 2022-06-22 RX ORDER — ACETAMINOPHEN 325 MG/1
650 TABLET ORAL
Status: CANCELLED | OUTPATIENT
Start: 2022-07-13

## 2022-06-22 RX ORDER — ACETAMINOPHEN 325 MG/1
650 TABLET ORAL
Status: CANCELLED | OUTPATIENT
Start: 2022-06-22

## 2022-06-22 RX ORDER — ONDANSETRON 2 MG/ML
8 INJECTION INTRAMUSCULAR; INTRAVENOUS
Status: CANCELLED | OUTPATIENT
Start: 2022-07-13

## 2022-06-22 RX ORDER — SODIUM CHLORIDE 0.9 % (FLUSH) 0.9 %
5-40 SYRINGE (ML) INJECTION PRN
Status: CANCELLED | OUTPATIENT
Start: 2022-06-22

## 2022-06-22 RX ORDER — SODIUM CHLORIDE 9 MG/ML
5-250 INJECTION, SOLUTION INTRAVENOUS PRN
Status: CANCELLED | OUTPATIENT
Start: 2022-06-22

## 2022-06-22 RX ORDER — SODIUM CHLORIDE 9 MG/ML
INJECTION, SOLUTION INTRAVENOUS CONTINUOUS
Status: CANCELLED | OUTPATIENT
Start: 2022-07-13

## 2022-06-22 RX ORDER — MEPERIDINE HYDROCHLORIDE 50 MG/ML
12.5 INJECTION INTRAMUSCULAR; INTRAVENOUS; SUBCUTANEOUS PRN
Status: CANCELLED | OUTPATIENT
Start: 2022-07-13

## 2022-06-22 RX ORDER — FAMOTIDINE 10 MG/ML
20 INJECTION, SOLUTION INTRAVENOUS
Status: CANCELLED | OUTPATIENT
Start: 2022-06-22

## 2022-06-22 RX ORDER — SODIUM CHLORIDE 9 MG/ML
5-250 INJECTION, SOLUTION INTRAVENOUS PRN
Status: DISCONTINUED | OUTPATIENT
Start: 2022-06-22 | End: 2022-06-23 | Stop reason: HOSPADM

## 2022-06-22 RX ORDER — HEPARIN SODIUM (PORCINE) LOCK FLUSH IV SOLN 100 UNIT/ML 100 UNIT/ML
500 SOLUTION INTRAVENOUS PRN
Status: CANCELLED | OUTPATIENT
Start: 2022-07-13

## 2022-06-22 RX ORDER — DIPHENHYDRAMINE HYDROCHLORIDE 50 MG/ML
50 INJECTION INTRAMUSCULAR; INTRAVENOUS
Status: CANCELLED | OUTPATIENT
Start: 2022-06-22

## 2022-06-22 RX ORDER — SODIUM CHLORIDE 9 MG/ML
5-40 INJECTION INTRAVENOUS PRN
Status: CANCELLED | OUTPATIENT
Start: 2022-07-13

## 2022-06-22 RX ORDER — ONDANSETRON 2 MG/ML
8 INJECTION INTRAMUSCULAR; INTRAVENOUS
Status: CANCELLED | OUTPATIENT
Start: 2022-06-22

## 2022-06-22 RX ORDER — SODIUM CHLORIDE 9 MG/ML
INJECTION, SOLUTION INTRAVENOUS CONTINUOUS
Status: CANCELLED | OUTPATIENT
Start: 2022-06-22

## 2022-06-22 RX ORDER — SODIUM CHLORIDE 9 MG/ML
5-250 INJECTION, SOLUTION INTRAVENOUS PRN
Status: CANCELLED | OUTPATIENT
Start: 2022-07-13

## 2022-06-22 RX ORDER — EPINEPHRINE 1 MG/ML
0.3 INJECTION, SOLUTION, CONCENTRATE INTRAVENOUS PRN
Status: CANCELLED | OUTPATIENT
Start: 2022-06-22

## 2022-06-22 RX ORDER — ALBUTEROL SULFATE 90 UG/1
4 AEROSOL, METERED RESPIRATORY (INHALATION) PRN
Status: CANCELLED | OUTPATIENT
Start: 2022-07-13

## 2022-06-22 RX ORDER — SODIUM CHLORIDE 9 MG/ML
5-40 INJECTION INTRAVENOUS PRN
Status: CANCELLED | OUTPATIENT
Start: 2022-06-22

## 2022-06-22 RX ORDER — FAMOTIDINE 10 MG/ML
20 INJECTION, SOLUTION INTRAVENOUS
Status: CANCELLED | OUTPATIENT
Start: 2022-07-13

## 2022-06-22 RX ORDER — SODIUM CHLORIDE 0.9 % (FLUSH) 0.9 %
5-40 SYRINGE (ML) INJECTION PRN
Status: CANCELLED | OUTPATIENT
Start: 2022-07-13

## 2022-06-22 RX ORDER — EPINEPHRINE 1 MG/ML
0.3 INJECTION, SOLUTION, CONCENTRATE INTRAVENOUS PRN
Status: CANCELLED | OUTPATIENT
Start: 2022-07-13

## 2022-06-22 RX ORDER — MEPERIDINE HYDROCHLORIDE 50 MG/ML
12.5 INJECTION INTRAMUSCULAR; INTRAVENOUS; SUBCUTANEOUS PRN
Status: CANCELLED | OUTPATIENT
Start: 2022-06-22

## 2022-06-22 RX ORDER — HEPARIN SODIUM (PORCINE) LOCK FLUSH IV SOLN 100 UNIT/ML 100 UNIT/ML
500 SOLUTION INTRAVENOUS PRN
Status: CANCELLED | OUTPATIENT
Start: 2022-06-22

## 2022-06-22 RX ADMIN — PERTUZUMAB 420 MG: 30 INJECTION, SOLUTION, CONCENTRATE INTRAVENOUS at 13:55

## 2022-06-22 RX ADMIN — SODIUM CHLORIDE 100 ML/HR: 9 INJECTION, SOLUTION INTRAVENOUS at 12:30

## 2022-06-22 RX ADMIN — SODIUM CHLORIDE 546 MG: 9 INJECTION, SOLUTION INTRAVENOUS at 12:56

## 2022-06-22 NOTE — PATIENT INSTRUCTIONS
Patient Instructions from Today's Visit    Reason for Visit:  Follow up visit for breast cancer      Plan:    Continue Arimidex. Proceed to infusion.     Follow Up:  - as scheduled    Recent Lab Results:  Hospital Outpatient Visit on 06/22/2022   Component Date Value Ref Range Status    WBC 06/22/2022 6.1  4.3 - 11.1 K/uL Final    RBC 06/22/2022 5.13  4.05 - 5.2 M/uL Final    Hemoglobin 06/22/2022 14.7  11.7 - 15.4 g/dL Final    Hematocrit 06/22/2022 46.7* 35.8 - 46.3 % Final    MCV 06/22/2022 91.0  79.6 - 97.8 FL Final    MCH 06/22/2022 28.7  26.1 - 32.9 PG Final    MCHC 06/22/2022 31.5  31.4 - 35.0 g/dL Final    RDW 06/22/2022 13.9  11.9 - 14.6 % Final    Platelets 51/93/8195 188  150 - 450 K/uL Final    MPV 06/22/2022 11.2  9.4 - 12.3 FL Final    nRBC 06/22/2022 0.00  0.0 - 0.2 K/uL Final    **Note: Absolute NRBC parameter is now reported with Hemogram**    Differential Type 06/22/2022 AUTOMATED    Final    Seg Neutrophils 06/22/2022 58  43 - 78 % Final    Lymphocytes 06/22/2022 34  13 - 44 % Final    Monocytes 06/22/2022 6  4.0 - 12.0 % Final    Eosinophils % 06/22/2022 2  0.5 - 7.8 % Final    Basophils 06/22/2022 0  0.0 - 2.0 % Final    Immature Granulocytes 06/22/2022 0  0.0 - 5.0 % Final    Segs Absolute 06/22/2022 3.5  1.7 - 8.2 K/UL Final    Absolute Lymph # 06/22/2022 2.1  0.5 - 4.6 K/UL Final    Absolute Mono # 06/22/2022 0.4  0.1 - 1.3 K/UL Final    Absolute Eos # 06/22/2022 0.1  0.0 - 0.8 K/UL Final    Basophils Absolute 06/22/2022 0.0  0.0 - 0.2 K/UL Final    Absolute Immature Granulocyte 06/22/2022 0.0  0.0 - 0.5 K/UL Final    Sodium 06/22/2022 142  136 - 145 mmol/L Final    Potassium 06/22/2022 3.9  3.5 - 5.1 mmol/L Final    Chloride 06/22/2022 109* 98 - 107 mmol/L Final    CO2 06/22/2022 27  21 - 32 mmol/L Final    Anion Gap 06/22/2022 6* 7 - 16 mmol/L Final    Glucose 06/22/2022 99  65 - 100 mg/dL Final    BUN 06/22/2022 6* 8 - 23 MG/DL Final    CREATININE 06/22/2022 0.70  0.6 - 1.0 MG/DL Final    GFR  06/22/2022 >60  >60 ml/min/1.73m2 Final    GFR Non- 06/22/2022 >60  >60 ml/min/1.73m2 Final    Comment:      Estimated GFR is calculated using the Modification of Diet in Renal Disease (MDRD) Study equation, reported for both  Americans (GFRAA) and non- Americans (GFRNA), and normalized to 1.73m2 body surface area. The physician must decide which value applies to the patient. The MDRD study equation should only be used in individuals age 25 or older. It has not been validated for the following: pregnant women, patients with serious comorbid conditions,or on certain medications, or persons with extremes of body size, muscle mass, or nutritional status.  Calcium 06/22/2022 10.0  8.3 - 10.4 MG/DL Final    Total Bilirubin 06/22/2022 0.9  0.2 - 1.1 MG/DL Final    ALT 06/22/2022 20  12 - 65 U/L Final    AST 06/22/2022 16  15 - 37 U/L Final    Alk Phosphatase 06/22/2022 115  50 - 136 U/L Final    Total Protein 06/22/2022 7.1  6.3 - 8.2 g/dL Final    Albumin 06/22/2022 3.6  3.2 - 4.6 g/dL Final    Globulin 06/22/2022 3.5  2.3 - 3.5 g/dL Final    Albumin/Globulin Ratio 06/22/2022 1.0* 1.2 - 3.5   Final    Magnesium 06/22/2022 2.1  1.8 - 2.4 mg/dL Final       Treatment Summary has been discussed and given to patient: n/a        -------------------------------------------------------------------------------------------------------------------  Please call our office at [phone] if you have any  of the following symptoms:   · Fever of 100.5 or greater  · Chills  · Shortness of breath  · Swelling or pain in one leg    After office hours an answering service is available and will contact a provider for emergencies or if you are experiencing any of the above symptoms.  Patient did express an interest in My Chart.   My Chart log in information explained on the after visit summary printout at the check-out jose.    Aaron Valdez RN

## 2022-06-22 NOTE — PROGRESS NOTES
Arrived to the infusion center. Completed Trazimera and  Perjeta without signs of adverse reaction. No new issues or concerns voiced during the visit. Aware  Of her next appointment on 7/30 at 0930. Discharged via wheelchair in  satisfactory condition.

## 2022-06-27 DIAGNOSIS — F51.01 PRIMARY INSOMNIA: Primary | ICD-10-CM

## 2022-06-27 RX ORDER — TEMAZEPAM 30 MG/1
30 CAPSULE ORAL NIGHTLY PRN
Qty: 30 CAPSULE | Refills: 0 | Status: SHIPPED | OUTPATIENT
Start: 2022-06-27 | End: 2022-07-27

## 2022-06-27 NOTE — TELEPHONE ENCOUNTER
Patient requesting refill on Temazepam 30mg send to [facility] on [phone] in [place]. Please advise.

## 2022-06-29 DIAGNOSIS — R19.7 DIARRHEA, UNSPECIFIED: ICD-10-CM

## 2022-06-30 RX ORDER — CHOLESTYRAMINE 4 G/9G
POWDER, FOR SUSPENSION ORAL
Qty: 378 G | Refills: 1 | Status: SHIPPED | OUTPATIENT
Start: 2022-06-30

## 2022-07-01 ENCOUNTER — HOSPITAL ENCOUNTER (EMERGENCY)
Age: 82
Discharge: HOME | End: 2022-07-02
Attending: EMERGENCY MEDICINE
Payer: MEDICARE

## 2022-07-01 ENCOUNTER — HOSPITAL ENCOUNTER (EMERGENCY)
Dept: GENERAL RADIOLOGY | Age: 82
Discharge: HOME | End: 2022-07-04
Payer: MEDICARE

## 2022-07-01 ENCOUNTER — HOSPITAL ENCOUNTER (EMERGENCY)
Dept: CT IMAGING | Age: 82
End: 2022-07-01
Payer: MEDICARE

## 2022-07-01 VITALS
HEART RATE: 52 BPM | OXYGEN SATURATION: 94 % | BODY MASS INDEX: 35.07 KG/M2 | TEMPERATURE: 97.8 F | SYSTOLIC BLOOD PRESSURE: 151 MMHG | HEIGHT: 63 IN | RESPIRATION RATE: 20 BRPM | DIASTOLIC BLOOD PRESSURE: 77 MMHG

## 2022-07-01 DIAGNOSIS — R03.0 ELEVATED BLOOD PRESSURE READING: Primary | ICD-10-CM

## 2022-07-01 DIAGNOSIS — M25.50 ARTHRALGIA, UNSPECIFIED JOINT: ICD-10-CM

## 2022-07-01 LAB
ALBUMIN SERPL-MCNC: 2.9 G/DL (ref 3.2–4.6)
ALBUMIN/GLOB SERPL: 0.7 {RATIO} (ref 1.2–3.5)
ALP SERPL-CCNC: 112 U/L (ref 50–136)
ALT SERPL-CCNC: 21 U/L (ref 12–65)
ANION GAP SERPL CALC-SCNC: 6 MMOL/L (ref 7–16)
AST SERPL-CCNC: 60 U/L (ref 15–37)
BASOPHILS # BLD: 0 K/UL (ref 0–0.2)
BASOPHILS NFR BLD: 0 % (ref 0–2)
BILIRUB SERPL-MCNC: 1.2 MG/DL (ref 0.2–1.1)
BUN SERPL-MCNC: 9 MG/DL (ref 8–23)
CALCIUM SERPL-MCNC: 9.6 MG/DL (ref 8.3–10.4)
CHLORIDE SERPL-SCNC: 111 MMOL/L (ref 98–107)
CO2 SERPL-SCNC: 23 MMOL/L (ref 21–32)
CREAT SERPL-MCNC: 0.7 MG/DL (ref 0.6–1)
DIFFERENTIAL METHOD BLD: ABNORMAL
EKG ATRIAL RATE: 53 BPM
EKG DIAGNOSIS: NORMAL
EKG P AXIS: 54 DEGREES
EKG P-R INTERVAL: 166 MS
EKG Q-T INTERVAL: 464 MS
EKG QRS DURATION: 92 MS
EKG QTC CALCULATION (BAZETT): 435 MS
EKG R AXIS: -7 DEGREES
EKG T AXIS: 90 DEGREES
EKG VENTRICULAR RATE: 53 BPM
EOSINOPHIL # BLD: 0 K/UL (ref 0–0.8)
EOSINOPHIL NFR BLD: 0 % (ref 0.5–7.8)
ERYTHROCYTE [DISTWIDTH] IN BLOOD BY AUTOMATED COUNT: 14.3 % (ref 11.9–14.6)
GLOBULIN SER CALC-MCNC: 4.3 G/DL (ref 2.3–3.5)
GLUCOSE SERPL-MCNC: 94 MG/DL (ref 65–100)
HCT VFR BLD AUTO: 46.9 % (ref 35.8–46.3)
HGB BLD-MCNC: 14.7 G/DL (ref 11.7–15.4)
IMM GRANULOCYTES # BLD AUTO: 0 K/UL (ref 0–0.5)
IMM GRANULOCYTES NFR BLD AUTO: 0 % (ref 0–5)
LYMPHOCYTES # BLD: 2.5 K/UL (ref 0.5–4.6)
LYMPHOCYTES NFR BLD: 34 % (ref 13–44)
MCH RBC QN AUTO: 28.5 PG (ref 26.1–32.9)
MCHC RBC AUTO-ENTMCNC: 31.3 G/DL (ref 31.4–35)
MCV RBC AUTO: 91.1 FL (ref 79.6–97.8)
MONOCYTES # BLD: 0.5 K/UL (ref 0.1–1.3)
MONOCYTES NFR BLD: 7 % (ref 4–12)
NEUTS SEG # BLD: 4.2 K/UL (ref 1.7–8.2)
NEUTS SEG NFR BLD: 59 % (ref 43–78)
NRBC # BLD: 0 K/UL (ref 0–0.2)
PLATELET # BLD AUTO: 189 K/UL (ref 150–450)
PMV BLD AUTO: 11 FL (ref 9.4–12.3)
POTASSIUM SERPL-SCNC: 7.3 MMOL/L (ref 3.5–5.1)
PROT SERPL-MCNC: 7.2 G/DL (ref 6.3–8.2)
RBC # BLD AUTO: 5.15 M/UL (ref 4.05–5.2)
SODIUM SERPL-SCNC: 140 MMOL/L (ref 136–145)
WBC # BLD AUTO: 7.1 K/UL (ref 4.3–11.1)

## 2022-07-01 PROCEDURE — 80053 COMPREHEN METABOLIC PANEL: CPT

## 2022-07-01 PROCEDURE — 71045 X-RAY EXAM CHEST 1 VIEW: CPT

## 2022-07-01 PROCEDURE — 85025 COMPLETE CBC W/AUTO DIFF WBC: CPT

## 2022-07-01 PROCEDURE — 99285 EMERGENCY DEPT VISIT HI MDM: CPT

## 2022-07-01 ASSESSMENT — PAIN - FUNCTIONAL ASSESSMENT: PAIN_FUNCTIONAL_ASSESSMENT: NONE - DENIES PAIN

## 2022-07-02 ENCOUNTER — APPOINTMENT (OUTPATIENT)
Dept: CT IMAGING | Age: 82
End: 2022-07-02
Payer: MEDICARE

## 2022-07-02 LAB
ANION GAP SERPL CALC-SCNC: 5 MMOL/L (ref 7–16)
APPEARANCE UR: CLEAR
BACTERIA URNS QL MICRO: NEGATIVE /HPF
BILIRUB UR QL: NEGATIVE
BILIRUB UR QL: NEGATIVE
BUN SERPL-MCNC: 9 MG/DL (ref 8–23)
CALCIUM SERPL-MCNC: 9.3 MG/DL (ref 8.3–10.4)
CASTS URNS QL MICRO: ABNORMAL /LPF
CHLORIDE SERPL-SCNC: 112 MMOL/L (ref 98–107)
CO2 SERPL-SCNC: 26 MMOL/L (ref 21–32)
COLOR UR: ABNORMAL
CREAT SERPL-MCNC: 0.7 MG/DL (ref 0.6–1)
EPI CELLS #/AREA URNS HPF: ABNORMAL /HPF
GLUCOSE SERPL-MCNC: 94 MG/DL (ref 65–100)
GLUCOSE UR QL STRIP.AUTO: NEGATIVE MG/DL
GLUCOSE UR STRIP.AUTO-MCNC: NEGATIVE MG/DL
HGB UR QL STRIP: NEGATIVE
KETONES UR QL STRIP.AUTO: NEGATIVE MG/DL
KETONES UR-MCNC: NEGATIVE MG/DL
LEUKOCYTE ESTERASE UR QL STRIP.AUTO: ABNORMAL
LEUKOCYTE ESTERASE UR QL STRIP: ABNORMAL
NITRITE UR QL STRIP.AUTO: NEGATIVE
NITRITE UR QL: NEGATIVE
PH UR STRIP: 7.5 [PH] (ref 5–9)
PH UR: 7 [PH] (ref 5–9)
POTASSIUM SERPL-SCNC: 5.1 MMOL/L (ref 3.5–5.1)
PROT UR QL: NEGATIVE MG/DL
PROT UR STRIP-MCNC: NEGATIVE MG/DL
RBC # UR STRIP: NEGATIVE /UL
RBC #/AREA URNS HPF: ABNORMAL /HPF
SERVICE CMNT-IMP: ABNORMAL
SODIUM SERPL-SCNC: 143 MMOL/L (ref 136–145)
SP GR UR REFRACTOMETRY: 1 (ref 1–1.02)
SP GR UR: 1.01 (ref 1–1.02)
UROBILINOGEN UR QL STRIP.AUTO: 0.2 EU/DL (ref 0.2–1)
UROBILINOGEN UR QL: 0.2 EU/DL (ref 0.2–1)
WBC URNS QL MICRO: ABNORMAL /HPF

## 2022-07-02 PROCEDURE — 81003 URINALYSIS AUTO W/O SCOPE: CPT

## 2022-07-02 PROCEDURE — 81001 URINALYSIS AUTO W/SCOPE: CPT

## 2022-07-02 ASSESSMENT — ENCOUNTER SYMPTOMS
BACK PAIN: 0
SINUS PAIN: 0
EYE REDNESS: 0
WHEEZING: 0
ABDOMINAL PAIN: 0
TROUBLE SWALLOWING: 0
NAUSEA: 1
COUGH: 0
DIARRHEA: 0
VOMITING: 0
CONSTIPATION: 0
EYE ITCHING: 0
EYE PAIN: 0
SHORTNESS OF BREATH: 0

## 2022-07-02 NOTE — ED NOTES
I have reviewed discharge instructions with the patient. The patient verbalized understanding. Patient left ED via Discharge Method: stretcher to Home with [facility]    Opportunity for questions and clarification provided. Patient given 0 scripts. To continue your aftercare when you leave the hospital, you may receive an automated call from our care team to check in on how you are doing. This is a free service and part of our promise to provide the best care and service to meet your aftercare needs.  If you have questions, or wish to unsubscribe from this service please call [phone]. Thank you for Choosing our [facility] Emergency Department.         Chanelle Blake RN  07/02/22 1655

## 2022-07-02 NOTE — ED NOTES
Pt states that she does not want the CT scan of her head.  Pt states that she does not want to have a full work up but wants to know why she has some dizziness that has been on and off     Peter'VICKY garcia  07/01/22 5584

## 2022-07-02 NOTE — ED TRIAGE NOTES
Patient is an 67-year-old female who presents with dizziness. At this point time she says her dizziness has resolved when she sits still in the wheelchair, but it returned when she moves at all. Denies any other acute complaints. Well-appearing. No acute distress. Lungs clear to auscultate bilaterally. Patient evaluated initially in triage. Rapid Medical Evaluation was conducted and necessary orders have been placed. I have performed a medical screening exam. Care will now be transferred to the provider in the back of the emergency department.  CHELSEA John 9:54 PM

## 2022-07-02 NOTE — ED NOTES
Pt assisted to bathroom via wheelchair. Urine collected.  Pt given meal tray per MD Dylan Petreson, RN  07/02/22 9130

## 2022-07-02 NOTE — ED PROVIDER NOTES
[facility] Emergency Department Provider Note                   PCP:                None Provider               Age: 81 y.o. Sex: female       ICD-10-CM    1. Elevated blood pressure reading  R03.0    2. Arthralgia, unspecified joint  M25.50        DISPOSITION         Discharge Medication List as of 7/2/2022  1:14 AM          Orders Placed This Encounter   Procedures    XR CHEST PORTABLE    CBC with Auto Differential    Comprehensive Metabolic Panel    Basic Metabolic Panel    Urinalysis w rflx microscopic    Cardiac Monitor - ED Only    Continuous Pulse Oximetry    Orthostatic blood pressure and pulse    POCT Urine Dipstick    POCT Urinalysis no Micro    EKG 12 Lead        Janie Spencer MD 2:33 AM      MDM  Number of Diagnoses or Management Options  Arthralgia, unspecified joint: established, worsening  Elevated blood pressure reading: established, improving  Diagnosis management comments: 81-year-old female patient with concerns over hypertension  EMS did find the patient's blood pressures initially be quite high. They resolve spontaneously and currently her blood pressure is in the 1 01-8 50 systolic range. Initial EKG did not reveal evidence of an acute change. Will check labs and monitor  Patient reports that she is hungry. I will go ahead and let her have some food       Amount and/or Complexity of Data Reviewed  Clinical lab tests: reviewed and ordered  Tests in the medicine section of CPT®: ordered and reviewed  Decide to obtain previous medical records or to obtain history from someone other than the patient: yes  Review and summarize past medical records: yes  Independent visualization of images, tracings, or specimens: yes    Risk of Complications, Morbidity, and/or Mortality  Presenting problems: moderate  Diagnostic procedures: moderate  Management options: moderate  General comments: Elements of this note have been dictated via voice recognition software.   Text and phrases may be limited by the accuracy of the software. The chart has been reviewed, but errors may still be present. Patient Progress  Patient progress: improved       Marc Hanna is a 81 y.o. female who presents to the Emergency Department with chief complaint of    Chief Complaint   Patient presents with    Dizziness    Hypertension      81-year-old female patient presents today with concerns over hypertension  Patient states that she was taking her blood pressure noted to be very high and called EMS  Patient proceeded to ask me \"how to read\" the blood pressure numbers, so not really sure why she had concern about the readings she was looking up but not really being able to interpret. She did complain of feeling some mild lightheadedness. She had no syncope no nausea or vomiting  Patient states that her mobility is fairly limited and she is basically confined to her own home. She reports being compliant with all her medications  She is concerned that the gravy biscuit she had this morning at [address] [place] [address] Road may have caused her blood pressure to run up    The history is provided by the patient and the EMS personnel. Hypertension  Severity:  Moderate  Onset quality:  Unable to specify  Timing:  Intermittent  Progression:  Partially resolved  Chronicity:  Chronic  Context: normal sodium, not herbal remedies, not medication change and not noncompliance    Relieved by:  None tried  Worsened by:  Nothing  Ineffective treatments:  None tried  Associated symptoms: fatigue and nausea    Associated symptoms: no abdominal pain, no anxiety, no chest pain, no confusion, no dizziness, no ear pain, no fever, no headaches, no hematuria, no loss of consciousness, no palpitations, no shortness of breath and not vomiting        All other systems reviewed and are negative. Review of Systems   Constitutional: Positive for fatigue. Negative for chills and fever.    HENT: Negative for ear pain, hearing loss, sinus pain, sneezing and trouble swallowing. Eyes: Negative for pain, redness and itching. Respiratory: Negative for cough, shortness of breath and wheezing. Cardiovascular: Negative for chest pain and palpitations. Gastrointestinal: Positive for nausea. Negative for abdominal pain, constipation, diarrhea and vomiting. Endocrine: Negative for polydipsia, polyphagia and polyuria. Genitourinary: Negative for dysuria, flank pain, frequency and hematuria. Musculoskeletal: Positive for arthralgias and gait problem. Negative for back pain and myalgias. Skin: Negative for rash and wound. Allergic/Immunologic: Negative for food allergies and immunocompromised state. Neurological: Negative for dizziness, loss of consciousness, syncope, speech difficulty, light-headedness and headaches. Hematological: Negative for adenopathy. Does not bruise/bleed easily. Psychiatric/Behavioral: Negative for confusion, dysphoric mood and self-injury. The patient is not nervous/anxious. All other systems reviewed and are negative.       Past Medical History:   Diagnosis Date    Arthritis     Asthma     [facility] ([facility] [address]     Chronic kidney disease     Ear problems     Gastrointestinal disorder     acid reflux, hernia    Hyperlipidemia     Hypertension     Insomnia     Other ill-defined conditions(799.89)     blood clot on lung    Stroke ([facility] [address]     Thyroid disease     Tinnitus     Vitamin D deficiency         Past Surgical History:   Procedure Laterality Date    APPENDECTOMY      BREAST BIOPSY Left 04/07/2021    u/s x 3    GYN  71,73    c-sec    HEENT      t & a    OTHER SURGICAL HISTORY      fatty tissue removed from under arm    UT APPENDECTOMY      US BREAST BIOPSY NEEDLE ADDITIONAL LEFT Left 4/7/2021    US BREAST BIOPSY NEEDLE ADDITIONAL LEFT 4/7/2021 [facility]    US BREAST NEEDLE BIOPSY LEFT Left 4/7/2021    US BREAST NEEDLE BIOPSY LEFT 4/7/2021 [facility]    US LYMPH NODE BIOPSY  4/7/2021    US LYMPH NODE BIOPSY 4/7/2021 [facility]        Family History   Problem Relation Age of Onset    No Known Problems Mother     No Known Problems Father     No Known Problems Maternal Grandmother     No Known Problems Maternal Grandfather     No Known Problems Paternal Grandmother     No Known Problems Paternal Grandfather     Breast Cancer Neg Hx            Social Connections:     Frequency of Communication with Friends and Family: Not on file    Frequency of Social Gatherings with Friends and Family: Not on file    Attends Sabianist Services: Not on file    Active Member of [address] [place] [address] [employer] or Organizations: Not on file    Attends Club or Organization Meetings: Not on file    Marital Status: Not on file        Allergies   Allergen Reactions    Aspirin Other (See Comments)    Ciprofloxacin Other (See Comments)    Doxycycline Calcium Other (See Comments)    Fluconazole Other (See Comments)    Ibuprofen Other (See Comments)    Metformin Other (See Comments)    Sulfa Antibiotics Hives and Other (See Comments)    Sulfabenzamide Other (See Comments)    Benzonatate Rash    Gabapentin Anxiety        Vitals signs and nursing note reviewed. Patient Vitals for the past 4 hrs:   BP   07/01/22 2242 (!) 151/77          Physical Exam  Vitals and nursing note reviewed. Constitutional:       General: She is in acute distress. Appearance: Normal appearance. She is obese. HENT:      Head: Normocephalic and atraumatic. Nose: Nose normal.      Mouth/Throat:      Mouth: Mucous membranes are moist.      Pharynx: Oropharynx is clear. Eyes:      General: No scleral icterus. Extraocular Movements: Extraocular movements intact. Conjunctiva/sclera: Conjunctivae normal.      Pupils: Pupils are equal, round, and reactive to light. Cardiovascular:      Rate and Rhythm: Regular rhythm. Bradycardia present. Pulses: Normal pulses. Heart sounds: Normal heart sounds.    Pulmonary:      Effort: Pulmonary effort is normal. No respiratory distress. Breath sounds: Normal breath sounds. Abdominal:      General: Abdomen is flat. Bowel sounds are normal. There is no distension. Palpations: Abdomen is soft. There is no mass. Tenderness: There is no abdominal tenderness. Musculoskeletal:         General: No deformity or signs of injury. Normal range of motion. Cervical back: Normal range of motion and neck supple. Skin:     General: Skin is warm and dry. Capillary Refill: Capillary refill takes less than 2 seconds. Neurological:      General: No focal deficit present. Mental Status: She is alert and oriented to person, place, and time. Psychiatric:         Mood and Affect: Mood normal.         Behavior: Behavior normal.         Thought Content:  Thought content normal.         Judgment: Judgment normal.          EKG 12 Lead    Date/Time: 7/1/2022 11:39 PM  Performed by: Georges Castro MD  Authorized by: Georges Castro MD     ECG reviewed by ED Physician in the absence of a cardiologist: yes    Interpretation:     Interpretation: non-specific    Rate:     ECG rate assessment: normal    Rhythm:     Rhythm: sinus bradycardia    Ectopy:     Ectopy: none    QRS:     QRS axis:  Normal    QRS intervals:  Normal  Conduction:     Conduction: normal    ST segments:     ST segments:  Normal  T waves:     T waves: normal    Comments:      No acute ischemic changes        E  Labs Reviewed   CBC WITH AUTO DIFFERENTIAL - Abnormal; Notable for the following components:       Result Value    Hematocrit 46.9 (*)     MCHC 31.3 (*)     Eosinophils % 0 (*)     All other components within normal limits   COMPREHENSIVE METABOLIC PANEL - Abnormal; Notable for the following components:    Potassium 7.3 (*)     Chloride 111 (*)     Anion Gap 6 (*)     Total Bilirubin 1.2 (*)     AST 60 (*)     Albumin 2.9 (*)     Globulin 4.3 (*)     Albumin/Globulin Ratio 0.7 (*)     All other components within normal limits   BASIC METABOLIC PANEL - Abnormal; Notable for the following components:    Chloride 112 (*)     Anion Gap 5 (*)     All other components within normal limits   URINALYSIS - Abnormal; Notable for the following components:    Leukocyte Esterase, Urine SMALL (*)     WBC, UA 5-10 (*)     RBC, UA 0-5 (*)     Epithelial Cells UA 0-5 (*)     BACTERIA, URINE Negative (*)     Casts 0-2 (*)     All other components within normal limits   POCT URINALYSIS DIPSTICK - Abnormal; Notable for the following components:    Leukocyte Est, UA POC SMALL (*)     All other components within normal limits        XR CHEST PORTABLE   Final Result   1. Emphysematous lungs without a focal consolidation evident. NIH Stroke Scale  Interval: Baseline  Level of Consciousness (1a): Alert  LOC Questions (1b): Answers both correctly  LOC Commands (1c): Performs both tasks correctly  Best Gaze (2): Normal  Visual (3): No visual loss  Facial Palsy (4): Normal symmetrical movement  Motor Arm, Left (5a): No drift  Motor Arm, Right (5b): No drift  Motor Leg, Left (6a): No drift  Motor Leg, Right (6b): No drift  Sensory (8): Normal  Best Language (9): No aphasia  Dysarthria (10): Normal  Extinction and Inattention (11): No abnormality                   Voice dictation software was used during the making of this note. This software is not perfect and grammatical and other typographical errors may be present. This note has not been completely proofread for errors.      Armani Gasca MD  07/02/22 2648

## 2022-07-02 NOTE — ED NOTES
Lab called to place labs in process     Grabiel Gunn, [facility]  07/01/22 8581

## 2022-07-02 NOTE — ED TRIAGE NOTES
Pt arrives via [facility] from home. 210/108 BP hx of HTN and hypothyroidism. Called out for dizziness but has since resolved.  No CP or SHOB, n/v/d.

## 2022-07-05 ENCOUNTER — TELEPHONE (OUTPATIENT)
Dept: FAMILY MEDICINE CLINIC | Facility: CLINIC | Age: 82
End: 2022-07-05

## 2022-07-05 ENCOUNTER — OFFICE VISIT (OUTPATIENT)
Dept: FAMILY MEDICINE CLINIC | Facility: CLINIC | Age: 82
End: 2022-07-05
Payer: MEDICARE

## 2022-07-05 VITALS
SYSTOLIC BLOOD PRESSURE: 152 MMHG | BODY MASS INDEX: 35.07 KG/M2 | TEMPERATURE: 97.9 F | HEART RATE: 94 BPM | OXYGEN SATURATION: 97 % | HEIGHT: 63 IN | DIASTOLIC BLOOD PRESSURE: 80 MMHG

## 2022-07-05 DIAGNOSIS — H91.91 HEARING DIFFICULTY OF RIGHT EAR: ICD-10-CM

## 2022-07-05 DIAGNOSIS — I10 BENIGN ESSENTIAL HTN: ICD-10-CM

## 2022-07-05 DIAGNOSIS — E03.9 ACQUIRED HYPOTHYROIDISM: Primary | ICD-10-CM

## 2022-07-05 DIAGNOSIS — H92.01 RIGHT EAR PAIN: ICD-10-CM

## 2022-07-05 LAB
T4 FREE SERPL-MCNC: 1.5 NG/DL (ref 0.78–1.46)
TSH, 3RD GENERATION: 1.9 UIU/ML (ref 0.36–3.74)

## 2022-07-05 PROCEDURE — 1123F ACP DISCUSS/DSCN MKR DOCD: CPT | Performed by: PHYSICIAN ASSISTANT

## 2022-07-05 PROCEDURE — 1036F TOBACCO NON-USER: CPT | Performed by: PHYSICIAN ASSISTANT

## 2022-07-05 PROCEDURE — G8427 DOCREV CUR MEDS BY ELIG CLIN: HCPCS | Performed by: PHYSICIAN ASSISTANT

## 2022-07-05 PROCEDURE — G8400 PT W/DXA NO RESULTS DOC: HCPCS | Performed by: PHYSICIAN ASSISTANT

## 2022-07-05 PROCEDURE — 99214 OFFICE O/P EST MOD 30 MIN: CPT | Performed by: PHYSICIAN ASSISTANT

## 2022-07-05 PROCEDURE — 1090F PRES/ABSN URINE INCON ASSESS: CPT | Performed by: PHYSICIAN ASSISTANT

## 2022-07-05 PROCEDURE — G8417 CALC BMI ABV UP PARAM F/U: HCPCS | Performed by: PHYSICIAN ASSISTANT

## 2022-07-05 RX ORDER — LISINOPRIL 20 MG/1
20 TABLET ORAL DAILY
Qty: 30 TABLET | Refills: 1 | Status: SHIPPED | OUTPATIENT
Start: 2022-07-05 | End: 2022-10-26 | Stop reason: SDUPTHER

## 2022-07-05 SDOH — ECONOMIC STABILITY: FOOD INSECURITY: WITHIN THE PAST 12 MONTHS, THE FOOD YOU BOUGHT JUST DIDN'T LAST AND YOU DIDN'T HAVE MONEY TO GET MORE.: NEVER TRUE

## 2022-07-05 SDOH — ECONOMIC STABILITY: FOOD INSECURITY: WITHIN THE PAST 12 MONTHS, YOU WORRIED THAT YOUR FOOD WOULD RUN OUT BEFORE YOU GOT MONEY TO BUY MORE.: NEVER TRUE

## 2022-07-05 ASSESSMENT — PATIENT HEALTH QUESTIONNAIRE - PHQ9
1. LITTLE INTEREST OR PLEASURE IN DOING THINGS: 0
SUM OF ALL RESPONSES TO PHQ QUESTIONS 1-9: 0

## 2022-07-05 ASSESSMENT — ANXIETY QUESTIONNAIRES
6. BECOMING EASILY ANNOYED OR IRRITABLE: 0
3. WORRYING TOO MUCH ABOUT DIFFERENT THINGS: 0
GAD7 TOTAL SCORE: 0
4. TROUBLE RELAXING: 0
2. NOT BEING ABLE TO STOP OR CONTROL WORRYING: 0
1. FEELING NERVOUS, ANXIOUS, OR ON EDGE: 0
5. BEING SO RESTLESS THAT IT IS HARD TO SIT STILL: 0
7. FEELING AFRAID AS IF SOMETHING AWFUL MIGHT HAPPEN: 0

## 2022-07-05 ASSESSMENT — SOCIAL DETERMINANTS OF HEALTH (SDOH): HOW HARD IS IT FOR YOU TO PAY FOR THE VERY BASICS LIKE FOOD, HOUSING, MEDICAL CARE, AND HEATING?: NOT HARD AT ALL

## 2022-07-05 NOTE — PROGRESS NOTES
Patient: Tristan Allen  YOB: 1940  Patient Age 81 y.o. Patient sex: female  Medical Record:  [id number]  Visit Date:7/5/2022   Author:  Ronnell Childs [facility] Note     Chief complaint  Tristan Allen  is a 81 y.o. female who was seen on 07/05/22  10:49 AM  for the following reasons:    Chief Complaint   Patient presents with    Follow-up    Hypertension    Otalgia     right ear       Current Medical problems addressed    Ms. Marilee Aguillon is an 72-year-old female with a history of hypertension, cardiomegaly megaly, bradycardia, COPD, IBS, sepsis esophageal dysphagia, reflux, diabetes type 2, hypothyroidism, osteoarthritis, mixed incontinence, insomnia, vitamin D deficiency, mixed hyperlipidemia, hypokalemia, obesity with BMI of 40 at last visit and history of left breast cancer with estrogen receptor positive diagnosed on 5/4/2021, snuff user. .  Last visit patient was seen on 4/28/2022 at that time her blood pressure was 130/80 but patient recently went to the hospital on 7/1/2022. Started feeling dizzy. Diagnosed with elevated blood pressure /77 EKG was sounds normal patient was advised to follow-up for blood pressure recheck patient is currently treated with lisinopril 10 mg and blood pressure today is elevated. She reports she was on 25mg before when she saw Dr Burgess and had been reduced on her meds to 10mg and been having elevated bp at home  Denies chest pain or shortness of breath. She notes right ear has been hurtting for several weeks  She notes its been hard to hear for some time as well. Hypothyroid she has been feeiling stable with that but hasn't had labs. Past Medical History: Allergies:   Allergies   Allergen Reactions    Aspirin Other (See Comments)    Ciprofloxacin Other (See Comments)    Doxycycline Calcium Other (See Comments)    Fluconazole Other (See Comments)    Ibuprofen Other (See Comments)    Metformin Other (See Comments)    Sulfa Antibiotics Hives and Other (See Comments)    Sulfabenzamide Other (See Comments)    Benzonatate Rash    Gabapentin Anxiety       Current Medications:   Medications marked \"taking\" at this time:  Current Outpatient Medications   Medication Sig Dispense Refill    cholestyramine (QUESTRAN) 4 GM/DOSE powder TAKE 4 G BY MOUTH THREE (3) TIMES DAILY (WITH MEALS) FOR 30 DAYS. 378 g 1    temazepam (RESTORIL) 30 MG capsule Take 1 capsule by mouth nightly as needed for Sleep for up to 30 days. 30 capsule 0    nystatin (MYCOSTATIN) 327713 UNIT/GM powder Apply topically 4 times daily 60 g 5    ipratropium (ATROVENT) 0.06 % nasal spray 2 sprays by Nasal route 4 times daily INSTILL 2 SPRAYS IN EACH NOSTRIL 4 TIMES A DAY 15 mL 2    anastrozole (ARIMIDEX) 1 MG tablet Take 1 mg by mouth daily      atorvastatin (LIPITOR) 20 MG tablet Take 20 mg by mouth daily      levothyroxine (SYNTHROID) 112 MCG tablet Take 112 mcg by mouth every morning (before breakfast)      lisinopril (PRINIVIL;ZESTRIL) 10 MG tablet Take 10 mg by mouth daily      naftifine (NAFTIN) 1 % cream Apply topically daily      SITagliptin (JANUVIA) 100 MG tablet TAKE 1 TABLET BY MOUTH EVERY DAY       No current facility-administered medications for this visit.         Current Problem List:   Patient Active Problem List   Diagnosis    Mixed incontinence    Insomnia    Vitamin D deficiency    Diabetes mellitus type 2, controlled ([facility] [address]    Pleuritic chest pain    Mixed hyperlipidemia    Urge incontinence of urine    Allergic rhinitis    Cardiomegaly    Asthma    Primary osteoarthritis involving multiple joints    Nocturnal hypoxia    Shortness of breath    Hypokalemia    History of abnormal mammogram    Congenital hypothyroidism without goiter    IBS (irritable bowel syndrome)    Benign essential HTN    Chronic pelvic pain in female    Medication nonadherence due to psychosocial problem    BMI 40.0-44.9, adult (HCC)    Bradycardia    Esophageal dysphagia    Malignant neoplasm of overlapping sites of left breast in female, estrogen receptor positive ([address]    COVID-19 virus infection    Dehydration    Diabetes mellitus type 2 with neurological manifestations ([address]    Chronic generalized abdominal pain    Poor mobility    Fecal incontinence    Gastroesophageal reflux disease without esophagitis    Chronic pain of left knee    Primary osteoarthritis of right knee    Nausea and vomiting    Chronic obstructive pulmonary disease, unspecified COPD type (HCC)    Tonsil symptom    Oropharyngeal dysphagia    Acquired hypothyroidism    Snuff user    Tinnitus, right       Social History:   Social History     Tobacco Use    Smoking status: Never Smoker    Smokeless tobacco: Never Used   Substance Use Topics    Alcohol use: No       Family History:   Family History   Problem Relation Age of Onset    No Known Problems Mother     No Known Problems Father     No Known Problems Maternal Grandmother     No Known Problems Maternal Grandfather     No Known Problems Paternal Grandmother     No Known Problems Paternal Grandfather     Breast Cancer Neg Hx        Surgical History:  Past Surgical History:   Procedure Laterality Date    APPENDECTOMY      BREAST BIOPSY Left 04/07/2021    u/s x 3    GYN  [age]    c-sec    HEENT      t & a    OTHER SURGICAL HISTORY      fatty tissue removed from under arm    ME APPENDECTOMY      US BREAST BIOPSY NEEDLE ADDITIONAL LEFT Left 4/7/2021    US BREAST BIOPSY NEEDLE ADDITIONAL LEFT 4/7/2021 [facility]    US BREAST NEEDLE BIOPSY LEFT Left 4/7/2021    US BREAST NEEDLE BIOPSY LEFT 4/7/2021 [facility]    US LYMPH NODE BIOPSY  4/7/2021    US LYMPH NODE BIOPSY 4/7/2021 [facility]       ROS  Review of Systems   Constitutional: Negative for fever. HENT: Positive for ear pain. Respiratory: Negative for shortness of breath. Cardiovascular: Negative for chest pain. Expiration Date:   7/5/2023    TSH    T4, Free    [facility], [place]     Referral Priority:   Routine     Referral Type:   Eval and Treat     Referral Reason:   Specialty Services Required     Requested Specialty:   Otolaryngology     Number of Visits Requested:   1     I have reviewed the patient's past medical history, social history and family history and vitals. We have discussed treatment plan and follow up and given patient instructions. Patient's questions are answered and we will follow up as indicated. There are no Patient Instructions on file for this visit. I have reviewed the patient's past medical history, social history and family history and vitals. We have discussed treatment plan and follow up and given patient instructions. Patient's questions are answered and we will follow up as indicated. No follow-ups on file. Nereyda Spear PA-C  10:49 AM  7/5/2022

## 2022-07-05 NOTE — TELEPHONE ENCOUNTER
Please Change her Pharmacy to the  [facility] todays meds was transferred over please change for next time

## 2022-07-11 ASSESSMENT — ENCOUNTER SYMPTOMS
VOMITING: 0
NAUSEA: 0
SHORTNESS OF BREATH: 0

## 2022-07-12 ENCOUNTER — TELEPHONE (OUTPATIENT)
Dept: ONCOLOGY | Age: 82
End: 2022-07-12

## 2022-07-12 NOTE — TELEPHONE ENCOUNTER
SW received an incoming call from the pt asking for assistance with transportation for her appointment on 7/13/22. SW is unable to accommodate this trip. Pt is not able to leave her home without assistance so she can not utilize roundtrip. Pt can not utilize [employer] without a 3 day notice. Pt states that her daughter is trying to take leave from work  To accommodate her appointment. Pt asked sw not to have the appointment cancelled.

## 2022-07-13 ENCOUNTER — HOSPITAL ENCOUNTER (OUTPATIENT)
Dept: INFUSION THERAPY | Age: 82
Discharge: HOME | End: 2022-07-13
Payer: MEDICARE

## 2022-07-13 VITALS
OXYGEN SATURATION: 97 % | DIASTOLIC BLOOD PRESSURE: 100 MMHG | RESPIRATION RATE: 18 BRPM | WEIGHT: 199.7 LBS | BODY MASS INDEX: 35.38 KG/M2 | SYSTOLIC BLOOD PRESSURE: 144 MMHG | TEMPERATURE: 98.2 F | HEART RATE: 52 BPM

## 2022-07-13 DIAGNOSIS — Z17.0 MALIGNANT NEOPLASM OF OVERLAPPING SITES OF LEFT BREAST IN FEMALE, ESTROGEN RECEPTOR POSITIVE (HCC): Primary | ICD-10-CM

## 2022-07-13 DIAGNOSIS — C50.812 MALIGNANT NEOPLASM OF OVERLAPPING SITES OF LEFT BREAST IN FEMALE, ESTROGEN RECEPTOR POSITIVE (HCC): Primary | ICD-10-CM

## 2022-07-13 PROCEDURE — 6360000002 HC RX W HCPCS: Performed by: NURSE PRACTITIONER

## 2022-07-13 PROCEDURE — 2580000003 HC RX 258: Performed by: NURSE PRACTITIONER

## 2022-07-13 PROCEDURE — 96413 CHEMO IV INFUSION 1 HR: CPT

## 2022-07-13 PROCEDURE — 96417 CHEMO IV INFUS EACH ADDL SEQ: CPT

## 2022-07-13 RX ORDER — SODIUM CHLORIDE 0.9 % (FLUSH) 0.9 %
5-40 SYRINGE (ML) INJECTION PRN
Status: DISCONTINUED | OUTPATIENT
Start: 2022-07-13 | End: 2022-07-14 | Stop reason: HOSPADM

## 2022-07-13 RX ORDER — SODIUM CHLORIDE 9 MG/ML
5-250 INJECTION, SOLUTION INTRAVENOUS PRN
Status: DISCONTINUED | OUTPATIENT
Start: 2022-07-13 | End: 2022-07-14 | Stop reason: HOSPADM

## 2022-07-13 RX ADMIN — SODIUM CHLORIDE, PRESERVATIVE FREE 10 ML: 5 INJECTION INTRAVENOUS at 10:20

## 2022-07-13 RX ADMIN — PERTUZUMAB 420 MG: 30 INJECTION, SOLUTION, CONCENTRATE INTRAVENOUS at 11:07

## 2022-07-13 RX ADMIN — SODIUM CHLORIDE 25 ML/HR: 9 INJECTION, SOLUTION INTRAVENOUS at 10:25

## 2022-07-13 RX ADMIN — SODIUM CHLORIDE 546 MG: 9 INJECTION, SOLUTION INTRAVENOUS at 10:32

## 2022-07-13 NOTE — PROGRESS NOTES
Arrived to the [facility]. Herceptin and Perjeta completed. Patient tolerated well. Any issues or concerns during appointment: none. Patient aware of next infusion appointment on 8/3/22 (date) at 36 (time). Patient aware of next lab and [facility] office visit on 8/3/002 (date) at 21 [phone] (time). Patient instructed to call provider with temperature of 100.4 or greater or nausea/vomiting/ diarrhea or pain not controlled by medications  Discharged via wheelchair accompanied by daughter.

## 2022-07-21 ENCOUNTER — TELEPHONE (OUTPATIENT)
Dept: ONCOLOGY | Age: 82
End: 2022-07-21

## 2022-07-21 PROCEDURE — 99283 EMERGENCY DEPT VISIT LOW MDM: CPT

## 2022-07-21 ASSESSMENT — PAIN DESCRIPTION - LOCATION: LOCATION: HIP

## 2022-07-21 ASSESSMENT — PAIN - FUNCTIONAL ASSESSMENT: PAIN_FUNCTIONAL_ASSESSMENT: 0-10

## 2022-07-21 ASSESSMENT — PAIN SCALES - GENERAL: PAINLEVEL_OUTOF10: 5

## 2022-07-21 NOTE — TELEPHONE ENCOUNTER
Sw received an incoming call from pt requesting assistance with transportation to her upcoming appointment at [facility] on August 3 @ 10:30 am.    Sw scheduled pts transportation via roundtrip. Pt will be picked up from her home at 10:01 am for transport to [facility]. Will call for return trip.

## 2022-07-22 ENCOUNTER — HOSPITAL ENCOUNTER (EMERGENCY)
Age: 82
Discharge: HOME | End: 2022-07-22
Attending: EMERGENCY MEDICINE
Payer: MEDICARE

## 2022-07-22 VITALS
TEMPERATURE: 98 F | HEART RATE: 68 BPM | RESPIRATION RATE: 16 BRPM | WEIGHT: 200 LBS | BODY MASS INDEX: 35.44 KG/M2 | SYSTOLIC BLOOD PRESSURE: 128 MMHG | HEIGHT: 63 IN | DIASTOLIC BLOOD PRESSURE: 68 MMHG | OXYGEN SATURATION: 95 %

## 2022-07-22 DIAGNOSIS — M79.18 MYALGIA OF MUSCLE OF NECK: Primary | ICD-10-CM

## 2022-07-22 LAB
BILIRUB UR QL: NEGATIVE
GLUCOSE UR QL STRIP.AUTO: NEGATIVE MG/DL
KETONES UR-MCNC: NEGATIVE MG/DL
LEUKOCYTE ESTERASE UR QL STRIP: ABNORMAL
NITRITE UR QL: NEGATIVE
PH UR: 6 [PH] (ref 5–9)
PROT UR QL: NEGATIVE MG/DL
RBC # UR STRIP: NEGATIVE /UL
SP GR UR: 1.01 (ref 1–1.02)
UROBILINOGEN UR QL: 0.2 EU/DL (ref 0.2–1)

## 2022-07-22 PROCEDURE — 6370000000 HC RX 637 (ALT 250 FOR IP): Performed by: EMERGENCY MEDICINE

## 2022-07-22 PROCEDURE — 81003 URINALYSIS AUTO W/O SCOPE: CPT

## 2022-07-22 RX ORDER — ACETAMINOPHEN 500 MG
1000 TABLET ORAL
Status: COMPLETED | OUTPATIENT
Start: 2022-07-22 | End: 2022-07-22

## 2022-07-22 RX ADMIN — ACETAMINOPHEN 1000 MG: 500 TABLET, FILM COATED ORAL at 04:50

## 2022-07-22 ASSESSMENT — PAIN SCALES - GENERAL
PAINLEVEL_OUTOF10: 0
PAINLEVEL_OUTOF10: 5

## 2022-07-22 ASSESSMENT — ENCOUNTER SYMPTOMS: BACK PAIN: 1

## 2022-07-22 NOTE — ED TRIAGE NOTES
Pt brought in by [facility] from home. EMS states patient has chronic pain all over and wants to be evaluated.   HR 60s  /90  93-94RA wears oxygen at night.
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
## 2022-07-26 ENCOUNTER — HOSPITAL ENCOUNTER (EMERGENCY)
Age: 82
Discharge: HOME | End: 2022-07-26
Attending: EMERGENCY MEDICINE | Admitting: EMERGENCY MEDICINE
Payer: MEDICARE

## 2022-07-26 ENCOUNTER — APPOINTMENT (OUTPATIENT)
Dept: GENERAL RADIOLOGY | Age: 82
End: 2022-07-26
Payer: MEDICARE

## 2022-07-26 VITALS
DIASTOLIC BLOOD PRESSURE: 75 MMHG | SYSTOLIC BLOOD PRESSURE: 160 MMHG | OXYGEN SATURATION: 94 % | RESPIRATION RATE: 14 BRPM | TEMPERATURE: 97.4 F | HEART RATE: 53 BPM

## 2022-07-26 DIAGNOSIS — G89.4 CHRONIC PAIN SYNDROME: Primary | ICD-10-CM

## 2022-07-26 DIAGNOSIS — R19.7 DIARRHEA, UNSPECIFIED: ICD-10-CM

## 2022-07-26 DIAGNOSIS — M25.532 LEFT WRIST PAIN: ICD-10-CM

## 2022-07-26 LAB
ALBUMIN SERPL-MCNC: 3.4 G/DL (ref 3.2–4.6)
ALBUMIN/GLOB SERPL: 1 {RATIO} (ref 1.2–3.5)
ALP SERPL-CCNC: 114 U/L (ref 50–136)
ALT SERPL-CCNC: 13 U/L (ref 12–65)
ANION GAP SERPL CALC-SCNC: 4 MMOL/L (ref 7–16)
AST SERPL-CCNC: 12 U/L (ref 15–37)
BILIRUB SERPL-MCNC: 0.9 MG/DL (ref 0.2–1.1)
BUN SERPL-MCNC: 10 MG/DL (ref 8–23)
CALCIUM SERPL-MCNC: 9.9 MG/DL (ref 8.3–10.4)
CHLORIDE SERPL-SCNC: 110 MMOL/L (ref 98–107)
CO2 SERPL-SCNC: 29 MMOL/L (ref 21–32)
CREAT SERPL-MCNC: 0.6 MG/DL (ref 0.6–1)
ERYTHROCYTE [DISTWIDTH] IN BLOOD BY AUTOMATED COUNT: 13.7 % (ref 11.9–14.6)
GLOBULIN SER CALC-MCNC: 3.5 G/DL (ref 2.3–3.5)
GLUCOSE SERPL-MCNC: 99 MG/DL (ref 65–100)
HCT VFR BLD AUTO: 46.6 % (ref 35.8–46.3)
HGB BLD-MCNC: 14.7 G/DL (ref 11.7–15.4)
MCH RBC QN AUTO: 29.4 PG (ref 26.1–32.9)
MCHC RBC AUTO-ENTMCNC: 31.5 G/DL (ref 31.4–35)
MCV RBC AUTO: 93.2 FL (ref 79.6–97.8)
NRBC # BLD: 0 K/UL (ref 0–0.2)
PLATELET # BLD AUTO: 192 K/UL (ref 150–450)
PMV BLD AUTO: 11.5 FL (ref 9.4–12.3)
POTASSIUM SERPL-SCNC: 3.8 MMOL/L (ref 3.5–5.1)
PROT SERPL-MCNC: 6.9 G/DL (ref 6.3–8.2)
RBC # BLD AUTO: 5 M/UL (ref 4.05–5.2)
SODIUM SERPL-SCNC: 143 MMOL/L (ref 136–145)
WBC # BLD AUTO: 6.4 K/UL (ref 4.3–11.1)

## 2022-07-26 PROCEDURE — 80053 COMPREHEN METABOLIC PANEL: CPT

## 2022-07-26 PROCEDURE — 85027 COMPLETE CBC AUTOMATED: CPT

## 2022-07-26 PROCEDURE — 99284 EMERGENCY DEPT VISIT MOD MDM: CPT

## 2022-07-26 PROCEDURE — 73110 X-RAY EXAM OF WRIST: CPT

## 2022-07-26 PROCEDURE — 6370000000 HC RX 637 (ALT 250 FOR IP): Performed by: EMERGENCY MEDICINE

## 2022-07-26 RX ORDER — ACETAMINOPHEN 500 MG
500 TABLET ORAL 4 TIMES DAILY PRN
Qty: 360 TABLET | Refills: 1 | Status: SHIPPED | OUTPATIENT
Start: 2022-07-26

## 2022-07-26 RX ORDER — ACETAMINOPHEN 500 MG
1000 TABLET ORAL
Status: COMPLETED | OUTPATIENT
Start: 2022-07-26 | End: 2022-07-26

## 2022-07-26 RX ADMIN — ACETAMINOPHEN 1000 MG: 500 TABLET, FILM COATED ORAL at 12:17

## 2022-07-26 ASSESSMENT — PAIN - FUNCTIONAL ASSESSMENT: PAIN_FUNCTIONAL_ASSESSMENT: NONE - DENIES PAIN

## 2022-07-26 ASSESSMENT — PAIN SCALES - GENERAL: PAINLEVEL_OUTOF10: 10

## 2022-07-26 NOTE — ED PROVIDER NOTES
[facility] Emergency Department Provider Note                   PCP:                None Provider               Age: 81 y.o. Sex: female       ICD-10-CM    1. Chronic pain syndrome  G89.4       2. Left wrist pain  M25.532           DISPOSITION Decision To Discharge 07/26/2022 01:12:44 PM        MDM  Number of Diagnoses or Management Options  Chronic pain syndrome: established, worsening  Left wrist pain: new, no workup  Diagnosis management comments: Otherwise very well-appearing 78-year-old female with chronic pain type syndrome to bilateral knees and shoulders with new right wrist pain. X-ray of the right wrist read by radiology reviewed by me shows no acute findings. There is degenerative changes consistent with age. Given dose of Tylenol here and she wants prescription to go home. I will oblige this. Low suspicion for sepsis, rhabdomyolysis, gout, or other acute emergent condition at this time. Amount and/or Complexity of Data Reviewed  Clinical lab tests: reviewed  Tests in the radiology section of CPT®: reviewed    Risk of Complications, Morbidity, and/or Mortality  Presenting problems: moderate  Management options: moderate         Orders Placed This Encounter   Procedures    XR WRIST RIGHT (MIN 3 VIEWS)    CBC    Comprehensive Metabolic Panel        Ashish Paris is a 81 y.o. female who presents to the Emergency Department with chief complaint of    Chief Complaint   Patient presents with    Chronic Pain      81year-old female comes in with bilateral knee pain, bilateral shoulder pain and right wrist pain. She states all pain ongoing over a year, however she noticed that her wrist specifically was a little swollen last week and is mildly to moderately improved. Pain is dull and achy, relieved by Tylenol for which she takes somewhat regularly. She has run out of her Tylenol at home, and is here requesting prescription. She has no new recent fever chills or cough or congestion.   No fall or Last Year: Never true         Aspirin, Ciprofloxacin, Doxycycline calcium, Fluconazole, Ibuprofen, Metformin, Sulfa antibiotics, Sulfabenzamide, Benzonatate, and Gabapentin     Previous Medications    ANASTROZOLE (ARIMIDEX) 1 MG TABLET    Take 1 mg by mouth daily    ATORVASTATIN (LIPITOR) 20 MG TABLET    Take 20 mg by mouth daily    CHOLESTYRAMINE (QUESTRAN) 4 GM/DOSE POWDER    TAKE 4 G BY MOUTH THREE (3) TIMES DAILY (WITH MEALS) FOR 30 DAYS. HYDROCORTISONE (WESTCORT) 0.2 % CREAM    Apply topically 2 times daily. IPRATROPIUM (ATROVENT) 0.06 % NASAL SPRAY    2 sprays by Nasal route 4 times daily INSTILL 2 SPRAYS IN EACH NOSTRIL 4 TIMES A DAY    LEVOTHYROXINE (SYNTHROID) 112 MCG TABLET    Take 112 mcg by mouth every morning (before breakfast)    LISINOPRIL (PRINIVIL;ZESTRIL) 20 MG TABLET    Take 1 tablet by mouth daily    NAFTIFINE (NAFTIN) 1 % CREAM    Apply topically daily    NYSTATIN (MYCOSTATIN) 229824 UNIT/GM POWDER    Apply topically 4 times daily    SITAGLIPTIN (JANUVIA) 100 MG TABLET    TAKE 1 TABLET BY MOUTH EVERY DAY    TEMAZEPAM (RESTORIL) 30 MG CAPSULE    Take 1 capsule by mouth nightly as needed for Sleep for up to 30 days. Vitals signs and nursing note reviewed. Patient Vitals for the past 4 hrs:   Temp Pulse Resp BP SpO2   07/26/22 1146 -- 53 14 (!) 160/75 94 %   07/26/22 1137 97.4 °F (36.3 °C) 55 18 (!) 187/107 94 %          Physical Exam  Vitals and nursing note reviewed. Constitutional:       General: She is not in acute distress. Appearance: Normal appearance. She is not ill-appearing. HENT:      Head: Normocephalic and atraumatic. Nose: Nose normal.      Mouth/Throat:      Mouth: Mucous membranes are moist.      Pharynx: No posterior oropharyngeal erythema. Eyes:      Extraocular Movements: Extraocular movements intact. Conjunctiva/sclera: Conjunctivae normal.      Pupils: Pupils are equal, round, and reactive to light.    Cardiovascular:      Rate and Rhythm: Normal rate and regular rhythm. Heart sounds: No murmur heard. Pulmonary:      Effort: Pulmonary effort is normal. No respiratory distress. Breath sounds: No wheezing, rhonchi or rales. Abdominal:      General: There is no distension. Palpations: Abdomen is soft. Tenderness: There is no abdominal tenderness. There is no guarding. Musculoskeletal:         General: No swelling, tenderness or deformity. Cervical back: Neck supple. No tenderness. Comments: There is mild swelling of the distal ulna of the right wrist.  No warmth erythema. Unable to flex and extend normal range of motion eliciting only mild pain. No anatomical snuffbox tenderness. Skin:     General: Skin is warm and dry. Capillary Refill: Capillary refill takes less than 2 seconds. Coloration: Skin is not jaundiced. Neurological:      Mental Status: She is alert. Mental status is at baseline. Motor: No weakness. Procedures      Labs Reviewed   CBC - Abnormal; Notable for the following components:       Result Value    Hematocrit 46.6 (*)     All other components within normal limits   COMPREHENSIVE METABOLIC PANEL - Abnormal; Notable for the following components:    Chloride 110 (*)     Anion Gap 4 (*)     AST 12 (*)     Albumin/Globulin Ratio 1.0 (*)     All other components within normal limits        XR WRIST RIGHT (MIN 3 VIEWS)   Final Result   1. Diffuse osteopenia and degenerative changes most pronounced at the first   carpometacarpal joint without acute fracture or dislocation. Voice dictation software was used during the making of this note. This software is not perfect and grammatical and other typographical errors may be present. This note has not been completely proofread for errors.      Evelyne Joyner DO  07/26/22 8507

## 2022-07-26 NOTE — ED NOTES
EMS vitals bp 144/98 hr 62 bgl 108 temperature 98.4 sinus adam with PAC.       Arben Cruz RN  07/26/22 4136

## 2022-07-27 ENCOUNTER — CARE COORDINATION (OUTPATIENT)
Dept: CARE COORDINATION | Facility: CLINIC | Age: 82
End: 2022-07-27

## 2022-07-27 NOTE — CARE COORDINATION
Attempted outreach # 1 to patient for CCM assessent due to multiple ED visits this month   UTR and unable to leave VM message  Will attempt another outreach before closing the case

## 2022-07-28 ENCOUNTER — CARE COORDINATION (OUTPATIENT)
Dept: CARE COORDINATION | Facility: CLINIC | Age: 82
End: 2022-07-28

## 2022-07-28 DIAGNOSIS — I10 BENIGN ESSENTIAL HTN: ICD-10-CM

## 2022-07-28 RX ORDER — LISINOPRIL 20 MG/1
TABLET ORAL
Qty: 30 TABLET | Refills: 1 | OUTPATIENT
Start: 2022-07-28

## 2022-07-28 NOTE — CARE COORDINATION
Attempted outreach # 2 to patient for CCM assessent due to multiple ED visits this month  UTR and unable to leave VM message  Case closed due to UTR

## 2022-08-04 ENCOUNTER — TELEPHONE (OUTPATIENT)
Dept: ONCOLOGY | Age: 82
End: 2022-08-04

## 2022-08-04 NOTE — TELEPHONE ENCOUNTER
Informed pt that refill should be at pharmacy to  - she just needs to call pharmacy and ask them to fill it. Pt verbalized understanding and appreciated my call.

## 2022-08-12 ENCOUNTER — HOSPITAL ENCOUNTER (EMERGENCY)
Age: 82
Discharge: HOME | End: 2022-08-12
Attending: EMERGENCY MEDICINE
Payer: MEDICARE

## 2022-08-12 ENCOUNTER — HOSPITAL ENCOUNTER (EMERGENCY)
Dept: GENERAL RADIOLOGY | Age: 82
Discharge: HOME | End: 2022-08-15
Payer: MEDICARE

## 2022-08-12 VITALS
WEIGHT: 200 LBS | DIASTOLIC BLOOD PRESSURE: 87 MMHG | RESPIRATION RATE: 18 BRPM | OXYGEN SATURATION: 97 % | TEMPERATURE: 98 F | SYSTOLIC BLOOD PRESSURE: 169 MMHG | BODY MASS INDEX: 35.44 KG/M2 | HEART RATE: 62 BPM | HEIGHT: 63 IN

## 2022-08-12 DIAGNOSIS — M79.602 LEFT ARM PAIN: Primary | ICD-10-CM

## 2022-08-12 PROCEDURE — 99283 EMERGENCY DEPT VISIT LOW MDM: CPT

## 2022-08-12 PROCEDURE — 73090 X-RAY EXAM OF FOREARM: CPT

## 2022-08-12 PROCEDURE — 73060 X-RAY EXAM OF HUMERUS: CPT

## 2022-08-12 RX ORDER — ACETAMINOPHEN 325 MG/1
650 TABLET ORAL
Status: DISCONTINUED | OUTPATIENT
Start: 2022-08-12 | End: 2022-08-12 | Stop reason: HOSPADM

## 2022-08-12 ASSESSMENT — PAIN - FUNCTIONAL ASSESSMENT: PAIN_FUNCTIONAL_ASSESSMENT: 0-10

## 2022-08-12 ASSESSMENT — PAIN SCALES - GENERAL: PAINLEVEL_OUTOF10: 9

## 2022-08-12 NOTE — ED PROVIDER NOTES
[facility] Emergency Department Provider Note                   PCP:                None Provider               Age: 81 y.o. Sex: female       ICD-10-CM    1. Left arm pain  M79.602           DISPOSITION    Discharged    MDM     Amount and/or Complexity of Data Reviewed  Tests in the radiology section of CPT®: ordered and reviewed  Decide to obtain previous medical records or to obtain history from someone other than the patient: yes  Obtain history from someone other than the patient: yes  Review and summarize past medical records: yes  Independent visualization of images, tracings, or specimens: yes    Risk of Complications, Morbidity, and/or Mortality  Presenting problems: moderate  Diagnostic procedures: moderate  Management options: moderate    Patient Progress  Patient progress: stable     81-year-old female presents the ED with complaint of left arm pain. She states this is the pain that she experiences daily and has for at least 4 months. States that she has pain \"all over and this is a common occurrence for her, and states that she wanted to have the left arm evaluated. She denies radiation of pain, chest pain, back pain, neck pain. Denies fall or injury. Denies numbness or tingling. Denies joint pain. Denies swelling, numbness or tingling and all other complaint. She denies any new or worsening pain. States her pain is unchanged. Has not taken her usual Tylenol today. I discussed possibility of other etiology the patient's pain, she declines further work-up, labs, EKG. I have low clinical suspicion of fracture or dislocation, DVT, ACS, PE, dissection, pneumonia, pneumothorax, compartment syndrome, septic joint, abscess, gout flare or other acute emergent process. Given Tylenol, radiographs were obtained, noted no acute process. Feel she stable for discharge home. Patient comfortable with plan, agreeable comments or questions.   To follow with PCP and gave contact information for pain management clinic. Given strict return precautions. Patient is well-hydrated appearing, no distress. Nontoxic-appearing, tolerating oral intake, hemodynamically stable. All findings and plan were discussed with the patient. All questions answered. Discussed with the patient that an unremarkable evaluation in the ED does not preclude the development or presence of a serious or life threatening condition. Patient was instructed to return immediately for any worsening or change in current symptoms, or if symptoms do not continue to improve. I instructed them to follow up with their primary care provider, own specialist, or medical provider that I am recommending for him within the next 2-3 days  The patient acknowledged understanding plan of care and affirmed approval.     Signed by: TABATHA Dillard     This note created using Dragon voice recognition software. Please excuse any accidental errors associated with its use, as note has not been fully proofread and edited. Orders Placed This Encounter   Procedures    XR HUMERUS LEFT (MIN 2 VIEWS)    XR RADIUS ULNA LEFT (2 VIEWS)        Bradley Keith is a 81 y.o. female who presents to the Emergency Department with chief complaint of    Chief Complaint   Patient presents with    Shoulder Pain      HPI  71-year-old female presents to the ED with complaint of left upper extremity pain times 4+ months. States she has arthritis which has been causing her pain in the \"entire left arm. \"  Patient attributes this to \"my arthritis. \"  States this pain has been present for many months, constant and without any change. States that she typically takes acetaminophen for this pain, but stopped taking this a number of days ago, because she wondered if acetaminophen might be making her pain worse. She denies worsening pain, new pain or change in pain. She denies radiation of pain, denies intermittent symptoms. She denies numbness/tingling/weakness.   She denies now or ever experiencing chest pain, tightness, difficulty breathing, BRADSHAW. Denies cough or hemoptysis, dizziness or lightheadedness, syncope or near syncope, fall, back pain, neck pain, abdominal pain, nausea or vomiting. Denies recent URI, recent fall, known injury, activity change and all other complaint. Patient is quite well-appearing and appears to be in no acute distress. She is conversant at length with no increased effort, and spends a great deal of time initiating conversation about current events and local issues. Hemodynamically stable and afebrile. Not toxic appearing and appears no distress. Not tachycardic, tachypneic, hypoxic or febrile. Review of Systems  Constitutional: Negative for fever. Negative for appetite change, chills, diaphoresis and unexpected weight change.     HENT: Negative     Eyes: Negative   Respiratory: Negative  Cardiovascular: Negative  Musculoskeletal: As in HPI   skin: Negative     Allergic/Immunologic: Negative  Neurological: Negative                        Past Medical History:   Diagnosis Date    Arthritis     Asthma     Breast cancer ([facility] [address]     Chronic kidney disease     Ear problems     Gastrointestinal disorder     acid reflux, hernia    Hyperlipidemia     Hypertension     Insomnia     Other ill-defined conditions(799.89)     blood clot on lung    Stroke ([facility] [address]     Thyroid disease     Tinnitus     Vitamin D deficiency         Past Surgical History:   Procedure Laterality Date    APPENDECTOMY      BREAST BIOPSY Left 04/07/2021    u/s x 3    GYN  [age]    c-sec    HEENT      t & a    OTHER SURGICAL HISTORY      fatty tissue removed from under arm    MD APPENDECTOMY      US BREAST BIOPSY NEEDLE ADDITIONAL LEFT Left 4/7/2021    US BREAST BIOPSY NEEDLE ADDITIONAL LEFT 4/7/2021 [facility]    US BREAST NEEDLE BIOPSY LEFT Left 4/7/2021    US BREAST NEEDLE BIOPSY LEFT 4/7/2021 [facility]    US LYMPH NODE BIOPSY  4/7/2021    US LYMPH NODE BIOPSY 4/7/2021 [facility]        Family History   Problem Relation Age of Onset    No Known Problems Mother     No Known Problems Father     No Known Problems Maternal Grandmother     No Known Problems Maternal Grandfather     No Known Problems Paternal Grandmother     No Known Problems Paternal Grandfather     Breast Cancer Neg Hx         Social History     Socioeconomic History    Marital status:    Tobacco Use    Smoking status: Never    Smokeless tobacco: Never   Substance and Sexual Activity    Alcohol use: No    Drug use: No     Social Determinants of Health     Financial Resource Strain: Low Risk     Difficulty of Paying Living Expenses: Not hard at all   Food Insecurity: No Food Insecurity    Worried About Running Out of Food in the Last Year: Never true    Ran Out of Food in the Last Year: Never true         Aspirin, Ciprofloxacin, Doxycycline calcium, Fluconazole, Ibuprofen, Metformin, Sulfa antibiotics, Sulfabenzamide, Benzonatate, and Gabapentin     Previous Medications    ACETAMINOPHEN (TYLENOL) 500 MG TABLET    Take 1 tablet by mouth 4 times daily as needed for Pain    ANASTROZOLE (ARIMIDEX) 1 MG TABLET    Take 1 mg by mouth daily    ATORVASTATIN (LIPITOR) 20 MG TABLET    Take 20 mg by mouth daily    CHOLESTYRAMINE (QUESTRAN) 4 GM/DOSE POWDER    TAKE 4 G BY MOUTH THREE (3) TIMES DAILY (WITH MEALS) FOR 30 DAYS. HYDROCORTISONE (WESTCORT) 0.2 % CREAM    Apply topically 2 times daily.     IPRATROPIUM (ATROVENT) 0.06 % NASAL SPRAY    2 sprays by Nasal route 4 times daily INSTILL 2 SPRAYS IN EACH NOSTRIL 4 TIMES A DAY    LEVOTHYROXINE (SYNTHROID) 112 MCG TABLET    Take 112 mcg by mouth every morning (before breakfast)    LISINOPRIL (PRINIVIL;ZESTRIL) 20 MG TABLET    Take 1 tablet by mouth daily    NAFTIFINE (NAFTIN) 1 % CREAM    Apply topically daily    NYSTATIN (MYCOSTATIN) 529672 UNIT/GM POWDER    Apply topically 4 times daily    SITAGLIPTIN (JANUVIA) 100 MG TABLET    TAKE 1 TABLET BY MOUTH EVERY DAY Vitals signs and nursing note reviewed. Patient Vitals for the past 4 hrs:   Temp Pulse Resp BP SpO2   08/12/22 1623 98 °F (36.7 °C) 59 18 (!) 152/83 95 %          Physical Exam   Constitutional: Oriented to person, place, and time. Appears well-developed and well-nourished. No distress. HENT:    Head: Normocephalic and atraumatic   Right Ear: External ear normal.    Left Ear: External ear normal.     Nose: Nose normal.   Mouth/Throat: Mouth normal.    Eyes: Conjunctivae are normal.   Neck: Supple. No tracheal deviation. Cardiovascular: Normal rate, intact distal pulses. Brisk capillary refill intact, less than 2 seconds. Regular rhythm present. No pitting edema. Pulmonary/Chest: Lungs are clear & equal bilaterally. No adventitious sounds. No respiratory distress. Tenderness. Abdominal: Soft. There is no tenderness. Musculoskeletal: No obvious deformity, erythema, edema. +Tenderness of the shoulder, range of motion of the left shoulder is intact. There is no palsy or sensory deficits. She endorses diffuse tenderness of the upper and lower arm with no tenderness over elbow, wrist or hand. She is range of motion of the elbow without limitation. Palpation of wrist and hand without limitation or pain. She has no crepitus, no pallor, no swelling, no pitting edema, no bruising, no pain out of proportion. -Tenderness of the back, to include midline. No lesion,. Neurological: Alert and oriented to person, place, and time. numbness/tingling. No loss of sensation. Positive PMS ×4. GCS= 15. Skin: Skin is warm and dry. Capillary refill takes less than 2 seconds. No abrasion, no lesion, no petechiae and no rash noted. Not diaphoretic. No cyanosis, erythema, or pallor. Psychiatric: Normal mood and affect. Behavior is normal.    Nursing note and vitals reviewed. Procedures      Labs Reviewed - No data to display     XR HUMERUS LEFT (MIN 2 VIEWS)   Final Result   Low bone mineral density. Left humerus series: 08/12/2022      HISTORY: Pain and tenderness      2 views were obtained. Comparison: None      Findings: There is no evidence of acute fracture or dislocation. The soft   tissues are unremarkable. There is no bony destruction or other abnormality. There is diffuse low bone mineral density. IMPRESSION: Low bone mineral density. XR RADIUS ULNA LEFT (2 VIEWS)   Final Result   Low bone mineral density. Left humerus series: 08/12/2022      HISTORY: Pain and tenderness      2 views were obtained. Comparison: None      Findings: There is no evidence of acute fracture or dislocation. The soft   tissues are unremarkable. There is no bony destruction or other abnormality. There is diffuse low bone mineral density. IMPRESSION: Low bone mineral density. Voice dictation software was used during the making of this note. This software is not perfect and grammatical and other typographical errors may be present. This note has not been completely proofread for errors.      DIMAS Silvestre - CNP  08/12/22 4619

## 2022-08-16 ENCOUNTER — HOSPITAL ENCOUNTER (OUTPATIENT)
Dept: INFUSION THERAPY | Age: 82
Discharge: HOME | End: 2022-08-16
Payer: MEDICARE

## 2022-08-16 ENCOUNTER — OFFICE VISIT (OUTPATIENT)
Dept: ONCOLOGY | Age: 82
End: 2022-08-16
Payer: MEDICARE

## 2022-08-16 ENCOUNTER — HOSPITAL ENCOUNTER (OUTPATIENT)
Dept: LAB | Age: 82
Discharge: HOME | End: 2022-08-19
Payer: MEDICARE

## 2022-08-16 ENCOUNTER — CLINICAL DOCUMENTATION (OUTPATIENT)
Dept: ONCOLOGY | Age: 82
End: 2022-08-16

## 2022-08-16 VITALS
OXYGEN SATURATION: 95 % | DIASTOLIC BLOOD PRESSURE: 79 MMHG | SYSTOLIC BLOOD PRESSURE: 145 MMHG | HEART RATE: 50 BPM | RESPIRATION RATE: 16 BRPM

## 2022-08-16 DIAGNOSIS — R53.82 CHRONIC FATIGUE: ICD-10-CM

## 2022-08-16 DIAGNOSIS — Z17.0 MALIGNANT NEOPLASM OF OVERLAPPING SITES OF LEFT BREAST IN FEMALE, ESTROGEN RECEPTOR POSITIVE (HCC): ICD-10-CM

## 2022-08-16 DIAGNOSIS — C50.812 MALIGNANT NEOPLASM OF OVERLAPPING SITES OF LEFT BREAST IN FEMALE, ESTROGEN RECEPTOR POSITIVE (HCC): Primary | ICD-10-CM

## 2022-08-16 DIAGNOSIS — C50.812 MALIGNANT NEOPLASM OF OVERLAPPING SITES OF LEFT BREAST IN FEMALE, ESTROGEN RECEPTOR POSITIVE (HCC): ICD-10-CM

## 2022-08-16 DIAGNOSIS — J44.9 CHRONIC OBSTRUCTIVE PULMONARY DISEASE, UNSPECIFIED COPD TYPE (HCC): ICD-10-CM

## 2022-08-16 DIAGNOSIS — Z17.0 MALIGNANT NEOPLASM OF OVERLAPPING SITES OF LEFT BREAST IN FEMALE, ESTROGEN RECEPTOR POSITIVE (HCC): Primary | ICD-10-CM

## 2022-08-16 DIAGNOSIS — T45.1X5A CHEMOTHERAPY INDUCED DIARRHEA: ICD-10-CM

## 2022-08-16 DIAGNOSIS — K52.1 CHEMOTHERAPY INDUCED DIARRHEA: ICD-10-CM

## 2022-08-16 DIAGNOSIS — R06.02 SHORTNESS OF BREATH: ICD-10-CM

## 2022-08-16 LAB
ALBUMIN SERPL-MCNC: 3.4 G/DL (ref 3.2–4.6)
ALBUMIN/GLOB SERPL: 1 {RATIO} (ref 1.2–3.5)
ALP SERPL-CCNC: 121 U/L (ref 50–136)
ALT SERPL-CCNC: 19 U/L (ref 12–65)
ANION GAP SERPL CALC-SCNC: 8 MMOL/L (ref 7–16)
AST SERPL-CCNC: 17 U/L (ref 15–37)
BASOPHILS # BLD: 0 K/UL (ref 0–0.2)
BASOPHILS NFR BLD: 1 % (ref 0–2)
BILIRUB SERPL-MCNC: 0.6 MG/DL (ref 0.2–1.1)
BUN SERPL-MCNC: 10 MG/DL (ref 8–23)
CALCIUM SERPL-MCNC: 9.5 MG/DL (ref 8.3–10.4)
CHLORIDE SERPL-SCNC: 109 MMOL/L (ref 98–107)
CO2 SERPL-SCNC: 23 MMOL/L (ref 21–32)
CREAT SERPL-MCNC: 0.6 MG/DL (ref 0.6–1)
DIFFERENTIAL METHOD BLD: ABNORMAL
EOSINOPHIL # BLD: 0 K/UL (ref 0–0.8)
EOSINOPHIL NFR BLD: 0 % (ref 0.5–7.8)
ERYTHROCYTE [DISTWIDTH] IN BLOOD BY AUTOMATED COUNT: 13.9 % (ref 11.9–14.6)
GLOBULIN SER CALC-MCNC: 3.3 G/DL (ref 2.3–3.5)
GLUCOSE SERPL-MCNC: 102 MG/DL (ref 65–100)
HCT VFR BLD AUTO: 47 % (ref 35.8–46.3)
HGB BLD-MCNC: 14.7 G/DL (ref 11.7–15.4)
IMM GRANULOCYTES # BLD AUTO: 0 K/UL (ref 0–0.5)
IMM GRANULOCYTES NFR BLD AUTO: 0 % (ref 0–5)
LYMPHOCYTES # BLD: 2 K/UL (ref 0.5–4.6)
LYMPHOCYTES NFR BLD: 33 % (ref 13–44)
MAGNESIUM SERPL-MCNC: 2.3 MG/DL (ref 1.8–2.4)
MCH RBC QN AUTO: 28.6 PG (ref 26.1–32.9)
MCHC RBC AUTO-ENTMCNC: 31.3 G/DL (ref 31.4–35)
MCV RBC AUTO: 91.4 FL (ref 79.6–97.8)
MONOCYTES # BLD: 0.4 K/UL (ref 0.1–1.3)
MONOCYTES NFR BLD: 6 % (ref 4–12)
NEUTS SEG # BLD: 3.5 K/UL (ref 1.7–8.2)
NEUTS SEG NFR BLD: 60 % (ref 43–78)
NRBC # BLD: 0 K/UL (ref 0–0.2)
PLATELET # BLD AUTO: 214 K/UL (ref 150–450)
PMV BLD AUTO: 11 FL (ref 9.4–12.3)
POTASSIUM SERPL-SCNC: 4.1 MMOL/L (ref 3.5–5.1)
PROT SERPL-MCNC: 6.7 G/DL (ref 6.3–8.2)
RBC # BLD AUTO: 5.14 M/UL (ref 4.05–5.2)
SODIUM SERPL-SCNC: 140 MMOL/L (ref 136–145)
WBC # BLD AUTO: 5.9 K/UL (ref 4.3–11.1)

## 2022-08-16 PROCEDURE — 1090F PRES/ABSN URINE INCON ASSESS: CPT | Performed by: NURSE PRACTITIONER

## 2022-08-16 PROCEDURE — 96417 CHEMO IV INFUS EACH ADDL SEQ: CPT

## 2022-08-16 PROCEDURE — 2580000003 HC RX 258: Performed by: NURSE PRACTITIONER

## 2022-08-16 PROCEDURE — 80053 COMPREHEN METABOLIC PANEL: CPT

## 2022-08-16 PROCEDURE — 83735 ASSAY OF MAGNESIUM: CPT

## 2022-08-16 PROCEDURE — 96413 CHEMO IV INFUSION 1 HR: CPT

## 2022-08-16 PROCEDURE — 85025 COMPLETE CBC W/AUTO DIFF WBC: CPT

## 2022-08-16 PROCEDURE — G8417 CALC BMI ABV UP PARAM F/U: HCPCS | Performed by: NURSE PRACTITIONER

## 2022-08-16 PROCEDURE — 36415 COLL VENOUS BLD VENIPUNCTURE: CPT

## 2022-08-16 PROCEDURE — G8400 PT W/DXA NO RESULTS DOC: HCPCS | Performed by: NURSE PRACTITIONER

## 2022-08-16 PROCEDURE — 1036F TOBACCO NON-USER: CPT | Performed by: NURSE PRACTITIONER

## 2022-08-16 PROCEDURE — 3023F SPIROM DOC REV: CPT | Performed by: NURSE PRACTITIONER

## 2022-08-16 PROCEDURE — 1123F ACP DISCUSS/DSCN MKR DOCD: CPT | Performed by: NURSE PRACTITIONER

## 2022-08-16 PROCEDURE — 99214 OFFICE O/P EST MOD 30 MIN: CPT | Performed by: NURSE PRACTITIONER

## 2022-08-16 PROCEDURE — 6360000002 HC RX W HCPCS: Performed by: NURSE PRACTITIONER

## 2022-08-16 PROCEDURE — G8427 DOCREV CUR MEDS BY ELIG CLIN: HCPCS | Performed by: NURSE PRACTITIONER

## 2022-08-16 RX ORDER — ACETAMINOPHEN 325 MG/1
650 TABLET ORAL
Status: CANCELLED | OUTPATIENT
Start: 2022-10-18

## 2022-08-16 RX ORDER — EPINEPHRINE 1 MG/ML
0.3 INJECTION, SOLUTION, CONCENTRATE INTRAVENOUS PRN
Status: CANCELLED | OUTPATIENT
Start: 2022-08-16

## 2022-08-16 RX ORDER — SODIUM CHLORIDE 9 MG/ML
INJECTION, SOLUTION INTRAVENOUS CONTINUOUS
Status: CANCELLED | OUTPATIENT
Start: 2022-08-16

## 2022-08-16 RX ORDER — HEPARIN SODIUM (PORCINE) LOCK FLUSH IV SOLN 100 UNIT/ML 100 UNIT/ML
500 SOLUTION INTRAVENOUS PRN
Status: CANCELLED | OUTPATIENT
Start: 2022-08-16

## 2022-08-16 RX ORDER — SODIUM CHLORIDE 9 MG/ML
5-250 INJECTION, SOLUTION INTRAVENOUS PRN
Status: DISCONTINUED | OUTPATIENT
Start: 2022-08-16 | End: 2022-08-17 | Stop reason: HOSPADM

## 2022-08-16 RX ORDER — DIPHENHYDRAMINE HYDROCHLORIDE 50 MG/ML
50 INJECTION INTRAMUSCULAR; INTRAVENOUS
Status: CANCELLED | OUTPATIENT
Start: 2022-10-18

## 2022-08-16 RX ORDER — SODIUM CHLORIDE 9 MG/ML
5-250 INJECTION, SOLUTION INTRAVENOUS PRN
Status: CANCELLED | OUTPATIENT
Start: 2022-08-16

## 2022-08-16 RX ORDER — ONDANSETRON 2 MG/ML
8 INJECTION INTRAMUSCULAR; INTRAVENOUS
Status: CANCELLED | OUTPATIENT
Start: 2022-10-18

## 2022-08-16 RX ORDER — MEPERIDINE HYDROCHLORIDE 50 MG/ML
12.5 INJECTION INTRAMUSCULAR; INTRAVENOUS; SUBCUTANEOUS PRN
Status: CANCELLED | OUTPATIENT
Start: 2022-10-18

## 2022-08-16 RX ORDER — ONDANSETRON 2 MG/ML
8 INJECTION INTRAMUSCULAR; INTRAVENOUS
Status: CANCELLED | OUTPATIENT
Start: 2022-08-16

## 2022-08-16 RX ORDER — SODIUM CHLORIDE 0.9 % (FLUSH) 0.9 %
5-40 SYRINGE (ML) INJECTION PRN
Status: CANCELLED | OUTPATIENT
Start: 2022-08-16

## 2022-08-16 RX ORDER — DIPHENHYDRAMINE HYDROCHLORIDE 50 MG/ML
50 INJECTION INTRAMUSCULAR; INTRAVENOUS
Status: CANCELLED | OUTPATIENT
Start: 2022-08-16

## 2022-08-16 RX ORDER — ACETAMINOPHEN 325 MG/1
650 TABLET ORAL
Status: CANCELLED | OUTPATIENT
Start: 2022-08-16

## 2022-08-16 RX ORDER — ALBUTEROL SULFATE 90 UG/1
4 AEROSOL, METERED RESPIRATORY (INHALATION) PRN
Status: CANCELLED | OUTPATIENT
Start: 2022-08-16

## 2022-08-16 RX ORDER — SODIUM CHLORIDE 9 MG/ML
INJECTION, SOLUTION INTRAVENOUS CONTINUOUS
Status: CANCELLED | OUTPATIENT
Start: 2022-10-18

## 2022-08-16 RX ORDER — SODIUM CHLORIDE 0.9 % (FLUSH) 0.9 %
5-40 SYRINGE (ML) INJECTION PRN
Status: CANCELLED | OUTPATIENT
Start: 2022-10-18

## 2022-08-16 RX ORDER — HEPARIN SODIUM (PORCINE) LOCK FLUSH IV SOLN 100 UNIT/ML 100 UNIT/ML
500 SOLUTION INTRAVENOUS PRN
Status: CANCELLED | OUTPATIENT
Start: 2022-10-18

## 2022-08-16 RX ORDER — ALBUTEROL SULFATE 90 UG/1
4 AEROSOL, METERED RESPIRATORY (INHALATION) PRN
Status: CANCELLED | OUTPATIENT
Start: 2022-10-18

## 2022-08-16 RX ORDER — SODIUM CHLORIDE 9 MG/ML
5-250 INJECTION, SOLUTION INTRAVENOUS PRN
Status: CANCELLED | OUTPATIENT
Start: 2022-10-18

## 2022-08-16 RX ORDER — FAMOTIDINE 10 MG/ML
20 INJECTION, SOLUTION INTRAVENOUS
Status: CANCELLED | OUTPATIENT
Start: 2022-10-18

## 2022-08-16 RX ORDER — EPINEPHRINE 1 MG/ML
0.3 INJECTION, SOLUTION, CONCENTRATE INTRAVENOUS PRN
Status: CANCELLED | OUTPATIENT
Start: 2022-10-18

## 2022-08-16 RX ORDER — SODIUM CHLORIDE 9 MG/ML
5-40 INJECTION INTRAVENOUS PRN
Status: CANCELLED | OUTPATIENT
Start: 2022-10-18

## 2022-08-16 RX ORDER — MEPERIDINE HYDROCHLORIDE 50 MG/ML
12.5 INJECTION INTRAMUSCULAR; INTRAVENOUS; SUBCUTANEOUS PRN
Status: CANCELLED | OUTPATIENT
Start: 2022-08-16

## 2022-08-16 RX ORDER — SODIUM CHLORIDE 9 MG/ML
5-40 INJECTION INTRAVENOUS PRN
Status: CANCELLED | OUTPATIENT
Start: 2022-08-16

## 2022-08-16 RX ORDER — FAMOTIDINE 10 MG/ML
20 INJECTION, SOLUTION INTRAVENOUS
Status: CANCELLED | OUTPATIENT
Start: 2022-08-16

## 2022-08-16 RX ADMIN — SODIUM CHLORIDE 546 MG: 9 INJECTION, SOLUTION INTRAVENOUS at 13:59

## 2022-08-16 RX ADMIN — PERTUZUMAB 420 MG: 30 INJECTION, SOLUTION, CONCENTRATE INTRAVENOUS at 14:34

## 2022-08-16 RX ADMIN — SODIUM CHLORIDE 25 ML/HR: 9 INJECTION, SOLUTION INTRAVENOUS at 14:03

## 2022-08-16 NOTE — PROGRESS NOTES
Arrived to the [facility]. Moody Huge completed. Patient tolerated without problems. Any issues or concerns during appointment: Patient was extremely loud and rude to Staff. Patient could not be made to understand that her Infusion appointment was at 2pm (patient was brought back to Infusion at 1:30). Patient's aide, Cristine Osorio, was with her. Cristine Osorio stated that she needed to leave to  her daughter. Aide left. Roundtrip ride arranged by Mauri Lilly  for 3:20 pm.  Patient aware of next infusion appointment on 10/18/22 (date) at 36 (time)  Patient instructed to call provider with temperature of 100.4 or greater or nausea/vomiting/ diarrhea or pain not controlled by medications  Escorted via [facility] outside at the front door to await her Roundtrip transportation, time was 3:12.

## 2022-08-16 NOTE — PROGRESS NOTES
ASHLEY scheduled pt's transportation via roundtrip. Pt was dropped off by a caregiver and does not have return transportation available.   Pt will be picked up from [facility] and transported to her home at 3:20 pm.

## 2022-08-16 NOTE — PROGRESS NOTES
[facility] Hematology and Oncology: Office Visit Established Patient     Chief Complaint:    Chief Complaint   Patient presents with    Follow-up      History of Present Illness:  Ms. Kaylene Stephens  is a 81 y.o. female who presents  today for follow-up regarding breast cancer. She presented to [facility] on 4/5/21 for evaluation of a palpable, painful lump in her left breast. Bilateral diagnostic digital mammography and left breast ultrasound identified an irregular mass, very  suspicious for malignancy located at the 1:30 position in the posterior left breast; a second smaller mass also suspicious for malignancy at the 6:30 position in the left breast; a dysmorphic left axillary lymph node suspicious for metastatic disease;  and innumerable new microcalcifications occupying much of the lateral left breast, suspicious for associated DCIS. Recommended core needle biopsies and marker clip placements for the palpable 2.3 cm posterior 1:30 mass, the 1.8 cm posterior 6:30 nodule,  and a dystrophic axillary lymph node were performed on 4/7/20. Pathology from the core biopsy of the left breast, 6:30 position, 12 cm from nipple, revealed ER 99%/SD 5% positive, HER-2 (1+) negative, low grade (well differentiated), infiltrating duct  carcinoma with colloid features and no definite in situ component or lymphovascular invasion identified; pathology from the core biopsy of the left axilla revealed macro metastatic carcinoma focally involving connective tissue consistent with extracapsular  extension; and pathology from the core biopsy of the left breast, 1:30 position, 20 cm from nipple, revealed ER 95%/SD 9%/HER-2 (3+) positive, low grade (well differentiated), infiltrating duct carcinoma with lobular features and no definite in situ component  or lymphovascular invasion identified. She was referred to [facility], per \[facility] [place] [facility]\"" [facility] evaluation and treatment of newly diagnosed breast cancer.   We recommended proceeding with PET/CT, as well as IHC on the axillary lymph node  to determine whether this was HER2 positive as well. PET/CT shows no distant metastatic disease. I discussed the case with Dr. Gibson Valles at tumor board. Normally, for node positive HER2 positive  breast cancer, we would recommend neoadjuvant chemoimmunotherapy. However, she is unwilling to consider cytotoxic therapy, which is not unreasonable given her age and comorbidities. Since she has only locoregional disease, we should then consider mastectomy  and node dissection for definitive therapy. She was also very reluctant to consider any surgery because of her history of intolerance to anesthesia, but we were able to convince her to at least meet with Dr. Gibson Valles. However, after discussion with  him, she declined any local therapy and was only willing to consider systemic treatment. Therefore, we recommended the PERTAIN regimen for \"unresectable\" disease, Arimidex + HP. She is here today for follow up and next HP with her new aide. Since last seen she has made several trips to the ED for various complaints. There is no nausea and she reports ongoing diarrhea. Appetite varies day to day, she refused to stand for a weight today. Fatigue is unchanged. Baseline shortness of breath is stable, uses O2 at HS. She denies any fevers or recent infections. Prior to the visit today she moved herself out to the hallway and was raising her voice about her wait time even after being informed that she was >1 hour early for her visit. Review of Systems:   Constitutional: Positive for fatigue. HENT: Negative. Eyes: Negative. Respiratory: Negative. Cardiovascular: Negative. Gastrointestinal: Positive for diarrhea. Genitourinary: Negative. Musculoskeletal: Negative. Skin: Negative. Neurological: Negative. Endo/Heme/Allergies: Negative. Psychiatric/Behavioral: Negative.     All other systems reviewed and are negative. Allergies   Allergen Reactions    Aspirin Other (See Comments)    Ciprofloxacin Other (See Comments)    Doxycycline Calcium Other (See Comments)    Fluconazole Other (See Comments)    Ibuprofen Other (See Comments)    Metformin Other (See Comments)    Sulfa Antibiotics Hives and Other (See Comments)    Sulfabenzamide Other (See Comments)    Benzonatate Rash    Gabapentin Anxiety     Past Medical History:   Diagnosis Date    Arthritis     Asthma     Breast cancer ([facility] [address]     Chronic kidney disease     Ear problems     Gastrointestinal disorder     acid reflux, hernia    Hyperlipidemia     Hypertension     Insomnia     Other ill-defined conditions(799.89)     blood clot on lung    Stroke ([facility] [address]     Thyroid disease     Tinnitus     Vitamin D deficiency      Past Surgical History:   Procedure Laterality Date    APPENDECTOMY      BREAST BIOPSY Left 04/07/2021    u/s x 3    GYN  [age]    c-sec    HEENT      t & a    OTHER SURGICAL HISTORY      fatty tissue removed from under arm    CO APPENDECTOMY      US BREAST BIOPSY NEEDLE ADDITIONAL LEFT Left 4/7/2021    US BREAST BIOPSY NEEDLE ADDITIONAL LEFT 4/7/2021 [facility]    US BREAST NEEDLE BIOPSY LEFT Left 4/7/2021    US BREAST NEEDLE BIOPSY LEFT 4/7/2021 [facility]    US LYMPH NODE BIOPSY  4/7/2021    US LYMPH NODE BIOPSY 4/7/2021 [facility]     Family History   Problem Relation Age of Onset    No Known Problems Mother     No Known Problems Father     No Known Problems Maternal Grandmother     No Known Problems Maternal Grandfather     No Known Problems Paternal Grandmother     No Known Problems Paternal Grandfather     Breast Cancer Neg Hx      Social History     Socioeconomic History    Marital status:       Spouse name: Not on file    Number of children: Not on file    Years of education: Not on file    Highest education level: Not on file   Occupational History    Not on file   Tobacco Use    Smoking status: Never Smokeless tobacco: Never   Substance and Sexual Activity    Alcohol use: No    Drug use: No    Sexual activity: Not on file   Other Topics Concern    Not on file   Social History Narrative    Not on file     Social Determinants of Health     Financial Resource Strain: Low Risk     Difficulty of Paying Living Expenses: Not hard at all   Food Insecurity: No Food Insecurity    Worried About Running Out of Food in the Last Year: Never true    Ran Out of Food in the Last Year: Never true   Transportation Needs: Not on file   Physical Activity: Not on file   Stress: Not on file   Social Connections: Not on file   Intimate Partner Violence: Not on file   Housing Stability: Not on file     Current Outpatient Medications   Medication Sig Dispense Refill    acetaminophen (TYLENOL) 500 MG tablet Take 1 tablet by mouth 4 times daily as needed for Pain 360 tablet 1    atorvastatin (LIPITOR) 20 MG tablet Take 20 mg by mouth daily      lisinopril (PRINIVIL;ZESTRIL) 20 MG tablet Take 1 tablet by mouth daily 30 tablet 1    hydrocortisone (WESTCORT) 0.2 % cream Apply topically 2 times daily. 45 g 1    cholestyramine (QUESTRAN) 4 GM/DOSE powder TAKE 4 G BY MOUTH THREE (3) TIMES DAILY (WITH MEALS) FOR 30 DAYS. 378 g 1    nystatin (MYCOSTATIN) 649535 UNIT/GM powder Apply topically 4 times daily 60 g 5    ipratropium (ATROVENT) 0.06 % nasal spray 2 sprays by Nasal route 4 times daily INSTILL 2 SPRAYS IN EACH NOSTRIL 4 TIMES A DAY 15 mL 2    anastrozole (ARIMIDEX) 1 MG tablet Take 1 mg by mouth daily      levothyroxine (SYNTHROID) 112 MCG tablet Take 112 mcg by mouth every morning (before breakfast)      naftifine (NAFTIN) 1 % cream Apply topically daily      SITagliptin (JANUVIA) 100 MG tablet TAKE 1 TABLET BY MOUTH EVERY DAY       No current facility-administered medications for this visit.          OBJECTIVE:  Visit Vitals  Vitals:    08/16/22 1116   BP: (!) 145/79   Site: Left Upper Arm   Position: Sitting   Pulse: 50   Resp: 16 SpO2: 95%           Physical Exam:     Constitutional:  Well developed, well nourished female in no acute  distress, sitting comfortably in the wheelchair. HEENT:  Normocephalic and atraumatic. Sclerae anicteric. Neck supple without JVD. No thyromegaly present. Lymph node     Deferred      Skin  Warm and dry. No bruising noted. No erythema. No pallor. +small erythematous lesion to left abd-no sign of infection. Respiratory  Lungs are clear to auscultation bilaterally without wheezes, rales or rhonchi, normal air exchange without accessory muscle use. CVS  Normal rate, regular rhythm and normal S1 and S2. No murmurs, gallops, or rubs. Neuro  Grossly nonfocal with no obvious sensory or motor deficits. MSK  Normal range of motion in general.  Trace edema BLE and no tenderness. Psych  Appropriate mood and affect.           Labs:  Hospital Outpatient Visit on 08/16/2022   Component Date Value Ref Range Status    WBC 08/16/2022 5.9  4.3 - 11.1 K/uL Final    RBC 08/16/2022 5.14  4.05 - 5.2 M/uL Final    Hemoglobin 08/16/2022 14.7  11.7 - 15.4 g/dL Final    Hematocrit 08/16/2022 47.0 (A) 35.8 - 46.3 % Final    MCV 08/16/2022 91.4  79.6 - 97.8 FL Final    MCH 08/16/2022 28.6  26.1 - 32.9 PG Final    MCHC 08/16/2022 31.3 (A) 31.4 - 35.0 g/dL Final    RDW 08/16/2022 13.9  11.9 - 14.6 % Final    Platelets 25/74/9416 214  150 - 450 K/uL Final    MPV 08/16/2022 11.0  9.4 - 12.3 FL Final    nRBC 08/16/2022 0.00  0.0 - 0.2 K/uL Final    **Note: Absolute NRBC parameter is now reported with Hemogram**    Differential Type 08/16/2022 AUTOMATED    Final    Seg Neutrophils 08/16/2022 60  43 - 78 % Final    Lymphocytes 08/16/2022 33  13 - 44 % Final    Monocytes 08/16/2022 6  4.0 - 12.0 % Final    Eosinophils % 08/16/2022 0 (A) 0.5 - 7.8 % Final    Basophils 08/16/2022 1  0.0 - 2.0 % Final    Immature Granulocytes 08/16/2022 0  0.0 - 5.0 % Final    Segs Absolute 08/16/2022 3.5  1.7 - 8.2 K/UL Final    Absolute Lymph # 08/16/2022 2.0  0.5 - 4.6 K/UL Final    Absolute Mono # 08/16/2022 0.4  0.1 - 1.3 K/UL Final    Absolute Eos # 08/16/2022 0.0  0.0 - 0.8 K/UL Final    Basophils Absolute 08/16/2022 0.0  0.0 - 0.2 K/UL Final    Absolute Immature Granulocyte 08/16/2022 0.0  0.0 - 0.5 K/UL Final    Sodium 08/16/2022 140  136 - 145 mmol/L Final    Potassium 08/16/2022 4.1  3.5 - 5.1 mmol/L Final    Chloride 08/16/2022 109 (A) 98 - 107 mmol/L Final    CO2 08/16/2022 23  21 - 32 mmol/L Final    Anion Gap 08/16/2022 8  7 - 16 mmol/L Final    Glucose 08/16/2022 102 (A) 65 - 100 mg/dL Final    BUN 08/16/2022 10  8 - 23 MG/DL Final    Creatinine 08/16/2022 0.60  0.6 - 1.0 MG/DL Final    GFR  08/16/2022 >60  >60 ml/min/1.73m2 Final    GFR Non- 08/16/2022 >60  >60 ml/min/1.73m2 Final    Comment:      Estimated GFR is calculated using the Modification of Diet in Renal Disease (MDRD) Study equation, reported for both  Americans (GFRAA) and non- Americans (GFRNA), and normalized to 1.73m2 body surface area. The physician must decide which value applies to the patient. The MDRD study equation should only be used in individuals age 25 or older. It has not been validated for the following: pregnant women, patients with serious comorbid conditions,or on certain medications, or persons with extremes of body size, muscle mass, or nutritional status.       Calcium 08/16/2022 9.5  8.3 - 10.4 MG/DL Final    Total Bilirubin 08/16/2022 0.6  0.2 - 1.1 MG/DL Final    ALT 08/16/2022 19  12 - 65 U/L Final    AST 08/16/2022 17  15 - 37 U/L Final    Alk Phosphatase 08/16/2022 121  50 - 136 U/L Final    Total Protein 08/16/2022 6.7  6.3 - 8.2 g/dL Final    Albumin 08/16/2022 3.4  3.2 - 4.6 g/dL Final    Globulin 08/16/2022 3.3  2.3 - 3.5 g/dL Final    Albumin/Globulin Ratio 08/16/2022 1.0 (A) 1.2 - 3.5   Final    Magnesium 08/16/2022 2.3  1.8 - 2.4 mg/dL Final         Imaging:   PET/CT Indication: Left breast cancer restaging       Radiopharmaceutical: 16.8 mCi F18-FDG, intravenously. Technique: Imaging was performed from the skull through the proximal thighs   using routine PET/CT acquisition protocol. Imaging was performed approximately   60 minutes post injection. Oral contrast was administered. Radiation dose   reduction techniques were used for this study:  Our CT scanners use one or all   of the following: Automated exposure control, adjustment of the mA and/or kVp   according to patient's size, iterative reconstruction. Serum glucose: 91 mg/dL prior to injection. Comparison studies: PET/CT 4/23/2021       Findings:       Head and Neck: No enlarged or hypermetabolic cervical chain nodes. Chest: No discrete lung nodule. No mediastinal mass or lymphadenopathy. Abdomen/Pelvis: No enlarged or hypermetabolic lymph nodes of the abdomen or   pelvis. No worrisome focal uptake of the abdominal viscera. Bones and soft tissues: Interval improved tracer uptake associated with   multifocal masses inferiorly in the left breast. Interval resolution of   previously faintly hypermetabolic lymph node of the left inferior axilla. No aggressive osseous lesion. IMPRESSION   1. Interval improved tracer uptake associated with multifocal masses inferiorly   in the left breast.   2.  Interval resolution of previously hypermetabolic lymph node within the left   Axilla. PET/CT: 4/23/2021       INDICATION: Initial staging of breast cancer. TECHNIQUE: After oral administration of gastroview and intravenous   administration of 12.76 mCi of F18 FDG, noncontrast CT images were obtained for   attenuation correction and for fusion with emission PET images. A series of   overlapping emission PET images were then obtained beginning 60 minutes after   injection of FDG. The area imaged spanned the region from the skull base to the   mid thighs.  All CT scans performed at this facility use one or all of the   following: Automated exposure control, adjustment of the mA and/or kVp according   to patient's size, iterative reconstruction. COMPARISON: Bilateral diagnostic mammogram 4/5/2021       FINDINGS:    NECK/CHEST:   No worrisome activity is seen in the neck. No adenopathy or aggressive osseous   lesion is suggested on limited CT images. Abnormal activity is seen within the patient's known outer left breast masses   with the greater activity occurring in the more lateral mass with maximum SUV of   5.2 g/mL. And additional activity is seen within the more medial left breast   lesion with a maximum SUV of 3.3 g/mL and focal appearing activity is seen   associated with a mildly dysmorphic left axillary lymph node with an adjacent   biopsy clip seen on image 90 with a maximum SUV of 2.5 g/mL consistent with the   patient's known bhavana metastasis. No worrisome activity is otherwise seen. No   suspicious pulmonary lesion is seen. The lungs appear hypoaerated. Linear   densities are seen which may represent scarring or atelectasis. No adenopathy or   aggressive osseous lesion is seen. ABDOMEN/PELVIS:   No worrisome activity is seen below the diaphragms. Retroperitoneal focal   activity is seen on PET/CT image 181 although this corresponds to the right   ureter suggesting benign excreted tracer within the right ureter. No adenopathy,   or aggressive osseous lesion is suggested. IMPRESSION   1. Abnormal activity within the patient's known multiple left breast masses as   well as dysmorphic left axillary lymph node consistent with the patient's known   disease. No additional metabolically active disease is seen in the neck, chest,   abdomen, or pelvis.          BILATERAL DIAGNOSTIC DIGITAL MAMMOGRAPHY,   LEFT BREAST SONOGRAPHY:       CLINICAL HISTORY:  81-year-old with no family history of breast cancer presents   for evaluation of a painful, palpable lump in the left breast.       COMPARISON:  May 22, 2013 from [facility]. BILATERAL MAMMOGRAM:  A metallic skin marker was placed at the site of left   palpable concern. Bilateral craniocaudal and mediolateral oblique as well as   left lateral views were technically limited by the patient's inability to   cooperate with standard positioning. There is scattered fibroglandular tissue   bilaterally. Right lower mid breast nodule remains stable, and there has been   interval coarsening of right upper outer quadrant calcifications, suggesting a   benign etiology. However on the left, there is a new spiculated mass at the   site of posterior 1:30 palpable concern as well as a second lobular nodule at   the left posterior 6:30 position which are suspicious for malignancy. Moreover   innumerable new, mildly pleomorphic microcalcifications occupying most of the   lateral left breast are suspicious for associated DCIS. LEFT ULTRASOUND:  Images from careful ultrasound evaluation of the area of   palpable concern at the 1:30 position approximately 20 cm from the nipple   demonstrate a 2.3 cm irregular hypoechoic mass with dense acoustical shadowing   which is very suspicious for malignancy. There is also a densely shadowing 1.2   cm hypoechoic mass at the 6:30 position 12 cm from the nipple which is also   suspicious for malignancy. A 1.1 cm dysmorphic axillary lymph node with   effacement of the fatty hilum suggests metastatic disease as well. IMPRESSION       1.  PALPABLE LEFT POSTERIOR 1:30 IRREGULAR MASS IS VERY SUSPICIOUS FOR   MALIGNANCY, AND THERE IS A SECOND LEFT 6:30 SMALLER MASS ALSO SUSPICIOUS FOR   MALIGNANCY AND A DYSMORPHIC LEFT AXILLARY LYMPH NODE SUSPICIOUS FOR METASTATIC   DISEASE. BIOPSIES ARE RECOMMENDED. 2.  INNUMERABLE NEW MICROCALCIFICATIONS OCCUPYING MUCH OF THE LATERAL LEFT   BREAST ARE ALSO SUSPICIOUS FOR ASSOCIATED DCIS.            BI-RADS Assessment Category 5: Highly suggestive of malignancy- Appropriate   action should be taken. Pathology:             ASSESSMENT:    ICD-10-CM    1. Malignant neoplasm of overlapping sites of left breast in female, estrogen receptor positive ([facility] [address]  C50.812     Z17.0       2. Chronic obstructive pulmonary disease, unspecified COPD type ([address]  J44.9       3. Shortness of breath  R06.02       4. Chemotherapy induced diarrhea  K52.1     T45.1X5A       5. Chronic fatigue  R53.82           Patient Active Problem List   Diagnosis    Mixed incontinence    Insomnia    Vitamin D deficiency    Diabetes mellitus type 2, controlled ([address]    Pleuritic chest pain    Mixed hyperlipidemia    Urge incontinence of urine    Allergic rhinitis    Cardiomegaly    Asthma    Primary osteoarthritis involving multiple joints    Nocturnal hypoxia    Shortness of breath    Hypokalemia    History of abnormal mammogram    Congenital hypothyroidism without goiter    IBS (irritable bowel syndrome)    Benign essential HTN    Chronic pelvic pain in female    Medication nonadherence due to psychosocial problem    BMI 40.0-44.9, adult (HCC)    Bradycardia    Esophageal dysphagia    Malignant neoplasm of overlapping sites of left breast in female, estrogen receptor positive ([address]    COVID-19 virus infection    Dehydration    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 Breast cancer: two separate nodules with dominant left UOQ nodule 2.3 cm and HER2 positive, other nodule left LIQ 1.2 cm and HER2 negative.   Both low grade and ER positive, weakly MD positive. Axillary node positive for macrometastatic carcinoma, also  HER2 positive. PET/CT shows no distant metastatic disease. I discussed the case with Dr. Wilfred Vivar at tumor board. Normally, for node positive HER2 positive breast cancer, we would recommend neoadjuvant  chemoimmunotherapy. However, she is unwilling to consider cytotoxic therapy, which is not unreasonable given her age and comorbidities. Since she has only locoregional disease, we should then consider mastectomy and node dissection for definitive therapy. She was also very reluctant to consider any surgery because of her history of intolerance to anesthesia, but we were able to convince her to at least meet with Dr. Wilfred Vivar. However, after discussion with him, she declined any local therapy and was  only willing to consider systemic treatment. Therefore, we recommended the PERTAIN regimen for \"unresectable\" disease, Arimidex + HP. PET/CT reviewed after 4 cycles shows response with decrease in both breast masses and decreased axillary lymph node,  no new areas of metastatic disease noted. We once again noted that without surgery, we would eventually expect progression, but for now AI+HP is working well and can be continued as long as efficacy  and tolerance maintained. She is here today for follow up and next HP with her new aide. Since last seen she has made several trips to the ED for various complaints. There is no nausea and she reports ongoing diarrhea. Appetite varies day to day, she refused to stand for a weight today. Fatigue is unchanged. Baseline shortness of breath is stable, uses O2 at HS. She denies any fevers or recent infections. Prior to the visit today she moved herself out to the hallway and was raising her voice about her wait time even after being informed that she was >1 hour early for her visit. Labs reviewed and adequate. Proceed with HP and continue on daily Arimidex. Repeat echo is due mid-September. Return in 3 weeks for HP and in 6 weeks for labs/OV/HP.           Rebecca Alonzo) [address] [place] [address] Hematology and Oncology  [address], [address] [place] [address]  Office : [phone]  Fax : [phone]

## 2022-08-17 ENCOUNTER — CARE COORDINATION (OUTPATIENT)
Dept: CARE COORDINATION | Facility: CLINIC | Age: 82
End: 2022-08-17

## 2022-08-17 NOTE — CARE COORDINATION
Attempted outreach # 1 to patient for CCM assessment of ED visit on 8/12/2022 for left arm pain  UTR and unable to leave VM message  Patient's apt with PCP scheduled for today 8/17/2022 was cancelled  Will attempt one more outreach before closing the case

## 2022-08-18 ENCOUNTER — CARE COORDINATION (OUTPATIENT)
Dept: CARE COORDINATION | Facility: CLINIC | Age: 82
End: 2022-08-18

## 2022-08-18 NOTE — CARE COORDINATION
Attempted outreach # 2 to patient for CCM assessment of ED visit on 8/12/2022 for left arm pain  UTR and unable to leave VM message  Patient's apt with PCP scheduled for 8/17/2022 was cancelled  Case closed and no further outreaches scheduled at this time due to UTR x's 2

## 2022-08-22 ENCOUNTER — TELEPHONE (OUTPATIENT)
Dept: FAMILY MEDICINE CLINIC | Facility: CLINIC | Age: 82
End: 2022-08-22

## 2022-08-22 DIAGNOSIS — G47.00 INSOMNIA, UNSPECIFIED TYPE: Primary | ICD-10-CM

## 2022-08-22 RX ORDER — TEMAZEPAM 30 MG/1
30 CAPSULE ORAL NIGHTLY PRN
Qty: 30 CAPSULE | Refills: 0 | Status: SHIPPED | OUTPATIENT
Start: 2022-08-26 | End: 2022-09-15 | Stop reason: SDUPTHER

## 2022-08-23 ENCOUNTER — TELEPHONE (OUTPATIENT)
Dept: FAMILY MEDICINE CLINIC | Facility: CLINIC | Age: 82
End: 2022-08-23

## 2022-08-23 NOTE — TELEPHONE ENCOUNTER
Patient called and stated she needs a new CPAP machine. She is wondering if this can be ordered for her or what she needed to do to receive one. Please advise.

## 2022-08-24 NOTE — TELEPHONE ENCOUNTER
Please advise whether or not you are willing to order a new CPAP for patient? Or do we need to refer to pulmonology?

## 2022-09-15 ENCOUNTER — OFFICE VISIT (OUTPATIENT)
Dept: FAMILY MEDICINE CLINIC | Facility: CLINIC | Age: 82
End: 2022-09-15
Payer: MEDICARE

## 2022-09-15 DIAGNOSIS — E03.9 ACQUIRED HYPOTHYROIDISM: Primary | ICD-10-CM

## 2022-09-15 DIAGNOSIS — G47.00 INSOMNIA, UNSPECIFIED TYPE: ICD-10-CM

## 2022-09-15 DIAGNOSIS — E11.8 TYPE 2 DIABETES MELLITUS WITH UNSPECIFIED COMPLICATIONS (HCC): ICD-10-CM

## 2022-09-15 DIAGNOSIS — E03.9 ACQUIRED HYPOTHYROIDISM: ICD-10-CM

## 2022-09-15 DIAGNOSIS — I10 BENIGN ESSENTIAL HTN: ICD-10-CM

## 2022-09-15 PROCEDURE — 99214 OFFICE O/P EST MOD 30 MIN: CPT | Performed by: STUDENT IN AN ORGANIZED HEALTH CARE EDUCATION/TRAINING PROGRAM

## 2022-09-15 PROCEDURE — G8417 CALC BMI ABV UP PARAM F/U: HCPCS | Performed by: STUDENT IN AN ORGANIZED HEALTH CARE EDUCATION/TRAINING PROGRAM

## 2022-09-15 PROCEDURE — 1036F TOBACCO NON-USER: CPT | Performed by: STUDENT IN AN ORGANIZED HEALTH CARE EDUCATION/TRAINING PROGRAM

## 2022-09-15 PROCEDURE — 1123F ACP DISCUSS/DSCN MKR DOCD: CPT | Performed by: STUDENT IN AN ORGANIZED HEALTH CARE EDUCATION/TRAINING PROGRAM

## 2022-09-15 PROCEDURE — 3044F HG A1C LEVEL LT 7.0%: CPT | Performed by: STUDENT IN AN ORGANIZED HEALTH CARE EDUCATION/TRAINING PROGRAM

## 2022-09-15 PROCEDURE — G8427 DOCREV CUR MEDS BY ELIG CLIN: HCPCS | Performed by: STUDENT IN AN ORGANIZED HEALTH CARE EDUCATION/TRAINING PROGRAM

## 2022-09-15 PROCEDURE — 1090F PRES/ABSN URINE INCON ASSESS: CPT | Performed by: STUDENT IN AN ORGANIZED HEALTH CARE EDUCATION/TRAINING PROGRAM

## 2022-09-15 PROCEDURE — G8400 PT W/DXA NO RESULTS DOC: HCPCS | Performed by: STUDENT IN AN ORGANIZED HEALTH CARE EDUCATION/TRAINING PROGRAM

## 2022-09-15 RX ORDER — TEMAZEPAM 30 MG/1
30 CAPSULE ORAL NIGHTLY PRN
Qty: 30 CAPSULE | Refills: 5 | Status: SHIPPED | OUTPATIENT
Start: 2022-09-25 | End: 2023-03-24

## 2022-09-15 RX ORDER — AMLODIPINE BESYLATE 2.5 MG/1
2.5 TABLET ORAL DAILY
Qty: 30 TABLET | Refills: 5 | Status: SHIPPED | OUTPATIENT
Start: 2022-09-15

## 2022-09-15 NOTE — PROGRESS NOTES
[facility]  Tayler Velasquez  Phone [phone]  Fax:  [phone]    Janiya Nicole (:  1940) is a 81 y.o. female here for evaluation of the following chief complaint(s):  Follow-up (6 month)       ASSESSMENT/PLAN:  1. Acquired hypothyroidism  -     T4, Free; Future  -     TSH; Future  2. 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 Insomnia, unspecified type  -     temazepam (RESTORIL) 30 MG capsule; Take 1 capsule by mouth nightly as needed for Sleep for up to 180 days. , Disp-30 capsule, R-5Normal  4. Benign essential HTN    Continue Synthroid 112 mcg daily for hypothyroidism, check TSH and free T4. Blood pressure borderline high today. Continue lisinopril and start low-dose amlodipine 2.5 mg nightly. Patient reports she needs a new CPAP. Unable to find any records from sleep medicine. We will have patient follow-up with sleep medicine about this. Continue Januvia for diabetes, will check A1c and adjust regimen if needed. Patient takes Restoril nightly for insomnia. Discussed risks of this medication in the elderly. Patient expresses understanding and would like to continue this.  checked and appropriate, refill provided. Return in about 6 months (around 3/15/2023) for routine f/u, labs prior. Subjective   SUBJECTIVE/OBJECTIVE:  HPI  52-year-old female with PMH of DM2, HTN, HLD, hypothyroidism, insomnia, COPD, breast cancer, and sleep apnea presents for regular 6-month follow-up. Lives with her son. Here with aide.  -states she could not tolerate vit D supplementation in the past  -can only walk a little with walker at home  -states she needs a new CPAP  -takes Restoril at night for insomnia    Review of Systems       Objective     Vitals:    09/15/22 1203   BP: (!) 132/92   Pulse: 64   Temp: 98.2 °F (36.8 °C)   SpO2: 96%       Physical Exam  Vitals reviewed.    Constitutional: General: She is not in acute distress. Appearance: She is obese. Comments: In wheelchair   HENT:      Head: Normocephalic and atraumatic. Cardiovascular:      Rate and Rhythm: Normal rate and regular rhythm. Pulmonary:      Effort: Pulmonary effort is normal.      Breath sounds: Normal breath sounds. Musculoskeletal:      Right lower leg: No edema. Left lower leg: No edema. Skin:     General: Skin is warm and dry. Neurological:      General: No focal deficit present. Mental Status: She is alert and oriented to person, place, and time. An electronic signature was used to authenticate this note.     --Argentina Calvin MD

## 2022-09-16 ENCOUNTER — TELEPHONE (OUTPATIENT)
Dept: ONCOLOGY | Age: 82
End: 2022-09-16

## 2022-09-16 LAB
EST. AVERAGE GLUCOSE BLD GHB EST-MCNC: 105 MG/DL
HBA1C MFR BLD: 5.3 % (ref 4.8–5.6)
T4 FREE SERPL-MCNC: 1.2 NG/DL (ref 0.78–1.46)
TSH, 3RD GENERATION: 1.96 UIU/ML (ref 0.36–3.74)

## 2022-09-16 NOTE — TELEPHONE ENCOUNTER
Spoke w pt. She did not want to r/s. She stated she needs someone to come get her to take her to her appointments.  Message sent to Rubens estrada, [address]

## 2022-09-19 ENCOUNTER — TELEPHONE (OUTPATIENT)
Dept: FAMILY MEDICINE CLINIC | Facility: CLINIC | Age: 82
End: 2022-09-19

## 2022-09-19 DIAGNOSIS — Z99.89 OSA ON CPAP: Primary | ICD-10-CM

## 2022-09-19 DIAGNOSIS — G47.33 OSA ON CPAP: Primary | ICD-10-CM

## 2022-09-19 NOTE — TELEPHONE ENCOUNTER
Please place an order for a new referral for a sleep study. Unable to locate last one and pt unsure of where done. Will need this for new supplies.

## 2022-09-22 ENCOUNTER — TELEPHONE (OUTPATIENT)
Dept: FAMILY MEDICINE CLINIC | Facility: CLINIC | Age: 82
End: 2022-09-22

## 2022-09-22 NOTE — TELEPHONE ENCOUNTER
Niranjan Ang spoke with pt and relayed to pt that in order to get new cpap and supplies a new study has to be done. She was given the number to call and talk about a in home test. A referral was placed already for this.

## 2022-09-23 ENCOUNTER — TELEPHONE (OUTPATIENT)
Dept: FAMILY MEDICINE CLINIC | Facility: CLINIC | Age: 82
End: 2022-09-23

## 2022-09-23 NOTE — TELEPHONE ENCOUNTER
Pt has been advised numerous times she has to go to the appt so they can make arrangements for a home or in office sleep study. She has been told she can not get supplies without one as that is how it works.

## 2022-09-27 ENCOUNTER — TELEPHONE (OUTPATIENT)
Dept: ONCOLOGY | Age: 82
End: 2022-09-27

## 2022-09-27 ENCOUNTER — CLINICAL DOCUMENTATION (OUTPATIENT)
Dept: ONCOLOGY | Age: 82
End: 2022-09-27

## 2022-09-28 VITALS
DIASTOLIC BLOOD PRESSURE: 92 MMHG | SYSTOLIC BLOOD PRESSURE: 132 MMHG | TEMPERATURE: 98.2 F | OXYGEN SATURATION: 96 % | HEART RATE: 64 BPM

## 2022-10-03 DIAGNOSIS — E03.9 HYPOTHYROIDISM, UNSPECIFIED: ICD-10-CM

## 2022-10-04 RX ORDER — LEVOTHYROXINE SODIUM 112 UG/1
TABLET ORAL
Qty: 90 TABLET | Refills: 1 | OUTPATIENT
Start: 2022-10-04

## 2022-10-07 ENCOUNTER — TELEPHONE (OUTPATIENT)
Dept: FAMILY MEDICINE CLINIC | Facility: CLINIC | Age: 82
End: 2022-10-07

## 2022-10-07 RX ORDER — LEVOTHYROXINE SODIUM 112 UG/1
112 TABLET ORAL
Qty: 30 TABLET | Refills: 5 | Status: SHIPPED | OUTPATIENT
Start: 2022-10-07

## 2022-10-07 NOTE — TELEPHONE ENCOUNTER
Patient needs refill on synthroid, needs it today as she only has a ride today.  [facility] [phone]

## 2022-10-18 ENCOUNTER — HOSPITAL ENCOUNTER (OUTPATIENT)
Dept: INFUSION THERAPY | Age: 82
Discharge: HOME | End: 2022-10-18
Payer: MEDICARE

## 2022-10-18 VITALS
RESPIRATION RATE: 18 BRPM | HEART RATE: 53 BPM | DIASTOLIC BLOOD PRESSURE: 83 MMHG | SYSTOLIC BLOOD PRESSURE: 143 MMHG | OXYGEN SATURATION: 93 % | BODY MASS INDEX: 34.51 KG/M2 | WEIGHT: 194.8 LBS | TEMPERATURE: 98.5 F

## 2022-10-18 DIAGNOSIS — Z17.0 MALIGNANT NEOPLASM OF OVERLAPPING SITES OF LEFT BREAST IN FEMALE, ESTROGEN RECEPTOR POSITIVE (HCC): Primary | ICD-10-CM

## 2022-10-18 DIAGNOSIS — C50.812 MALIGNANT NEOPLASM OF OVERLAPPING SITES OF LEFT BREAST IN FEMALE, ESTROGEN RECEPTOR POSITIVE (HCC): Primary | ICD-10-CM

## 2022-10-18 PROCEDURE — 96413 CHEMO IV INFUSION 1 HR: CPT

## 2022-10-18 PROCEDURE — 6360000002 HC RX W HCPCS: Performed by: NURSE PRACTITIONER

## 2022-10-18 PROCEDURE — 2580000003 HC RX 258: Performed by: NURSE PRACTITIONER

## 2022-10-18 PROCEDURE — 96417 CHEMO IV INFUS EACH ADDL SEQ: CPT

## 2022-10-18 RX ORDER — SODIUM CHLORIDE 9 MG/ML
5-250 INJECTION, SOLUTION INTRAVENOUS PRN
Status: DISCONTINUED | OUTPATIENT
Start: 2022-10-18 | End: 2022-10-19 | Stop reason: HOSPADM

## 2022-10-18 RX ADMIN — PERTUZUMAB 420 MG: 30 INJECTION, SOLUTION, CONCENTRATE INTRAVENOUS at 13:19

## 2022-10-18 RX ADMIN — SODIUM CHLORIDE 25 ML/HR: 9 INJECTION, SOLUTION INTRAVENOUS at 12:44

## 2022-10-18 RX ADMIN — SODIUM CHLORIDE 546 MG: 9 INJECTION, SOLUTION INTRAVENOUS at 12:44

## 2022-10-18 NOTE — PROGRESS NOTES
Arrived to the [facility]. Herceptin and Perjeta completed. Patient tolerated without problems. Any issues or concerns during appointment: no.  Patient aware of next infusion appointment on 11/8/22 (date) at 1430 (time). Patient instructed to call provider with temperature of 100.4 or greater or nausea/vomiting/ diarrhea or pain not controlled by medications  Discharged via [facility] with her daughter.

## 2022-10-25 DIAGNOSIS — I10 BENIGN ESSENTIAL HTN: ICD-10-CM

## 2022-10-25 RX ORDER — LISINOPRIL 20 MG/1
20 TABLET ORAL DAILY
Qty: 30 TABLET | Refills: 5 | Status: CANCELLED | OUTPATIENT
Start: 2022-10-25

## 2022-10-26 DIAGNOSIS — I10 BENIGN ESSENTIAL HTN: ICD-10-CM

## 2022-10-26 RX ORDER — LISINOPRIL 20 MG/1
20 TABLET ORAL DAILY
Qty: 90 TABLET | Refills: 1 | Status: SHIPPED | OUTPATIENT
Start: 2022-10-26

## 2022-10-26 RX ORDER — LISINOPRIL 10 MG/1
TABLET ORAL
Qty: 90 TABLET | Refills: 1 | OUTPATIENT
Start: 2022-10-26

## 2022-10-26 NOTE — TELEPHONE ENCOUNTER
Breanne Villeda had told patient to take 2 of the Lisinopril 10 mg of what she had left then started her on the 20 mg. She has been out of the medication x 1 month. A nurse came out & checked her BP on Monday & said it was a little high but patient could not remember the reading.

## 2022-11-08 ENCOUNTER — HOSPITAL ENCOUNTER (OUTPATIENT)
Dept: INFUSION THERAPY | Age: 82
Discharge: HOME | End: 2022-11-08
Payer: MEDICARE

## 2022-11-08 ENCOUNTER — HOSPITAL ENCOUNTER (OUTPATIENT)
Dept: LAB | Age: 82
Discharge: HOME | End: 2022-11-11
Payer: MEDICARE

## 2022-11-08 ENCOUNTER — OFFICE VISIT (OUTPATIENT)
Dept: ONCOLOGY | Age: 82
End: 2022-11-08
Payer: MEDICARE

## 2022-11-08 VITALS
SYSTOLIC BLOOD PRESSURE: 139 MMHG | HEART RATE: 54 BPM | OXYGEN SATURATION: 95 % | DIASTOLIC BLOOD PRESSURE: 82 MMHG | RESPIRATION RATE: 21 BRPM | HEIGHT: 63 IN | BODY MASS INDEX: 34.51 KG/M2

## 2022-11-08 VITALS — WEIGHT: 195.5 LBS | BODY MASS INDEX: 34.63 KG/M2

## 2022-11-08 DIAGNOSIS — Z17.0 MALIGNANT NEOPLASM OF OVERLAPPING SITES OF LEFT BREAST IN FEMALE, ESTROGEN RECEPTOR POSITIVE (HCC): ICD-10-CM

## 2022-11-08 DIAGNOSIS — C50.812 MALIGNANT NEOPLASM OF OVERLAPPING SITES OF LEFT BREAST IN FEMALE, ESTROGEN RECEPTOR POSITIVE (HCC): Primary | ICD-10-CM

## 2022-11-08 DIAGNOSIS — C50.812 MALIGNANT NEOPLASM OF OVERLAPPING SITES OF LEFT BREAST IN FEMALE, ESTROGEN RECEPTOR POSITIVE (HCC): ICD-10-CM

## 2022-11-08 DIAGNOSIS — Z17.0 MALIGNANT NEOPLASM OF OVERLAPPING SITES OF LEFT BREAST IN FEMALE, ESTROGEN RECEPTOR POSITIVE (HCC): Primary | ICD-10-CM

## 2022-11-08 LAB
ALBUMIN SERPL-MCNC: 3.2 G/DL (ref 3.2–4.6)
ALBUMIN/GLOB SERPL: 0.9 {RATIO} (ref 0.4–1.6)
ALP SERPL-CCNC: 125 U/L (ref 50–136)
ALT SERPL-CCNC: 22 U/L (ref 12–65)
ANION GAP SERPL CALC-SCNC: 6 MMOL/L (ref 2–11)
AST SERPL-CCNC: 19 U/L (ref 15–37)
BASOPHILS # BLD: 0 K/UL (ref 0–0.2)
BASOPHILS NFR BLD: 0 % (ref 0–2)
BILIRUB SERPL-MCNC: 0.5 MG/DL (ref 0.2–1.1)
BUN SERPL-MCNC: 6 MG/DL (ref 8–23)
CALCIUM SERPL-MCNC: 9.5 MG/DL (ref 8.3–10.4)
CHLORIDE SERPL-SCNC: 109 MMOL/L (ref 101–110)
CO2 SERPL-SCNC: 26 MMOL/L (ref 21–32)
CREAT SERPL-MCNC: 0.7 MG/DL (ref 0.6–1)
DIFFERENTIAL METHOD BLD: NORMAL
EOSINOPHIL # BLD: 0.2 K/UL (ref 0–0.8)
EOSINOPHIL NFR BLD: 2 % (ref 0.5–7.8)
ERYTHROCYTE [DISTWIDTH] IN BLOOD BY AUTOMATED COUNT: 13.3 % (ref 11.9–14.6)
GLOBULIN SER CALC-MCNC: 3.5 G/DL (ref 2.8–4.5)
GLUCOSE SERPL-MCNC: 103 MG/DL (ref 65–100)
HCT VFR BLD AUTO: 45.6 % (ref 35.8–46.3)
HGB BLD-MCNC: 14.3 G/DL (ref 11.7–15.4)
IMM GRANULOCYTES # BLD AUTO: 0 K/UL (ref 0–0.5)
IMM GRANULOCYTES NFR BLD AUTO: 0 % (ref 0–5)
LYMPHOCYTES # BLD: 2 K/UL (ref 0.5–4.6)
LYMPHOCYTES NFR BLD: 30 % (ref 13–44)
MAGNESIUM SERPL-MCNC: 2.1 MG/DL (ref 1.8–2.4)
MCH RBC QN AUTO: 28.7 PG (ref 26.1–32.9)
MCHC RBC AUTO-ENTMCNC: 31.4 G/DL (ref 31.4–35)
MCV RBC AUTO: 91.6 FL (ref 82–102)
MONOCYTES # BLD: 0.4 K/UL (ref 0.1–1.3)
MONOCYTES NFR BLD: 5 % (ref 4–12)
NEUTS SEG # BLD: 4.1 K/UL (ref 1.7–8.2)
NEUTS SEG NFR BLD: 63 % (ref 43–78)
NRBC # BLD: 0 K/UL (ref 0–0.2)
PLATELET # BLD AUTO: 273 K/UL (ref 150–450)
PMV BLD AUTO: 10.8 FL (ref 9.4–12.3)
POTASSIUM SERPL-SCNC: 3.7 MMOL/L (ref 3.5–5.1)
PROT SERPL-MCNC: 6.7 G/DL (ref 6.3–8.2)
RBC # BLD AUTO: 4.98 M/UL (ref 4.05–5.2)
SODIUM SERPL-SCNC: 141 MMOL/L (ref 133–143)
WBC # BLD AUTO: 6.7 K/UL (ref 4.3–11.1)

## 2022-11-08 PROCEDURE — G8400 PT W/DXA NO RESULTS DOC: HCPCS | Performed by: INTERNAL MEDICINE

## 2022-11-08 PROCEDURE — 96413 CHEMO IV INFUSION 1 HR: CPT

## 2022-11-08 PROCEDURE — 85025 COMPLETE CBC W/AUTO DIFF WBC: CPT

## 2022-11-08 PROCEDURE — 83735 ASSAY OF MAGNESIUM: CPT

## 2022-11-08 PROCEDURE — G8417 CALC BMI ABV UP PARAM F/U: HCPCS | Performed by: INTERNAL MEDICINE

## 2022-11-08 PROCEDURE — 96417 CHEMO IV INFUS EACH ADDL SEQ: CPT

## 2022-11-08 PROCEDURE — 1123F ACP DISCUSS/DSCN MKR DOCD: CPT | Performed by: INTERNAL MEDICINE

## 2022-11-08 PROCEDURE — 2580000003 HC RX 258: Performed by: INTERNAL MEDICINE

## 2022-11-08 PROCEDURE — 1090F PRES/ABSN URINE INCON ASSESS: CPT | Performed by: INTERNAL MEDICINE

## 2022-11-08 PROCEDURE — G8484 FLU IMMUNIZE NO ADMIN: HCPCS | Performed by: INTERNAL MEDICINE

## 2022-11-08 PROCEDURE — 99214 OFFICE O/P EST MOD 30 MIN: CPT | Performed by: INTERNAL MEDICINE

## 2022-11-08 PROCEDURE — G8428 CUR MEDS NOT DOCUMENT: HCPCS | Performed by: INTERNAL MEDICINE

## 2022-11-08 PROCEDURE — 1036F TOBACCO NON-USER: CPT | Performed by: INTERNAL MEDICINE

## 2022-11-08 PROCEDURE — 36415 COLL VENOUS BLD VENIPUNCTURE: CPT

## 2022-11-08 PROCEDURE — 3074F SYST BP LT 130 MM HG: CPT | Performed by: INTERNAL MEDICINE

## 2022-11-08 PROCEDURE — 6360000002 HC RX W HCPCS: Performed by: INTERNAL MEDICINE

## 2022-11-08 PROCEDURE — 3078F DIAST BP <80 MM HG: CPT | Performed by: INTERNAL MEDICINE

## 2022-11-08 PROCEDURE — 80053 COMPREHEN METABOLIC PANEL: CPT

## 2022-11-08 RX ORDER — EPINEPHRINE 1 MG/ML
0.3 INJECTION, SOLUTION, CONCENTRATE INTRAVENOUS PRN
Status: CANCELLED | OUTPATIENT
Start: 2022-11-08

## 2022-11-08 RX ORDER — ONDANSETRON 2 MG/ML
8 INJECTION INTRAMUSCULAR; INTRAVENOUS
Status: CANCELLED | OUTPATIENT
Start: 2022-11-29

## 2022-11-08 RX ORDER — EPINEPHRINE 1 MG/ML
0.3 INJECTION, SOLUTION, CONCENTRATE INTRAVENOUS PRN
Status: CANCELLED | OUTPATIENT
Start: 2022-11-29

## 2022-11-08 RX ORDER — SODIUM CHLORIDE 0.9 % (FLUSH) 0.9 %
5-40 SYRINGE (ML) INJECTION PRN
Status: CANCELLED | OUTPATIENT
Start: 2022-11-29

## 2022-11-08 RX ORDER — SODIUM CHLORIDE 9 MG/ML
5-250 INJECTION, SOLUTION INTRAVENOUS PRN
Status: CANCELLED | OUTPATIENT
Start: 2022-11-29

## 2022-11-08 RX ORDER — ACETAMINOPHEN 325 MG/1
650 TABLET ORAL
Status: CANCELLED | OUTPATIENT
Start: 2022-11-08

## 2022-11-08 RX ORDER — DIPHENHYDRAMINE HYDROCHLORIDE 50 MG/ML
50 INJECTION INTRAMUSCULAR; INTRAVENOUS
Status: CANCELLED | OUTPATIENT
Start: 2022-11-08

## 2022-11-08 RX ORDER — MEPERIDINE HYDROCHLORIDE 50 MG/ML
12.5 INJECTION INTRAMUSCULAR; INTRAVENOUS; SUBCUTANEOUS PRN
Status: CANCELLED | OUTPATIENT
Start: 2022-11-08

## 2022-11-08 RX ORDER — SODIUM CHLORIDE 9 MG/ML
5-40 INJECTION INTRAVENOUS PRN
Status: CANCELLED | OUTPATIENT
Start: 2022-11-29

## 2022-11-08 RX ORDER — ONDANSETRON 2 MG/ML
8 INJECTION INTRAMUSCULAR; INTRAVENOUS
Status: CANCELLED | OUTPATIENT
Start: 2022-11-08

## 2022-11-08 RX ORDER — MEPERIDINE HYDROCHLORIDE 50 MG/ML
12.5 INJECTION INTRAMUSCULAR; INTRAVENOUS; SUBCUTANEOUS PRN
Status: CANCELLED | OUTPATIENT
Start: 2022-11-29

## 2022-11-08 RX ORDER — ACETAMINOPHEN 325 MG/1
650 TABLET ORAL
Status: CANCELLED | OUTPATIENT
Start: 2022-11-29

## 2022-11-08 RX ORDER — HEPARIN SODIUM (PORCINE) LOCK FLUSH IV SOLN 100 UNIT/ML 100 UNIT/ML
500 SOLUTION INTRAVENOUS PRN
Status: CANCELLED | OUTPATIENT
Start: 2022-11-08

## 2022-11-08 RX ORDER — SODIUM CHLORIDE 9 MG/ML
INJECTION, SOLUTION INTRAVENOUS CONTINUOUS
Status: CANCELLED | OUTPATIENT
Start: 2022-11-29

## 2022-11-08 RX ORDER — SODIUM CHLORIDE 9 MG/ML
5-250 INJECTION, SOLUTION INTRAVENOUS PRN
Status: DISCONTINUED | OUTPATIENT
Start: 2022-11-08 | End: 2022-11-09 | Stop reason: HOSPADM

## 2022-11-08 RX ORDER — SODIUM CHLORIDE 9 MG/ML
5-40 INJECTION INTRAVENOUS PRN
Status: CANCELLED | OUTPATIENT
Start: 2022-11-08

## 2022-11-08 RX ORDER — HEPARIN SODIUM (PORCINE) LOCK FLUSH IV SOLN 100 UNIT/ML 100 UNIT/ML
500 SOLUTION INTRAVENOUS PRN
Status: CANCELLED | OUTPATIENT
Start: 2022-11-29

## 2022-11-08 RX ORDER — DIPHENHYDRAMINE HYDROCHLORIDE 50 MG/ML
50 INJECTION INTRAMUSCULAR; INTRAVENOUS
Status: CANCELLED | OUTPATIENT
Start: 2022-11-29

## 2022-11-08 RX ORDER — SODIUM CHLORIDE 0.9 % (FLUSH) 0.9 %
5-40 SYRINGE (ML) INJECTION PRN
Status: CANCELLED | OUTPATIENT
Start: 2022-11-08

## 2022-11-08 RX ORDER — FAMOTIDINE 10 MG/ML
20 INJECTION, SOLUTION INTRAVENOUS
Status: CANCELLED | OUTPATIENT
Start: 2022-11-29

## 2022-11-08 RX ORDER — SODIUM CHLORIDE 9 MG/ML
INJECTION, SOLUTION INTRAVENOUS CONTINUOUS
Status: CANCELLED | OUTPATIENT
Start: 2022-11-08

## 2022-11-08 RX ORDER — ALBUTEROL SULFATE 90 UG/1
4 AEROSOL, METERED RESPIRATORY (INHALATION) PRN
Status: CANCELLED | OUTPATIENT
Start: 2022-11-08

## 2022-11-08 RX ORDER — ALBUTEROL SULFATE 90 UG/1
4 AEROSOL, METERED RESPIRATORY (INHALATION) PRN
Status: CANCELLED | OUTPATIENT
Start: 2022-11-29

## 2022-11-08 RX ORDER — FAMOTIDINE 10 MG/ML
20 INJECTION, SOLUTION INTRAVENOUS
Status: CANCELLED | OUTPATIENT
Start: 2022-11-08

## 2022-11-08 RX ORDER — SODIUM CHLORIDE 9 MG/ML
5-250 INJECTION, SOLUTION INTRAVENOUS PRN
Status: CANCELLED | OUTPATIENT
Start: 2022-11-08

## 2022-11-08 RX ORDER — SODIUM CHLORIDE 0.9 % (FLUSH) 0.9 %
5-40 SYRINGE (ML) INJECTION PRN
Status: DISCONTINUED | OUTPATIENT
Start: 2022-11-08 | End: 2022-11-09 | Stop reason: HOSPADM

## 2022-11-08 RX ADMIN — SODIUM CHLORIDE, PRESERVATIVE FREE 10 ML: 5 INJECTION INTRAVENOUS at 15:15

## 2022-11-08 RX ADMIN — SODIUM CHLORIDE 546 MG: 9 INJECTION, SOLUTION INTRAVENOUS at 15:19

## 2022-11-08 RX ADMIN — PERTUZUMAB 420 MG: 30 INJECTION, SOLUTION, CONCENTRATE INTRAVENOUS at 15:52

## 2022-11-08 RX ADMIN — SODIUM CHLORIDE 25 ML/HR: 9 INJECTION, SOLUTION INTRAVENOUS at 15:00

## 2022-11-08 ASSESSMENT — PATIENT HEALTH QUESTIONNAIRE - PHQ9
SUM OF ALL RESPONSES TO PHQ QUESTIONS 1-9: 0
SUM OF ALL RESPONSES TO PHQ QUESTIONS 1-9: 0
SUM OF ALL RESPONSES TO PHQ9 QUESTIONS 1 & 2: 0
2. FEELING DOWN, DEPRESSED OR HOPELESS: 0
SUM OF ALL RESPONSES TO PHQ QUESTIONS 1-9: 0
1. LITTLE INTEREST OR PLEASURE IN DOING THINGS: 0
SUM OF ALL RESPONSES TO PHQ QUESTIONS 1-9: 0

## 2022-11-08 NOTE — PROGRESS NOTES
[facility] Hematology and Oncology: Office Visit Established Patient     Chief Complaint:    Chief Complaint   Patient presents with    Follow-up      History of Present Illness:  Ms. Maynor Benitez  is a 81 y.o. female who presents  today for follow-up regarding breast cancer. She presented to [facility] on [facility] for evaluation of a palpable, painful lump in her left breast. Bilateral diagnostic digital mammography and left breast ultrasound identified an irregular mass, very  suspicious for malignancy located at the 1:30 position in the posterior left breast; a second smaller mass also suspicious for malignancy at the 6:30 position in the left breast; a dysmorphic left axillary lymph node suspicious for metastatic disease;  and innumerable new microcalcifications occupying much of the lateral left breast, suspicious for associated DCIS. Recommended core needle biopsies and marker clip placements for the palpable 2.3 cm posterior 1:30 mass, the 1.8 cm posterior 6:30 nodule,  and a dystrophic axillary lymph node were performed on 4/7/20. Pathology from the core biopsy of the left breast, 6:30 position, 12 cm from nipple, revealed ER 99%/NC 5% positive, HER-2 (1+) negative, low grade (well differentiated), infiltrating duct  carcinoma with colloid features and no definite in situ component or lymphovascular invasion identified; pathology from the core biopsy of the left axilla revealed macro metastatic carcinoma focally involving connective tissue consistent with extracapsular  extension; and pathology from the core biopsy of the left breast, 1:30 position, 20 cm from nipple, revealed ER 95%/NC 9%/HER-2 (3+) positive, low grade (well differentiated), infiltrating duct carcinoma with lobular features and no definite in situ component  or lymphovascular invasion identified. [facility], for oncology evaluation and treatment of newly diagnosed breast cancer.   We recommended proceeding with PET/CT, as well as IHC on the axillary lymph node  to determine whether this was HER2 positive as well. PET/CT shows no distant metastatic disease. I discussed the case with Dr. Natalya Trammell at tumor board. Normally, for node positive HER2 positive  breast cancer, we would recommend neoadjuvant chemoimmunotherapy. However, she is unwilling to consider cytotoxic therapy, which is not unreasonable given her age and comorbidities. Since she has only locoregional disease, we should then consider mastectomy  and node dissection for definitive therapy. She was also very reluctant to consider any surgery because of her history of intolerance to anesthesia, but we were able to convince her to at least meet with Dr. Natalya Trammell. However, after discussion with  him, she declined any local therapy and was only willing to consider systemic treatment. Therefore, we recommended the PERTAIN regimen for \"unresectable\" disease, Arimidex + HP. She is here today for follow up and next HP. She missed her last two infusions due to transportation issues. She is feeling OK, main complaints are arthritis and a recent sinus infection which did not respond to antibiotics. Her GI symptoms are much better, bowels are improved with less diarrhea. Appetite varies day to day. Fatigue is unchanged. She takes Tylenol ES for her arthritis pain. Review of Systems:   Constitutional: Positive for fatigue. HENT: Negative. Eyes: Negative. Respiratory: Negative. Cardiovascular: Negative. Gastrointestinal: Positive for diarrhea. Genitourinary: Negative. Musculoskeletal: Negative. Skin: Negative. Neurological: Negative. Endo/Heme/Allergies: Negative. Psychiatric/Behavioral: Negative. All other systems reviewed and are negative.     Allergies   Allergen Reactions    Aspirin Other (See Comments)    Ciprofloxacin Other (See Comments)    Doxycycline Calcium Other (See Comments) Fluconazole Other (See Comments)    Ibuprofen Other (See Comments)    Metformin Other (See Comments)    Sulfa Antibiotics Hives and Other (See Comments)    Sulfabenzamide Other (See Comments)    Benzonatate Rash    Gabapentin Anxiety     Past Medical History:   Diagnosis Date    Arthritis     Asthma     Breast cancer ([facility] [address]     Chronic kidney disease     Ear problems     Gastrointestinal disorder     acid reflux, hernia    Hyperlipidemia     Hypertension     Insomnia     Other ill-defined conditions(799.89)     blood clot on lung    Stroke ([facility] [address]     Thyroid disease     Tinnitus     Vitamin D deficiency      Past Surgical History:   Procedure Laterality Date    APPENDECTOMY      BREAST BIOPSY Left 04/07/2021    u/s x 3    GYN  [age]    c-sec    HEENT      t & a    OTHER SURGICAL HISTORY      fatty tissue removed from under arm    FL APPENDECTOMY      US BREAST BIOPSY NEEDLE ADDITIONAL LEFT Left 4/7/2021    US BREAST BIOPSY NEEDLE ADDITIONAL LEFT 4/7/2021 [facility]    US BREAST NEEDLE BIOPSY LEFT Left 4/7/2021    US BREAST NEEDLE BIOPSY LEFT 4/7/2021 [facility]    US LYMPH NODE BIOPSY  4/7/2021    US LYMPH NODE BIOPSY 4/7/2021 [facility]     Family History   Problem Relation Age of Onset    No Known Problems Mother     No Known Problems Father     No Known Problems Maternal Grandmother     No Known Problems Maternal Grandfather     No Known Problems Paternal Grandmother     No Known Problems Paternal Grandfather     Breast Cancer Neg Hx      Social History     Socioeconomic History    Marital status:       Spouse name: Not on file    Number of children: Not on file    Years of education: Not on file    Highest education level: Not on file   Occupational History    Not on file   Tobacco Use    Smoking status: Never    Smokeless tobacco: Never   Substance and Sexual Activity    Alcohol use: No    Drug use: No    Sexual activity: Not on file   Other Topics Concern    Not on file   Social Sitting   Cuff Size: Medium Adult   Pulse: 54   Resp: 21   SpO2: 95%   Height: 5' 3\" (1.6 m)           Physical Exam:  Constitutional: Well developed, well nourished female in no acute distress, sitting comfortably in the wheelchair. HEENT: Normocephalic and atraumatic. Oropharynx is clear, mucous membranes are moist.  Sclerae anicteric. Neck supple without JVD. No thyromegaly present. Lymph node   No palpable submandibular, cervical, supraclavicular, axillary lymph nodes. Skin Warm and dry. No bruising and no rash noted. No erythema. No pallor. Respiratory Lungs are clear to auscultation bilaterally without wheezes, rales or rhonchi, normal air exchange without accessory muscle use. CVS Normal rate, regular rhythm and normal S1 and S2. No murmurs, gallops, or rubs. Abdomen Soft, nontender and nondistended, normoactive bowel sounds. No palpable mass. No hepatosplenomegaly. Neuro Grossly nonfocal with no obvious sensory or motor deficits. MSK Normal range of motion in general.  No edema and no tenderness. Psych Appropriate mood and affect.          Labs:  Hospital Outpatient Visit on 11/08/2022   Component Date Value Ref Range Status    WBC 11/08/2022 6.7  4.3 - 11.1 K/uL Final    RBC 11/08/2022 4.98  4.05 - 5.2 M/uL Final    Hemoglobin 11/08/2022 14.3  11.7 - 15.4 g/dL Final    Hematocrit 11/08/2022 45.6  35.8 - 46.3 % Final    MCV 11/08/2022 91.6  82.0 - 102.0 FL Final    MCH 11/08/2022 28.7  26.1 - 32.9 PG Final    MCHC 11/08/2022 31.4  31.4 - 35.0 g/dL Final    RDW 11/08/2022 13.3  11.9 - 14.6 % Final    Platelets 97/45/8065 273  150 - 450 K/uL Final    MPV 11/08/2022 10.8  9.4 - 12.3 FL Final    nRBC 11/08/2022 0.00  0.0 - 0.2 K/uL Final    **Note: Absolute NRBC parameter is now reported with Hemogram**    Seg Neutrophils 11/08/2022 63  43 - 78 % Final    Lymphocytes 11/08/2022 30  13 - 44 % Final    Monocytes 11/08/2022 5  4.0 - 12.0 % Final    Eosinophils % 11/08/2022 2  0.5 - 7.8 % Final    Basophils 11/08/2022 0  0.0 - 2.0 % Final    Immature Granulocytes 11/08/2022 0  0.0 - 5.0 % Final    Segs Absolute 11/08/2022 4.1  1.7 - 8.2 K/UL Final    Absolute Lymph # 11/08/2022 2.0  0.5 - 4.6 K/UL Final    Absolute Mono # 11/08/2022 0.4  0.1 - 1.3 K/UL Final    Absolute Eos # 11/08/2022 0.2  0.0 - 0.8 K/UL Final    Basophils Absolute 11/08/2022 0.0  0.0 - 0.2 K/UL Final    Absolute Immature Granulocyte 11/08/2022 0.0  0.0 - 0.5 K/UL Final    Differential Type 11/08/2022 AUTOMATED    Final    Sodium 11/08/2022 141  133 - 143 mmol/L Final    Potassium 11/08/2022 3.7  3.5 - 5.1 mmol/L Final    Chloride 11/08/2022 109  101 - 110 mmol/L Final    CO2 11/08/2022 26  21 - 32 mmol/L Final    Anion Gap 11/08/2022 6  2 - 11 mmol/L Final    Glucose 11/08/2022 103 (A)  65 - 100 mg/dL Final    BUN 11/08/2022 6 (A)  8 - 23 MG/DL Final    Creatinine 11/08/2022 0.70  0.6 - 1.0 MG/DL Final    Est, Glom Filt Rate 11/08/2022 >60  >60 ml/min/1.73m2 Final    Comment:      Pediatric calculator link: [employer]. org/professionals/kdoqi/gfr_calculatorped       Effective Oct 3, 2022       These results are not intended for use in patients <25years of age. eGFR results are calculated without a race factor using  the 2021 CKD-EPI equation. Careful clinical correlation is recommended, particularly when comparing to results calculated using previous equations. The CKD-EPI equation is less accurate in patients with extremes of muscle mass, extra-renal metabolism of creatinine, excessive creatine ingestion, or following therapy that affects renal tubular secretion.       Calcium 11/08/2022 9.5  8.3 - 10.4 MG/DL Final    Total Bilirubin 11/08/2022 0.5  0.2 - 1.1 MG/DL Final    ALT 11/08/2022 22  12 - 65 U/L Final    AST 11/08/2022 19  15 - 37 U/L Final    Alk Phosphatase 11/08/2022 125  50 - 136 U/L Final    Total Protein 11/08/2022 6.7  6.3 - 8.2 g/dL Final    Albumin 11/08/2022 3.2  3.2 - 4.6 g/dL Final    Globulin 11/08/2022 3.5  2.8 - 4.5 g/dL Final    Albumin/Globulin Ratio 11/08/2022 0.9  0.4 - 1.6   Final    Magnesium 11/08/2022 2.1  1.8 - 2.4 mg/dL Final         Imaging:   PET/CT         Indication: Left breast cancer restaging       Radiopharmaceutical: 16.8 mCi F18-FDG, intravenously. Technique: Imaging was performed from the skull through the proximal thighs   using routine PET/CT acquisition protocol. Imaging was performed approximately   60 minutes post injection. Oral contrast was administered. Radiation dose   reduction techniques were used for this study:  Our CT scanners use one or all   of the following: Automated exposure control, adjustment of the mA and/or kVp   according to patient's size, iterative reconstruction. Serum glucose: 91 mg/dL prior to injection. Comparison studies: PET/CT 4/23/2021       Findings:       Head and Neck: No enlarged or hypermetabolic cervical chain nodes. Chest: No discrete lung nodule. No mediastinal mass or lymphadenopathy. Abdomen/Pelvis: No enlarged or hypermetabolic lymph nodes of the abdomen or   pelvis. No worrisome focal uptake of the abdominal viscera. Bones and soft tissues: Interval improved tracer uptake associated with   multifocal masses inferiorly in the left breast. Interval resolution of   previously faintly hypermetabolic lymph node of the left inferior axilla. No aggressive osseous lesion. IMPRESSION   1. Interval improved tracer uptake associated with multifocal masses inferiorly   in the left breast.   2.  Interval resolution of previously hypermetabolic lymph node within the left   Axilla. PET/CT: 4/23/2021       INDICATION: Initial staging of breast cancer. TECHNIQUE: After oral administration of gastroview and intravenous   administration of 12.76 mCi of F18 FDG, noncontrast CT images were obtained for   attenuation correction and for fusion with emission PET images.  A series of overlapping emission PET images were then obtained beginning 60 minutes after   injection of FDG. The area imaged spanned the region from the skull base to the   mid thighs. All CT scans performed at this facility use one or all of the   following: Automated exposure control, adjustment of the mA and/or kVp according   to patient's size, iterative reconstruction. COMPARISON: Bilateral diagnostic mammogram 4/5/2021       FINDINGS:    NECK/CHEST:   No worrisome activity is seen in the neck. No adenopathy or aggressive osseous   lesion is suggested on limited CT images. Abnormal activity is seen within the patient's known outer left breast masses   with the greater activity occurring in the more lateral mass with maximum SUV of   5.2 g/mL. And additional activity is seen within the more medial left breast   lesion with a maximum SUV of 3.3 g/mL and focal appearing activity is seen   associated with a mildly dysmorphic left axillary lymph node with an adjacent   biopsy clip seen on image 90 with a maximum SUV of 2.5 g/mL consistent with the   patient's known bhavana metastasis. No worrisome activity is otherwise seen. No   suspicious pulmonary lesion is seen. The lungs appear hypoaerated. Linear   densities are seen which may represent scarring or atelectasis. No adenopathy or   aggressive osseous lesion is seen. ABDOMEN/PELVIS:   No worrisome activity is seen below the diaphragms. Retroperitoneal focal   activity is seen on PET/CT image 181 although this corresponds to the right   ureter suggesting benign excreted tracer within the right ureter. No adenopathy,   or aggressive osseous lesion is suggested. IMPRESSION   1. Abnormal activity within the patient's known multiple left breast masses as   well as dysmorphic left axillary lymph node consistent with the patient's known   disease. No additional metabolically active disease is seen in the neck, chest,   abdomen, or pelvis.          BILATERAL DIAGNOSTIC DIGITAL MAMMOGRAPHY,   LEFT BREAST SONOGRAPHY:       CLINICAL HISTORY:  81-year-old with no family history of breast cancer presents   for evaluation of a painful, palpable lump in the left breast.       COMPARISON:  May 22, 2013 from [place]. BILATERAL MAMMOGRAM:  A metallic skin marker was placed at the site of left   palpable concern. Bilateral craniocaudal and mediolateral oblique as well as   left lateral views were technically limited by the patient's inability to   cooperate with standard positioning. There is scattered fibroglandular tissue   bilaterally. Right lower mid breast nodule remains stable, and there has been   interval coarsening of right upper outer quadrant calcifications, suggesting a   benign etiology. However on the left, there is a new spiculated mass at the   site of posterior 1:30 palpable concern as well as a second lobular nodule at   the left posterior 6:30 position which are suspicious for malignancy. Moreover   innumerable new, mildly pleomorphic microcalcifications occupying most of the   lateral left breast are suspicious for associated DCIS. LEFT ULTRASOUND:  Images from careful ultrasound evaluation of the area of   palpable concern at the 1:30 position approximately 20 cm from the nipple   demonstrate a 2.3 cm irregular hypoechoic mass with dense acoustical shadowing   which is very suspicious for malignancy. There is also a densely shadowing 1.2   cm hypoechoic mass at the 6:30 position 12 cm from the nipple which is also   suspicious for malignancy. A 1.1 cm dysmorphic axillary lymph node with   effacement of the fatty hilum suggests metastatic disease as well. IMPRESSION       1.  PALPABLE LEFT POSTERIOR 1:30 IRREGULAR MASS IS VERY SUSPICIOUS FOR   MALIGNANCY, AND THERE IS A SECOND LEFT 6:30 SMALLER MASS ALSO SUSPICIOUS FOR   MALIGNANCY AND A DYSMORPHIC LEFT AXILLARY LYMPH NODE SUSPICIOUS FOR METASTATIC   DISEASE. BIOPSIES ARE RECOMMENDED. 2. INNUMERABLE NEW MICROCALCIFICATIONS OCCUPYING MUCH OF THE LATERAL LEFT   BREAST ARE ALSO SUSPICIOUS FOR ASSOCIATED DCIS. BI-RADS Assessment Category 5: Highly suggestive of malignancy- Appropriate   action should be taken. Pathology:             ASSESSMENT:  No diagnosis found. Patient Active Problem List   Diagnosis    Mixed incontinence    Insomnia    Vitamin D deficiency    Diabetes mellitus type 2, controlled ([address]    Pleuritic chest pain    Mixed hyperlipidemia    Urge incontinence of urine    Allergic rhinitis    Cardiomegaly    Asthma    Primary osteoarthritis involving multiple joints    Nocturnal hypoxia    Shortness of breath    Hypokalemia    History of abnormal mammogram    Congenital hypothyroidism without goiter    IBS (irritable bowel syndrome)    Benign essential HTN    Chronic pelvic pain in female    Medication nonadherence due to psychosocial problem    BMI 40.0-44.9, adult (HCC)    Bradycardia    Esophageal dysphagia    Malignant neoplasm of overlapping sites of left breast in female, estrogen receptor positive ([address]    COVID-19 virus infection    Dehydration    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 Breast cancer: two separate nodules with dominant left UOQ nodule 2.3 cm and HER2 positive, other nodule left LIQ 1.2 cm and HER2 negative. Both low grade and ER positive, weakly MT positive. Axillary node positive for macrometastatic carcinoma, also  HER2 positive.   PET/CT shows no distant metastatic disease. I discussed the case with Dr. Winsome Rice at tumor board. Normally, for node positive HER2 positive breast cancer, we would recommend neoadjuvant  chemoimmunotherapy. However, she is unwilling to consider cytotoxic therapy, which is not unreasonable given her age and comorbidities. Since she has only locoregional disease, we should then consider mastectomy and node dissection for definitive therapy. She was also very reluctant to consider any surgery because of her history of intolerance to anesthesia, but we were able to convince her to at least meet with Dr. Winsome Rice. However, after discussion with him, she declined any local therapy and was  only willing to consider systemic treatment. Therefore, we recommended the PERTAIN regimen for \"unresectable\" disease, Arimidex + HP. PET/CT reviewed after 4 cycles shows response with decrease in both breast masses and decreased axillary lymph node,  no new areas of metastatic disease noted. We once again noted that without surgery, we would eventually expect progression, but for now AI+HP is working well and can be continued as long as efficacy and tolerance maintained. She is here today for follow up and next HP. She missed her last two infusions due to transportation issues. She is feeling OK, main complaints are arthritis and a recent sinus infection which did not respond to antibiotics. Her GI symptoms are much better, bowels are improved with less diarrhea. Appetite varies day to day. Fatigue is unchanged. She takes Tylenol ES for her arthritis pain. Labs reviewed and adequate. Proceed with HP and continue on daily Arimidex. Repeat echo mid-September showed preserved EF, repeat December 2022. Return in 3 weeks for HP and in 6 weeks for labs/OV/HP with PET/CT prior.             Teena Gonzalez MD, MD  [facility]  [address] [place] [address]  Office : [phone]  Fax : [phone]

## 2022-11-08 NOTE — PATIENT INSTRUCTIONS
Patient Instructions from Today's Visit    Reason for Visit:  Follow up breast cancer     Plan:   You look good today   It is time to redo a PET scan     Follow Up:  Proceed with treatment   Then 3 week infusion   Then 6 week PET scan office evisit     Recent Lab Results:  Hospital Outpatient Visit on 11/08/2022   Component Date Value Ref Range Status    WBC 11/08/2022 6.7  4.3 - 11.1 K/uL Final    RBC 11/08/2022 4.98  4.05 - 5.2 M/uL Final    Hemoglobin 11/08/2022 14.3  11.7 - 15.4 g/dL Final    Hematocrit 11/08/2022 45.6  35.8 - 46.3 % Final    MCV 11/08/2022 91.6  82.0 - 102.0 FL Final    MCH 11/08/2022 28.7  26.1 - 32.9 PG Final    MCHC 11/08/2022 31.4  31.4 - 35.0 g/dL Final    RDW 11/08/2022 13.3  11.9 - 14.6 % Final    Platelets 57/14/6062 273  150 - 450 K/uL Final    MPV 11/08/2022 10.8  9.4 - 12.3 FL Final    nRBC 11/08/2022 0.00  0.0 - 0.2 K/uL Final    **Note: Absolute NRBC parameter is now reported with Hemogram**    Seg Neutrophils 11/08/2022 63  43 - 78 % Final    Lymphocytes 11/08/2022 30  13 - 44 % Final    Monocytes 11/08/2022 5  4.0 - 12.0 % Final    Eosinophils % 11/08/2022 2  0.5 - 7.8 % Final    Basophils 11/08/2022 0  0.0 - 2.0 % Final    Immature Granulocytes 11/08/2022 0  0.0 - 5.0 % Final    Segs Absolute 11/08/2022 4.1  1.7 - 8.2 K/UL Final    Absolute Lymph # 11/08/2022 2.0  0.5 - 4.6 K/UL Final    Absolute Mono # 11/08/2022 0.4  0.1 - 1.3 K/UL Final    Absolute Eos # 11/08/2022 0.2  0.0 - 0.8 K/UL Final    Basophils Absolute 11/08/2022 0.0  0.0 - 0.2 K/UL Final    Absolute Immature Granulocyte 11/08/2022 0.0  0.0 - 0.5 K/UL Final    Differential Type 11/08/2022 AUTOMATED    Final    Sodium 11/08/2022 141  133 - 143 mmol/L Final    Potassium 11/08/2022 3.7  3.5 - 5.1 mmol/L Final    Chloride 11/08/2022 109  101 - 110 mmol/L Final    CO2 11/08/2022 26  21 - 32 mmol/L Final    Anion Gap 11/08/2022 6  2 - 11 mmol/L Final    Glucose 11/08/2022 103 (A)  65 - 100 mg/dL Final    BUN 11/08/2022 6 (A)  8 - 23 MG/DL Final    Creatinine 11/08/2022 0.70  0.6 - 1.0 MG/DL Final    Est, Mylene Filt Rate 11/08/2022 >60  >60 ml/min/1.73m2 Final    Comment:      Pediatric calculator link: [employer]. org/professionals/kdoqi/gfr_calculatorped       Effective Oct 3, 2022       These results are not intended for use in patients <25years of age. eGFR results are calculated without a race factor using  the 2021 CKD-EPI equation. Careful clinical correlation is recommended, particularly when comparing to results calculated using previous equations. The CKD-EPI equation is less accurate in patients with extremes of muscle mass, extra-renal metabolism of creatinine, excessive creatine ingestion, or following therapy that affects renal tubular secretion. Calcium 11/08/2022 9.5  8.3 - 10.4 MG/DL Final    Total Bilirubin 11/08/2022 0.5  0.2 - 1.1 MG/DL Final    ALT 11/08/2022 22  12 - 65 U/L Final    AST 11/08/2022 19  15 - 37 U/L Final    Alk Phosphatase 11/08/2022 125  50 - 136 U/L Final    Total Protein 11/08/2022 6.7  6.3 - 8.2 g/dL Final    Albumin 11/08/2022 3.2  3.2 - 4.6 g/dL Final    Globulin 11/08/2022 3.5  2.8 - 4.5 g/dL Final    Albumin/Globulin Ratio 11/08/2022 0.9  0.4 - 1.6   Final    Magnesium 11/08/2022 2.1  1.8 - 2.4 mg/dL Final            -------------------------------------------------------------------------------------------------------------------  Please call our office at [phone] if you have any  of the following symptoms:   Fever of 100.5 or greater  Chills  Shortness of breath  Swelling or pain in one leg    After office hours an answering service is available and will contact a provider for emergencies or if you are experiencing any of the above symptoms. Patient has express an interest in My Chart. My Chart log in information explained on the after visit summary printout at the [facility] [address] desk.     MO GARCIA MA

## 2022-11-08 NOTE — PROGRESS NOTES
Arrived to the On license of [facility]. Herceptin and Perjeta completed. Patient tolerated well. Any issues or concerns during appointment: none. Patient aware of next infusion appointment on 11/29/22 (date) at 1430 (time). Patient aware of next lab and [facility] office visit on 12/20/22 (date) at 1300 (time). Patient instructed to call provider with temperature of 100.4 or greater or nausea/vomiting/ diarrhea or pain not controlled by medications  Discharged via wheelchair accompanied by daughter.

## 2022-11-10 DIAGNOSIS — E11.8 TYPE 2 DIABETES MELLITUS WITH UNSPECIFIED COMPLICATIONS (HCC): ICD-10-CM

## 2022-11-10 RX ORDER — SITAGLIPTIN 100 MG/1
TABLET, FILM COATED ORAL
Qty: 90 TABLET | Refills: 1 | OUTPATIENT
Start: 2022-11-10

## 2022-11-16 ENCOUNTER — HOSPITAL ENCOUNTER (EMERGENCY)
Age: 82
Discharge: HOME | End: 2022-11-16
Attending: STUDENT IN AN ORGANIZED HEALTH CARE EDUCATION/TRAINING PROGRAM
Payer: MEDICARE

## 2022-11-16 ENCOUNTER — APPOINTMENT (OUTPATIENT)
Dept: GENERAL RADIOLOGY | Age: 82
End: 2022-11-16
Payer: MEDICARE

## 2022-11-16 ENCOUNTER — APPOINTMENT (OUTPATIENT)
Dept: CT IMAGING | Age: 82
End: 2022-11-16
Payer: MEDICARE

## 2022-11-16 VITALS
OXYGEN SATURATION: 96 % | BODY MASS INDEX: 28.93 KG/M2 | DIASTOLIC BLOOD PRESSURE: 62 MMHG | SYSTOLIC BLOOD PRESSURE: 154 MMHG | HEART RATE: 55 BPM | WEIGHT: 180 LBS | TEMPERATURE: 97.3 F | RESPIRATION RATE: 20 BRPM | HEIGHT: 66 IN

## 2022-11-16 DIAGNOSIS — N39.0 URINARY TRACT INFECTION WITHOUT HEMATURIA, SITE UNSPECIFIED: ICD-10-CM

## 2022-11-16 DIAGNOSIS — R42 DIZZINESS: Primary | ICD-10-CM

## 2022-11-16 LAB
ALBUMIN SERPL-MCNC: 3.4 G/DL (ref 3.2–4.6)
ALBUMIN/GLOB SERPL: 0.9 {RATIO} (ref 0.4–1.6)
ALP SERPL-CCNC: 128 U/L (ref 50–136)
ALT SERPL-CCNC: 22 U/L (ref 12–65)
ANION GAP SERPL CALC-SCNC: 3 MMOL/L (ref 2–11)
APPEARANCE UR: CLEAR
AST SERPL-CCNC: 29 U/L (ref 15–37)
BACTERIA URNS QL MICRO: ABNORMAL /HPF
BASOPHILS # BLD: 0 K/UL (ref 0–0.2)
BASOPHILS NFR BLD: 0 % (ref 0–2)
BILIRUB SERPL-MCNC: 1 MG/DL (ref 0.2–1.1)
BILIRUB UR QL: NEGATIVE
BUN SERPL-MCNC: 8 MG/DL (ref 8–23)
CALCIUM SERPL-MCNC: 9.8 MG/DL (ref 8.3–10.4)
CASTS URNS QL MICRO: ABNORMAL /LPF
CHLORIDE SERPL-SCNC: 107 MMOL/L (ref 101–110)
CO2 SERPL-SCNC: 30 MMOL/L (ref 21–32)
COLOR UR: YELLOW
CREAT SERPL-MCNC: 0.7 MG/DL (ref 0.6–1)
DIFFERENTIAL METHOD BLD: ABNORMAL
EOSINOPHIL # BLD: 0 K/UL (ref 0–0.8)
EOSINOPHIL NFR BLD: 0 % (ref 0.5–7.8)
EPI CELLS #/AREA URNS HPF: ABNORMAL /HPF
ERYTHROCYTE [DISTWIDTH] IN BLOOD BY AUTOMATED COUNT: 13.5 % (ref 11.9–14.6)
GLOBULIN SER CALC-MCNC: 3.9 G/DL (ref 2.8–4.5)
GLUCOSE SERPL-MCNC: 95 MG/DL (ref 65–100)
GLUCOSE UR STRIP.AUTO-MCNC: NEGATIVE MG/DL
HCT VFR BLD AUTO: 48.3 % (ref 35.8–46.3)
HGB BLD-MCNC: 15.2 G/DL (ref 11.7–15.4)
HGB UR QL STRIP: NEGATIVE
IMM GRANULOCYTES # BLD AUTO: 0 K/UL (ref 0–0.5)
IMM GRANULOCYTES NFR BLD AUTO: 0 % (ref 0–5)
KETONES UR QL STRIP.AUTO: NEGATIVE MG/DL
LEUKOCYTE ESTERASE UR QL STRIP.AUTO: ABNORMAL
LYMPHOCYTES # BLD: 2.2 K/UL (ref 0.5–4.6)
LYMPHOCYTES NFR BLD: 30 % (ref 13–44)
MAGNESIUM SERPL-MCNC: 2.2 MG/DL (ref 1.8–2.4)
MCH RBC QN AUTO: 29.8 PG (ref 26.1–32.9)
MCHC RBC AUTO-ENTMCNC: 31.5 G/DL (ref 31.4–35)
MCV RBC AUTO: 94.7 FL (ref 82–102)
MONOCYTES # BLD: 0.4 K/UL (ref 0.1–1.3)
MONOCYTES NFR BLD: 6 % (ref 4–12)
NEUTS SEG # BLD: 4.5 K/UL (ref 1.7–8.2)
NEUTS SEG NFR BLD: 64 % (ref 43–78)
NITRITE UR QL STRIP.AUTO: NEGATIVE
NRBC # BLD: 0 K/UL (ref 0–0.2)
PH UR STRIP: 7 [PH] (ref 5–9)
PLATELET # BLD AUTO: 251 K/UL (ref 150–450)
PMV BLD AUTO: 11.8 FL (ref 9.4–12.3)
POTASSIUM SERPL-SCNC: 4.3 MMOL/L (ref 3.5–5.1)
PROT SERPL-MCNC: 7.3 G/DL (ref 6.3–8.2)
PROT UR STRIP-MCNC: NEGATIVE MG/DL
RBC # BLD AUTO: 5.1 M/UL (ref 4.05–5.2)
RBC #/AREA URNS HPF: ABNORMAL /HPF
SODIUM SERPL-SCNC: 140 MMOL/L (ref 133–143)
SP GR UR REFRACTOMETRY: 1.01 (ref 1–1.02)
UROBILINOGEN UR QL STRIP.AUTO: 0.2 EU/DL (ref 0.2–1)
WBC # BLD AUTO: 7.2 K/UL (ref 4.3–11.1)
WBC URNS QL MICRO: ABNORMAL /HPF

## 2022-11-16 PROCEDURE — 70450 CT HEAD/BRAIN W/O DYE: CPT

## 2022-11-16 PROCEDURE — 81001 URINALYSIS AUTO W/SCOPE: CPT

## 2022-11-16 PROCEDURE — 71046 X-RAY EXAM CHEST 2 VIEWS: CPT

## 2022-11-16 PROCEDURE — 6360000002 HC RX W HCPCS: Performed by: STUDENT IN AN ORGANIZED HEALTH CARE EDUCATION/TRAINING PROGRAM

## 2022-11-16 PROCEDURE — 6370000000 HC RX 637 (ALT 250 FOR IP): Performed by: STUDENT IN AN ORGANIZED HEALTH CARE EDUCATION/TRAINING PROGRAM

## 2022-11-16 PROCEDURE — 96365 THER/PROPH/DIAG IV INF INIT: CPT

## 2022-11-16 PROCEDURE — 2580000003 HC RX 258: Performed by: STUDENT IN AN ORGANIZED HEALTH CARE EDUCATION/TRAINING PROGRAM

## 2022-11-16 PROCEDURE — 80053 COMPREHEN METABOLIC PANEL: CPT

## 2022-11-16 PROCEDURE — 85025 COMPLETE CBC W/AUTO DIFF WBC: CPT

## 2022-11-16 PROCEDURE — 83735 ASSAY OF MAGNESIUM: CPT

## 2022-11-16 PROCEDURE — 99285 EMERGENCY DEPT VISIT HI MDM: CPT

## 2022-11-16 RX ORDER — CEFPODOXIME PROXETIL 100 MG/1
100 TABLET, FILM COATED ORAL 2 TIMES DAILY
Qty: 20 TABLET | Refills: 0 | Status: SHIPPED | OUTPATIENT
Start: 2022-11-16 | End: 2022-11-16 | Stop reason: SDUPTHER

## 2022-11-16 RX ORDER — CEFPODOXIME PROXETIL 100 MG/1
100 TABLET, FILM COATED ORAL 2 TIMES DAILY
Qty: 20 TABLET | Refills: 0 | Status: SHIPPED | OUTPATIENT
Start: 2022-11-16 | End: 2022-11-26

## 2022-11-16 RX ORDER — MECLIZINE HYDROCHLORIDE 25 MG/1
25 TABLET ORAL 3 TIMES DAILY PRN
Qty: 15 TABLET | Refills: 0 | Status: SHIPPED | OUTPATIENT
Start: 2022-11-16 | End: 2022-11-26

## 2022-11-16 RX ORDER — 0.9 % SODIUM CHLORIDE 0.9 %
500 INTRAVENOUS SOLUTION INTRAVENOUS
Status: COMPLETED | OUTPATIENT
Start: 2022-11-16 | End: 2022-11-16

## 2022-11-16 RX ORDER — MECLIZINE HYDROCHLORIDE 25 MG/1
50 TABLET ORAL
Status: COMPLETED | OUTPATIENT
Start: 2022-11-16 | End: 2022-11-16

## 2022-11-16 RX ORDER — MECLIZINE HYDROCHLORIDE 25 MG/1
25 TABLET ORAL 3 TIMES DAILY PRN
Qty: 15 TABLET | Refills: 0 | Status: SHIPPED | OUTPATIENT
Start: 2022-11-16 | End: 2022-11-16 | Stop reason: SDUPTHER

## 2022-11-16 RX ADMIN — SODIUM CHLORIDE 500 ML: 9 INJECTION, SOLUTION INTRAVENOUS at 14:07

## 2022-11-16 RX ADMIN — MECLIZINE HYDROCHLORIDE 50 MG: 25 TABLET ORAL at 14:06

## 2022-11-16 RX ADMIN — CEFTRIAXONE 1000 MG: 1 INJECTION, POWDER, FOR SOLUTION INTRAMUSCULAR; INTRAVENOUS at 15:38

## 2022-11-16 ASSESSMENT — ENCOUNTER SYMPTOMS
ANAL BLEEDING: 0
EYE PAIN: 0
WHEEZING: 0
BACK PAIN: 0
EYE DISCHARGE: 0
NAUSEA: 0
ABDOMINAL PAIN: 0
CHEST TIGHTNESS: 0
COLOR CHANGE: 0
APNEA: 0
ABDOMINAL DISTENTION: 0
RHINORRHEA: 0
SHORTNESS OF BREATH: 0
DIARRHEA: 0
SORE THROAT: 0
EYE REDNESS: 0
COUGH: 0
EYE ITCHING: 0
VOMITING: 0

## 2022-11-16 NOTE — ED TRIAGE NOTES
Patient arrives to ED via EMS from home. Patient has history of breast cancer. Patient reports feeling dizzy since her chemo treatment a week ago.
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
## 2022-11-16 NOTE — ED NOTES
[address] [place] [address] called to transport patient back to residence; [address] [facility]  11/16/22 1371

## 2022-11-16 NOTE — DISCHARGE INSTRUCTIONS
CT imaging, chest x-ray and lab work appear stable. Your urine sample showed evidence of UTI. Take the antibiotic prescribed as directed to run out of medication. You have been prescribed some medication to use as needed for dizziness. Increase fluid intake to ensure hydration. Please arrange follow-up with your primary care provider this week for recheck.

## 2022-11-18 ENCOUNTER — HOSPITAL ENCOUNTER (EMERGENCY)
Age: 82
Discharge: HOME | End: 2022-11-18
Attending: EMERGENCY MEDICINE
Payer: MEDICARE

## 2022-11-18 VITALS
OXYGEN SATURATION: 95 % | WEIGHT: 180 LBS | DIASTOLIC BLOOD PRESSURE: 105 MMHG | BODY MASS INDEX: 28.93 KG/M2 | TEMPERATURE: 97.5 F | RESPIRATION RATE: 28 BRPM | SYSTOLIC BLOOD PRESSURE: 180 MMHG | HEIGHT: 66 IN | HEART RATE: 51 BPM

## 2022-11-18 DIAGNOSIS — R42 DIZZINESS: Primary | ICD-10-CM

## 2022-11-18 LAB
ALBUMIN SERPL-MCNC: 3.1 G/DL (ref 3.2–4.6)
ALBUMIN/GLOB SERPL: 0.9 {RATIO} (ref 0.4–1.6)
ALP SERPL-CCNC: 117 U/L (ref 50–136)
ALT SERPL-CCNC: 18 U/L (ref 12–65)
ANION GAP SERPL CALC-SCNC: 5 MMOL/L (ref 2–11)
APPEARANCE UR: CLEAR
AST SERPL-CCNC: 14 U/L (ref 15–37)
BACTERIA URNS QL MICRO: NEGATIVE /HPF
BASOPHILS # BLD: 0 K/UL (ref 0–0.2)
BASOPHILS NFR BLD: 1 % (ref 0–2)
BILIRUB SERPL-MCNC: 0.6 MG/DL (ref 0.2–1.1)
BILIRUB UR QL: NEGATIVE
BUN SERPL-MCNC: 9 MG/DL (ref 8–23)
CALCIUM SERPL-MCNC: 10.2 MG/DL (ref 8.3–10.4)
CASTS URNS QL MICRO: ABNORMAL /LPF
CHLORIDE SERPL-SCNC: 109 MMOL/L (ref 101–110)
CO2 SERPL-SCNC: 28 MMOL/L (ref 21–32)
COLOR UR: ABNORMAL
CREAT SERPL-MCNC: 0.7 MG/DL (ref 0.6–1)
DIFFERENTIAL METHOD BLD: ABNORMAL
EOSINOPHIL # BLD: 0 K/UL (ref 0–0.8)
EOSINOPHIL NFR BLD: 0 % (ref 0.5–7.8)
EPI CELLS #/AREA URNS HPF: ABNORMAL /HPF
ERYTHROCYTE [DISTWIDTH] IN BLOOD BY AUTOMATED COUNT: 13.7 % (ref 11.9–14.6)
GLOBULIN SER CALC-MCNC: 3.6 G/DL (ref 2.8–4.5)
GLUCOSE SERPL-MCNC: 88 MG/DL (ref 65–100)
GLUCOSE UR STRIP.AUTO-MCNC: NEGATIVE MG/DL
HCT VFR BLD AUTO: 47.2 % (ref 35.8–46.3)
HGB BLD-MCNC: 14.7 G/DL (ref 11.7–15.4)
HGB UR QL STRIP: NEGATIVE
IMM GRANULOCYTES # BLD AUTO: 0 K/UL (ref 0–0.5)
IMM GRANULOCYTES NFR BLD AUTO: 0 % (ref 0–5)
KETONES UR QL STRIP.AUTO: NEGATIVE MG/DL
LEUKOCYTE ESTERASE UR QL STRIP.AUTO: ABNORMAL
LYMPHOCYTES # BLD: 2 K/UL (ref 0.5–4.6)
LYMPHOCYTES NFR BLD: 32 % (ref 13–44)
MAGNESIUM SERPL-MCNC: 2.2 MG/DL (ref 1.8–2.4)
MCH RBC QN AUTO: 29.5 PG (ref 26.1–32.9)
MCHC RBC AUTO-ENTMCNC: 31.1 G/DL (ref 31.4–35)
MCV RBC AUTO: 94.6 FL (ref 82–102)
MONOCYTES # BLD: 0.4 K/UL (ref 0.1–1.3)
MONOCYTES NFR BLD: 7 % (ref 4–12)
NEUTS SEG # BLD: 3.7 K/UL (ref 1.7–8.2)
NEUTS SEG NFR BLD: 61 % (ref 43–78)
NITRITE UR QL STRIP.AUTO: NEGATIVE
NRBC # BLD: 0 K/UL (ref 0–0.2)
PH UR STRIP: 8 [PH] (ref 5–9)
PLATELET # BLD AUTO: 228 K/UL (ref 150–450)
PMV BLD AUTO: 11.1 FL (ref 9.4–12.3)
POTASSIUM SERPL-SCNC: 3.9 MMOL/L (ref 3.5–5.1)
PROT SERPL-MCNC: 6.7 G/DL (ref 6.3–8.2)
PROT UR STRIP-MCNC: NEGATIVE MG/DL
RBC # BLD AUTO: 4.99 M/UL (ref 4.05–5.2)
RBC #/AREA URNS HPF: ABNORMAL /HPF
SODIUM SERPL-SCNC: 142 MMOL/L (ref 133–143)
SP GR UR REFRACTOMETRY: 1.01 (ref 1–1.02)
UROBILINOGEN UR QL STRIP.AUTO: 1 EU/DL (ref 0.2–1)
WBC # BLD AUTO: 6.2 K/UL (ref 4.3–11.1)
WBC URNS QL MICRO: ABNORMAL /HPF

## 2022-11-18 PROCEDURE — 83735 ASSAY OF MAGNESIUM: CPT

## 2022-11-18 PROCEDURE — 93005 ELECTROCARDIOGRAM TRACING: CPT | Performed by: PHYSICIAN ASSISTANT

## 2022-11-18 PROCEDURE — 85025 COMPLETE CBC W/AUTO DIFF WBC: CPT

## 2022-11-18 PROCEDURE — 81001 URINALYSIS AUTO W/SCOPE: CPT

## 2022-11-18 PROCEDURE — 2580000003 HC RX 258: Performed by: PHYSICIAN ASSISTANT

## 2022-11-18 PROCEDURE — 99284 EMERGENCY DEPT VISIT MOD MDM: CPT

## 2022-11-18 PROCEDURE — 80053 COMPREHEN METABOLIC PANEL: CPT

## 2022-11-18 RX ORDER — 0.9 % SODIUM CHLORIDE 0.9 %
1000 INTRAVENOUS SOLUTION INTRAVENOUS ONCE
Status: COMPLETED | OUTPATIENT
Start: 2022-11-18 | End: 2022-11-18

## 2022-11-18 RX ADMIN — SODIUM CHLORIDE 1000 ML: 900 INJECTION, SOLUTION INTRAVENOUS at 16:16

## 2022-11-18 ASSESSMENT — ENCOUNTER SYMPTOMS
RESPIRATORY NEGATIVE: 1
GASTROINTESTINAL NEGATIVE: 1

## 2022-11-18 NOTE — ED NOTES
I have reviewed discharge instructions with the patient. The patient verbalized understanding. Patient left ED via Discharge Method: stretcher to Home with [facility]). Opportunity for questions and clarification provided. Patient given 0 scripts. To continue your aftercare when you leave the hospital, you may receive an automated call from our care team to check in on how you are doing. This is a free service and part of our promise to provide the best care and service to meet your aftercare needs.  If you have questions, or wish to unsubscribe from this service please call [phone]. Thank you for Choosing our [facility] Emergency Department.         Sara Rankin RN  11/18/22 2899

## 2022-11-18 NOTE — ED TRIAGE NOTES
Patient arrives via EMS from home c/o dizziness. Patient was seen at this facility Wednesday for the same complaint, has only taken x1 dose of her prescribed medication for it. EMS vitals: /palp. HR 56, RR 20, 95% on RA, , oral temp 98.3  NAD. Provider present for triage.
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
## 2022-11-19 LAB
EKG ATRIAL RATE: 49 BPM
EKG DIAGNOSIS: NORMAL
EKG P AXIS: 61 DEGREES
EKG P-R INTERVAL: 198 MS
EKG Q-T INTERVAL: 468 MS
EKG QRS DURATION: 99 MS
EKG QTC CALCULATION (BAZETT): 423 MS
EKG R AXIS: -17 DEGREES
EKG T AXIS: 87 DEGREES
EKG VENTRICULAR RATE: 49 BPM

## 2022-11-29 ENCOUNTER — HOSPITAL ENCOUNTER (OUTPATIENT)
Dept: INFUSION THERAPY | Age: 82
Discharge: HOME | End: 2022-11-29
Payer: MEDICARE

## 2022-11-29 VITALS
TEMPERATURE: 98 F | RESPIRATION RATE: 18 BRPM | SYSTOLIC BLOOD PRESSURE: 119 MMHG | OXYGEN SATURATION: 91 % | WEIGHT: 185.4 LBS | HEART RATE: 52 BPM | DIASTOLIC BLOOD PRESSURE: 63 MMHG | BODY MASS INDEX: 29.92 KG/M2

## 2022-11-29 DIAGNOSIS — Z17.0 MALIGNANT NEOPLASM OF OVERLAPPING SITES OF LEFT BREAST IN FEMALE, ESTROGEN RECEPTOR POSITIVE (HCC): Primary | ICD-10-CM

## 2022-11-29 DIAGNOSIS — C50.812 MALIGNANT NEOPLASM OF OVERLAPPING SITES OF LEFT BREAST IN FEMALE, ESTROGEN RECEPTOR POSITIVE (HCC): Primary | ICD-10-CM

## 2022-11-29 PROCEDURE — 2580000003 HC RX 258: Performed by: INTERNAL MEDICINE

## 2022-11-29 PROCEDURE — 6360000002 HC RX W HCPCS: Performed by: INTERNAL MEDICINE

## 2022-11-29 PROCEDURE — 96413 CHEMO IV INFUSION 1 HR: CPT

## 2022-11-29 PROCEDURE — 96417 CHEMO IV INFUS EACH ADDL SEQ: CPT

## 2022-11-29 RX ORDER — SODIUM CHLORIDE 9 MG/ML
5-250 INJECTION, SOLUTION INTRAVENOUS PRN
Status: DISCONTINUED | OUTPATIENT
Start: 2022-11-29 | End: 2022-11-30 | Stop reason: HOSPADM

## 2022-11-29 RX ORDER — ACETAMINOPHEN 325 MG/1
650 TABLET ORAL
Status: DISCONTINUED | OUTPATIENT
Start: 2022-11-29 | End: 2022-11-30 | Stop reason: HOSPADM

## 2022-11-29 RX ORDER — SODIUM CHLORIDE 0.9 % (FLUSH) 0.9 %
5-40 SYRINGE (ML) INJECTION PRN
Status: DISCONTINUED | OUTPATIENT
Start: 2022-11-29 | End: 2022-11-30 | Stop reason: HOSPADM

## 2022-11-29 RX ORDER — DIPHENHYDRAMINE HYDROCHLORIDE 50 MG/ML
50 INJECTION INTRAMUSCULAR; INTRAVENOUS
Status: DISCONTINUED | OUTPATIENT
Start: 2022-11-29 | End: 2022-11-30 | Stop reason: HOSPADM

## 2022-11-29 RX ADMIN — SODIUM CHLORIDE 25 ML/HR: 9 INJECTION, SOLUTION INTRAVENOUS at 15:00

## 2022-11-29 RX ADMIN — SODIUM CHLORIDE, PRESERVATIVE FREE 10 ML: 5 INJECTION INTRAVENOUS at 15:00

## 2022-11-29 RX ADMIN — PERTUZUMAB 420 MG: 30 INJECTION, SOLUTION, CONCENTRATE INTRAVENOUS at 16:15

## 2022-11-29 RX ADMIN — SODIUM CHLORIDE 546 MG: 9 INJECTION, SOLUTION INTRAVENOUS at 15:30

## 2022-11-29 NOTE — PROGRESS NOTES
Arrived to the [facility]. Herceptin/Perjeta infusions completed. Patient tolerated well. Any issues or concerns during appointment: no.  Patient aware of next infusion appointment on 12/20/2022 (date) at 1400 (time). Patient aware of next lab and [facility] office visit on 12/20/2022 (date) at 1300 (time). Patient instructed to call provider with temperature of 100.4 or greater or nausea/vomiting/ diarrhea or pain not controlled by medications  Discharged via wheelchair w/family.

## 2022-12-06 ENCOUNTER — TELEPHONE (OUTPATIENT)
Dept: FAMILY MEDICINE CLINIC | Facility: CLINIC | Age: 82
End: 2022-12-06

## 2022-12-06 RX ORDER — SITAGLIPTIN 100 MG/1
TABLET, FILM COATED ORAL
Qty: 90 TABLET | Refills: 1 | OUTPATIENT
Start: 2022-12-06

## 2022-12-06 NOTE — TELEPHONE ENCOUNTER
----- Message from Brittny Benavides sent at 12/6/2022 11:34 AM EST -----  Subject: Refill Request    QUESTIONS  Name of Medication? SITagliptin (JANUVIA) 100 MG tablet  Patient-reported dosage and instructions? TAKE 1 TABLET BY MOUTH EVERY DAY  How many days do you have left? 0  Preferred Pharmacy? [id number]  Pharmacy phone number (if available)? [phone]  ---------------------------------------------------------------------------  --------------  Naye RUELAS  What is the best way for the office to contact you? Do not leave any   message, patient will call back for answer  Preferred Call Back Phone Number? [phone]  ---------------------------------------------------------------------------  --------------  SCRIPT ANSWERS  Relationship to Patient?  Self

## 2022-12-08 ENCOUNTER — TELEPHONE (OUTPATIENT)
Dept: FAMILY MEDICINE CLINIC | Facility: CLINIC | Age: 82
End: 2022-12-08

## 2022-12-16 ENCOUNTER — HOSPITAL ENCOUNTER (OUTPATIENT)
Dept: PET IMAGING | Age: 82
End: 2022-12-16
Payer: MEDICARE

## 2022-12-16 DIAGNOSIS — C50.812 MALIGNANT NEOPLASM OF OVERLAPPING SITES OF LEFT BREAST IN FEMALE, ESTROGEN RECEPTOR POSITIVE (HCC): ICD-10-CM

## 2022-12-16 DIAGNOSIS — C50.812 MALIGNANT NEOPLASM OF OVERLAPPING SITES OF LEFT BREAST IN FEMALE, ESTROGEN RECEPTOR POSITIVE (HCC): Primary | ICD-10-CM

## 2022-12-16 DIAGNOSIS — Z17.0 MALIGNANT NEOPLASM OF OVERLAPPING SITES OF LEFT BREAST IN FEMALE, ESTROGEN RECEPTOR POSITIVE (HCC): ICD-10-CM

## 2022-12-16 DIAGNOSIS — Z17.0 MALIGNANT NEOPLASM OF OVERLAPPING SITES OF LEFT BREAST IN FEMALE, ESTROGEN RECEPTOR POSITIVE (HCC): Primary | ICD-10-CM

## 2022-12-16 LAB
GLUCOSE BLD STRIP.AUTO-MCNC: 89 MG/DL (ref 65–100)
SERVICE CMNT-IMP: NORMAL

## 2022-12-16 PROCEDURE — 78815 PET IMAGE W/CT SKULL-THIGH: CPT

## 2022-12-16 PROCEDURE — 82962 GLUCOSE BLOOD TEST: CPT

## 2022-12-16 PROCEDURE — 3430000000 HC RX DIAGNOSTIC RADIOPHARMACEUTICAL: Performed by: INTERNAL MEDICINE

## 2022-12-16 PROCEDURE — 6360000004 HC RX CONTRAST MEDICATION: Performed by: INTERNAL MEDICINE

## 2022-12-16 PROCEDURE — A9552 F18 FDG: HCPCS | Performed by: INTERNAL MEDICINE

## 2022-12-16 PROCEDURE — 2580000003 HC RX 258: Performed by: INTERNAL MEDICINE

## 2022-12-16 RX ORDER — SODIUM CHLORIDE 0.9 % (FLUSH) 0.9 %
20 SYRINGE (ML) INJECTION AS NEEDED
Status: DISCONTINUED | OUTPATIENT
Start: 2022-12-16 | End: 2022-12-20 | Stop reason: HOSPADM

## 2022-12-16 RX ORDER — FLUDEOXYGLUCOSE F 18 200 MCI/ML
14.36 INJECTION, SOLUTION INTRAVENOUS
Status: COMPLETED | OUTPATIENT
Start: 2022-12-16 | End: 2022-12-16

## 2022-12-16 RX ADMIN — DIATRIZOATE MEGLUMINE AND DIATRIZOATE SODIUM 10 ML: 660; 100 LIQUID ORAL; RECTAL at 09:45

## 2022-12-16 RX ADMIN — SODIUM CHLORIDE, PRESERVATIVE FREE 20 ML: 5 INJECTION INTRAVENOUS at 09:45

## 2022-12-16 RX ADMIN — FLUDEOXYGLUCOSE F 18 14.36 MILLICURIE: 200 INJECTION, SOLUTION INTRAVENOUS at 09:45

## 2022-12-31 DIAGNOSIS — E78.2 MIXED HYPERLIPIDEMIA: ICD-10-CM

## 2023-01-03 ENCOUNTER — TELEPHONE (OUTPATIENT)
Dept: FAMILY MEDICINE CLINIC | Facility: CLINIC | Age: 83
End: 2023-01-03

## 2023-01-03 RX ORDER — ATORVASTATIN CALCIUM 20 MG/1
TABLET, FILM COATED ORAL
Qty: 90 TABLET | Refills: 1 | OUTPATIENT
Start: 2023-01-03

## 2023-01-03 NOTE — TELEPHONE ENCOUNTER
Patient states she \"needs all her meds sent to the pharmacy\" but could not tell me which ones except for the [facility]itor, [facility] [address]

## 2023-01-04 DIAGNOSIS — G47.00 INSOMNIA, UNSPECIFIED TYPE: ICD-10-CM

## 2023-01-04 DIAGNOSIS — I10 BENIGN ESSENTIAL HTN: ICD-10-CM

## 2023-01-04 RX ORDER — AMLODIPINE BESYLATE 2.5 MG/1
2.5 TABLET ORAL DAILY
Qty: 30 TABLET | Refills: 5 | Status: SHIPPED | OUTPATIENT
Start: 2023-01-04

## 2023-01-04 RX ORDER — ATORVASTATIN CALCIUM 20 MG/1
20 TABLET, FILM COATED ORAL DAILY
Qty: 30 TABLET | Refills: 5 | Status: SHIPPED | OUTPATIENT
Start: 2023-01-04

## 2023-01-04 RX ORDER — LISINOPRIL 20 MG/1
20 TABLET ORAL DAILY
Qty: 30 TABLET | Refills: 5 | Status: SHIPPED | OUTPATIENT
Start: 2023-01-04

## 2023-01-04 RX ORDER — LEVOTHYROXINE SODIUM 112 UG/1
112 TABLET ORAL
Qty: 30 TABLET | Refills: 5 | Status: SHIPPED | OUTPATIENT
Start: 2023-01-04

## 2023-01-04 RX ORDER — TEMAZEPAM 30 MG/1
30 CAPSULE ORAL NIGHTLY PRN
Qty: 30 CAPSULE | Refills: 0 | Status: SHIPPED | OUTPATIENT
Start: 2023-01-04 | End: 2023-02-03

## 2023-01-05 ENCOUNTER — TELEPHONE (OUTPATIENT)
Dept: ONCOLOGY | Age: 83
End: 2023-01-05

## 2023-01-12 ENCOUNTER — HOSPITAL ENCOUNTER (EMERGENCY)
Age: 83
Discharge: HOME | End: 2023-01-12
Attending: EMERGENCY MEDICINE | Admitting: EMERGENCY MEDICINE
Payer: MEDICARE

## 2023-01-12 VITALS
HEIGHT: 62 IN | WEIGHT: 185 LBS | HEART RATE: 59 BPM | OXYGEN SATURATION: 96 % | TEMPERATURE: 98 F | RESPIRATION RATE: 16 BRPM | DIASTOLIC BLOOD PRESSURE: 84 MMHG | SYSTOLIC BLOOD PRESSURE: 166 MMHG | BODY MASS INDEX: 34.04 KG/M2

## 2023-01-12 DIAGNOSIS — H65.93 FLUID LEVEL BEHIND TYMPANIC MEMBRANE OF BOTH EARS: Primary | ICD-10-CM

## 2023-01-12 PROCEDURE — 99283 EMERGENCY DEPT VISIT LOW MDM: CPT | Performed by: EMERGENCY MEDICINE

## 2023-01-12 ASSESSMENT — ENCOUNTER SYMPTOMS
RESPIRATORY NEGATIVE: 1
GASTROINTESTINAL NEGATIVE: 1
EYES NEGATIVE: 1

## 2023-01-12 NOTE — ED TRIAGE NOTES
Pt ED via [facility] for head congestion for the past couple of days. Pt VSS with EMS. No other c/o at this time.

## 2023-01-12 NOTE — ED NOTES
[address] [place] [address] called to transport patient back to residence; [address] [facility]  01/12/23 0692

## 2023-01-12 NOTE — ED NOTES
I have reviewed discharge instructions with the patient. The patient verbalized understanding. Patient left ED via Discharge Method: stretcher to Home with [facility]. Opportunity for questions and clarification provided. Patient given 0 scripts. To continue your aftercare when you leave the hospital, you may receive an automated call from our care team to check in on how you are doing. This is a free service and part of our promise to provide the best care and service to meet your aftercare needs.  If you have questions, or wish to unsubscribe from this service please call [phone]. Thank you for Choosing our [facility] Emergency Department.         Bridget Malone RN  01/12/23 1446
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
## 2023-01-20 ENCOUNTER — HOSPITAL ENCOUNTER (OUTPATIENT)
Dept: INFUSION THERAPY | Age: 83
Discharge: HOME | End: 2023-01-20
Payer: MEDICARE

## 2023-01-20 ENCOUNTER — HOSPITAL ENCOUNTER (OUTPATIENT)
Dept: LAB | Age: 83
End: 2023-01-20
Payer: MEDICARE

## 2023-01-20 ENCOUNTER — OFFICE VISIT (OUTPATIENT)
Dept: ONCOLOGY | Age: 83
End: 2023-01-20
Payer: MEDICARE

## 2023-01-20 VITALS
TEMPERATURE: 97.7 F | DIASTOLIC BLOOD PRESSURE: 77 MMHG | HEART RATE: 55 BPM | RESPIRATION RATE: 22 BRPM | HEIGHT: 63 IN | SYSTOLIC BLOOD PRESSURE: 134 MMHG | BODY MASS INDEX: 32.77 KG/M2 | OXYGEN SATURATION: 97 %

## 2023-01-20 VITALS — WEIGHT: 185.8 LBS | BODY MASS INDEX: 32.91 KG/M2

## 2023-01-20 DIAGNOSIS — Z17.0 MALIGNANT NEOPLASM OF OVERLAPPING SITES OF LEFT BREAST IN FEMALE, ESTROGEN RECEPTOR POSITIVE (HCC): ICD-10-CM

## 2023-01-20 DIAGNOSIS — Z51.81 ENCOUNTER FOR MONITORING CARDIOTOXIC DRUG THERAPY: ICD-10-CM

## 2023-01-20 DIAGNOSIS — Z79.899 ENCOUNTER FOR MONITORING CARDIOTOXIC DRUG THERAPY: ICD-10-CM

## 2023-01-20 DIAGNOSIS — Z17.0 MALIGNANT NEOPLASM OF OVERLAPPING SITES OF LEFT BREAST IN FEMALE, ESTROGEN RECEPTOR POSITIVE (HCC): Primary | ICD-10-CM

## 2023-01-20 DIAGNOSIS — C50.812 MALIGNANT NEOPLASM OF OVERLAPPING SITES OF LEFT BREAST IN FEMALE, ESTROGEN RECEPTOR POSITIVE (HCC): Primary | ICD-10-CM

## 2023-01-20 DIAGNOSIS — C50.812 MALIGNANT NEOPLASM OF OVERLAPPING SITES OF LEFT BREAST IN FEMALE, ESTROGEN RECEPTOR POSITIVE (HCC): ICD-10-CM

## 2023-01-20 LAB
ALBUMIN SERPL-MCNC: 3.2 G/DL (ref 3.2–4.6)
ALBUMIN/GLOB SERPL: 0.9 (ref 0.4–1.6)
ALP SERPL-CCNC: 125 U/L (ref 50–136)
ALT SERPL-CCNC: 18 U/L (ref 12–65)
ANION GAP SERPL CALC-SCNC: 6 MMOL/L (ref 2–11)
AST SERPL-CCNC: 14 U/L (ref 15–37)
BASOPHILS # BLD: 0 K/UL (ref 0–0.2)
BASOPHILS NFR BLD: 0 % (ref 0–2)
BILIRUB SERPL-MCNC: 0.7 MG/DL (ref 0.2–1.1)
BUN SERPL-MCNC: 8 MG/DL (ref 8–23)
CALCIUM SERPL-MCNC: 9.7 MG/DL (ref 8.3–10.4)
CHLORIDE SERPL-SCNC: 109 MMOL/L (ref 101–110)
CO2 SERPL-SCNC: 27 MMOL/L (ref 21–32)
CREAT SERPL-MCNC: 0.8 MG/DL (ref 0.6–1)
DIFFERENTIAL METHOD BLD: ABNORMAL
EOSINOPHIL # BLD: 0 K/UL (ref 0–0.8)
EOSINOPHIL NFR BLD: 0 % (ref 0.5–7.8)
ERYTHROCYTE [DISTWIDTH] IN BLOOD BY AUTOMATED COUNT: 14.2 % (ref 11.9–14.6)
GLOBULIN SER CALC-MCNC: 3.6 G/DL (ref 2.8–4.5)
GLUCOSE SERPL-MCNC: 135 MG/DL (ref 65–100)
HCT VFR BLD AUTO: 46.4 % (ref 35.8–46.3)
HGB BLD-MCNC: 14.3 G/DL (ref 11.7–15.4)
IMM GRANULOCYTES # BLD AUTO: 0 K/UL (ref 0–0.5)
IMM GRANULOCYTES NFR BLD AUTO: 0 % (ref 0–5)
LYMPHOCYTES # BLD: 1.4 K/UL (ref 0.5–4.6)
LYMPHOCYTES NFR BLD: 22 % (ref 13–44)
MAGNESIUM SERPL-MCNC: 1.9 MG/DL (ref 1.8–2.4)
MCH RBC QN AUTO: 27.9 PG (ref 26.1–32.9)
MCHC RBC AUTO-ENTMCNC: 30.8 G/DL (ref 31.4–35)
MCV RBC AUTO: 90.6 FL (ref 82–102)
MONOCYTES # BLD: 0.3 K/UL (ref 0.1–1.3)
MONOCYTES NFR BLD: 4 % (ref 4–12)
NEUTS SEG # BLD: 4.8 K/UL (ref 1.7–8.2)
NEUTS SEG NFR BLD: 74 % (ref 43–78)
NRBC # BLD: 0 K/UL (ref 0–0.2)
PLATELET # BLD AUTO: 189 K/UL (ref 150–450)
PMV BLD AUTO: 11.6 FL (ref 9.4–12.3)
POTASSIUM SERPL-SCNC: 3.4 MMOL/L (ref 3.5–5.1)
PROT SERPL-MCNC: 6.8 G/DL (ref 6.3–8.2)
RBC # BLD AUTO: 5.12 M/UL (ref 4.05–5.2)
SODIUM SERPL-SCNC: 142 MMOL/L (ref 133–143)
WBC # BLD AUTO: 6.5 K/UL (ref 4.3–11.1)

## 2023-01-20 PROCEDURE — 1090F PRES/ABSN URINE INCON ASSESS: CPT | Performed by: INTERNAL MEDICINE

## 2023-01-20 PROCEDURE — 96417 CHEMO IV INFUS EACH ADDL SEQ: CPT

## 2023-01-20 PROCEDURE — 3078F DIAST BP <80 MM HG: CPT | Performed by: INTERNAL MEDICINE

## 2023-01-20 PROCEDURE — G8417 CALC BMI ABV UP PARAM F/U: HCPCS | Performed by: INTERNAL MEDICINE

## 2023-01-20 PROCEDURE — 2580000003 HC RX 258: Performed by: INTERNAL MEDICINE

## 2023-01-20 PROCEDURE — 1036F TOBACCO NON-USER: CPT | Performed by: INTERNAL MEDICINE

## 2023-01-20 PROCEDURE — G8484 FLU IMMUNIZE NO ADMIN: HCPCS | Performed by: INTERNAL MEDICINE

## 2023-01-20 PROCEDURE — 99214 OFFICE O/P EST MOD 30 MIN: CPT | Performed by: INTERNAL MEDICINE

## 2023-01-20 PROCEDURE — 6360000002 HC RX W HCPCS: Performed by: INTERNAL MEDICINE

## 2023-01-20 PROCEDURE — 3075F SYST BP GE 130 - 139MM HG: CPT | Performed by: INTERNAL MEDICINE

## 2023-01-20 PROCEDURE — 36415 COLL VENOUS BLD VENIPUNCTURE: CPT

## 2023-01-20 PROCEDURE — 83735 ASSAY OF MAGNESIUM: CPT

## 2023-01-20 PROCEDURE — G8428 CUR MEDS NOT DOCUMENT: HCPCS | Performed by: INTERNAL MEDICINE

## 2023-01-20 PROCEDURE — 85025 COMPLETE CBC W/AUTO DIFF WBC: CPT

## 2023-01-20 PROCEDURE — G8400 PT W/DXA NO RESULTS DOC: HCPCS | Performed by: INTERNAL MEDICINE

## 2023-01-20 PROCEDURE — 80053 COMPREHEN METABOLIC PANEL: CPT

## 2023-01-20 PROCEDURE — 96413 CHEMO IV INFUSION 1 HR: CPT

## 2023-01-20 PROCEDURE — 1123F ACP DISCUSS/DSCN MKR DOCD: CPT | Performed by: INTERNAL MEDICINE

## 2023-01-20 RX ORDER — SODIUM CHLORIDE 0.9 % (FLUSH) 0.9 %
5-40 SYRINGE (ML) INJECTION PRN
Status: DISCONTINUED | OUTPATIENT
Start: 2023-01-20 | End: 2023-01-21 | Stop reason: HOSPADM

## 2023-01-20 RX ORDER — DIPHENHYDRAMINE HYDROCHLORIDE 50 MG/ML
50 INJECTION INTRAMUSCULAR; INTRAVENOUS
OUTPATIENT
Start: 2023-02-08

## 2023-01-20 RX ORDER — ACETAMINOPHEN 325 MG/1
650 TABLET ORAL
Status: CANCELLED | OUTPATIENT
Start: 2023-01-20

## 2023-01-20 RX ORDER — SODIUM CHLORIDE 9 MG/ML
5-250 INJECTION, SOLUTION INTRAVENOUS PRN
Status: DISCONTINUED | OUTPATIENT
Start: 2023-01-20 | End: 2023-01-21 | Stop reason: HOSPADM

## 2023-01-20 RX ORDER — DIPHENHYDRAMINE HYDROCHLORIDE 50 MG/ML
50 INJECTION INTRAMUSCULAR; INTRAVENOUS
Status: CANCELLED | OUTPATIENT
Start: 2023-01-20

## 2023-01-20 RX ORDER — SODIUM CHLORIDE 9 MG/ML
5-250 INJECTION, SOLUTION INTRAVENOUS PRN
OUTPATIENT
Start: 2023-02-08

## 2023-01-20 RX ORDER — SODIUM CHLORIDE 9 MG/ML
INJECTION, SOLUTION INTRAVENOUS CONTINUOUS
Status: CANCELLED | OUTPATIENT
Start: 2023-01-20

## 2023-01-20 RX ORDER — HEPARIN SODIUM (PORCINE) LOCK FLUSH IV SOLN 100 UNIT/ML 100 UNIT/ML
500 SOLUTION INTRAVENOUS PRN
OUTPATIENT
Start: 2023-02-08

## 2023-01-20 RX ORDER — ALBUTEROL SULFATE 90 UG/1
4 AEROSOL, METERED RESPIRATORY (INHALATION) PRN
OUTPATIENT
Start: 2023-02-08

## 2023-01-20 RX ORDER — ALBUTEROL SULFATE 90 UG/1
4 AEROSOL, METERED RESPIRATORY (INHALATION) PRN
Status: CANCELLED | OUTPATIENT
Start: 2023-01-20

## 2023-01-20 RX ORDER — SODIUM CHLORIDE 9 MG/ML
5-40 INJECTION INTRAVENOUS PRN
OUTPATIENT
Start: 2023-02-08

## 2023-01-20 RX ORDER — HEPARIN SODIUM (PORCINE) LOCK FLUSH IV SOLN 100 UNIT/ML 100 UNIT/ML
500 SOLUTION INTRAVENOUS PRN
Status: CANCELLED | OUTPATIENT
Start: 2023-01-20

## 2023-01-20 RX ORDER — FAMOTIDINE 10 MG/ML
20 INJECTION, SOLUTION INTRAVENOUS
OUTPATIENT
Start: 2023-02-08

## 2023-01-20 RX ORDER — MEPERIDINE HYDROCHLORIDE 50 MG/ML
12.5 INJECTION INTRAMUSCULAR; INTRAVENOUS; SUBCUTANEOUS PRN
OUTPATIENT
Start: 2023-02-08

## 2023-01-20 RX ORDER — FAMOTIDINE 10 MG/ML
20 INJECTION, SOLUTION INTRAVENOUS
Status: CANCELLED | OUTPATIENT
Start: 2023-01-20

## 2023-01-20 RX ORDER — EPINEPHRINE 1 MG/ML
0.3 INJECTION, SOLUTION, CONCENTRATE INTRAVENOUS PRN
OUTPATIENT
Start: 2023-02-08

## 2023-01-20 RX ORDER — EPINEPHRINE 1 MG/ML
0.3 INJECTION, SOLUTION, CONCENTRATE INTRAVENOUS PRN
Status: CANCELLED | OUTPATIENT
Start: 2023-01-20

## 2023-01-20 RX ORDER — SODIUM CHLORIDE 0.9 % (FLUSH) 0.9 %
5-40 SYRINGE (ML) INJECTION PRN
OUTPATIENT
Start: 2023-02-08

## 2023-01-20 RX ORDER — ACETAMINOPHEN 325 MG/1
650 TABLET ORAL
OUTPATIENT
Start: 2023-02-08

## 2023-01-20 RX ORDER — SODIUM CHLORIDE 9 MG/ML
INJECTION, SOLUTION INTRAVENOUS CONTINUOUS
OUTPATIENT
Start: 2023-02-08

## 2023-01-20 RX ORDER — SODIUM CHLORIDE 9 MG/ML
5-40 INJECTION INTRAVENOUS PRN
Status: CANCELLED | OUTPATIENT
Start: 2023-01-20

## 2023-01-20 RX ORDER — MEPERIDINE HYDROCHLORIDE 50 MG/ML
12.5 INJECTION INTRAMUSCULAR; INTRAVENOUS; SUBCUTANEOUS PRN
Status: CANCELLED | OUTPATIENT
Start: 2023-01-20

## 2023-01-20 RX ORDER — ONDANSETRON 2 MG/ML
8 INJECTION INTRAMUSCULAR; INTRAVENOUS
OUTPATIENT
Start: 2023-02-08

## 2023-01-20 RX ORDER — SODIUM CHLORIDE 9 MG/ML
5-250 INJECTION, SOLUTION INTRAVENOUS PRN
Status: CANCELLED | OUTPATIENT
Start: 2023-01-20

## 2023-01-20 RX ORDER — ONDANSETRON 2 MG/ML
8 INJECTION INTRAMUSCULAR; INTRAVENOUS
Status: CANCELLED | OUTPATIENT
Start: 2023-01-20

## 2023-01-20 RX ORDER — FLUTICASONE PROPIONATE 50 MCG
SPRAY, SUSPENSION (ML) NASAL
COMMUNITY
Start: 2023-01-13

## 2023-01-20 RX ORDER — SODIUM CHLORIDE 0.9 % (FLUSH) 0.9 %
5-40 SYRINGE (ML) INJECTION PRN
Status: CANCELLED | OUTPATIENT
Start: 2023-01-20

## 2023-01-20 RX ADMIN — PERTUZUMAB 420 MG: 30 INJECTION, SOLUTION, CONCENTRATE INTRAVENOUS at 15:33

## 2023-01-20 RX ADMIN — SODIUM CHLORIDE 50 ML/HR: 9 INJECTION, SOLUTION INTRAVENOUS at 14:54

## 2023-01-20 RX ADMIN — SODIUM CHLORIDE 546 MG: 9 INJECTION, SOLUTION INTRAVENOUS at 14:58

## 2023-01-20 RX ADMIN — SODIUM CHLORIDE, PRESERVATIVE FREE 10 ML: 5 INJECTION INTRAVENOUS at 14:53

## 2023-01-20 ASSESSMENT — PATIENT HEALTH QUESTIONNAIRE - PHQ9
SUM OF ALL RESPONSES TO PHQ9 QUESTIONS 1 & 2: 0
SUM OF ALL RESPONSES TO PHQ QUESTIONS 1-9: 0
1. LITTLE INTEREST OR PLEASURE IN DOING THINGS: 0
SUM OF ALL RESPONSES TO PHQ QUESTIONS 1-9: 0
2. FEELING DOWN, DEPRESSED OR HOPELESS: 0
SUM OF ALL RESPONSES TO PHQ QUESTIONS 1-9: 0
SUM OF ALL RESPONSES TO PHQ QUESTIONS 1-9: 0

## 2023-01-20 NOTE — PROGRESS NOTES
[facility] Hematology and Oncology: Office Visit Established Patient     Chief Complaint:    Chief Complaint   Patient presents with    Follow-up      History of Present Illness:  Ms. Margherita Leyden  is a 81 y.o. female who presents  today for follow-up regarding breast cancer. She presented to [facility] on [facility] for evaluation of a palpable, painful lump in her left breast. Bilateral diagnostic digital mammography and left breast ultrasound identified an irregular mass, very  suspicious for malignancy located at the 1:30 position in the posterior left breast; a second smaller mass also suspicious for malignancy at the 6:30 position in the left breast; a dysmorphic left axillary lymph node suspicious for metastatic disease;  and innumerable new microcalcifications occupying much of the lateral left breast, suspicious for associated DCIS. Recommended core needle biopsies and marker clip placements for the palpable 2.3 cm posterior 1:30 mass, the 1.8 cm posterior 6:30 nodule,  and a dystrophic axillary lymph node were performed on 4/7/20. 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 She was referred to [facility], [facility], for oncology evaluation and treatment of newly diagnosed breast cancer.   We recommended proceeding with PET/CT, as well as IHC on the axillary lymph node  to determine whether this was HER2 positive as well. PET/CT shows no distant metastatic disease. I discussed the case with Dr. Amanda Britton at tumor board. Normally, for node positive HER2 positive  breast cancer, we would recommend neoadjuvant chemoimmunotherapy. However, she is unwilling to consider cytotoxic therapy, which is not unreasonable given her age and comorbidities. Since she has only locoregional disease, we should then consider mastectomy  and node dissection for definitive therapy. She was also very reluctant to consider any surgery because of her history of intolerance to anesthesia, but we were able to convince her to at least meet with Dr. Amanda Britton. However, after discussion with  him, she declined any local therapy and was only willing to consider systemic treatment. Therefore, we recommended the PERTAIN regimen for \"unresectable\" disease, Arimidex + HP. She is here today for follow up and next HP. She has missed multiple infusions due to transportation issues. She is feeling well overall, she continues to have allergic rhinitis symptoms that are bothersome, as well as some intermittent diarrhea, but overall she is doing well. ECOG 1 currently. Her daughter notes that she was a little more listless over the holidays, but has perked up and energy/level of functioning is back to baseline. Review of Systems:   Constitutional: Positive for fatigue. HENT: Positive for allergic rhinitis. Eyes: Negative. Respiratory: Negative. Cardiovascular: Negative. Gastrointestinal: Positive for diarrhea (improved). Genitourinary: Negative. Musculoskeletal: Negative. Skin: Negative. Neurological: Negative. Endo/Heme/Allergies: Negative. Psychiatric/Behavioral: Negative. All other systems reviewed and are negative.     Allergies   Allergen Reactions    Aspirin Other (See Comments) Ciprofloxacin Other (See Comments)    Doxycycline Calcium Other (See Comments)    Fluconazole Other (See Comments)    Ibuprofen Other (See Comments)    Metformin Other (See Comments)    Sulfa Antibiotics Hives and Other (See Comments)    Sulfabenzamide Other (See Comments)    Benzonatate Rash    Gabapentin Anxiety     Past Medical History:   Diagnosis Date    Arthritis     Asthma     Breast cancer ([facility] [address]     Chronic kidney disease     Ear problems     Gastrointestinal disorder     acid reflux, hernia    Hyperlipidemia     Hypertension     Insomnia     Other ill-defined conditions(799.89)     blood clot on lung    Stroke ([facility] [address]     Thyroid disease     Tinnitus     Vitamin D deficiency      Past Surgical History:   Procedure Laterality Date    APPENDECTOMY      BREAST BIOPSY Left 04/07/2021    u/s x 3    GYN  [age]    c-sec    HEENT      t & a    OTHER SURGICAL HISTORY      fatty tissue removed from under arm    FL APPENDECTOMY      US BREAST BIOPSY NEEDLE ADDITIONAL LEFT Left 4/7/2021    US BREAST BIOPSY NEEDLE ADDITIONAL LEFT 4/7/2021 [facility]    US BREAST BIOPSY W LOC DEVICE 1ST LESION LEFT Left 4/7/2021    US BREAST NEEDLE BIOPSY LEFT 4/7/2021 [facility]    US LYMPH NODE BIOPSY  4/7/2021    US LYMPH NODE BIOPSY 4/7/2021 [facility]     Family History   Problem Relation Age of Onset    No Known Problems Mother     No Known Problems Father     No Known Problems Maternal Grandmother     No Known Problems Maternal Grandfather     No Known Problems Paternal Grandmother     No Known Problems Paternal Grandfather     Breast Cancer Neg Hx      Social History     Socioeconomic History    Marital status:       Spouse name: Not on file    Number of children: Not on file    Years of education: Not on file    Highest education level: Not on file   Occupational History    Not on file   Tobacco Use    Smoking status: Never    Smokeless tobacco: Never   Substance and Sexual Activity    Alcohol use: No    Drug use: No    Sexual activity: Not on file   Other Topics Concern    Not on file   Social History Narrative    Not on file     Social Determinants of Health     Financial Resource Strain: Low Risk     Difficulty of Paying Living Expenses: Not hard at all   Food Insecurity: No Food Insecurity    Worried About Running Out of Food in the Last Year: Never true    Ran Out of Food in the Last Year: Never true   Transportation Needs: Not on file   Physical Activity: Not on file   Stress: Not on file   Social Connections: Not on file   Intimate Partner Violence: Not on file   Housing Stability: Not on file     Current Outpatient Medications   Medication Sig Dispense Refill    fluticasone (FLONASE) 50 MCG/ACT nasal spray USE 2 SPRAYS INTO EACH NOSTRIL ONCE DAILY      amLODIPine (NORVASC) 2.5 MG tablet Take 1 tablet by mouth daily 30 tablet 5    levothyroxine (SYNTHROID) 112 MCG tablet Take 1 tablet by mouth every morning (before breakfast) 30 tablet 5    lisinopril (PRINIVIL;ZESTRIL) 20 MG tablet Take 1 tablet by mouth daily 30 tablet 5    SITagliptin (JANUVIA) 100 MG tablet TAKE 1 TABLET BY MOUTH EVERY DAY 30 tablet 5    temazepam (RESTORIL) 30 MG capsule Take 1 capsule by mouth nightly as needed for Sleep for up to 30 days. Max Daily Amount: 30 mg 30 capsule 0    atorvastatin (LIPITOR) 20 MG tablet Take 1 tablet by mouth daily 30 tablet 5    acetaminophen (TYLENOL) 500 MG tablet Take 1 tablet by mouth 4 times daily as needed for Pain 360 tablet 1    hydrocortisone (WESTCORT) 0.2 % cream Apply topically 2 times daily. 45 g 1    cholestyramine (QUESTRAN) 4 GM/DOSE powder TAKE 4 G BY MOUTH THREE (3) TIMES DAILY (WITH MEALS) FOR 30 DAYS.  378 g 1    nystatin (MYCOSTATIN) 798118 UNIT/GM powder Apply topically 4 times daily 60 g 5    ipratropium (ATROVENT) 0.06 % nasal spray 2 sprays by Nasal route 4 times daily INSTILL 2 SPRAYS IN EACH NOSTRIL 4 TIMES A DAY 15 mL 2    anastrozole (ARIMIDEX) 1 MG tablet Take 1 mg by mouth daily      naftifine (NAFTIN) 1 % cream Apply topically daily       No current facility-administered medications for this visit. OBJECTIVE:  Visit Vitals  Vitals:    01/20/23 1245   BP: 134/77   Pulse: 55   Resp: 22   Temp: 97.7 °F (36.5 °C)   TempSrc: Oral   SpO2: 97%   Height: 5' 3\" (1.6 m)           Physical Exam:  Constitutional: Well developed, well nourished female in no acute distress, sitting comfortably in the wheelchair. HEENT: Normocephalic and atraumatic. Oropharynx is clear, mucous membranes are moist.  Sclerae anicteric. Neck supple without JVD. No thyromegaly present. Lymph node   No palpable submandibular, cervical, supraclavicular, axillary lymph nodes. Skin Warm and dry. No bruising and no rash noted. No erythema. No pallor. Respiratory Lungs are clear to auscultation bilaterally without wheezes, rales or rhonchi, normal air exchange without accessory muscle use. CVS Normal rate, regular rhythm and normal S1 and S2. No murmurs, gallops, or rubs. Abdomen Soft, nontender and nondistended, normoactive bowel sounds. No palpable mass. No hepatosplenomegaly. Neuro Grossly nonfocal with no obvious sensory or motor deficits. MSK Normal range of motion in general.  No edema and no tenderness. Psych Appropriate mood and affect.          Labs:  Hospital Outpatient Visit on 01/20/2023   Component Date Value Ref Range Status    WBC 01/20/2023 6.5  4.3 - 11.1 K/uL Final    RBC 01/20/2023 5.12  4.05 - 5.2 M/uL Final    Hemoglobin 01/20/2023 14.3  11.7 - 15.4 g/dL Final    Hematocrit 01/20/2023 46.4 (A)  35.8 - 46.3 % Final    MCV 01/20/2023 90.6  82.0 - 102.0 FL Final    MCH 01/20/2023 27.9  26.1 - 32.9 PG Final    MCHC 01/20/2023 30.8 (A)  31.4 - 35.0 g/dL Final    RDW 01/20/2023 14.2  11.9 - 14.6 % Final    Platelets 55/33/8512 189  150 - 450 K/uL Final    MPV 01/20/2023 11.6  9.4 - 12.3 FL Final    nRBC 01/20/2023 0.00  0.0 - 0.2 K/uL Final    **Note: Absolute NRBC parameter is now reported with Hemogram**    Seg Neutrophils 01/20/2023 74  43 - 78 % Final    Lymphocytes 01/20/2023 22  13 - 44 % Final    Monocytes 01/20/2023 4  4.0 - 12.0 % Final    Eosinophils % 01/20/2023 0 (A)  0.5 - 7.8 % Final    Basophils 01/20/2023 0  0.0 - 2.0 % Final    Immature Granulocytes 01/20/2023 0  0.0 - 5.0 % Final    Segs Absolute 01/20/2023 4.8  1.7 - 8.2 K/UL Final    Absolute Lymph # 01/20/2023 1.4  0.5 - 4.6 K/UL Final    Absolute Mono # 01/20/2023 0.3  0.1 - 1.3 K/UL Final    Absolute Eos # 01/20/2023 0.0  0.0 - 0.8 K/UL Final    Basophils Absolute 01/20/2023 0.0  0.0 - 0.2 K/UL Final    Absolute Immature Granulocyte 01/20/2023 0.0  0.0 - 0.5 K/UL Final    Differential Type 01/20/2023 AUTOMATED    Final    Sodium 01/20/2023 142  133 - 143 mmol/L Final    Potassium 01/20/2023 3.4 (A)  3.5 - 5.1 mmol/L Final    Chloride 01/20/2023 109  101 - 110 mmol/L Final    CO2 01/20/2023 27  21 - 32 mmol/L Final    Anion Gap 01/20/2023 6  2 - 11 mmol/L Final    Glucose 01/20/2023 135 (A)  65 - 100 mg/dL Final    BUN 01/20/2023 8  8 - 23 MG/DL Final    Creatinine 01/20/2023 0.80  0.6 - 1.0 MG/DL Final    Est, Glom Filt Rate 01/20/2023 >60  >60 ml/min/1.73m2 Final    Comment:      Pediatric calculator link: [employer]. org/professionals/kdoqi/gfr_calculatorped       These results are not intended for use in patients <25years of age. eGFR results are calculated without a race factor using  the 2021 CKD-EPI equation. Careful clinical correlation is recommended, particularly when comparing to results calculated using previous equations. The CKD-EPI equation is less accurate in patients with extremes of muscle mass, extra-renal metabolism of creatinine, excessive creatine ingestion, or following therapy that affects renal tubular secretion.       Calcium 01/20/2023 9.7  8.3 - 10.4 MG/DL Final    Total Bilirubin 01/20/2023 0.7  0.2 - 1.1 MG/DL Final    ALT 01/20/2023 18  12 - 65 U/L Final    AST 01/20/2023 14 (A)  15 - 37 U/L Final    Alk Phosphatase 01/20/2023 125  50 - 136 U/L Final    Total Protein 01/20/2023 6.8  6.3 - 8.2 g/dL Final    Albumin 01/20/2023 3.2  3.2 - 4.6 g/dL Final    Globulin 01/20/2023 3.6  2.8 - 4.5 g/dL Final    Albumin/Globulin Ratio 01/20/2023 0.9  0.4 - 1.6   Final    Magnesium 01/20/2023 1.9  1.8 - 2.4 mg/dL Final         Imaging:  PET/CT         Indication: Left breast cancer restaging       Radiopharmaceutical: 16.8 mCi F18-FDG, intravenously. Technique: Imaging was performed from the skull through the proximal thighs   using routine PET/CT acquisition protocol. Imaging was performed approximately   60 minutes post injection. Oral contrast was administered. Radiation dose   reduction techniques were used for this study:  Our CT scanners use one or all   of the following: Automated exposure control, adjustment of the mA and/or kVp   according to patient's size, iterative reconstruction. Serum glucose: 91 mg/dL prior to injection. Comparison studies: PET/CT 4/23/2021       Findings:       Head and Neck: No enlarged or hypermetabolic cervical chain nodes. Chest: No discrete lung nodule. No mediastinal mass or lymphadenopathy. Abdomen/Pelvis: No enlarged or hypermetabolic lymph nodes of the abdomen or   pelvis. No worrisome focal uptake of the abdominal viscera. Bones and soft tissues: Interval improved tracer uptake associated with   multifocal masses inferiorly in the left breast. Interval resolution of   previously faintly hypermetabolic lymph node of the left inferior axilla. No aggressive osseous lesion. IMPRESSION   1. Interval improved tracer uptake associated with multifocal masses inferiorly   in the left breast.   2.  Interval resolution of previously hypermetabolic lymph node within the left   Axilla. PET/CT: 4/23/2021       INDICATION: Initial staging of breast cancer.        TECHNIQUE: After oral administration of gastroview and intravenous   administration of 12.76 mCi of F18 FDG, noncontrast CT images were obtained for   attenuation correction and for fusion with emission PET images. A series of   overlapping emission PET images were then obtained beginning 60 minutes after   injection of FDG. The area imaged spanned the region from the skull base to the   mid thighs. All CT scans performed at this facility use one or all of the   following: Automated exposure control, adjustment of the mA and/or kVp according   to patient's size, iterative reconstruction. COMPARISON: Bilateral diagnostic mammogram 4/5/2021       FINDINGS:    NECK/CHEST:   No worrisome activity is seen in the neck. No adenopathy or aggressive osseous   lesion is suggested on limited CT images. Abnormal activity is seen within the patient's known outer left breast masses   with the greater activity occurring in the more lateral mass with maximum SUV of   5.2 g/mL. And additional activity is seen within the more medial left breast   lesion with a maximum SUV of 3.3 g/mL and focal appearing activity is seen   associated with a mildly dysmorphic left axillary lymph node with an adjacent   biopsy clip seen on image 90 with a maximum SUV of 2.5 g/mL consistent with the   patient's known bhavana metastasis. No worrisome activity is otherwise seen. No   suspicious pulmonary lesion is seen. The lungs appear hypoaerated. Linear   densities are seen which may represent scarring or atelectasis. No adenopathy or   aggressive osseous lesion is seen. ABDOMEN/PELVIS:   No worrisome activity is seen below the diaphragms. Retroperitoneal focal   activity is seen on PET/CT image 181 although this corresponds to the right   ureter suggesting benign excreted tracer within the right ureter. No adenopathy,   or aggressive osseous lesion is suggested. IMPRESSION   1.  Abnormal activity within the patient's known multiple left breast masses as   well as dysmorphic left axillary lymph node consistent with the patient's known   disease. No additional metabolically active disease is seen in the neck, chest,   abdomen, or pelvis. BILATERAL DIAGNOSTIC DIGITAL MAMMOGRAPHY,   LEFT BREAST SONOGRAPHY:       CLINICAL HISTORY:  50-year-old with no family history of breast cancer presents   for evaluation of a painful, palpable lump in the left breast.       COMPARISON:  May 22, 2013 from [place]. BILATERAL MAMMOGRAM:  A metallic skin marker was placed at the site of left   palpable concern. Bilateral craniocaudal and mediolateral oblique as well as   left lateral views were technically limited by the patient's inability to   cooperate with standard positioning. There is scattered fibroglandular tissue   bilaterally. Right lower mid breast nodule remains stable, and there has been   interval coarsening of right upper outer quadrant calcifications, suggesting a   benign etiology. However on the left, there is a new spiculated mass at the   site of posterior 1:30 palpable concern as well as a second lobular nodule at   the left posterior 6:30 position which are suspicious for malignancy. Moreover   innumerable new, mildly pleomorphic microcalcifications occupying most of the   lateral left breast are suspicious for associated DCIS. LEFT ULTRASOUND:  Images from careful ultrasound evaluation of the area of   palpable concern at the 1:30 position approximately 20 cm from the nipple   demonstrate a 2.3 cm irregular hypoechoic mass with dense acoustical shadowing   which is very suspicious for malignancy. There is also a densely shadowing 1.2   cm hypoechoic mass at the 6:30 position 12 cm from the nipple which is also   suspicious for malignancy. A 1.1 cm dysmorphic axillary lymph node with   effacement of the fatty hilum suggests metastatic disease as well.        IMPRESSION       1.  PALPABLE LEFT POSTERIOR 1:30 IRREGULAR MASS IS VERY SUSPICIOUS FOR   MALIGNANCY, AND THERE IS A SECOND LEFT 6:30 SMALLER MASS ALSO SUSPICIOUS FOR   MALIGNANCY AND A DYSMORPHIC LEFT AXILLARY LYMPH NODE SUSPICIOUS FOR METASTATIC   DISEASE. BIOPSIES ARE RECOMMENDED. 2.  INNUMERABLE NEW MICROCALCIFICATIONS OCCUPYING MUCH OF THE LATERAL LEFT   BREAST ARE ALSO SUSPICIOUS FOR ASSOCIATED DCIS. BI-RADS Assessment Category 5: Highly suggestive of malignancy- Appropriate   action should be taken. Pathology:             ASSESSMENT:    ICD-10-CM    1. Malignant neoplasm of overlapping sites of left breast in female, estrogen receptor positive ([facility] [address]  C50.812 [address]    Z17.0       2.  Encounter for monitoring cardiotoxic drug therapy  Z51.81 [address] [id number] [facility]    L87.115             Patient Active Problem List   Diagnosis    Mixed incontinence    Insomnia    Vitamin D deficiency    Diabetes mellitus type 2, controlled ([facility] [address]    Pleuritic chest pain    Mixed hyperlipidemia    Urge incontinence of urine    Allergic rhinitis    Cardiomegaly    Asthma    Primary osteoarthritis involving multiple joints    Nocturnal hypoxia    Shortness of breath    Hypokalemia    History of abnormal mammogram    Congenital hypothyroidism without goiter    IBS (irritable bowel syndrome)    Benign essential HTN    Chronic pelvic pain in female    Medication nonadherence due to psychosocial problem    BMI 40.0-44.9, adult (HCC)    Bradycardia    Esophageal dysphagia    Malignant neoplasm of overlapping sites of left breast in female, estrogen receptor positive ([facility] [address]    COVID-19 virus infection    Dehydration    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 Breast cancer: two separate nodules with dominant left UOQ nodule 2.3 cm and HER2 positive, other nodule left LIQ 1.2 cm and HER2 negative. Both low grade and ER positive, weakly WI positive. Axillary node positive for macrometastatic carcinoma, also  HER2 positive. PET/CT shows no distant metastatic disease. I discussed the case with Dr. Mariella Ferris at tumor board. Normally, for node positive HER2 positive breast cancer, we would recommend neoadjuvant  chemoimmunotherapy. However, she is unwilling to consider cytotoxic therapy, which is not unreasonable given her age and comorbidities. Since she has only locoregional disease, we should then consider mastectomy and node dissection for definitive therapy. She was also very reluctant to consider any surgery because of her history of intolerance to anesthesia, but we were able to convince her to at least meet with Dr. Mariella Ferris. However, after discussion with him, she declined any local therapy and was  only willing to consider systemic treatment. Therefore, we recommended the PERTAIN regimen for \"unresectable\" disease, Arimidex + HP. PET/CT reviewed after 4 cycles shows response with decrease in both breast masses and decreased axillary lymph node,  no new areas of metastatic disease noted. We once again noted that without surgery, we would eventually expect progression, but for now AI+HP is working well and can be continued as long as efficacy and tolerance maintained. She is here today for follow up and next HP. She has missed multiple infusions due to transportation issues. She is feeling well overall, she continues to have allergic rhinitis symptoms that are bothersome, as well as some intermittent diarrhea, but overall she is doing well. ECOG 1 currently.   Her daughter notes that she was a little more listless over the holidays, but has perked up and energy/level of functioning is back to baseline. Labs reviewed and adequate. PET/CT December 2022 showed no evidence of recurrent or progressive disease, continued CR. She will not consider surgical intervention so we will proceed with HP and continue on daily Arimidex. Repeat echo mid-September showed preserved EF, repeat due now - I am OK with proceeding with infusion today with this pending. Return in 3 weeks for HP and in 6 weeks for labs/OV/HP, PET/CT in 6 months (June 2023).             Reyna Calderon MD, MD [facility]  [address] [place] [address]  Office : [phone]  Fax : [phone]

## 2023-01-20 NOTE — PATIENT INSTRUCTIONS
Patient Instructions from Today's Visit    Reason for Visit:  Follow up   S/p PET Dec 2022    Plan: Your PET scan from last month looked good! Next PET 6 months from last.     Carol Vázquez to proceed with infusion today. Need ECHO prior to the next infusion.      Follow Up:  Echo in the next couple weeks  Infusion in 3 weeks  Follow up, labs, and infusion in 6 weeks     Recent Lab Results:  Hospital Outpatient Visit on 01/20/2023   Component Date Value Ref Range Status    WBC 01/20/2023 6.5  4.3 - 11.1 K/uL Final    RBC 01/20/2023 5.12  4.05 - 5.2 M/uL Final    Hemoglobin 01/20/2023 14.3  11.7 - 15.4 g/dL Final    Hematocrit 01/20/2023 46.4 (A)  35.8 - 46.3 % Final    MCV 01/20/2023 90.6  82.0 - 102.0 FL Final    MCH 01/20/2023 27.9  26.1 - 32.9 PG Final    MCHC 01/20/2023 30.8 (A)  31.4 - 35.0 g/dL Final    RDW 01/20/2023 14.2  11.9 - 14.6 % Final    Platelets 21/85/6055 189  150 - 450 K/uL Final    MPV 01/20/2023 11.6  9.4 - 12.3 FL Final    nRBC 01/20/2023 0.00  0.0 - 0.2 K/uL Final    **Note: Absolute NRBC parameter is now reported with Hemogram**    Seg Neutrophils 01/20/2023 74  43 - 78 % Final    Lymphocytes 01/20/2023 22  13 - 44 % Final    Monocytes 01/20/2023 4  4.0 - 12.0 % Final    Eosinophils % 01/20/2023 0 (A)  0.5 - 7.8 % Final    Basophils 01/20/2023 0  0.0 - 2.0 % Final    Immature Granulocytes 01/20/2023 0  0.0 - 5.0 % Final    Segs Absolute 01/20/2023 4.8  1.7 - 8.2 K/UL Final    Absolute Lymph # 01/20/2023 1.4  0.5 - 4.6 K/UL Final    Absolute Mono # 01/20/2023 0.3  0.1 - 1.3 K/UL Final    Absolute Eos # 01/20/2023 0.0  0.0 - 0.8 K/UL Final    Basophils Absolute 01/20/2023 0.0  0.0 - 0.2 K/UL Final    Absolute Immature Granulocyte 01/20/2023 0.0  0.0 - 0.5 K/UL Final    Differential Type 01/20/2023 AUTOMATED    Final    Sodium 01/20/2023 142  133 - 143 mmol/L Final    Potassium 01/20/2023 3.4 (A)  3.5 - 5.1 mmol/L Final    Chloride 01/20/2023 109  101 - 110 mmol/L Final    CO2 01/20/2023 27  21 - 32 mmol/L Final    Anion Gap 01/20/2023 6  2 - 11 mmol/L Final    Glucose 01/20/2023 135 (A)  65 - 100 mg/dL Final    BUN 01/20/2023 8  8 - 23 MG/DL Final    Creatinine 01/20/2023 0.80  0.6 - 1.0 MG/DL Final    Est, Mylene Filt Rate 01/20/2023 >60  >60 ml/min/1.73m2 Final    Comment:      Pediatric calculator link: Yogi.at. org/professionals/kdoqi/gfr_calculatorped       These results are not intended for use in patients <25years of age. eGFR results are calculated without a race factor using  the 2021 CKD-EPI equation. Careful clinical correlation is recommended, particularly when comparing to results calculated using previous equations. The CKD-EPI equation is less accurate in patients with extremes of muscle mass, extra-renal metabolism of creatinine, excessive creatine ingestion, or following therapy that affects renal tubular secretion. Calcium 01/20/2023 9.7  8.3 - 10.4 MG/DL Final    Total Bilirubin 01/20/2023 0.7  0.2 - 1.1 MG/DL Final    ALT 01/20/2023 18  12 - 65 U/L Final    AST 01/20/2023 14 (A)  15 - 37 U/L Final    Alk Phosphatase 01/20/2023 125  50 - 136 U/L Final    Total Protein 01/20/2023 6.8  6.3 - 8.2 g/dL Final    Albumin 01/20/2023 3.2  3.2 - 4.6 g/dL Final    Globulin 01/20/2023 3.6  2.8 - 4.5 g/dL Final    Albumin/Globulin Ratio 01/20/2023 0.9  0.4 - 1.6   Final    Magnesium 01/20/2023 1.9  1.8 - 2.4 mg/dL Final         Treatment Summary has been discussed and given to patient: NA        -------------------------------------------------------------------------------------------------------------------  Please call our office at [phone] if you have any  of the following symptoms:   Fever of 100.5 or greater  Chills  Shortness of breath  Swelling or pain in one leg    After office hours an answering service is available and will contact a provider for emergencies or if you are experiencing any of the above symptoms. Patient has My Chart.   My Chart log in information explained on the after visit summary printout at the [facility] desk.

## 2023-01-20 NOTE — PROGRESS NOTES
Arrived to the [facility]ending [facility] to [facility] by wheelchair. C24D1 Hercepting & Perjeta infusions completed. Patient tolerated well. Any issues or concerns during appointment: none. Patient aware of next infusion appointment on 2/8/2023 (date) at 1330 (time). Patient aware of next lab and [facility] office visit on 2/3/2023 (date) at 80 (time). Patient instructed to call provider with temperature of 100.4 or greater or nausea/vomiting/ diarrhea or pain not controlled by medications  Discharged by wheelchair.

## 2023-01-28 ENCOUNTER — HOSPITAL ENCOUNTER (EMERGENCY)
Age: 83
Discharge: HOME | End: 2023-01-29
Attending: EMERGENCY MEDICINE
Payer: MEDICARE

## 2023-01-28 DIAGNOSIS — T74.31XD: Primary | ICD-10-CM

## 2023-01-28 LAB
ALBUMIN SERPL-MCNC: 3.2 G/DL (ref 3.2–4.6)
ALBUMIN/GLOB SERPL: 0.9 (ref 0.4–1.6)
ALP SERPL-CCNC: 113 U/L (ref 50–136)
ALT SERPL-CCNC: 15 U/L (ref 12–65)
ANION GAP SERPL CALC-SCNC: 7 MMOL/L (ref 2–11)
APPEARANCE UR: CLEAR
AST SERPL-CCNC: 19 U/L (ref 15–37)
BACTERIA URNS QL MICRO: NEGATIVE /HPF
BASOPHILS # BLD: 0 K/UL (ref 0–0.2)
BASOPHILS NFR BLD: 0 % (ref 0–2)
BILIRUB SERPL-MCNC: 1.1 MG/DL (ref 0.2–1.1)
BILIRUB UR QL: NEGATIVE
BILIRUB UR QL: NEGATIVE
BUN SERPL-MCNC: 9 MG/DL (ref 8–23)
CALCIUM SERPL-MCNC: 10 MG/DL (ref 8.3–10.4)
CASTS URNS QL MICRO: ABNORMAL /LPF
CHLORIDE SERPL-SCNC: 110 MMOL/L (ref 101–110)
CO2 SERPL-SCNC: 28 MMOL/L (ref 21–32)
COLOR UR: ABNORMAL
CREAT SERPL-MCNC: 0.7 MG/DL (ref 0.6–1)
DIFFERENTIAL METHOD BLD: ABNORMAL
EOSINOPHIL # BLD: 0 K/UL (ref 0–0.8)
EOSINOPHIL NFR BLD: 0 % (ref 0.5–7.8)
EPI CELLS #/AREA URNS HPF: ABNORMAL /HPF
ERYTHROCYTE [DISTWIDTH] IN BLOOD BY AUTOMATED COUNT: 14.2 % (ref 11.9–14.6)
GLOBULIN SER CALC-MCNC: 3.7 G/DL (ref 2.8–4.5)
GLUCOSE SERPL-MCNC: 116 MG/DL (ref 65–100)
GLUCOSE UR QL STRIP.AUTO: NEGATIVE MG/DL
GLUCOSE UR STRIP.AUTO-MCNC: NEGATIVE MG/DL
HCT VFR BLD AUTO: 48.1 % (ref 35.8–46.3)
HGB BLD-MCNC: 15.1 G/DL (ref 11.7–15.4)
HGB UR QL STRIP: NEGATIVE
IMM GRANULOCYTES # BLD AUTO: 0 K/UL (ref 0–0.5)
IMM GRANULOCYTES NFR BLD AUTO: 0 % (ref 0–5)
KETONES UR QL STRIP.AUTO: NEGATIVE MG/DL
KETONES UR-MCNC: NEGATIVE MG/DL
LEUKOCYTE ESTERASE UR QL STRIP.AUTO: ABNORMAL
LEUKOCYTE ESTERASE UR QL STRIP: ABNORMAL
LYMPHOCYTES # BLD: 2 K/UL (ref 0.5–4.6)
LYMPHOCYTES NFR BLD: 28 % (ref 13–44)
MCH RBC QN AUTO: 28.5 PG (ref 26.1–32.9)
MCHC RBC AUTO-ENTMCNC: 31.4 G/DL (ref 31.4–35)
MCV RBC AUTO: 90.9 FL (ref 82–102)
MONOCYTES # BLD: 0.4 K/UL (ref 0.1–1.3)
MONOCYTES NFR BLD: 6 % (ref 4–12)
NEUTS SEG # BLD: 4.7 K/UL (ref 1.7–8.2)
NEUTS SEG NFR BLD: 66 % (ref 43–78)
NITRITE UR QL STRIP.AUTO: NEGATIVE
NITRITE UR QL: NEGATIVE
NRBC # BLD: 0 K/UL (ref 0–0.2)
PH UR STRIP: 7 (ref 5–9)
PH UR: 7 (ref 5–9)
PLATELET # BLD AUTO: 200 K/UL (ref 150–450)
PMV BLD AUTO: 11.3 FL (ref 9.4–12.3)
POTASSIUM SERPL-SCNC: 3.7 MMOL/L (ref 3.5–5.1)
PROT SERPL-MCNC: 6.9 G/DL (ref 6.3–8.2)
PROT UR QL: NEGATIVE MG/DL
PROT UR STRIP-MCNC: NEGATIVE MG/DL
RBC # BLD AUTO: 5.29 M/UL (ref 4.05–5.2)
RBC # UR STRIP: NEGATIVE
RBC #/AREA URNS HPF: ABNORMAL /HPF
SERVICE CMNT-IMP: ABNORMAL
SODIUM SERPL-SCNC: 145 MMOL/L (ref 133–143)
SP GR UR REFRACTOMETRY: 1.01 (ref 1–1.02)
SP GR UR: 1.01 (ref 1–1.02)
UROBILINOGEN UR QL STRIP.AUTO: 0.2 EU/DL (ref 0.2–1)
UROBILINOGEN UR QL: 0.2 EU/DL (ref 0.2–1)
WBC # BLD AUTO: 7.2 K/UL (ref 4.3–11.1)
WBC URNS QL MICRO: ABNORMAL /HPF

## 2023-01-28 PROCEDURE — 80053 COMPREHEN METABOLIC PANEL: CPT

## 2023-01-28 PROCEDURE — 81001 URINALYSIS AUTO W/SCOPE: CPT

## 2023-01-28 PROCEDURE — 81003 URINALYSIS AUTO W/O SCOPE: CPT

## 2023-01-28 PROCEDURE — 85025 COMPLETE CBC W/AUTO DIFF WBC: CPT

## 2023-01-28 PROCEDURE — 99284 EMERGENCY DEPT VISIT MOD MDM: CPT

## 2023-01-28 ASSESSMENT — PAIN - FUNCTIONAL ASSESSMENT: PAIN_FUNCTIONAL_ASSESSMENT: NONE - DENIES PAIN

## 2023-01-29 VITALS
HEART RATE: 52 BPM | SYSTOLIC BLOOD PRESSURE: 138 MMHG | OXYGEN SATURATION: 100 % | HEIGHT: 63 IN | WEIGHT: 185 LBS | TEMPERATURE: 98.4 F | RESPIRATION RATE: 16 BRPM | DIASTOLIC BLOOD PRESSURE: 70 MMHG | BODY MASS INDEX: 32.78 KG/M2

## 2023-01-29 ASSESSMENT — PAIN - FUNCTIONAL ASSESSMENT: PAIN_FUNCTIONAL_ASSESSMENT: NONE - DENIES PAIN

## 2023-01-29 NOTE — ED NOTES
I have reviewed discharge instructions with the patient. The patient verbalized understanding. Patient left ED via Discharge Method: [facility] transport to Home with transport. Opportunity for questions and clarification provided. Patient given 0 scripts. To continue your aftercare when you leave the hospital, you may receive an automated call from our care team to check in on how you are doing. This is a free service and part of our promise to provide the best care and service to meet your aftercare needs.  If you have questions, or wish to unsubscribe from this service please call [phone]. Thank you for Choosing our [facility] Emergency Department.         Elieser Cueva RN  01/29/23 2333
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
## 2023-02-27 DIAGNOSIS — E87.6 HYPOKALEMIA: ICD-10-CM

## 2023-02-27 DIAGNOSIS — C50.812 MALIGNANT NEOPLASM OF OVERLAPPING SITES OF LEFT BREAST IN FEMALE, ESTROGEN RECEPTOR POSITIVE (HCC): Primary | ICD-10-CM

## 2023-02-27 DIAGNOSIS — Z17.0 MALIGNANT NEOPLASM OF OVERLAPPING SITES OF LEFT BREAST IN FEMALE, ESTROGEN RECEPTOR POSITIVE (HCC): Primary | ICD-10-CM

## 2023-03-01 ENCOUNTER — HOSPITAL ENCOUNTER (OUTPATIENT)
Dept: INFUSION THERAPY | Age: 83
End: 2023-03-01

## 2023-03-07 ENCOUNTER — OFFICE VISIT (OUTPATIENT)
Dept: FAMILY MEDICINE CLINIC | Facility: CLINIC | Age: 83
End: 2023-03-07
Payer: MEDICARE

## 2023-03-07 VITALS
OXYGEN SATURATION: 98 % | HEIGHT: 63 IN | BODY MASS INDEX: 32.77 KG/M2 | DIASTOLIC BLOOD PRESSURE: 76 MMHG | TEMPERATURE: 98.2 F | HEART RATE: 56 BPM | SYSTOLIC BLOOD PRESSURE: 132 MMHG

## 2023-03-07 DIAGNOSIS — F51.04 CHRONIC INSOMNIA: ICD-10-CM

## 2023-03-07 DIAGNOSIS — E03.9 ACQUIRED HYPOTHYROIDISM: ICD-10-CM

## 2023-03-07 DIAGNOSIS — E78.2 MIXED HYPERLIPIDEMIA: ICD-10-CM

## 2023-03-07 DIAGNOSIS — E11.8 TYPE 2 DIABETES MELLITUS WITH UNSPECIFIED COMPLICATIONS (HCC): Primary | ICD-10-CM

## 2023-03-07 DIAGNOSIS — Z91.89 AT RISK FOR MEDICATION NONCOMPLIANCE: ICD-10-CM

## 2023-03-07 DIAGNOSIS — I10 ESSENTIAL HYPERTENSION: ICD-10-CM

## 2023-03-07 PROBLEM — C77.3 SECONDARY AND UNSPECIFIED MALIGNANT NEOPLASM OF AXILLA AND UPPER LIMB LYMPH NODES (HCC): Status: ACTIVE | Noted: 2023-03-07

## 2023-03-07 PROCEDURE — G8484 FLU IMMUNIZE NO ADMIN: HCPCS | Performed by: PHYSICIAN ASSISTANT

## 2023-03-07 PROCEDURE — G8400 PT W/DXA NO RESULTS DOC: HCPCS | Performed by: PHYSICIAN ASSISTANT

## 2023-03-07 PROCEDURE — G8417 CALC BMI ABV UP PARAM F/U: HCPCS | Performed by: PHYSICIAN ASSISTANT

## 2023-03-07 PROCEDURE — G8427 DOCREV CUR MEDS BY ELIG CLIN: HCPCS | Performed by: PHYSICIAN ASSISTANT

## 2023-03-07 PROCEDURE — 1090F PRES/ABSN URINE INCON ASSESS: CPT | Performed by: PHYSICIAN ASSISTANT

## 2023-03-07 PROCEDURE — 1036F TOBACCO NON-USER: CPT | Performed by: PHYSICIAN ASSISTANT

## 2023-03-07 PROCEDURE — 1123F ACP DISCUSS/DSCN MKR DOCD: CPT | Performed by: PHYSICIAN ASSISTANT

## 2023-03-07 PROCEDURE — 3078F DIAST BP <80 MM HG: CPT | Performed by: PHYSICIAN ASSISTANT

## 2023-03-07 PROCEDURE — 3075F SYST BP GE 130 - 139MM HG: CPT | Performed by: PHYSICIAN ASSISTANT

## 2023-03-07 PROCEDURE — 99214 OFFICE O/P EST MOD 30 MIN: CPT | Performed by: PHYSICIAN ASSISTANT

## 2023-03-07 RX ORDER — TRAZODONE HYDROCHLORIDE 50 MG/1
50 TABLET ORAL NIGHTLY PRN
Qty: 90 TABLET | Refills: 1 | Status: SHIPPED | OUTPATIENT
Start: 2023-03-07

## 2023-03-07 RX ORDER — LOSARTAN POTASSIUM 50 MG/1
50 TABLET ORAL DAILY
COMMUNITY

## 2023-03-07 RX ORDER — ATORVASTATIN CALCIUM 20 MG/1
20 TABLET, FILM COATED ORAL DAILY
Qty: 90 TABLET | Refills: 1 | Status: SHIPPED | OUTPATIENT
Start: 2023-03-07

## 2023-03-07 SDOH — ECONOMIC STABILITY: INCOME INSECURITY: HOW HARD IS IT FOR YOU TO PAY FOR THE VERY BASICS LIKE FOOD, HOUSING, MEDICAL CARE, AND HEATING?: NOT HARD AT ALL

## 2023-03-07 SDOH — ECONOMIC STABILITY: FOOD INSECURITY: WITHIN THE PAST 12 MONTHS, YOU WORRIED THAT YOUR FOOD WOULD RUN OUT BEFORE YOU GOT MONEY TO BUY MORE.: NEVER TRUE

## 2023-03-07 SDOH — ECONOMIC STABILITY: HOUSING INSECURITY
IN THE LAST 12 MONTHS, WAS THERE A TIME WHEN YOU DID NOT HAVE A STEADY PLACE TO SLEEP OR SLEPT IN A SHELTER (INCLUDING NOW)?: NO

## 2023-03-07 SDOH — ECONOMIC STABILITY: FOOD INSECURITY: WITHIN THE PAST 12 MONTHS, THE FOOD YOU BOUGHT JUST DIDN'T LAST AND YOU DIDN'T HAVE MONEY TO GET MORE.: NEVER TRUE

## 2023-03-07 ASSESSMENT — ANXIETY QUESTIONNAIRES
5. BEING SO RESTLESS THAT IT IS HARD TO SIT STILL: 0
1. FEELING NERVOUS, ANXIOUS, OR ON EDGE: 0
GAD7 TOTAL SCORE: 0
2. NOT BEING ABLE TO STOP OR CONTROL WORRYING: 0
7. FEELING AFRAID AS IF SOMETHING AWFUL MIGHT HAPPEN: 0
3. WORRYING TOO MUCH ABOUT DIFFERENT THINGS: 0
6. BECOMING EASILY ANNOYED OR IRRITABLE: 0
4. TROUBLE RELAXING: 0

## 2023-03-07 ASSESSMENT — ENCOUNTER SYMPTOMS
CHEST TIGHTNESS: 0
SHORTNESS OF BREATH: 0

## 2023-03-07 ASSESSMENT — PATIENT HEALTH QUESTIONNAIRE - PHQ9
SUM OF ALL RESPONSES TO PHQ9 QUESTIONS 1 & 2: 0
SUM OF ALL RESPONSES TO PHQ QUESTIONS 1-9: 0
1. LITTLE INTEREST OR PLEASURE IN DOING THINGS: 0
2. FEELING DOWN, DEPRESSED OR HOPELESS: 0
SUM OF ALL RESPONSES TO PHQ QUESTIONS 1-9: 0
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
## 2023-03-08 LAB
ALBUMIN SERPL-MCNC: 3.4 G/DL (ref 3.2–4.6)
ALBUMIN/GLOB SERPL: 1.1 (ref 0.4–1.6)
ALP SERPL-CCNC: 114 U/L (ref 50–136)
ALT SERPL-CCNC: 17 U/L (ref 12–65)
ANION GAP SERPL CALC-SCNC: 7 MMOL/L (ref 2–11)
AST SERPL-CCNC: 19 U/L (ref 15–37)
BILIRUB SERPL-MCNC: 1 MG/DL (ref 0.2–1.1)
BUN SERPL-MCNC: 7 MG/DL (ref 8–23)
CALCIUM SERPL-MCNC: 9.9 MG/DL (ref 8.3–10.4)
CHLORIDE SERPL-SCNC: 113 MMOL/L (ref 101–110)
CHOLEST SERPL-MCNC: 139 MG/DL
CO2 SERPL-SCNC: 25 MMOL/L (ref 21–32)
CREAT SERPL-MCNC: 0.6 MG/DL (ref 0.6–1)
EST. AVERAGE GLUCOSE BLD GHB EST-MCNC: 105 MG/DL
GLOBULIN SER CALC-MCNC: 3.1 G/DL (ref 2.8–4.5)
GLUCOSE SERPL-MCNC: 104 MG/DL (ref 65–100)
HBA1C MFR BLD: 5.3 % (ref 4.8–5.6)
HDLC SERPL-MCNC: 49 MG/DL (ref 40–60)
HDLC SERPL: 2.8
LDLC SERPL CALC-MCNC: 71.6 MG/DL
POTASSIUM SERPL-SCNC: 3.8 MMOL/L (ref 3.5–5.1)
PROT SERPL-MCNC: 6.5 G/DL (ref 6.3–8.2)
SODIUM SERPL-SCNC: 145 MMOL/L (ref 133–143)
TRIGL SERPL-MCNC: 92 MG/DL (ref 35–150)
TSH W FREE THYROID IF ABNORMAL: 0.73 UIU/ML (ref 0.36–3.74)
VLDLC SERPL CALC-MCNC: 18.4 MG/DL (ref 6–23)

## 2023-03-15 ENCOUNTER — OFFICE VISIT (OUTPATIENT)
Dept: FAMILY MEDICINE CLINIC | Facility: CLINIC | Age: 83
End: 2023-03-15
Payer: MEDICARE

## 2023-03-15 VITALS
TEMPERATURE: 98.6 F | OXYGEN SATURATION: 97 % | DIASTOLIC BLOOD PRESSURE: 84 MMHG | HEART RATE: 50 BPM | SYSTOLIC BLOOD PRESSURE: 178 MMHG

## 2023-03-15 DIAGNOSIS — I10 ESSENTIAL HYPERTENSION: Primary | ICD-10-CM

## 2023-03-15 DIAGNOSIS — M15.9 OSTEOARTHRITIS OF MULTIPLE JOINTS, UNSPECIFIED OSTEOARTHRITIS TYPE: ICD-10-CM

## 2023-03-15 DIAGNOSIS — Z91.81 AT HIGH RISK FOR INJURY RELATED TO FALL: ICD-10-CM

## 2023-03-15 DIAGNOSIS — Z91.14 NONCOMPLIANCE WITH MEDICATIONS: ICD-10-CM

## 2023-03-15 DIAGNOSIS — R26.81 GAIT INSTABILITY: ICD-10-CM

## 2023-03-15 DIAGNOSIS — R41.89 COGNITIVE IMPAIRMENT: ICD-10-CM

## 2023-03-15 PROCEDURE — 99214 OFFICE O/P EST MOD 30 MIN: CPT | Performed by: PHYSICIAN ASSISTANT

## 2023-03-15 PROCEDURE — G8484 FLU IMMUNIZE NO ADMIN: HCPCS | Performed by: PHYSICIAN ASSISTANT

## 2023-03-15 PROCEDURE — G8417 CALC BMI ABV UP PARAM F/U: HCPCS | Performed by: PHYSICIAN ASSISTANT

## 2023-03-15 PROCEDURE — 3078F DIAST BP <80 MM HG: CPT | Performed by: PHYSICIAN ASSISTANT

## 2023-03-15 PROCEDURE — G8427 DOCREV CUR MEDS BY ELIG CLIN: HCPCS | Performed by: PHYSICIAN ASSISTANT

## 2023-03-15 PROCEDURE — 1090F PRES/ABSN URINE INCON ASSESS: CPT | Performed by: PHYSICIAN ASSISTANT

## 2023-03-15 PROCEDURE — 1123F ACP DISCUSS/DSCN MKR DOCD: CPT | Performed by: PHYSICIAN ASSISTANT

## 2023-03-15 PROCEDURE — 3074F SYST BP LT 130 MM HG: CPT | Performed by: PHYSICIAN ASSISTANT

## 2023-03-15 PROCEDURE — 1036F TOBACCO NON-USER: CPT | Performed by: PHYSICIAN ASSISTANT

## 2023-03-15 PROCEDURE — G8400 PT W/DXA NO RESULTS DOC: HCPCS | Performed by: PHYSICIAN ASSISTANT

## 2023-03-15 RX ORDER — TEMAZEPAM 30 MG/1
30 CAPSULE ORAL NIGHTLY PRN
COMMUNITY
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
## 2023-03-16 ENCOUNTER — CARE COORDINATION (OUTPATIENT)
Dept: CARE COORDINATION | Facility: CLINIC | Age: 83
End: 2023-03-16

## 2023-03-16 NOTE — CARE COORDINATION
Referral received from PCP during my visit at PCP office  Attempted outreach # 1 to patient for CCM assessment   UTR and unable to leave VM message  Will attempt another outreach

## 2023-03-17 ENCOUNTER — CARE COORDINATION (OUTPATIENT)
Dept: CARE COORDINATION | Facility: CLINIC | Age: 83
End: 2023-03-17

## 2023-03-17 ASSESSMENT — ENCOUNTER SYMPTOMS
CHEST TIGHTNESS: 0
SHORTNESS OF BREATH: 0

## 2023-03-17 NOTE — CARE COORDINATION
Attempted outreach # 1 to patient for CCM assessment due to referral received from PCP during my visit at PCP office  UTR and unable to leave VM message  Case closed due to UTR x's 2

## 2023-03-20 ENCOUNTER — HOSPITAL ENCOUNTER (OUTPATIENT)
Dept: LAB | Age: 83
Discharge: HOME | End: 2023-03-23
Payer: MEDICARE

## 2023-03-20 ENCOUNTER — OFFICE VISIT (OUTPATIENT)
Dept: ONCOLOGY | Age: 83
End: 2023-03-20
Payer: MEDICARE

## 2023-03-20 ENCOUNTER — HOSPITAL ENCOUNTER (OUTPATIENT)
Dept: INFUSION THERAPY | Age: 83
Discharge: HOME | End: 2023-03-20

## 2023-03-20 VITALS
OXYGEN SATURATION: 93 % | TEMPERATURE: 97.7 F | DIASTOLIC BLOOD PRESSURE: 77 MMHG | RESPIRATION RATE: 16 BRPM | WEIGHT: 186.2 LBS | HEIGHT: 63 IN | HEART RATE: 58 BPM | SYSTOLIC BLOOD PRESSURE: 155 MMHG | BODY MASS INDEX: 32.99 KG/M2

## 2023-03-20 DIAGNOSIS — Z17.0 MALIGNANT NEOPLASM OF OVERLAPPING SITES OF LEFT BREAST IN FEMALE, ESTROGEN RECEPTOR POSITIVE (HCC): Primary | ICD-10-CM

## 2023-03-20 DIAGNOSIS — C50.812 MALIGNANT NEOPLASM OF OVERLAPPING SITES OF LEFT BREAST IN FEMALE, ESTROGEN RECEPTOR POSITIVE (HCC): Primary | ICD-10-CM

## 2023-03-20 DIAGNOSIS — E87.6 HYPOKALEMIA: ICD-10-CM

## 2023-03-20 DIAGNOSIS — Z51.81 ENCOUNTER FOR MONITORING CARDIOTOXIC DRUG THERAPY: ICD-10-CM

## 2023-03-20 DIAGNOSIS — Z79.899 ENCOUNTER FOR MONITORING CARDIOTOXIC DRUG THERAPY: ICD-10-CM

## 2023-03-20 DIAGNOSIS — Z17.0 MALIGNANT NEOPLASM OF OVERLAPPING SITES OF LEFT BREAST IN FEMALE, ESTROGEN RECEPTOR POSITIVE (HCC): ICD-10-CM

## 2023-03-20 DIAGNOSIS — C50.812 MALIGNANT NEOPLASM OF OVERLAPPING SITES OF LEFT BREAST IN FEMALE, ESTROGEN RECEPTOR POSITIVE (HCC): ICD-10-CM

## 2023-03-20 LAB
ALBUMIN SERPL-MCNC: 3.3 G/DL (ref 3.2–4.6)
ALBUMIN/GLOB SERPL: 1 (ref 0.4–1.6)
ALP SERPL-CCNC: 117 U/L (ref 50–136)
ALT SERPL-CCNC: 17 U/L (ref 12–65)
ANION GAP SERPL CALC-SCNC: 7 MMOL/L (ref 2–11)
AST SERPL-CCNC: 17 U/L (ref 15–37)
BASOPHILS # BLD: 0 K/UL (ref 0–0.2)
BASOPHILS NFR BLD: 1 % (ref 0–2)
BILIRUB SERPL-MCNC: 0.4 MG/DL (ref 0.2–1.1)
BUN SERPL-MCNC: 10 MG/DL (ref 8–23)
CALCIUM SERPL-MCNC: 9.7 MG/DL (ref 8.3–10.4)
CHLORIDE SERPL-SCNC: 113 MMOL/L (ref 101–110)
CO2 SERPL-SCNC: 23 MMOL/L (ref 21–32)
CREAT SERPL-MCNC: 0.8 MG/DL (ref 0.6–1)
DIFFERENTIAL METHOD BLD: ABNORMAL
EOSINOPHIL # BLD: 0 K/UL (ref 0–0.8)
EOSINOPHIL NFR BLD: 0 % (ref 0.5–7.8)
ERYTHROCYTE [DISTWIDTH] IN BLOOD BY AUTOMATED COUNT: 14.9 % (ref 11.9–14.6)
GLOBULIN SER CALC-MCNC: 3.3 G/DL (ref 2.8–4.5)
GLUCOSE SERPL-MCNC: 118 MG/DL (ref 65–100)
HCT VFR BLD AUTO: 46.6 % (ref 35.8–46.3)
HGB BLD-MCNC: 14.5 G/DL (ref 11.7–15.4)
IMM GRANULOCYTES # BLD AUTO: 0 K/UL (ref 0–0.5)
IMM GRANULOCYTES NFR BLD AUTO: 0 % (ref 0–5)
LYMPHOCYTES # BLD: 1.7 K/UL (ref 0.5–4.6)
LYMPHOCYTES NFR BLD: 26 % (ref 13–44)
MAGNESIUM SERPL-MCNC: 1.9 MG/DL (ref 1.8–2.4)
MCH RBC QN AUTO: 28.7 PG (ref 26.1–32.9)
MCHC RBC AUTO-ENTMCNC: 31.1 G/DL (ref 31.4–35)
MCV RBC AUTO: 92.1 FL (ref 82–102)
MONOCYTES # BLD: 0.4 K/UL (ref 0.1–1.3)
MONOCYTES NFR BLD: 6 % (ref 4–12)
NEUTS SEG # BLD: 4.4 K/UL (ref 1.7–8.2)
NEUTS SEG NFR BLD: 67 % (ref 43–78)
NRBC # BLD: 0 K/UL (ref 0–0.2)
PLATELET # BLD AUTO: 196 K/UL (ref 150–450)
PMV BLD AUTO: 11.6 FL (ref 9.4–12.3)
POTASSIUM SERPL-SCNC: 3.5 MMOL/L (ref 3.5–5.1)
PROT SERPL-MCNC: 6.6 G/DL (ref 6.3–8.2)
RBC # BLD AUTO: 5.06 M/UL (ref 4.05–5.2)
SODIUM SERPL-SCNC: 143 MMOL/L (ref 133–143)
WBC # BLD AUTO: 6.5 K/UL (ref 4.3–11.1)

## 2023-03-20 PROCEDURE — G8400 PT W/DXA NO RESULTS DOC: HCPCS | Performed by: NURSE PRACTITIONER

## 2023-03-20 PROCEDURE — 85025 COMPLETE CBC W/AUTO DIFF WBC: CPT

## 2023-03-20 PROCEDURE — G8427 DOCREV CUR MEDS BY ELIG CLIN: HCPCS | Performed by: NURSE PRACTITIONER

## 2023-03-20 PROCEDURE — G8417 CALC BMI ABV UP PARAM F/U: HCPCS | Performed by: NURSE PRACTITIONER

## 2023-03-20 PROCEDURE — 80053 COMPREHEN METABOLIC PANEL: CPT

## 2023-03-20 PROCEDURE — 3078F DIAST BP <80 MM HG: CPT | Performed by: NURSE PRACTITIONER

## 2023-03-20 PROCEDURE — 1090F PRES/ABSN URINE INCON ASSESS: CPT | Performed by: NURSE PRACTITIONER

## 2023-03-20 PROCEDURE — 1036F TOBACCO NON-USER: CPT | Performed by: NURSE PRACTITIONER

## 2023-03-20 PROCEDURE — 99214 OFFICE O/P EST MOD 30 MIN: CPT | Performed by: NURSE PRACTITIONER

## 2023-03-20 PROCEDURE — 3077F SYST BP >= 140 MM HG: CPT | Performed by: NURSE PRACTITIONER

## 2023-03-20 PROCEDURE — 1123F ACP DISCUSS/DSCN MKR DOCD: CPT | Performed by: NURSE PRACTITIONER

## 2023-03-20 PROCEDURE — 83735 ASSAY OF MAGNESIUM: CPT

## 2023-03-20 PROCEDURE — G8484 FLU IMMUNIZE NO ADMIN: HCPCS | Performed by: NURSE PRACTITIONER

## 2023-03-20 PROCEDURE — 36415 COLL VENOUS BLD VENIPUNCTURE: CPT

## 2023-03-20 ASSESSMENT — PATIENT HEALTH QUESTIONNAIRE - PHQ9
2. FEELING DOWN, DEPRESSED OR HOPELESS: 0
1. LITTLE INTEREST OR PLEASURE IN DOING THINGS: 0
SUM OF ALL RESPONSES TO PHQ QUESTIONS 1-9: 0
SUM OF ALL RESPONSES TO PHQ9 QUESTIONS 1 & 2: 0
SUM OF ALL RESPONSES TO PHQ QUESTIONS 1-9: 0

## 2023-03-20 NOTE — PROGRESS NOTES
hypoechoic mass with dense acoustical shadowing   which is very suspicious for malignancy. There is also a densely shadowing 1.2   cm hypoechoic mass at the 6:30 position 12 cm from the nipple which is also   suspicious for malignancy. A 1.1 cm dysmorphic axillary lymph node with   effacement of the fatty hilum suggests metastatic disease as well. IMPRESSION       1.  PALPABLE LEFT POSTERIOR 1:30 IRREGULAR MASS IS VERY SUSPICIOUS FOR   MALIGNANCY, AND THERE IS A SECOND LEFT 6:30 SMALLER MASS ALSO SUSPICIOUS FOR   MALIGNANCY AND A DYSMORPHIC LEFT AXILLARY LYMPH NODE SUSPICIOUS FOR METASTATIC   DISEASE. BIOPSIES ARE RECOMMENDED. 2.  INNUMERABLE NEW MICROCALCIFICATIONS OCCUPYING MUCH OF THE LATERAL LEFT   BREAST ARE ALSO SUSPICIOUS FOR ASSOCIATED DCIS. BI-RADS Assessment Category 5: Highly suggestive of malignancy- Appropriate   action should be taken. Pathology:             ASSESSMENT:    ICD-10-CM    1. Malignant neoplasm of overlapping sites of left breast in female, estrogen receptor positive ([facility] [address]  C50.812 Comprehensive Metabolic Panel    J15.7       2.  Encounter for monitoring cardiotoxic drug therapy  Z51.81 Comprehensive Metabolic Panel    M13.614               Patient Active Problem List   Diagnosis    Mixed incontinence    Insomnia    Vitamin D deficiency    Diabetes mellitus type 2, controlled ([facility] [address]    Pleuritic chest pain    Mixed hyperlipidemia    Urge incontinence of urine    Allergic rhinitis    Cardiomegaly    Asthma    Primary osteoarthritis involving multiple joints    Nocturnal hypoxia    Shortness of breath    Hypokalemia    History of abnormal mammogram    Congenital hypothyroidism without goiter    IBS (irritable bowel syndrome)    Benign essential HTN    Chronic pelvic pain in female    Medication nonadherence due to psychosocial problem    BMI 40.0-44.9, adult (HCC)    Bradycardia    Esophageal dysphagia    Malignant neoplasm of overlapping sites of left breast in

## 2023-03-20 NOTE — PATIENT INSTRUCTIONS
Patient Instructions from Today's Visit    Reason for Visit:  Follow up     Plan:   You will need a echo before you can resume chemotherapy     Follow Up:      Recent Lab Results:  Hospital Outpatient Visit on 03/20/2023   Component Date Value Ref Range Status    Magnesium 03/20/2023 1.9  1.8 - 2.4 mg/dL Final    WBC 03/20/2023 6.5  4.3 - 11.1 K/uL Final    RBC 03/20/2023 5.06  4.05 - 5.2 M/uL Final    Hemoglobin 03/20/2023 14.5  11.7 - 15.4 g/dL Final    Hematocrit 03/20/2023 46.6 (A)  35.8 - 46.3 % Final    MCV 03/20/2023 92.1  82.0 - 102.0 FL Final    MCH 03/20/2023 28.7  26.1 - 32.9 PG Final    MCHC 03/20/2023 31.1 (A)  31.4 - 35.0 g/dL Final    RDW 03/20/2023 14.9 (A)  11.9 - 14.6 % Final    Platelets 38/61/7759 196  150 - 450 K/uL Final    MPV 03/20/2023 11.6  9.4 - 12.3 FL Final    nRBC 03/20/2023 0.00  0.0 - 0.2 K/uL Final    **Note: Absolute NRBC parameter is now reported with Hemogram**    Seg Neutrophils 03/20/2023 67  43 - 78 % Final    Lymphocytes 03/20/2023 26  13 - 44 % Final    Monocytes 03/20/2023 6  4.0 - 12.0 % Final    Eosinophils % 03/20/2023 0 (A)  0.5 - 7.8 % Final    Basophils 03/20/2023 1  0.0 - 2.0 % Final    Immature Granulocytes 03/20/2023 0  0.0 - 5.0 % Final    Segs Absolute 03/20/2023 4.4  1.7 - 8.2 K/UL Final    Absolute Lymph # 03/20/2023 1.7  0.5 - 4.6 K/UL Final    Absolute Mono # 03/20/2023 0.4  0.1 - 1.3 K/UL Final    Absolute Eos # 03/20/2023 0.0  0.0 - 0.8 K/UL Final    Basophils Absolute 03/20/2023 0.0  0.0 - 0.2 K/UL Final    Absolute Immature Granulocyte 03/20/2023 0.0  0.0 - 0.5 K/UL Final    Differential Type 03/20/2023 AUTOMATED    Final            -------------------------------------------------------------------------------------------------------------------  Please call our office at [phone] if you have any  of the following symptoms:   Fever of 100.5 or greater  Chills  Shortness of breath  Swelling or pain in one leg    After office hours an answering

## 2023-03-22 ENCOUNTER — TELEPHONE (OUTPATIENT)
Dept: FAMILY MEDICINE CLINIC | Facility: CLINIC | Age: 83
End: 2023-03-22

## 2023-03-22 DIAGNOSIS — G47.33 OSA (OBSTRUCTIVE SLEEP APNEA): ICD-10-CM

## 2023-03-22 DIAGNOSIS — G47.34 NOCTURNAL HYPOXEMIA: Primary | ICD-10-CM

## 2023-03-22 NOTE — TELEPHONE ENCOUNTER
Patient states her \"breathing machine\" is not working well anymore & she needs a new one. She wants someone sent to her house & give her a new one like Dr Sheron Chris did.  She states she does not have a pulmonary Dr.

## 2023-03-22 NOTE — TELEPHONE ENCOUNTER
She will need a new sleep study performed to ensure pressure settings are appropriate. I am submitting a referral to pulmonology.

## 2023-03-23 ENCOUNTER — TELEPHONE (OUTPATIENT)
Dept: FAMILY MEDICINE CLINIC | Facility: CLINIC | Age: 83
End: 2023-03-23

## 2023-03-23 NOTE — TELEPHONE ENCOUNTER
Patient notified that a referral had been placed for the pulmonary Dr & they will be calling her to schedule an appt

## 2023-03-27 DIAGNOSIS — Z79.899 HIGH RISK MEDICATION USE: ICD-10-CM

## 2023-03-27 DIAGNOSIS — Z17.0 MALIGNANT NEOPLASM OF OVERLAPPING SITES OF LEFT BREAST IN FEMALE, ESTROGEN RECEPTOR POSITIVE (HCC): Primary | ICD-10-CM

## 2023-03-27 DIAGNOSIS — C50.812 MALIGNANT NEOPLASM OF OVERLAPPING SITES OF LEFT BREAST IN FEMALE, ESTROGEN RECEPTOR POSITIVE (HCC): Primary | ICD-10-CM

## 2023-03-28 ENCOUNTER — HOSPITAL ENCOUNTER (OUTPATIENT)
Dept: INFUSION THERAPY | Age: 83
Discharge: HOME | End: 2023-03-28
Payer: MEDICARE

## 2023-03-28 ENCOUNTER — HOSPITAL ENCOUNTER (OUTPATIENT)
Dept: LAB | Age: 83
Discharge: HOME | End: 2023-03-31
Payer: MEDICARE

## 2023-03-28 ENCOUNTER — OFFICE VISIT (OUTPATIENT)
Dept: ONCOLOGY | Age: 83
End: 2023-03-28
Payer: MEDICARE

## 2023-03-28 VITALS
SYSTOLIC BLOOD PRESSURE: 175 MMHG | TEMPERATURE: 98.4 F | WEIGHT: 183.6 LBS | HEART RATE: 57 BPM | RESPIRATION RATE: 14 BRPM | DIASTOLIC BLOOD PRESSURE: 81 MMHG | OXYGEN SATURATION: 97 % | HEIGHT: 63 IN | BODY MASS INDEX: 32.53 KG/M2

## 2023-03-28 DIAGNOSIS — C50.812 MALIGNANT NEOPLASM OF OVERLAPPING SITES OF LEFT BREAST IN FEMALE, ESTROGEN RECEPTOR POSITIVE (HCC): Primary | ICD-10-CM

## 2023-03-28 DIAGNOSIS — C77.3 SECONDARY AND UNSPECIFIED MALIGNANT NEOPLASM OF AXILLA AND UPPER LIMB LYMPH NODES (HCC): ICD-10-CM

## 2023-03-28 DIAGNOSIS — Z17.0 MALIGNANT NEOPLASM OF OVERLAPPING SITES OF LEFT BREAST IN FEMALE, ESTROGEN RECEPTOR POSITIVE (HCC): Primary | ICD-10-CM

## 2023-03-28 DIAGNOSIS — Z79.899 HIGH RISK MEDICATION USE: ICD-10-CM

## 2023-03-28 DIAGNOSIS — Z17.0 MALIGNANT NEOPLASM OF OVERLAPPING SITES OF LEFT BREAST IN FEMALE, ESTROGEN RECEPTOR POSITIVE (HCC): ICD-10-CM

## 2023-03-28 DIAGNOSIS — C50.812 MALIGNANT NEOPLASM OF OVERLAPPING SITES OF LEFT BREAST IN FEMALE, ESTROGEN RECEPTOR POSITIVE (HCC): ICD-10-CM

## 2023-03-28 LAB
ALBUMIN SERPL-MCNC: 3.3 G/DL (ref 3.2–4.6)
ALBUMIN/GLOB SERPL: 1 (ref 0.4–1.6)
ALP SERPL-CCNC: 121 U/L (ref 50–136)
ALT SERPL-CCNC: 17 U/L (ref 12–65)
ANION GAP SERPL CALC-SCNC: 2 MMOL/L (ref 2–11)
AST SERPL-CCNC: 15 U/L (ref 15–37)
BASOPHILS # BLD: 0 K/UL (ref 0–0.2)
BASOPHILS NFR BLD: 1 % (ref 0–2)
BILIRUB SERPL-MCNC: 1 MG/DL (ref 0.2–1.1)
BUN SERPL-MCNC: 8 MG/DL (ref 8–23)
CALCIUM SERPL-MCNC: 9.6 MG/DL (ref 8.3–10.4)
CHLORIDE SERPL-SCNC: 113 MMOL/L (ref 101–110)
CO2 SERPL-SCNC: 30 MMOL/L (ref 21–32)
CREAT SERPL-MCNC: 0.6 MG/DL (ref 0.6–1)
DIFFERENTIAL METHOD BLD: ABNORMAL
EOSINOPHIL # BLD: 0 K/UL (ref 0–0.8)
EOSINOPHIL NFR BLD: 0 % (ref 0.5–7.8)
ERYTHROCYTE [DISTWIDTH] IN BLOOD BY AUTOMATED COUNT: 14.4 % (ref 11.9–14.6)
GLOBULIN SER CALC-MCNC: 3.3 G/DL (ref 2.8–4.5)
GLUCOSE SERPL-MCNC: 89 MG/DL (ref 65–100)
HCT VFR BLD AUTO: 46.4 % (ref 35.8–46.3)
HGB BLD-MCNC: 14.4 G/DL (ref 11.7–15.4)
IMM GRANULOCYTES # BLD AUTO: 0 K/UL (ref 0–0.5)
IMM GRANULOCYTES NFR BLD AUTO: 0 % (ref 0–5)
LYMPHOCYTES # BLD: 1.6 K/UL (ref 0.5–4.6)
LYMPHOCYTES NFR BLD: 31 % (ref 13–44)
MAGNESIUM SERPL-MCNC: 2 MG/DL (ref 1.8–2.4)
MCH RBC QN AUTO: 28.2 PG (ref 26.1–32.9)
MCHC RBC AUTO-ENTMCNC: 31 G/DL (ref 31.4–35)
MCV RBC AUTO: 91 FL (ref 82–102)
MONOCYTES # BLD: 0.3 K/UL (ref 0.1–1.3)
MONOCYTES NFR BLD: 7 % (ref 4–12)
NEUTS SEG # BLD: 3.1 K/UL (ref 1.7–8.2)
NEUTS SEG NFR BLD: 61 % (ref 43–78)
NRBC # BLD: 0 K/UL (ref 0–0.2)
PLATELET # BLD AUTO: 196 K/UL (ref 150–450)
PMV BLD AUTO: 11.3 FL (ref 9.4–12.3)
POTASSIUM SERPL-SCNC: 3.2 MMOL/L (ref 3.5–5.1)
PROT SERPL-MCNC: 6.6 G/DL (ref 6.3–8.2)
RBC # BLD AUTO: 5.1 M/UL (ref 4.05–5.2)
SODIUM SERPL-SCNC: 145 MMOL/L (ref 133–143)
WBC # BLD AUTO: 5.2 K/UL (ref 4.3–11.1)

## 2023-03-28 PROCEDURE — G8484 FLU IMMUNIZE NO ADMIN: HCPCS | Performed by: INTERNAL MEDICINE

## 2023-03-28 PROCEDURE — 85025 COMPLETE CBC W/AUTO DIFF WBC: CPT

## 2023-03-28 PROCEDURE — 1090F PRES/ABSN URINE INCON ASSESS: CPT | Performed by: INTERNAL MEDICINE

## 2023-03-28 PROCEDURE — 3077F SYST BP >= 140 MM HG: CPT | Performed by: INTERNAL MEDICINE

## 2023-03-28 PROCEDURE — G8417 CALC BMI ABV UP PARAM F/U: HCPCS | Performed by: INTERNAL MEDICINE

## 2023-03-28 PROCEDURE — 3079F DIAST BP 80-89 MM HG: CPT | Performed by: INTERNAL MEDICINE

## 2023-03-28 PROCEDURE — 1123F ACP DISCUSS/DSCN MKR DOCD: CPT | Performed by: INTERNAL MEDICINE

## 2023-03-28 PROCEDURE — 6360000002 HC RX W HCPCS: Performed by: INTERNAL MEDICINE

## 2023-03-28 PROCEDURE — 80053 COMPREHEN METABOLIC PANEL: CPT

## 2023-03-28 PROCEDURE — G8428 CUR MEDS NOT DOCUMENT: HCPCS | Performed by: INTERNAL MEDICINE

## 2023-03-28 PROCEDURE — 96413 CHEMO IV INFUSION 1 HR: CPT

## 2023-03-28 PROCEDURE — 36415 COLL VENOUS BLD VENIPUNCTURE: CPT

## 2023-03-28 PROCEDURE — 83735 ASSAY OF MAGNESIUM: CPT

## 2023-03-28 PROCEDURE — 99214 OFFICE O/P EST MOD 30 MIN: CPT | Performed by: INTERNAL MEDICINE

## 2023-03-28 PROCEDURE — 96417 CHEMO IV INFUS EACH ADDL SEQ: CPT

## 2023-03-28 PROCEDURE — G8400 PT W/DXA NO RESULTS DOC: HCPCS | Performed by: INTERNAL MEDICINE

## 2023-03-28 PROCEDURE — 2580000003 HC RX 258: Performed by: INTERNAL MEDICINE

## 2023-03-28 PROCEDURE — 1036F TOBACCO NON-USER: CPT | Performed by: INTERNAL MEDICINE

## 2023-03-28 RX ORDER — ALBUTEROL SULFATE 90 UG/1
4 AEROSOL, METERED RESPIRATORY (INHALATION) PRN
OUTPATIENT
Start: 2023-05-09

## 2023-03-28 RX ORDER — ACETAMINOPHEN 325 MG/1
650 TABLET ORAL
OUTPATIENT
Start: 2023-04-18

## 2023-03-28 RX ORDER — SODIUM CHLORIDE 9 MG/ML
5-250 INJECTION, SOLUTION INTRAVENOUS PRN
OUTPATIENT
Start: 2023-04-18

## 2023-03-28 RX ORDER — EPINEPHRINE 1 MG/ML
0.3 INJECTION, SOLUTION, CONCENTRATE INTRAVENOUS PRN
OUTPATIENT
Start: 2023-04-18

## 2023-03-28 RX ORDER — SODIUM CHLORIDE 9 MG/ML
INJECTION, SOLUTION INTRAVENOUS CONTINUOUS
OUTPATIENT
Start: 2023-05-09

## 2023-03-28 RX ORDER — DIPHENHYDRAMINE HYDROCHLORIDE 50 MG/ML
50 INJECTION INTRAMUSCULAR; INTRAVENOUS
OUTPATIENT
Start: 2023-05-09

## 2023-03-28 RX ORDER — MEPERIDINE HYDROCHLORIDE 50 MG/ML
12.5 INJECTION INTRAMUSCULAR; INTRAVENOUS; SUBCUTANEOUS PRN
OUTPATIENT
Start: 2023-04-18

## 2023-03-28 RX ORDER — EPINEPHRINE 1 MG/ML
0.3 INJECTION, SOLUTION, CONCENTRATE INTRAVENOUS PRN
OUTPATIENT
Start: 2023-05-09

## 2023-03-28 RX ORDER — SODIUM CHLORIDE 0.9 % (FLUSH) 0.9 %
5-40 SYRINGE (ML) INJECTION PRN
OUTPATIENT
Start: 2023-05-09

## 2023-03-28 RX ORDER — FAMOTIDINE 10 MG/ML
20 INJECTION, SOLUTION INTRAVENOUS
OUTPATIENT
Start: 2023-05-09

## 2023-03-28 RX ORDER — DIPHENHYDRAMINE HYDROCHLORIDE 50 MG/ML
50 INJECTION INTRAMUSCULAR; INTRAVENOUS
Status: DISCONTINUED | OUTPATIENT
Start: 2023-03-28 | End: 2023-03-29 | Stop reason: HOSPADM

## 2023-03-28 RX ORDER — SODIUM CHLORIDE 9 MG/ML
INJECTION, SOLUTION INTRAVENOUS CONTINUOUS
OUTPATIENT
Start: 2023-04-18

## 2023-03-28 RX ORDER — SODIUM CHLORIDE 0.9 % (FLUSH) 0.9 %
5-40 SYRINGE (ML) INJECTION PRN
OUTPATIENT
Start: 2023-04-18

## 2023-03-28 RX ORDER — HEPARIN SODIUM (PORCINE) LOCK FLUSH IV SOLN 100 UNIT/ML 100 UNIT/ML
500 SOLUTION INTRAVENOUS PRN
OUTPATIENT
Start: 2023-05-09

## 2023-03-28 RX ORDER — DIPHENHYDRAMINE HYDROCHLORIDE 50 MG/ML
50 INJECTION INTRAMUSCULAR; INTRAVENOUS
OUTPATIENT
Start: 2023-04-18

## 2023-03-28 RX ORDER — MEPERIDINE HYDROCHLORIDE 50 MG/ML
12.5 INJECTION INTRAMUSCULAR; INTRAVENOUS; SUBCUTANEOUS PRN
OUTPATIENT
Start: 2023-05-09

## 2023-03-28 RX ORDER — SODIUM CHLORIDE 9 MG/ML
5-250 INJECTION, SOLUTION INTRAVENOUS PRN
OUTPATIENT
Start: 2023-05-09

## 2023-03-28 RX ORDER — ONDANSETRON 2 MG/ML
8 INJECTION INTRAMUSCULAR; INTRAVENOUS
OUTPATIENT
Start: 2023-04-18

## 2023-03-28 RX ORDER — SODIUM CHLORIDE 9 MG/ML
5-40 INJECTION INTRAVENOUS PRN
OUTPATIENT
Start: 2023-04-18

## 2023-03-28 RX ORDER — SODIUM CHLORIDE 9 MG/ML
5-40 INJECTION INTRAVENOUS PRN
OUTPATIENT
Start: 2023-05-09

## 2023-03-28 RX ORDER — ALBUTEROL SULFATE 90 UG/1
4 AEROSOL, METERED RESPIRATORY (INHALATION) PRN
OUTPATIENT
Start: 2023-04-18

## 2023-03-28 RX ORDER — ONDANSETRON 2 MG/ML
8 INJECTION INTRAMUSCULAR; INTRAVENOUS
OUTPATIENT
Start: 2023-05-09

## 2023-03-28 RX ORDER — FAMOTIDINE 10 MG/ML
20 INJECTION, SOLUTION INTRAVENOUS
OUTPATIENT
Start: 2023-04-18

## 2023-03-28 RX ORDER — HEPARIN SODIUM (PORCINE) LOCK FLUSH IV SOLN 100 UNIT/ML 100 UNIT/ML
500 SOLUTION INTRAVENOUS PRN
OUTPATIENT
Start: 2023-04-18

## 2023-03-28 RX ORDER — ANASTROZOLE 1 MG/1
1 TABLET ORAL DAILY
Qty: 30 TABLET | Refills: 5 | Status: SHIPPED | OUTPATIENT
Start: 2023-03-28

## 2023-03-28 RX ORDER — ACETAMINOPHEN 325 MG/1
650 TABLET ORAL
OUTPATIENT
Start: 2023-05-09

## 2023-03-28 RX ORDER — SODIUM CHLORIDE 9 MG/ML
5-250 INJECTION, SOLUTION INTRAVENOUS PRN
Status: DISCONTINUED | OUTPATIENT
Start: 2023-03-28 | End: 2023-03-29 | Stop reason: HOSPADM

## 2023-03-28 RX ORDER — SODIUM CHLORIDE 0.9 % (FLUSH) 0.9 %
5-40 SYRINGE (ML) INJECTION PRN
Status: DISCONTINUED | OUTPATIENT
Start: 2023-03-28 | End: 2023-03-29 | Stop reason: HOSPADM

## 2023-03-28 RX ADMIN — SODIUM CHLORIDE 25 ML/HR: 9 INJECTION, SOLUTION INTRAVENOUS at 11:00

## 2023-03-28 RX ADMIN — SODIUM CHLORIDE 735 MG: 9 INJECTION, SOLUTION INTRAVENOUS at 11:04

## 2023-03-28 RX ADMIN — PERTUZUMAB 840 MG: 30 INJECTION, SOLUTION, CONCENTRATE INTRAVENOUS at 12:38

## 2023-03-28 RX ADMIN — SODIUM CHLORIDE, PRESERVATIVE FREE 10 ML: 5 INJECTION INTRAVENOUS at 14:10

## 2023-03-28 RX ADMIN — SODIUM CHLORIDE, PRESERVATIVE FREE 10 ML: 5 INJECTION INTRAVENOUS at 11:00

## 2023-03-28 NOTE — PATIENT INSTRUCTIONS
symptoms:   Fever of 100.5 or greater  Chills  Shortness of breath  Swelling or pain in one leg    After office hours an answering service is available and will contact a provider for emergencies or if you are experiencing any of the above symptoms. Patient does express an interest in My Chart. My Chart log in information explained on the after visit summary printout at the [facility] [address] desk.     Madi Montgomery RN

## 2023-03-28 NOTE — PROGRESS NOTES
03/28/2023 3.3  3.2 - 4.6 g/dL Final    Globulin 03/28/2023 3.3  2.8 - 4.5 g/dL Final    Albumin/Globulin Ratio 03/28/2023 1.0  0.4 - 1.6   Final         Imaging:  PET/CT         Indication: Left breast cancer restaging       Radiopharmaceutical: 16.8 mCi F18-FDG, intravenously. Technique: Imaging was performed from the skull through the proximal thighs   using routine PET/CT acquisition protocol. Imaging was performed approximately   60 minutes post injection. Oral contrast was administered. Radiation dose   reduction techniques were used for this study:  Our CT scanners use one or all   of the following: Automated exposure control, adjustment of the mA and/or kVp   according to patient's size, iterative reconstruction. Serum glucose: 91 mg/dL prior to injection. Comparison studies: PET/CT 4/23/2021       Findings:       Head and Neck: No enlarged or hypermetabolic cervical chain nodes. Chest: No discrete lung nodule. No mediastinal mass or lymphadenopathy. Abdomen/Pelvis: No enlarged or hypermetabolic lymph nodes of the abdomen or   pelvis. No worrisome focal uptake of the abdominal viscera. Bones and soft tissues: Interval improved tracer uptake associated with   multifocal masses inferiorly in the left breast. Interval resolution of   previously faintly hypermetabolic lymph node of the left inferior axilla. No aggressive osseous lesion. IMPRESSION   1. Interval improved tracer uptake associated with multifocal masses inferiorly   in the left breast.   2.  Interval resolution of previously hypermetabolic lymph node within the left   Axilla. PET/CT: 4/23/2021       INDICATION: Initial staging of breast cancer. TECHNIQUE: After oral administration of gastroview and intravenous   administration of 12.76 mCi of F18 FDG, noncontrast CT images were obtained for   attenuation correction and for fusion with emission PET images.  A series of   overlapping

## 2023-03-28 NOTE — PROGRESS NOTES
Arrived to the [facility]. Herceptin/Perjeta completed. Patient tolerated well. Any issues or concerns during appointment: none. Patient aware of next infusion appointment on 4/18 (date) at 9:00 AM (time). Patient instructed to call provider with temperature of 100.4 or greater or nausea/vomiting/ diarrhea or pain not controlled by medications  Discharged via w/c.

## 2023-03-29 ENCOUNTER — TELEPHONE (OUTPATIENT)
Dept: FAMILY MEDICINE CLINIC | Facility: CLINIC | Age: 83
End: 2023-03-29

## 2023-03-29 DIAGNOSIS — F51.04 CHRONIC INSOMNIA: Primary | ICD-10-CM

## 2023-03-29 RX ORDER — TEMAZEPAM 15 MG/1
15 CAPSULE ORAL NIGHTLY PRN
Qty: 30 CAPSULE | Refills: 1 | Status: SHIPPED | OUTPATIENT
Start: 2023-03-29 | End: 2023-05-28

## 2023-03-29 NOTE — TELEPHONE ENCOUNTER
Patient has called several times trying to see Dr. Laurel Salazar, all that is available for the next 3-4 months are 4pm appts and she only has a [id number] in the mornings to bring her to appointments. She wants a refill on her Temazepam, says she has to have it to sleep. Uche Braswellhrie would not refill it at patient's last appt per patient. (See chart note). She wants to know if Dr. Laurel Salazar will refill this medication as she says she will be out tomorrow. She uses [facility] on [address]. Please advise.

## 2023-04-07 ENCOUNTER — TELEPHONE (OUTPATIENT)
Dept: FAMILY MEDICINE CLINIC | Facility: CLINIC | Age: 83
End: 2023-04-07

## 2023-04-07 NOTE — TELEPHONE ENCOUNTER
Patient calling back in about sleep medication, patient seems unaware that Dr. Hayley Collazo changed it. Unsure if she was notified. Appt made for 4/25 at 10am. Call given to Armin Beckham.

## 2023-04-07 NOTE — TELEPHONE ENCOUNTER
Spoke with patient & told her that Restoril 15 mg had been called to the pharmacy & Dr Levar Doshi was not going to refill the 30 mg.  Patient reminded of appt on 4/25/23

## 2023-04-18 ENCOUNTER — HOSPITAL ENCOUNTER (OUTPATIENT)
Dept: INFUSION THERAPY | Age: 83
Discharge: HOME | End: 2023-04-18
Payer: MEDICARE

## 2023-04-18 ENCOUNTER — HOSPITAL ENCOUNTER (OUTPATIENT)
Dept: LAB | Age: 83
Discharge: HOME | End: 2023-04-21

## 2023-04-18 VITALS
WEIGHT: 178 LBS | DIASTOLIC BLOOD PRESSURE: 63 MMHG | OXYGEN SATURATION: 93 % | HEART RATE: 62 BPM | RESPIRATION RATE: 18 BRPM | SYSTOLIC BLOOD PRESSURE: 144 MMHG | BODY MASS INDEX: 31.53 KG/M2 | TEMPERATURE: 97.6 F

## 2023-04-18 DIAGNOSIS — C50.812 MALIGNANT NEOPLASM OF OVERLAPPING SITES OF LEFT BREAST IN FEMALE, ESTROGEN RECEPTOR POSITIVE (HCC): Primary | ICD-10-CM

## 2023-04-18 DIAGNOSIS — Z17.0 MALIGNANT NEOPLASM OF OVERLAPPING SITES OF LEFT BREAST IN FEMALE, ESTROGEN RECEPTOR POSITIVE (HCC): Primary | ICD-10-CM

## 2023-04-18 LAB
ALBUMIN SERPL-MCNC: 3.4 G/DL (ref 3.2–4.6)
ALBUMIN/GLOB SERPL: 1.1 (ref 0.4–1.6)
ALP SERPL-CCNC: 112 U/L (ref 50–136)
ALT SERPL-CCNC: 15 U/L (ref 12–65)
ANION GAP SERPL CALC-SCNC: 6 MMOL/L (ref 2–11)
AST SERPL-CCNC: 13 U/L (ref 15–37)
BASOPHILS # BLD: 0 K/UL (ref 0–0.2)
BASOPHILS NFR BLD: 0 % (ref 0–2)
BILIRUB SERPL-MCNC: 0.9 MG/DL (ref 0.2–1.1)
BUN SERPL-MCNC: 10 MG/DL (ref 8–23)
CALCIUM SERPL-MCNC: 9.9 MG/DL (ref 8.3–10.4)
CHLORIDE SERPL-SCNC: 113 MMOL/L (ref 101–110)
CO2 SERPL-SCNC: 26 MMOL/L (ref 21–32)
CREAT SERPL-MCNC: 0.7 MG/DL (ref 0.6–1)
DIFFERENTIAL METHOD BLD: ABNORMAL
EOSINOPHIL # BLD: 0.2 K/UL (ref 0–0.8)
EOSINOPHIL NFR BLD: 3 % (ref 0.5–7.8)
ERYTHROCYTE [DISTWIDTH] IN BLOOD BY AUTOMATED COUNT: 14.1 % (ref 11.9–14.6)
GLOBULIN SER CALC-MCNC: 3.2 G/DL (ref 2.8–4.5)
GLUCOSE SERPL-MCNC: 85 MG/DL (ref 65–100)
HCT VFR BLD AUTO: 45.3 % (ref 35.8–46.3)
HGB BLD-MCNC: 14.1 G/DL (ref 11.7–15.4)
IMM GRANULOCYTES # BLD AUTO: 0 K/UL (ref 0–0.5)
IMM GRANULOCYTES NFR BLD AUTO: 0 % (ref 0–5)
LYMPHOCYTES # BLD: 1.6 K/UL (ref 0.5–4.6)
LYMPHOCYTES NFR BLD: 25 % (ref 13–44)
MCH RBC QN AUTO: 28.4 PG (ref 26.1–32.9)
MCHC RBC AUTO-ENTMCNC: 31.1 G/DL (ref 31.4–35)
MCV RBC AUTO: 91.1 FL (ref 82–102)
MONOCYTES # BLD: 0.4 K/UL (ref 0.1–1.3)
MONOCYTES NFR BLD: 6 % (ref 4–12)
NEUTS SEG # BLD: 4.2 K/UL (ref 1.7–8.2)
NEUTS SEG NFR BLD: 66 % (ref 43–78)
NRBC # BLD: 0 K/UL (ref 0–0.2)
PLATELET # BLD AUTO: 199 K/UL (ref 150–450)
PMV BLD AUTO: 11.5 FL (ref 9.4–12.3)
POTASSIUM SERPL-SCNC: 3.7 MMOL/L (ref 3.5–5.1)
PROT SERPL-MCNC: 6.6 G/DL (ref 6.3–8.2)
RBC # BLD AUTO: 4.97 M/UL (ref 4.05–5.2)
SODIUM SERPL-SCNC: 145 MMOL/L (ref 133–143)
WBC # BLD AUTO: 6.4 K/UL (ref 4.3–11.1)

## 2023-04-18 PROCEDURE — 2580000003 HC RX 258: Performed by: INTERNAL MEDICINE

## 2023-04-18 PROCEDURE — 80053 COMPREHEN METABOLIC PANEL: CPT

## 2023-04-18 PROCEDURE — 96417 CHEMO IV INFUS EACH ADDL SEQ: CPT

## 2023-04-18 PROCEDURE — 85025 COMPLETE CBC W/AUTO DIFF WBC: CPT

## 2023-04-18 PROCEDURE — 6360000002 HC RX W HCPCS: Performed by: INTERNAL MEDICINE

## 2023-04-18 PROCEDURE — 96413 CHEMO IV INFUSION 1 HR: CPT

## 2023-04-18 PROCEDURE — 36415 COLL VENOUS BLD VENIPUNCTURE: CPT

## 2023-04-18 RX ORDER — SODIUM CHLORIDE 0.9 % (FLUSH) 0.9 %
5-40 SYRINGE (ML) INJECTION PRN
Status: DISCONTINUED | OUTPATIENT
Start: 2023-04-18 | End: 2023-04-19 | Stop reason: HOSPADM

## 2023-04-18 RX ORDER — SODIUM CHLORIDE 9 MG/ML
5-250 INJECTION, SOLUTION INTRAVENOUS PRN
Status: DISCONTINUED | OUTPATIENT
Start: 2023-04-18 | End: 2023-04-19 | Stop reason: HOSPADM

## 2023-04-18 RX ADMIN — PERTUZUMAB 420 MG: 30 INJECTION, SOLUTION, CONCENTRATE INTRAVENOUS at 11:10

## 2023-04-18 RX ADMIN — SODIUM CHLORIDE, PRESERVATIVE FREE 10 ML: 5 INJECTION INTRAVENOUS at 09:20

## 2023-04-18 RX ADMIN — SODIUM CHLORIDE 546 MG: 9 INJECTION, SOLUTION INTRAVENOUS at 10:17

## 2023-04-18 RX ADMIN — SODIUM CHLORIDE 100 ML/HR: 9 INJECTION, SOLUTION INTRAVENOUS at 09:20

## 2023-04-18 ASSESSMENT — PAIN DESCRIPTION - DESCRIPTORS: DESCRIPTORS: ACHING

## 2023-04-18 ASSESSMENT — PAIN - FUNCTIONAL ASSESSMENT: PAIN_FUNCTIONAL_ASSESSMENT: PREVENTS OR INTERFERES WITH MANY ACTIVE NOT PASSIVE ACTIVITIES

## 2023-04-18 ASSESSMENT — PAIN DESCRIPTION - PAIN TYPE: TYPE: CHRONIC PAIN

## 2023-04-18 ASSESSMENT — PAIN SCALES - GENERAL: PAINLEVEL_OUTOF10: 4

## 2023-04-18 ASSESSMENT — PAIN DESCRIPTION - LOCATION: LOCATION: KNEE

## 2023-04-18 ASSESSMENT — PAIN DESCRIPTION - ONSET: ONSET: ON-GOING

## 2023-04-18 ASSESSMENT — PAIN DESCRIPTION - ORIENTATION: ORIENTATION: RIGHT;LEFT

## 2023-04-18 ASSESSMENT — PAIN DESCRIPTION - FREQUENCY: FREQUENCY: CONTINUOUS

## 2023-04-18 NOTE — PROGRESS NOTES
Arrived to the [facility]. Idalmis Gibbs completed. Patient tolerated well. Any issues or concerns during appointment: none. Patient aware of next infusion appointment on 5/9 at 10:30 am but patient going to reschedule due to a conflicting appt. Patient instructed to call provider with temperature of 100.4 or greater or nausea/vomiting/ diarrhea or pain not controlled by medications  Discharged via wheelchair to home with caregiver.

## 2023-04-18 NOTE — PLAN OF CARE
Problem: Chronic Conditions and Co-morbidities  Goal: Patient's chronic conditions and co-morbidity symptoms are monitored and maintained or improved  Outcome: Progressing no

## 2023-04-25 ENCOUNTER — CARE COORDINATION (OUTPATIENT)
Dept: CARE COORDINATION | Facility: CLINIC | Age: 83
End: 2023-04-25

## 2023-04-25 ENCOUNTER — OFFICE VISIT (OUTPATIENT)
Dept: FAMILY MEDICINE CLINIC | Facility: CLINIC | Age: 83
End: 2023-04-25
Payer: MEDICARE

## 2023-04-25 VITALS
HEART RATE: 50 BPM | TEMPERATURE: 97 F | SYSTOLIC BLOOD PRESSURE: 138 MMHG | DIASTOLIC BLOOD PRESSURE: 88 MMHG | OXYGEN SATURATION: 93 %

## 2023-04-25 DIAGNOSIS — F51.04 CHRONIC INSOMNIA: Primary | ICD-10-CM

## 2023-04-25 DIAGNOSIS — Z59.41 FOOD INSECURITY: ICD-10-CM

## 2023-04-25 PROCEDURE — 99214 OFFICE O/P EST MOD 30 MIN: CPT | Performed by: STUDENT IN AN ORGANIZED HEALTH CARE EDUCATION/TRAINING PROGRAM

## 2023-04-25 PROCEDURE — 1036F TOBACCO NON-USER: CPT | Performed by: STUDENT IN AN ORGANIZED HEALTH CARE EDUCATION/TRAINING PROGRAM

## 2023-04-25 PROCEDURE — 1123F ACP DISCUSS/DSCN MKR DOCD: CPT | Performed by: STUDENT IN AN ORGANIZED HEALTH CARE EDUCATION/TRAINING PROGRAM

## 2023-04-25 PROCEDURE — 3074F SYST BP LT 130 MM HG: CPT | Performed by: STUDENT IN AN ORGANIZED HEALTH CARE EDUCATION/TRAINING PROGRAM

## 2023-04-25 PROCEDURE — 3078F DIAST BP <80 MM HG: CPT | Performed by: STUDENT IN AN ORGANIZED HEALTH CARE EDUCATION/TRAINING PROGRAM

## 2023-04-25 PROCEDURE — 1090F PRES/ABSN URINE INCON ASSESS: CPT | Performed by: STUDENT IN AN ORGANIZED HEALTH CARE EDUCATION/TRAINING PROGRAM

## 2023-04-25 PROCEDURE — G8417 CALC BMI ABV UP PARAM F/U: HCPCS | Performed by: STUDENT IN AN ORGANIZED HEALTH CARE EDUCATION/TRAINING PROGRAM

## 2023-04-25 PROCEDURE — G8427 DOCREV CUR MEDS BY ELIG CLIN: HCPCS | Performed by: STUDENT IN AN ORGANIZED HEALTH CARE EDUCATION/TRAINING PROGRAM

## 2023-04-25 PROCEDURE — G8400 PT W/DXA NO RESULTS DOC: HCPCS | Performed by: STUDENT IN AN ORGANIZED HEALTH CARE EDUCATION/TRAINING PROGRAM

## 2023-04-25 RX ORDER — LEVOTHYROXINE SODIUM 112 UG/1
112 TABLET ORAL
Qty: 90 TABLET | Refills: 1 | Status: SHIPPED | OUTPATIENT
Start: 2023-04-25

## 2023-04-25 SDOH — ECONOMIC STABILITY: FOOD INSECURITY: WITHIN THE PAST 12 MONTHS, YOU WORRIED THAT YOUR FOOD WOULD RUN OUT BEFORE YOU GOT MONEY TO BUY MORE.: OFTEN TRUE

## 2023-04-25 SDOH — ECONOMIC STABILITY: INCOME INSECURITY: HOW HARD IS IT FOR YOU TO PAY FOR THE VERY BASICS LIKE FOOD, HOUSING, MEDICAL CARE, AND HEATING?: VERY HARD

## 2023-04-25 SDOH — ECONOMIC STABILITY - FOOD INSECURITY: FOOD INSECURITY: Z59.41

## 2023-04-25 SDOH — ECONOMIC STABILITY: FOOD INSECURITY: WITHIN THE PAST 12 MONTHS, THE FOOD YOU BOUGHT JUST DIDN'T LAST AND YOU DIDN'T HAVE MONEY TO GET MORE.: OFTEN TRUE

## 2023-04-25 SDOH — ECONOMIC STABILITY: HOUSING INSECURITY
IN THE LAST 12 MONTHS, WAS THERE A TIME WHEN YOU DID NOT HAVE A STEADY PLACE TO SLEEP OR SLEPT IN A SHELTER (INCLUDING NOW)?: YES

## 2023-04-25 ASSESSMENT — PATIENT HEALTH QUESTIONNAIRE - PHQ9
SUM OF ALL RESPONSES TO PHQ QUESTIONS 1-9: 0
SUM OF ALL RESPONSES TO PHQ QUESTIONS 1-9: 0
1. LITTLE INTEREST OR PLEASURE IN DOING THINGS: 0
SUM OF ALL RESPONSES TO PHQ9 QUESTIONS 1 & 2: 0
SUM OF ALL RESPONSES TO PHQ QUESTIONS 1-9: 0
2. FEELING DOWN, DEPRESSED OR HOPELESS: 0
SUM OF ALL RESPONSES TO PHQ QUESTIONS 1-9: 0
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
## 2023-04-26 ENCOUNTER — CARE COORDINATION (OUTPATIENT)
Dept: CARE COORDINATION | Facility: CLINIC | Age: 83
End: 2023-04-26

## 2023-04-27 NOTE — CARE COORDINATION
Spoke with the patient about local food paige and pantries in the community. She states that she has no transportation to go to them. PRATIBHA is looking for someone who could transport to her. The patient lives with her adult son who she states just started working and does not have money for meals for them. She gets meals on wheels Monday, Wednesday, and Friday. She has an aid M-F 5 hours a day with [facility]    She states that she is just running out of money for food and has not gotten approved for food stamps. Although she states that she has applied.

## 2023-05-01 ENCOUNTER — CARE COORDINATION (OUTPATIENT)
Dept: CARE COORDINATION | Facility: CLINIC | Age: 83
End: 2023-05-01

## 2023-05-01 NOTE — CARE COORDINATION
SW is continuing to work with patient in regards to community resources for food  Will remain on the case for 2 weeks in case of any med needs

## 2023-05-02 RX ORDER — TEMAZEPAM 15 MG/1
15 CAPSULE ORAL NIGHTLY PRN
Qty: 30 CAPSULE | Refills: 2 | Status: SHIPPED | OUTPATIENT
Start: 2023-05-05 | End: 2023-08-03

## 2023-05-08 DIAGNOSIS — C50.812 MALIGNANT NEOPLASM OF OVERLAPPING SITES OF LEFT BREAST IN FEMALE, ESTROGEN RECEPTOR POSITIVE (HCC): Primary | ICD-10-CM

## 2023-05-08 DIAGNOSIS — Z17.0 MALIGNANT NEOPLASM OF OVERLAPPING SITES OF LEFT BREAST IN FEMALE, ESTROGEN RECEPTOR POSITIVE (HCC): Primary | ICD-10-CM

## 2023-05-09 ENCOUNTER — OFFICE VISIT (OUTPATIENT)
Dept: ONCOLOGY | Age: 83
End: 2023-05-09
Payer: MEDICARE

## 2023-05-09 ENCOUNTER — HOSPITAL ENCOUNTER (OUTPATIENT)
Dept: LAB | Age: 83
Discharge: HOME | End: 2023-05-12
Payer: MEDICARE

## 2023-05-09 ENCOUNTER — HOSPITAL ENCOUNTER (OUTPATIENT)
Dept: INFUSION THERAPY | Age: 83
Discharge: HOME | End: 2023-05-09
Payer: MEDICARE

## 2023-05-09 VITALS
DIASTOLIC BLOOD PRESSURE: 79 MMHG | BODY MASS INDEX: 31.71 KG/M2 | HEIGHT: 63 IN | WEIGHT: 179 LBS | OXYGEN SATURATION: 99 % | SYSTOLIC BLOOD PRESSURE: 171 MMHG | HEART RATE: 57 BPM | RESPIRATION RATE: 16 BRPM | TEMPERATURE: 97.8 F

## 2023-05-09 DIAGNOSIS — R53.82 CHRONIC FATIGUE: ICD-10-CM

## 2023-05-09 DIAGNOSIS — Z17.0 MALIGNANT NEOPLASM OF OVERLAPPING SITES OF LEFT BREAST IN FEMALE, ESTROGEN RECEPTOR POSITIVE (HCC): ICD-10-CM

## 2023-05-09 DIAGNOSIS — C50.812 MALIGNANT NEOPLASM OF OVERLAPPING SITES OF LEFT BREAST IN FEMALE, ESTROGEN RECEPTOR POSITIVE (HCC): Primary | ICD-10-CM

## 2023-05-09 DIAGNOSIS — Z79.811 LONG TERM (CURRENT) USE OF AROMATASE INHIBITORS: ICD-10-CM

## 2023-05-09 DIAGNOSIS — C50.812 MALIGNANT NEOPLASM OF OVERLAPPING SITES OF LEFT BREAST IN FEMALE, ESTROGEN RECEPTOR POSITIVE (HCC): ICD-10-CM

## 2023-05-09 DIAGNOSIS — R19.7 DIARRHEA, UNSPECIFIED TYPE: ICD-10-CM

## 2023-05-09 DIAGNOSIS — Z17.0 MALIGNANT NEOPLASM OF OVERLAPPING SITES OF LEFT BREAST IN FEMALE, ESTROGEN RECEPTOR POSITIVE (HCC): Primary | ICD-10-CM

## 2023-05-09 LAB
ALBUMIN SERPL-MCNC: 3.1 G/DL (ref 3.2–4.6)
ALBUMIN/GLOB SERPL: 1 (ref 0.4–1.6)
ALP SERPL-CCNC: 108 U/L (ref 50–136)
ALT SERPL-CCNC: 20 U/L (ref 12–65)
ANION GAP SERPL CALC-SCNC: 5 MMOL/L (ref 2–11)
AST SERPL-CCNC: 22 U/L (ref 15–37)
BASOPHILS # BLD: 0 K/UL (ref 0–0.2)
BASOPHILS NFR BLD: 1 % (ref 0–2)
BILIRUB SERPL-MCNC: 0.6 MG/DL (ref 0.2–1.1)
BUN SERPL-MCNC: 8 MG/DL (ref 8–23)
CALCIUM SERPL-MCNC: 9.6 MG/DL (ref 8.3–10.4)
CHLORIDE SERPL-SCNC: 112 MMOL/L (ref 101–110)
CO2 SERPL-SCNC: 28 MMOL/L (ref 21–32)
CREAT SERPL-MCNC: 0.6 MG/DL (ref 0.6–1)
DIFFERENTIAL METHOD BLD: ABNORMAL
EOSINOPHIL # BLD: 0 K/UL (ref 0–0.8)
EOSINOPHIL NFR BLD: 0 % (ref 0.5–7.8)
ERYTHROCYTE [DISTWIDTH] IN BLOOD BY AUTOMATED COUNT: 14.2 % (ref 11.9–14.6)
GLOBULIN SER CALC-MCNC: 3.2 G/DL (ref 2.8–4.5)
GLUCOSE SERPL-MCNC: 96 MG/DL (ref 65–100)
HCT VFR BLD AUTO: 44.7 % (ref 35.8–46.3)
HGB BLD-MCNC: 14.2 G/DL (ref 11.7–15.4)
IMM GRANULOCYTES # BLD AUTO: 0 K/UL (ref 0–0.5)
IMM GRANULOCYTES NFR BLD AUTO: 0 % (ref 0–5)
LYMPHOCYTES # BLD: 1.7 K/UL (ref 0.5–4.6)
LYMPHOCYTES NFR BLD: 30 % (ref 13–44)
MCH RBC QN AUTO: 28.6 PG (ref 26.1–32.9)
MCHC RBC AUTO-ENTMCNC: 31.8 G/DL (ref 31.4–35)
MCV RBC AUTO: 90.1 FL (ref 82–102)
MONOCYTES # BLD: 0.3 K/UL (ref 0.1–1.3)
MONOCYTES NFR BLD: 6 % (ref 4–12)
NEUTS SEG # BLD: 3.5 K/UL (ref 1.7–8.2)
NEUTS SEG NFR BLD: 63 % (ref 43–78)
NRBC # BLD: 0 K/UL (ref 0–0.2)
PLATELET # BLD AUTO: 176 K/UL (ref 150–450)
PMV BLD AUTO: 11.8 FL (ref 9.4–12.3)
POTASSIUM SERPL-SCNC: 3.6 MMOL/L (ref 3.5–5.1)
PROT SERPL-MCNC: 6.3 G/DL (ref 6.3–8.2)
RBC # BLD AUTO: 4.96 M/UL (ref 4.05–5.2)
SODIUM SERPL-SCNC: 145 MMOL/L (ref 133–143)
WBC # BLD AUTO: 5.5 K/UL (ref 4.3–11.1)

## 2023-05-09 PROCEDURE — 80053 COMPREHEN METABOLIC PANEL: CPT

## 2023-05-09 PROCEDURE — G8417 CALC BMI ABV UP PARAM F/U: HCPCS | Performed by: NURSE PRACTITIONER

## 2023-05-09 PROCEDURE — 96417 CHEMO IV INFUS EACH ADDL SEQ: CPT

## 2023-05-09 PROCEDURE — 36415 COLL VENOUS BLD VENIPUNCTURE: CPT

## 2023-05-09 PROCEDURE — 96413 CHEMO IV INFUSION 1 HR: CPT

## 2023-05-09 PROCEDURE — 6360000002 HC RX W HCPCS: Performed by: INTERNAL MEDICINE

## 2023-05-09 PROCEDURE — 1123F ACP DISCUSS/DSCN MKR DOCD: CPT | Performed by: NURSE PRACTITIONER

## 2023-05-09 PROCEDURE — 1090F PRES/ABSN URINE INCON ASSESS: CPT | Performed by: NURSE PRACTITIONER

## 2023-05-09 PROCEDURE — 2580000003 HC RX 258: Performed by: INTERNAL MEDICINE

## 2023-05-09 PROCEDURE — 3077F SYST BP >= 140 MM HG: CPT | Performed by: NURSE PRACTITIONER

## 2023-05-09 PROCEDURE — G8427 DOCREV CUR MEDS BY ELIG CLIN: HCPCS | Performed by: NURSE PRACTITIONER

## 2023-05-09 PROCEDURE — 1036F TOBACCO NON-USER: CPT | Performed by: NURSE PRACTITIONER

## 2023-05-09 PROCEDURE — 3078F DIAST BP <80 MM HG: CPT | Performed by: NURSE PRACTITIONER

## 2023-05-09 PROCEDURE — 85025 COMPLETE CBC W/AUTO DIFF WBC: CPT

## 2023-05-09 PROCEDURE — G8400 PT W/DXA NO RESULTS DOC: HCPCS | Performed by: NURSE PRACTITIONER

## 2023-05-09 PROCEDURE — 99214 OFFICE O/P EST MOD 30 MIN: CPT | Performed by: NURSE PRACTITIONER

## 2023-05-09 RX ORDER — FAMOTIDINE 10 MG/ML
20 INJECTION, SOLUTION INTRAVENOUS
OUTPATIENT
Start: 2023-05-30

## 2023-05-09 RX ORDER — SODIUM CHLORIDE 9 MG/ML
INJECTION, SOLUTION INTRAVENOUS CONTINUOUS
OUTPATIENT
Start: 2023-05-30

## 2023-05-09 RX ORDER — MEPERIDINE HYDROCHLORIDE 25 MG/ML
12.5 INJECTION INTRAMUSCULAR; INTRAVENOUS; SUBCUTANEOUS PRN
Status: DISCONTINUED | OUTPATIENT
Start: 2023-05-09 | End: 2023-05-10 | Stop reason: HOSPADM

## 2023-05-09 RX ORDER — SODIUM CHLORIDE 0.9 % (FLUSH) 0.9 %
5-40 SYRINGE (ML) INJECTION PRN
Status: DISCONTINUED | OUTPATIENT
Start: 2023-05-09 | End: 2023-05-10 | Stop reason: HOSPADM

## 2023-05-09 RX ORDER — ALBUTEROL SULFATE 90 UG/1
4 AEROSOL, METERED RESPIRATORY (INHALATION) PRN
Status: DISCONTINUED | OUTPATIENT
Start: 2023-05-09 | End: 2023-05-10 | Stop reason: HOSPADM

## 2023-05-09 RX ORDER — ACETAMINOPHEN 325 MG/1
650 TABLET ORAL
OUTPATIENT
Start: 2023-05-30

## 2023-05-09 RX ORDER — SODIUM CHLORIDE 9 MG/ML
INJECTION, SOLUTION INTRAVENOUS CONTINUOUS
Status: DISCONTINUED | OUTPATIENT
Start: 2023-05-09 | End: 2023-05-10 | Stop reason: HOSPADM

## 2023-05-09 RX ORDER — SODIUM CHLORIDE 9 MG/ML
5-250 INJECTION, SOLUTION INTRAVENOUS PRN
OUTPATIENT
Start: 2023-05-30

## 2023-05-09 RX ORDER — ALBUTEROL SULFATE 90 UG/1
4 AEROSOL, METERED RESPIRATORY (INHALATION) PRN
OUTPATIENT
Start: 2023-05-30

## 2023-05-09 RX ORDER — EPINEPHRINE 1 MG/ML
0.3 INJECTION, SOLUTION, CONCENTRATE INTRAVENOUS PRN
Status: DISCONTINUED | OUTPATIENT
Start: 2023-05-09 | End: 2023-05-10 | Stop reason: HOSPADM

## 2023-05-09 RX ORDER — ONDANSETRON 2 MG/ML
8 INJECTION INTRAMUSCULAR; INTRAVENOUS
Status: DISCONTINUED | OUTPATIENT
Start: 2023-05-09 | End: 2023-05-10 | Stop reason: HOSPADM

## 2023-05-09 RX ORDER — SODIUM CHLORIDE 0.9 % (FLUSH) 0.9 %
5-40 SYRINGE (ML) INJECTION PRN
OUTPATIENT
Start: 2023-05-30

## 2023-05-09 RX ORDER — DIPHENHYDRAMINE HYDROCHLORIDE 50 MG/ML
50 INJECTION INTRAMUSCULAR; INTRAVENOUS
OUTPATIENT
Start: 2023-05-30

## 2023-05-09 RX ORDER — ONDANSETRON 2 MG/ML
8 INJECTION INTRAMUSCULAR; INTRAVENOUS
OUTPATIENT
Start: 2023-05-30

## 2023-05-09 RX ORDER — SODIUM CHLORIDE 9 MG/ML
5-250 INJECTION, SOLUTION INTRAVENOUS PRN
Status: DISCONTINUED | OUTPATIENT
Start: 2023-05-09 | End: 2023-05-10 | Stop reason: HOSPADM

## 2023-05-09 RX ORDER — ACETAMINOPHEN 325 MG/1
650 TABLET ORAL
Status: DISCONTINUED | OUTPATIENT
Start: 2023-05-09 | End: 2023-05-10 | Stop reason: HOSPADM

## 2023-05-09 RX ORDER — MEPERIDINE HYDROCHLORIDE 50 MG/ML
12.5 INJECTION INTRAMUSCULAR; INTRAVENOUS; SUBCUTANEOUS PRN
OUTPATIENT
Start: 2023-05-30

## 2023-05-09 RX ORDER — DIPHENHYDRAMINE HYDROCHLORIDE 50 MG/ML
50 INJECTION INTRAMUSCULAR; INTRAVENOUS
Status: DISCONTINUED | OUTPATIENT
Start: 2023-05-09 | End: 2023-05-10 | Stop reason: HOSPADM

## 2023-05-09 RX ORDER — HEPARIN SODIUM (PORCINE) LOCK FLUSH IV SOLN 100 UNIT/ML 100 UNIT/ML
500 SOLUTION INTRAVENOUS PRN
OUTPATIENT
Start: 2023-05-30

## 2023-05-09 RX ORDER — EPINEPHRINE 1 MG/ML
0.3 INJECTION, SOLUTION, CONCENTRATE INTRAVENOUS PRN
OUTPATIENT
Start: 2023-05-30

## 2023-05-09 RX ADMIN — SODIUM CHLORIDE 50 ML/HR: 9 INJECTION, SOLUTION INTRAVENOUS at 10:58

## 2023-05-09 RX ADMIN — PERTUZUMAB 420 MG: 30 INJECTION, SOLUTION, CONCENTRATE INTRAVENOUS at 12:47

## 2023-05-09 RX ADMIN — SODIUM CHLORIDE 483 MG: 9 INJECTION, SOLUTION INTRAVENOUS at 12:11

## 2023-05-09 RX ADMIN — SODIUM CHLORIDE, PRESERVATIVE FREE 10 ML: 5 INJECTION INTRAVENOUS at 13:32

## 2023-05-09 ASSESSMENT — PAIN DESCRIPTION - DESCRIPTORS: DESCRIPTORS: ACHING

## 2023-05-09 ASSESSMENT — PAIN DESCRIPTION - PAIN TYPE: TYPE: CHRONIC PAIN

## 2023-05-09 ASSESSMENT — PAIN DESCRIPTION - ORIENTATION: ORIENTATION: RIGHT;LEFT

## 2023-05-09 ASSESSMENT — PAIN SCALES - GENERAL: PAINLEVEL_OUTOF10: 5

## 2023-05-09 ASSESSMENT — PAIN DESCRIPTION - FREQUENCY: FREQUENCY: CONTINUOUS

## 2023-05-09 ASSESSMENT — PAIN - FUNCTIONAL ASSESSMENT: PAIN_FUNCTIONAL_ASSESSMENT: PREVENTS OR INTERFERES SOME ACTIVE ACTIVITIES AND ADLS

## 2023-05-09 ASSESSMENT — PAIN DESCRIPTION - LOCATION: LOCATION: LEG

## 2023-05-09 ASSESSMENT — PAIN DESCRIPTION - ONSET: ONSET: ON-GOING

## 2023-05-09 NOTE — PROGRESS NOTES
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 Breast cancer: two separate nodules with dominant left UOQ nodule 2.3 cm and HER2 positive, other nodule left LIQ 1.2 cm and HER2 negative. Both low grade and ER positive, weakly SD positive. Axillary node positive for macrometastatic carcinoma, also  HER2 positive. PET/CT shows no distant metastatic disease. I discussed the case with Dr. Gibson Tejada at tumor board. Normally, for node positive HER2 positive breast cancer, we would recommend neoadjuvant  chemoimmunotherapy. However, she is unwilling to consider cytotoxic therapy, which is not unreasonable given her age and comorbidities. Since she has only locoregional disease, we should then consider mastectomy and node dissection for definitive therapy. She was also very reluctant to consider any surgery because of her history of intolerance to anesthesia, but we were able to convince her to at least meet with Dr. Gibson Tejada. However, after discussion with him, she declined any local therapy and was  only willing to consider systemic treatment. Therefore, we recommended the PERTAIN regimen for \"unresectable\" disease, Arimidex + HP. PET/CT reviewed after 4 cycles shows response with decrease in both breast masses and decreased axillary lymph node,  no new areas of metastatic disease noted. We once again noted that without surgery, we would eventually expect progression, but for now AI+HP is working well and can be continued as long as efficacy and tolerance maintained. She returns today for follow up on Arimidex and next cycle HP. There is no nausea, however she reports ongoing diarrhea after eating.   She reports a poor appetite, weight down a few

## 2023-05-09 NOTE — PROGRESS NOTES
Pt arrived via w/c. Trastuzumab and Perjeta completed without complications. Pt waited 30 minutes following infusion, no signs of adverse reaction noted. Pt aware of next appt 5/30 at 0900. Discharged ambulatory, no distress noted.   Patient instructed to call provider with temperature of 100.4 or greater or nausea/vomiting/ diarrhea or pain not controlled by medications

## 2023-05-09 NOTE — PATIENT INSTRUCTIONS
Patient Instructions from Today's Visit    Reason for Visit:  Follow up-Breast    Diagnosis Information:  [email or web address] net/about-us/[email or web address]-answers-patient-education-materials/zuto-wjgvffv-wsis-sheets      Plan: We will get a PET scan and a echocardiogram prior to your appointment in June.     Follow Up:  As scheduled    Recent Lab Results:  Hospital Outpatient Visit on 05/09/2023   Component Date Value Ref Range Status    WBC 05/09/2023 5.5  4.3 - 11.1 K/uL Final    RBC 05/09/2023 4.96  4.05 - 5.2 M/uL Final    Hemoglobin 05/09/2023 14.2  11.7 - 15.4 g/dL Final    Hematocrit 05/09/2023 44.7  35.8 - 46.3 % Final    MCV 05/09/2023 90.1  82.0 - 102.0 FL Final    MCH 05/09/2023 28.6  26.1 - 32.9 PG Final    MCHC 05/09/2023 31.8  31.4 - 35.0 g/dL Final    RDW 05/09/2023 14.2  11.9 - 14.6 % Final    Platelets 95/90/1228 176  150 - 450 K/uL Final    MPV 05/09/2023 11.8  9.4 - 12.3 FL Final    nRBC 05/09/2023 0.00  0.0 - 0.2 K/uL Final    **Note: Absolute NRBC parameter is now reported with Hemogram**    Seg Neutrophils 05/09/2023 63  43 - 78 % Final    Lymphocytes 05/09/2023 30  13 - 44 % Final    Monocytes 05/09/2023 6  4.0 - 12.0 % Final    Eosinophils % 05/09/2023 0 (L)  0.5 - 7.8 % Final    Basophils 05/09/2023 1  0.0 - 2.0 % Final    Immature Granulocytes 05/09/2023 0  0.0 - 5.0 % Final    Segs Absolute 05/09/2023 3.5  1.7 - 8.2 K/UL Final    Absolute Lymph # 05/09/2023 1.7  0.5 - 4.6 K/UL Final    Absolute Mono # 05/09/2023 0.3  0.1 - 1.3 K/UL Final    Absolute Eos # 05/09/2023 0.0  0.0 - 0.8 K/UL Final    Basophils Absolute 05/09/2023 0.0  0.0 - 0.2 K/UL Final    Absolute Immature Granulocyte 05/09/2023 0.0  0.0 - 0.5 K/UL Final    Differential Type 05/09/2023 AUTOMATED    Final         Treatment Summary has been discussed and given to patient: n/a        -------------------------------------------------------------------------------------------------------------------  Please call our office at [phone]

## 2023-05-30 ENCOUNTER — HOSPITAL ENCOUNTER (OUTPATIENT)
Dept: INFUSION THERAPY | Age: 83
Discharge: HOME | End: 2023-05-30
Payer: MEDICARE

## 2023-05-30 ENCOUNTER — HOSPITAL ENCOUNTER (OUTPATIENT)
Dept: LAB | Age: 83
Discharge: HOME | End: 2023-06-02

## 2023-05-30 VITALS
RESPIRATION RATE: 18 BRPM | OXYGEN SATURATION: 94 % | TEMPERATURE: 97.8 F | BODY MASS INDEX: 30.11 KG/M2 | HEART RATE: 50 BPM | SYSTOLIC BLOOD PRESSURE: 144 MMHG | DIASTOLIC BLOOD PRESSURE: 84 MMHG | WEIGHT: 170 LBS

## 2023-05-30 DIAGNOSIS — C50.812 MALIGNANT NEOPLASM OF OVERLAPPING SITES OF LEFT BREAST IN FEMALE, ESTROGEN RECEPTOR POSITIVE (HCC): Primary | ICD-10-CM

## 2023-05-30 DIAGNOSIS — Z17.0 MALIGNANT NEOPLASM OF OVERLAPPING SITES OF LEFT BREAST IN FEMALE, ESTROGEN RECEPTOR POSITIVE (HCC): Primary | ICD-10-CM

## 2023-05-30 LAB
ALBUMIN SERPL-MCNC: 3.4 G/DL (ref 3.2–4.6)
ALBUMIN/GLOB SERPL: 0.9 (ref 0.4–1.6)
ALP SERPL-CCNC: 131 U/L (ref 50–136)
ALT SERPL-CCNC: 13 U/L (ref 12–65)
ANION GAP SERPL CALC-SCNC: 7 MMOL/L (ref 2–11)
AST SERPL-CCNC: 18 U/L (ref 15–37)
BASOPHILS # BLD: 0 K/UL (ref 0–0.2)
BASOPHILS NFR BLD: 0 % (ref 0–2)
BILIRUB SERPL-MCNC: 1.5 MG/DL (ref 0.2–1.1)
BUN SERPL-MCNC: 12 MG/DL (ref 8–23)
CALCIUM SERPL-MCNC: 10.4 MG/DL (ref 8.3–10.4)
CHLORIDE SERPL-SCNC: 110 MMOL/L (ref 101–110)
CO2 SERPL-SCNC: 29 MMOL/L (ref 21–32)
CREAT SERPL-MCNC: 0.5 MG/DL (ref 0.6–1)
DIFFERENTIAL METHOD BLD: ABNORMAL
EOSINOPHIL # BLD: 0 K/UL (ref 0–0.8)
EOSINOPHIL NFR BLD: 0 % (ref 0.5–7.8)
ERYTHROCYTE [DISTWIDTH] IN BLOOD BY AUTOMATED COUNT: 13.5 % (ref 11.9–14.6)
GLOBULIN SER CALC-MCNC: 3.8 G/DL (ref 2.8–4.5)
GLUCOSE SERPL-MCNC: 75 MG/DL (ref 65–100)
HCT VFR BLD AUTO: 46.7 % (ref 35.8–46.3)
HGB BLD-MCNC: 14.9 G/DL (ref 11.7–15.4)
IMM GRANULOCYTES # BLD AUTO: 0 K/UL (ref 0–0.5)
IMM GRANULOCYTES NFR BLD AUTO: 0 % (ref 0–5)
LYMPHOCYTES # BLD: 1.4 K/UL (ref 0.5–4.6)
LYMPHOCYTES NFR BLD: 22 % (ref 13–44)
MCH RBC QN AUTO: 28.8 PG (ref 26.1–32.9)
MCHC RBC AUTO-ENTMCNC: 31.9 G/DL (ref 31.4–35)
MCV RBC AUTO: 90.3 FL (ref 82–102)
MONOCYTES # BLD: 0.4 K/UL (ref 0.1–1.3)
MONOCYTES NFR BLD: 6 % (ref 4–12)
NEUTS SEG # BLD: 4.7 K/UL (ref 1.7–8.2)
NEUTS SEG NFR BLD: 72 % (ref 43–78)
NRBC # BLD: 0 K/UL (ref 0–0.2)
PLATELET # BLD AUTO: 194 K/UL (ref 150–450)
PMV BLD AUTO: 11.2 FL (ref 9.4–12.3)
POTASSIUM SERPL-SCNC: 3.2 MMOL/L (ref 3.5–5.1)
PROT SERPL-MCNC: 7.2 G/DL (ref 6.3–8.2)
RBC # BLD AUTO: 5.17 M/UL (ref 4.05–5.2)
SODIUM SERPL-SCNC: 146 MMOL/L (ref 133–143)
WBC # BLD AUTO: 6.6 K/UL (ref 4.3–11.1)

## 2023-05-30 PROCEDURE — 36415 COLL VENOUS BLD VENIPUNCTURE: CPT

## 2023-05-30 PROCEDURE — 85025 COMPLETE CBC W/AUTO DIFF WBC: CPT

## 2023-05-30 PROCEDURE — 6360000002 HC RX W HCPCS: Performed by: NURSE PRACTITIONER

## 2023-05-30 PROCEDURE — 2580000003 HC RX 258: Performed by: NURSE PRACTITIONER

## 2023-05-30 PROCEDURE — 80053 COMPREHEN METABOLIC PANEL: CPT

## 2023-05-30 PROCEDURE — 96417 CHEMO IV INFUS EACH ADDL SEQ: CPT

## 2023-05-30 PROCEDURE — 6370000000 HC RX 637 (ALT 250 FOR IP): Performed by: INTERNAL MEDICINE

## 2023-05-30 PROCEDURE — 96413 CHEMO IV INFUSION 1 HR: CPT

## 2023-05-30 RX ORDER — LOPERAMIDE HYDROCHLORIDE 2 MG/1
4 CAPSULE ORAL ONCE
Status: COMPLETED | OUTPATIENT
Start: 2023-05-30 | End: 2023-05-30

## 2023-05-30 RX ORDER — SODIUM CHLORIDE 9 MG/ML
5-250 INJECTION, SOLUTION INTRAVENOUS PRN
Status: DISCONTINUED | OUTPATIENT
Start: 2023-05-30 | End: 2023-05-31 | Stop reason: HOSPADM

## 2023-05-30 RX ORDER — DIPHENHYDRAMINE HYDROCHLORIDE 50 MG/ML
50 INJECTION INTRAMUSCULAR; INTRAVENOUS
Status: DISCONTINUED | OUTPATIENT
Start: 2023-05-30 | End: 2023-05-31 | Stop reason: HOSPADM

## 2023-05-30 RX ORDER — ACETAMINOPHEN 325 MG/1
650 TABLET ORAL
Status: DISCONTINUED | OUTPATIENT
Start: 2023-05-30 | End: 2023-05-31 | Stop reason: HOSPADM

## 2023-05-30 RX ORDER — SODIUM CHLORIDE 0.9 % (FLUSH) 0.9 %
5-40 SYRINGE (ML) INJECTION PRN
Status: DISCONTINUED | OUTPATIENT
Start: 2023-05-30 | End: 2023-05-31 | Stop reason: HOSPADM

## 2023-05-30 RX ADMIN — PERTUZUMAB 420 MG: 30 INJECTION, SOLUTION, CONCENTRATE INTRAVENOUS at 12:19

## 2023-05-30 RX ADMIN — SODIUM CHLORIDE 25 ML/HR: 9 INJECTION, SOLUTION INTRAVENOUS at 11:18

## 2023-05-30 RX ADMIN — LOPERAMIDE HYDROCHLORIDE 4 MG: 2 CAPSULE ORAL at 12:30

## 2023-05-30 RX ADMIN — SODIUM CHLORIDE 483 MG: 9 INJECTION, SOLUTION INTRAVENOUS at 11:09

## 2023-05-30 ASSESSMENT — PAIN DESCRIPTION - LOCATION: LOCATION: LEG

## 2023-05-30 ASSESSMENT — PAIN DESCRIPTION - DESCRIPTORS: DESCRIPTORS: ACHING

## 2023-05-30 ASSESSMENT — PAIN SCALES - GENERAL: PAINLEVEL_OUTOF10: 5

## 2023-05-30 NOTE — PROGRESS NOTES
Arrived to the infusion center. Herceptin / Levon Martínez completed with out  signs of adverse reaction No new issues or concerns voiced during the visit. Aware of her next appointment on 7/26 at 0930. Discharged via wheelchair in satisfactory condition.

## 2023-06-30 DIAGNOSIS — Z17.0 MALIGNANT NEOPLASM OF OVERLAPPING SITES OF LEFT BREAST IN FEMALE, ESTROGEN RECEPTOR POSITIVE (HCC): Primary | ICD-10-CM

## 2023-06-30 DIAGNOSIS — C50.812 MALIGNANT NEOPLASM OF OVERLAPPING SITES OF LEFT BREAST IN FEMALE, ESTROGEN RECEPTOR POSITIVE (HCC): Primary | ICD-10-CM

## 2023-07-03 ENCOUNTER — CLINICAL DOCUMENTATION (OUTPATIENT)
Dept: ONCOLOGY | Age: 83
End: 2023-07-03

## 2023-07-03 NOTE — PROGRESS NOTES
Sw received referral.  Sw will contact pt to assess transportation needs for [facility] appointment.

## 2023-07-11 ENCOUNTER — HOSPITAL ENCOUNTER (OUTPATIENT)
Dept: PET IMAGING | Age: 83
Discharge: HOME | End: 2023-07-14
Payer: MEDICARE

## 2023-07-11 DIAGNOSIS — Z17.0 MALIGNANT NEOPLASM OF OVERLAPPING SITES OF LEFT BREAST IN FEMALE, ESTROGEN RECEPTOR POSITIVE (HCC): ICD-10-CM

## 2023-07-11 DIAGNOSIS — C50.812 MALIGNANT NEOPLASM OF OVERLAPPING SITES OF LEFT BREAST IN FEMALE, ESTROGEN RECEPTOR POSITIVE (HCC): ICD-10-CM

## 2023-07-11 LAB
GLUCOSE BLD STRIP.AUTO-MCNC: 104 MG/DL (ref 65–100)
SERVICE CMNT-IMP: ABNORMAL

## 2023-07-11 PROCEDURE — A9552 F18 FDG: HCPCS | Performed by: NURSE PRACTITIONER

## 2023-07-11 PROCEDURE — 2580000003 HC RX 258: Performed by: NURSE PRACTITIONER

## 2023-07-11 PROCEDURE — 3430000000 HC RX DIAGNOSTIC RADIOPHARMACEUTICAL: Performed by: NURSE PRACTITIONER

## 2023-07-11 PROCEDURE — 78815 PET IMAGE W/CT SKULL-THIGH: CPT

## 2023-07-11 PROCEDURE — 82962 GLUCOSE BLOOD TEST: CPT

## 2023-07-11 RX ORDER — FLUDEOXYGLUCOSE F 18 200 MCI/ML
12.46 INJECTION, SOLUTION INTRAVENOUS
Status: COMPLETED | OUTPATIENT
Start: 2023-07-11 | End: 2023-07-11

## 2023-07-11 RX ORDER — SODIUM CHLORIDE 0.9 % (FLUSH) 0.9 %
10 SYRINGE (ML) INJECTION AS NEEDED
Status: DISCONTINUED | OUTPATIENT
Start: 2023-07-11 | End: 2023-07-15 | Stop reason: HOSPADM

## 2023-07-11 RX ADMIN — FLUDEOXYGLUCOSE F 18 12.46 MILLICURIE: 200 INJECTION, SOLUTION INTRAVENOUS at 10:48

## 2023-07-11 RX ADMIN — SODIUM CHLORIDE, PRESERVATIVE FREE 10 ML: 5 INJECTION INTRAVENOUS at 10:48

## 2023-07-24 ENCOUNTER — OFFICE VISIT (OUTPATIENT)
Dept: ONCOLOGY | Age: 83
End: 2023-07-24
Payer: MEDICARE

## 2023-07-24 ENCOUNTER — HOSPITAL ENCOUNTER (OUTPATIENT)
Dept: LAB | Age: 83
Discharge: HOME | End: 2023-07-27
Payer: MEDICARE

## 2023-07-24 ENCOUNTER — HOSPITAL ENCOUNTER (OUTPATIENT)
Dept: INFUSION THERAPY | Age: 83
Discharge: HOME | End: 2023-07-24
Payer: MEDICARE

## 2023-07-24 VITALS
SYSTOLIC BLOOD PRESSURE: 131 MMHG | OXYGEN SATURATION: 96 % | RESPIRATION RATE: 16 BRPM | HEIGHT: 63 IN | WEIGHT: 168 LBS | HEART RATE: 51 BPM | TEMPERATURE: 98.3 F | BODY MASS INDEX: 29.77 KG/M2 | DIASTOLIC BLOOD PRESSURE: 76 MMHG

## 2023-07-24 DIAGNOSIS — Z17.0 MALIGNANT NEOPLASM OF OVERLAPPING SITES OF LEFT BREAST IN FEMALE, ESTROGEN RECEPTOR POSITIVE (HCC): Primary | ICD-10-CM

## 2023-07-24 DIAGNOSIS — C50.812 MALIGNANT NEOPLASM OF OVERLAPPING SITES OF LEFT BREAST IN FEMALE, ESTROGEN RECEPTOR POSITIVE (HCC): Primary | ICD-10-CM

## 2023-07-24 DIAGNOSIS — C50.812 MALIGNANT NEOPLASM OF OVERLAPPING SITES OF LEFT BREAST IN FEMALE, ESTROGEN RECEPTOR POSITIVE (HCC): ICD-10-CM

## 2023-07-24 DIAGNOSIS — Z79.899 HIGH RISK MEDICATION USE: ICD-10-CM

## 2023-07-24 DIAGNOSIS — Z17.0 MALIGNANT NEOPLASM OF OVERLAPPING SITES OF LEFT BREAST IN FEMALE, ESTROGEN RECEPTOR POSITIVE (HCC): ICD-10-CM

## 2023-07-24 DIAGNOSIS — R53.82 CHRONIC FATIGUE: ICD-10-CM

## 2023-07-24 LAB
ALBUMIN SERPL-MCNC: 3.6 G/DL (ref 3.2–4.6)
ALBUMIN/GLOB SERPL: 1.1 (ref 0.4–1.6)
ALP SERPL-CCNC: 117 U/L (ref 50–136)
ALT SERPL-CCNC: 19 U/L (ref 12–65)
ANION GAP SERPL CALC-SCNC: 4 MMOL/L (ref 2–11)
AST SERPL-CCNC: 20 U/L (ref 15–37)
BASOPHILS # BLD: 0 K/UL (ref 0–0.2)
BASOPHILS NFR BLD: 0 % (ref 0–2)
BILIRUB SERPL-MCNC: 0.9 MG/DL (ref 0.2–1.1)
BUN SERPL-MCNC: 9 MG/DL (ref 8–23)
CALCIUM SERPL-MCNC: 10 MG/DL (ref 8.3–10.4)
CHLORIDE SERPL-SCNC: 111 MMOL/L (ref 101–110)
CO2 SERPL-SCNC: 26 MMOL/L (ref 21–32)
CREAT SERPL-MCNC: 0.6 MG/DL (ref 0.6–1)
DIFFERENTIAL METHOD BLD: ABNORMAL
EOSINOPHIL # BLD: 0.1 K/UL (ref 0–0.8)
EOSINOPHIL NFR BLD: 2 % (ref 0.5–7.8)
ERYTHROCYTE [DISTWIDTH] IN BLOOD BY AUTOMATED COUNT: 14.5 % (ref 11.9–14.6)
GLOBULIN SER CALC-MCNC: 3.3 G/DL (ref 2.8–4.5)
GLUCOSE SERPL-MCNC: 106 MG/DL (ref 65–100)
HCT VFR BLD AUTO: 46.5 % (ref 35.8–46.3)
HGB BLD-MCNC: 14.5 G/DL (ref 11.7–15.4)
IMM GRANULOCYTES # BLD AUTO: 0 K/UL (ref 0–0.5)
IMM GRANULOCYTES NFR BLD AUTO: 0 % (ref 0–5)
LYMPHOCYTES # BLD: 1.9 K/UL (ref 0.5–4.6)
LYMPHOCYTES NFR BLD: 27 % (ref 13–44)
MAGNESIUM SERPL-MCNC: 2.1 MG/DL (ref 1.8–2.4)
MCH RBC QN AUTO: 28.8 PG (ref 26.1–32.9)
MCHC RBC AUTO-ENTMCNC: 31.2 G/DL (ref 31.4–35)
MCV RBC AUTO: 92.4 FL (ref 82–102)
MONOCYTES # BLD: 0.5 K/UL (ref 0.1–1.3)
MONOCYTES NFR BLD: 7 % (ref 4–12)
NEUTS SEG # BLD: 4.3 K/UL (ref 1.7–8.2)
NEUTS SEG NFR BLD: 64 % (ref 43–78)
NRBC # BLD: 0 K/UL (ref 0–0.2)
PLATELET # BLD AUTO: 200 K/UL (ref 150–450)
PMV BLD AUTO: 11.7 FL (ref 9.4–12.3)
POTASSIUM SERPL-SCNC: 4.2 MMOL/L (ref 3.5–5.1)
PROT SERPL-MCNC: 6.9 G/DL (ref 6.3–8.2)
RBC # BLD AUTO: 5.03 M/UL (ref 4.05–5.2)
SODIUM SERPL-SCNC: 141 MMOL/L (ref 133–143)
WBC # BLD AUTO: 6.8 K/UL (ref 4.3–11.1)

## 2023-07-24 PROCEDURE — 80053 COMPREHEN METABOLIC PANEL: CPT

## 2023-07-24 PROCEDURE — 1123F ACP DISCUSS/DSCN MKR DOCD: CPT | Performed by: NURSE PRACTITIONER

## 2023-07-24 PROCEDURE — 96413 CHEMO IV INFUSION 1 HR: CPT

## 2023-07-24 PROCEDURE — 96417 CHEMO IV INFUS EACH ADDL SEQ: CPT

## 2023-07-24 PROCEDURE — G8427 DOCREV CUR MEDS BY ELIG CLIN: HCPCS | Performed by: NURSE PRACTITIONER

## 2023-07-24 PROCEDURE — 36415 COLL VENOUS BLD VENIPUNCTURE: CPT

## 2023-07-24 PROCEDURE — 83735 ASSAY OF MAGNESIUM: CPT

## 2023-07-24 PROCEDURE — 3078F DIAST BP <80 MM HG: CPT | Performed by: NURSE PRACTITIONER

## 2023-07-24 PROCEDURE — 85025 COMPLETE CBC W/AUTO DIFF WBC: CPT

## 2023-07-24 PROCEDURE — G8399 PT W/DXA RESULTS DOCUMENT: HCPCS | Performed by: NURSE PRACTITIONER

## 2023-07-24 PROCEDURE — 2580000003 HC RX 258: Performed by: NURSE PRACTITIONER

## 2023-07-24 PROCEDURE — 1036F TOBACCO NON-USER: CPT | Performed by: NURSE PRACTITIONER

## 2023-07-24 PROCEDURE — 99214 OFFICE O/P EST MOD 30 MIN: CPT | Performed by: NURSE PRACTITIONER

## 2023-07-24 PROCEDURE — 1090F PRES/ABSN URINE INCON ASSESS: CPT | Performed by: NURSE PRACTITIONER

## 2023-07-24 PROCEDURE — 6360000002 HC RX W HCPCS: Performed by: NURSE PRACTITIONER

## 2023-07-24 PROCEDURE — 3075F SYST BP GE 130 - 139MM HG: CPT | Performed by: NURSE PRACTITIONER

## 2023-07-24 PROCEDURE — G8417 CALC BMI ABV UP PARAM F/U: HCPCS | Performed by: NURSE PRACTITIONER

## 2023-07-24 RX ORDER — HEPARIN 100 UNIT/ML
500 SYRINGE INTRAVENOUS PRN
Status: DISCONTINUED | OUTPATIENT
Start: 2023-07-24 | End: 2023-07-25 | Stop reason: HOSPADM

## 2023-07-24 RX ORDER — DIPHENHYDRAMINE HYDROCHLORIDE 50 MG/ML
50 INJECTION INTRAMUSCULAR; INTRAVENOUS
OUTPATIENT
Start: 2023-08-14

## 2023-07-24 RX ORDER — ALBUTEROL SULFATE 90 UG/1
4 AEROSOL, METERED RESPIRATORY (INHALATION) PRN
Status: CANCELLED | OUTPATIENT
Start: 2023-07-24

## 2023-07-24 RX ORDER — ACETAMINOPHEN 325 MG/1
650 TABLET ORAL
Status: DISCONTINUED | OUTPATIENT
Start: 2023-07-24 | End: 2023-07-25 | Stop reason: HOSPADM

## 2023-07-24 RX ORDER — EPINEPHRINE 1 MG/ML
0.3 INJECTION, SOLUTION, CONCENTRATE INTRAVENOUS PRN
OUTPATIENT
Start: 2023-08-14

## 2023-07-24 RX ORDER — DIPHENHYDRAMINE HYDROCHLORIDE 50 MG/ML
50 INJECTION INTRAMUSCULAR; INTRAVENOUS
Status: CANCELLED | OUTPATIENT
Start: 2023-07-24

## 2023-07-24 RX ORDER — ALBUTEROL SULFATE 90 UG/1
4 AEROSOL, METERED RESPIRATORY (INHALATION) PRN
OUTPATIENT
Start: 2023-08-14

## 2023-07-24 RX ORDER — ALBUTEROL SULFATE 90 UG/1
4 AEROSOL, METERED RESPIRATORY (INHALATION) PRN
Status: DISCONTINUED | OUTPATIENT
Start: 2023-07-24 | End: 2023-07-25 | Stop reason: HOSPADM

## 2023-07-24 RX ORDER — ACETAMINOPHEN 325 MG/1
650 TABLET ORAL
Status: CANCELLED | OUTPATIENT
Start: 2023-07-24

## 2023-07-24 RX ORDER — MEPERIDINE HYDROCHLORIDE 50 MG/ML
12.5 INJECTION INTRAMUSCULAR; INTRAVENOUS; SUBCUTANEOUS PRN
Status: CANCELLED | OUTPATIENT
Start: 2023-07-24

## 2023-07-24 RX ORDER — EPINEPHRINE 1 MG/ML
0.3 INJECTION, SOLUTION, CONCENTRATE INTRAVENOUS PRN
Status: DISCONTINUED | OUTPATIENT
Start: 2023-07-24 | End: 2023-07-25 | Stop reason: HOSPADM

## 2023-07-24 RX ORDER — SODIUM CHLORIDE 9 MG/ML
5-250 INJECTION, SOLUTION INTRAVENOUS PRN
Status: CANCELLED | OUTPATIENT
Start: 2023-07-24

## 2023-07-24 RX ORDER — ACETAMINOPHEN 325 MG/1
650 TABLET ORAL
OUTPATIENT
Start: 2023-08-14

## 2023-07-24 RX ORDER — EPINEPHRINE 1 MG/ML
0.3 INJECTION, SOLUTION, CONCENTRATE INTRAVENOUS PRN
Status: CANCELLED | OUTPATIENT
Start: 2023-07-24

## 2023-07-24 RX ORDER — SODIUM CHLORIDE 9 MG/ML
5-250 INJECTION, SOLUTION INTRAVENOUS PRN
OUTPATIENT
Start: 2023-08-14

## 2023-07-24 RX ORDER — SODIUM CHLORIDE 9 MG/ML
5-250 INJECTION, SOLUTION INTRAVENOUS PRN
Status: DISCONTINUED | OUTPATIENT
Start: 2023-07-24 | End: 2023-07-25 | Stop reason: HOSPADM

## 2023-07-24 RX ORDER — SODIUM CHLORIDE 9 MG/ML
INJECTION, SOLUTION INTRAVENOUS CONTINUOUS
Status: DISCONTINUED | OUTPATIENT
Start: 2023-07-24 | End: 2023-07-25 | Stop reason: HOSPADM

## 2023-07-24 RX ORDER — MEPERIDINE HYDROCHLORIDE 25 MG/ML
12.5 INJECTION INTRAMUSCULAR; INTRAVENOUS; SUBCUTANEOUS PRN
Status: DISCONTINUED | OUTPATIENT
Start: 2023-07-24 | End: 2023-07-25 | Stop reason: HOSPADM

## 2023-07-24 RX ORDER — ONDANSETRON 2 MG/ML
8 INJECTION INTRAMUSCULAR; INTRAVENOUS
OUTPATIENT
Start: 2023-08-14

## 2023-07-24 RX ORDER — FAMOTIDINE 10 MG/ML
20 INJECTION, SOLUTION INTRAVENOUS
OUTPATIENT
Start: 2023-08-14

## 2023-07-24 RX ORDER — ONDANSETRON 2 MG/ML
8 INJECTION INTRAMUSCULAR; INTRAVENOUS
Status: CANCELLED | OUTPATIENT
Start: 2023-07-24

## 2023-07-24 RX ORDER — SODIUM CHLORIDE 0.9 % (FLUSH) 0.9 %
5-40 SYRINGE (ML) INJECTION PRN
Status: CANCELLED | OUTPATIENT
Start: 2023-07-24

## 2023-07-24 RX ORDER — DIPHENHYDRAMINE HYDROCHLORIDE 50 MG/ML
50 INJECTION INTRAMUSCULAR; INTRAVENOUS
Status: DISCONTINUED | OUTPATIENT
Start: 2023-07-24 | End: 2023-07-25 | Stop reason: HOSPADM

## 2023-07-24 RX ORDER — SODIUM CHLORIDE 0.9 % (FLUSH) 0.9 %
5-40 SYRINGE (ML) INJECTION PRN
Status: DISCONTINUED | OUTPATIENT
Start: 2023-07-24 | End: 2023-07-25 | Stop reason: HOSPADM

## 2023-07-24 RX ORDER — MEPERIDINE HYDROCHLORIDE 50 MG/ML
12.5 INJECTION INTRAMUSCULAR; INTRAVENOUS; SUBCUTANEOUS PRN
OUTPATIENT
Start: 2023-08-14

## 2023-07-24 RX ORDER — SODIUM CHLORIDE 9 MG/ML
INJECTION, SOLUTION INTRAVENOUS CONTINUOUS
OUTPATIENT
Start: 2023-08-14

## 2023-07-24 RX ORDER — ONDANSETRON 2 MG/ML
8 INJECTION INTRAMUSCULAR; INTRAVENOUS
Status: DISCONTINUED | OUTPATIENT
Start: 2023-07-24 | End: 2023-07-25 | Stop reason: HOSPADM

## 2023-07-24 RX ORDER — SODIUM CHLORIDE 0.9 % (FLUSH) 0.9 %
5-40 SYRINGE (ML) INJECTION PRN
OUTPATIENT
Start: 2023-08-14

## 2023-07-24 RX ORDER — FAMOTIDINE 10 MG/ML
20 INJECTION, SOLUTION INTRAVENOUS
Status: CANCELLED | OUTPATIENT
Start: 2023-07-24

## 2023-07-24 RX ORDER — HEPARIN SODIUM (PORCINE) LOCK FLUSH IV SOLN 100 UNIT/ML 100 UNIT/ML
500 SOLUTION INTRAVENOUS PRN
OUTPATIENT
Start: 2023-08-14

## 2023-07-24 RX ORDER — HEPARIN SODIUM (PORCINE) LOCK FLUSH IV SOLN 100 UNIT/ML 100 UNIT/ML
500 SOLUTION INTRAVENOUS PRN
Status: CANCELLED | OUTPATIENT
Start: 2023-07-24

## 2023-07-24 RX ORDER — SODIUM CHLORIDE 9 MG/ML
INJECTION, SOLUTION INTRAVENOUS CONTINUOUS
Status: CANCELLED | OUTPATIENT
Start: 2023-07-24

## 2023-07-24 RX ADMIN — SODIUM CHLORIDE 651 MG: 9 INJECTION, SOLUTION INTRAVENOUS at 14:36

## 2023-07-24 RX ADMIN — SODIUM CHLORIDE: 9 INJECTION, SOLUTION INTRAVENOUS at 14:15

## 2023-07-24 RX ADMIN — PERTUZUMAB 840 MG: 30 INJECTION, SOLUTION, CONCENTRATE INTRAVENOUS at 16:35

## 2023-07-24 ASSESSMENT — PATIENT HEALTH QUESTIONNAIRE - PHQ9
SUM OF ALL RESPONSES TO PHQ QUESTIONS 1-9: 0
2. FEELING DOWN, DEPRESSED OR HOPELESS: 0
SUM OF ALL RESPONSES TO PHQ QUESTIONS 1-9: 0

## 2023-07-24 NOTE — PROGRESS NOTES
separate nodules with dominant left UOQ nodule 2.3 cm and HER2 positive, other nodule left LIQ 1.2 cm and HER2 negative. Both low grade and ER positive, weakly MN positive. Axillary node positive for macrometastatic carcinoma, also  HER2 positive. PET/CT shows no distant metastatic disease. I discussed the case with Dr. Wing Wilder at tumor board. Normally, for node positive HER2 positive breast cancer, we would recommend neoadjuvant  chemoimmunotherapy. However, she is unwilling to consider cytotoxic therapy, which is not unreasonable given her age and comorbidities. Since she has only locoregional disease, we should then consider mastectomy and node dissection for definitive therapy. She was also very reluctant to consider any surgery because of her history of intolerance to anesthesia, but we were able to convince her to at least meet with Dr. Wing Wilder. However, after discussion with him, she declined any local therapy and was  only willing to consider systemic treatment. Therefore, we recommended the PERTAIN regimen for \"unresectable\" disease, Arimidex + HP. PET/CT reviewed after 4 cycles shows response with decrease in both breast masses and decreased axillary lymph node,  no new areas of metastatic disease noted. We once again noted that without surgery, we would eventually expect progression, but for now AI+HP is working well and can be continued as long as efficacy and tolerance maintained. She is here today for follow up on Arimidex and next cycle HP. She has been okay overall since last seen. She does have diarrhea depending on what she eats. She has intermittent joint aches in back and legs which have been ongoing. She denies any shortness of breath. She denies any fevers or other infectious symptoms. Labs reviewed and adequate/unremarkable. Echo reviewed from July and EF 60-65%.   PET scan reviewed from July and PAULETTE in [facility], left fourth and fifth ribs have appearance of previous

## 2023-07-24 NOTE — PROGRESS NOTES
Pt. Discharged ambulatory. Tolerated infusion well. No distress noted. To call physician with any problems or concerns. Understanding voiced. To return to Infusions on  8/14/23.

## 2023-08-05 DIAGNOSIS — E78.2 MIXED HYPERLIPIDEMIA: ICD-10-CM

## 2023-08-05 DIAGNOSIS — E11.8 TYPE 2 DIABETES MELLITUS WITH UNSPECIFIED COMPLICATIONS (HCC): ICD-10-CM

## 2023-08-07 RX ORDER — ATORVASTATIN CALCIUM 20 MG/1
TABLET, FILM COATED ORAL
Qty: 90 TABLET | Refills: 1 | OUTPATIENT
Start: 2023-08-07

## 2023-08-14 ENCOUNTER — HOSPITAL ENCOUNTER (OUTPATIENT)
Dept: INFUSION THERAPY | Age: 83
Discharge: HOME | End: 2023-08-14
Payer: MEDICARE

## 2023-08-14 VITALS
HEART RATE: 57 BPM | RESPIRATION RATE: 18 BRPM | OXYGEN SATURATION: 95 % | DIASTOLIC BLOOD PRESSURE: 52 MMHG | WEIGHT: 177 LBS | SYSTOLIC BLOOD PRESSURE: 121 MMHG | TEMPERATURE: 97.7 F | BODY MASS INDEX: 31.35 KG/M2

## 2023-08-14 DIAGNOSIS — Z17.0 MALIGNANT NEOPLASM OF OVERLAPPING SITES OF LEFT BREAST IN FEMALE, ESTROGEN RECEPTOR POSITIVE (HCC): Primary | ICD-10-CM

## 2023-08-14 DIAGNOSIS — C50.812 MALIGNANT NEOPLASM OF OVERLAPPING SITES OF LEFT BREAST IN FEMALE, ESTROGEN RECEPTOR POSITIVE (HCC): Primary | ICD-10-CM

## 2023-08-14 PROCEDURE — 6360000002 HC RX W HCPCS: Performed by: NURSE PRACTITIONER

## 2023-08-14 PROCEDURE — 96417 CHEMO IV INFUS EACH ADDL SEQ: CPT

## 2023-08-14 PROCEDURE — 96413 CHEMO IV INFUSION 1 HR: CPT

## 2023-08-14 PROCEDURE — 2580000003 HC RX 258: Performed by: NURSE PRACTITIONER

## 2023-08-14 RX ORDER — MEPERIDINE HYDROCHLORIDE 25 MG/ML
12.5 INJECTION INTRAMUSCULAR; INTRAVENOUS; SUBCUTANEOUS PRN
Status: DISCONTINUED | OUTPATIENT
Start: 2023-08-14 | End: 2023-08-15 | Stop reason: HOSPADM

## 2023-08-14 RX ORDER — ANASTROZOLE 1 MG/1
1 TABLET ORAL DAILY
Qty: 30 TABLET | Refills: 11 | Status: SHIPPED | OUTPATIENT
Start: 2023-08-14

## 2023-08-14 RX ORDER — SODIUM CHLORIDE 9 MG/ML
5-250 INJECTION, SOLUTION INTRAVENOUS PRN
Status: DISCONTINUED | OUTPATIENT
Start: 2023-08-14 | End: 2023-08-15 | Stop reason: HOSPADM

## 2023-08-14 RX ORDER — EPINEPHRINE 1 MG/ML
0.3 INJECTION, SOLUTION, CONCENTRATE INTRAVENOUS PRN
Status: DISCONTINUED | OUTPATIENT
Start: 2023-08-14 | End: 2023-08-15 | Stop reason: HOSPADM

## 2023-08-14 RX ORDER — ACETAMINOPHEN 325 MG/1
650 TABLET ORAL
Status: DISCONTINUED | OUTPATIENT
Start: 2023-08-14 | End: 2023-08-15 | Stop reason: HOSPADM

## 2023-08-14 RX ORDER — DIPHENHYDRAMINE HYDROCHLORIDE 50 MG/ML
50 INJECTION INTRAMUSCULAR; INTRAVENOUS
Status: DISCONTINUED | OUTPATIENT
Start: 2023-08-14 | End: 2023-08-15 | Stop reason: HOSPADM

## 2023-08-14 RX ORDER — ALBUTEROL SULFATE 90 UG/1
4 AEROSOL, METERED RESPIRATORY (INHALATION) PRN
Status: DISCONTINUED | OUTPATIENT
Start: 2023-08-14 | End: 2023-08-15 | Stop reason: HOSPADM

## 2023-08-14 RX ORDER — ONDANSETRON 2 MG/ML
8 INJECTION INTRAMUSCULAR; INTRAVENOUS
Status: DISCONTINUED | OUTPATIENT
Start: 2023-08-14 | End: 2023-08-15 | Stop reason: HOSPADM

## 2023-08-14 RX ADMIN — PERTUZUMAB 420 MG: 30 INJECTION, SOLUTION, CONCENTRATE INTRAVENOUS at 15:54

## 2023-08-14 RX ADMIN — SODIUM CHLORIDE 25 ML/HR: 9 INJECTION, SOLUTION INTRAVENOUS at 15:04

## 2023-08-14 RX ADMIN — SODIUM CHLORIDE 483 MG: 9 INJECTION, SOLUTION INTRAVENOUS at 15:05

## 2023-08-14 NOTE — PROGRESS NOTES
Arrived to the [facility]. Phils Hipolito completed. Patient tolerated without problems. Any issues or concerns during appointment: patient states that she need refills for her \"stomach medicine' Patient has the bottle with her, the medication is Anastrozole (Arimidex). Attempted to explain that Anastrozole was a medication for breast cancer. Patient loudly says \"Dr Delfino Keen told me it was a stomach pill\". Medication bottle opened and contains white round pills, white oval pills and pink oval pills. Attempted to talk with patient and have her explain the reason for different pills in the same bottle. Attempted to explain the importance of keeping medications in the correct bottles. Patient became agitated and loudly says \"I'm 81years old, I know what I'm doing\" Paid Robi Colbert,  is with patient but not overseeing her medications. Contacted [facility], patient does not have refills. Spoke with Alvarado Armando NP, made aware of mixed pills in the bottle, prescription sent to [facility]  Patient aware of next infusion appointment on 9/12/23 (date) at 56 (time). Patient aware of next lab and [facility] office visit on 9/12/23 (date) at [phone] (time). Printed appointment schedule given to patient ans Caregiver  Patient instructed to call provider with temperature of 100.4 or greater or nausea/vomiting/ diarrhea or pain not controlled by medications  Discharged via [facility] [place] [facility] with Caregiver. Email sent to Dr Delfino Keen and Remy Garcia NP to make them aware before September OV.

## 2023-09-07 DIAGNOSIS — Z79.899 HIGH RISK MEDICATIONS (NOT ANTICOAGULANTS) LONG-TERM USE: ICD-10-CM

## 2023-09-07 DIAGNOSIS — Z17.0 MALIGNANT NEOPLASM OF OVERLAPPING SITES OF LEFT BREAST IN FEMALE, ESTROGEN RECEPTOR POSITIVE (HCC): Primary | ICD-10-CM

## 2023-09-07 DIAGNOSIS — C77.3 SECONDARY AND UNSPECIFIED MALIGNANT NEOPLASM OF AXILLA AND UPPER LIMB LYMPH NODES (HCC): ICD-10-CM

## 2023-09-07 DIAGNOSIS — C50.812 MALIGNANT NEOPLASM OF OVERLAPPING SITES OF LEFT BREAST IN FEMALE, ESTROGEN RECEPTOR POSITIVE (HCC): Primary | ICD-10-CM

## 2023-09-08 NOTE — PROGRESS NOTES
Head congestion for about two weeks; says it sounds like \"eggs frying in a pan\". [facility]
No

## 2023-09-12 ENCOUNTER — OFFICE VISIT (OUTPATIENT)
Dept: ONCOLOGY | Age: 83
End: 2023-09-12
Payer: MEDICARE

## 2023-09-12 ENCOUNTER — HOSPITAL ENCOUNTER (OUTPATIENT)
Dept: LAB | Age: 83
Discharge: HOME | End: 2023-09-15
Payer: MEDICARE

## 2023-09-12 ENCOUNTER — HOSPITAL ENCOUNTER (OUTPATIENT)
Dept: INFUSION THERAPY | Age: 83
Discharge: HOME | End: 2023-09-12
Payer: MEDICARE

## 2023-09-12 VITALS
SYSTOLIC BLOOD PRESSURE: 144 MMHG | DIASTOLIC BLOOD PRESSURE: 84 MMHG | HEART RATE: 50 BPM | HEIGHT: 63 IN | BODY MASS INDEX: 31.35 KG/M2 | OXYGEN SATURATION: 95 % | RESPIRATION RATE: 18 BRPM | TEMPERATURE: 97.9 F

## 2023-09-12 VITALS — BODY MASS INDEX: 31 KG/M2 | WEIGHT: 175 LBS

## 2023-09-12 DIAGNOSIS — Z17.0 MALIGNANT NEOPLASM OF OVERLAPPING SITES OF LEFT BREAST IN FEMALE, ESTROGEN RECEPTOR POSITIVE (HCC): Primary | ICD-10-CM

## 2023-09-12 DIAGNOSIS — C50.812 MALIGNANT NEOPLASM OF OVERLAPPING SITES OF LEFT BREAST IN FEMALE, ESTROGEN RECEPTOR POSITIVE (HCC): ICD-10-CM

## 2023-09-12 DIAGNOSIS — R09.81 NASAL CONGESTION: ICD-10-CM

## 2023-09-12 DIAGNOSIS — C77.3 SECONDARY AND UNSPECIFIED MALIGNANT NEOPLASM OF AXILLA AND UPPER LIMB LYMPH NODES (HCC): ICD-10-CM

## 2023-09-12 DIAGNOSIS — C50.812 MALIGNANT NEOPLASM OF OVERLAPPING SITES OF LEFT BREAST IN FEMALE, ESTROGEN RECEPTOR POSITIVE (HCC): Primary | ICD-10-CM

## 2023-09-12 DIAGNOSIS — Z17.0 MALIGNANT NEOPLASM OF OVERLAPPING SITES OF LEFT BREAST IN FEMALE, ESTROGEN RECEPTOR POSITIVE (HCC): ICD-10-CM

## 2023-09-12 DIAGNOSIS — Z79.899 HIGH RISK MEDICATIONS (NOT ANTICOAGULANTS) LONG-TERM USE: ICD-10-CM

## 2023-09-12 LAB
ALBUMIN SERPL-MCNC: 3.3 G/DL (ref 3.2–4.6)
ALBUMIN/GLOB SERPL: 0.9 (ref 0.4–1.6)
ALP SERPL-CCNC: 113 U/L (ref 50–136)
ALT SERPL-CCNC: 20 U/L (ref 12–65)
ANION GAP SERPL CALC-SCNC: 4 MMOL/L (ref 2–11)
AST SERPL-CCNC: 15 U/L (ref 15–37)
BASOPHILS # BLD: 0 K/UL (ref 0–0.2)
BASOPHILS NFR BLD: 1 % (ref 0–2)
BILIRUB SERPL-MCNC: 0.9 MG/DL (ref 0.2–1.1)
BUN SERPL-MCNC: 8 MG/DL (ref 8–23)
CALCIUM SERPL-MCNC: 9.9 MG/DL (ref 8.3–10.4)
CHLORIDE SERPL-SCNC: 110 MMOL/L (ref 101–110)
CO2 SERPL-SCNC: 29 MMOL/L (ref 21–32)
CREAT SERPL-MCNC: 0.8 MG/DL (ref 0.6–1)
DIFFERENTIAL METHOD BLD: ABNORMAL
EOSINOPHIL # BLD: 0 K/UL (ref 0–0.8)
EOSINOPHIL NFR BLD: 0 % (ref 0.5–7.8)
ERYTHROCYTE [DISTWIDTH] IN BLOOD BY AUTOMATED COUNT: 14.2 % (ref 11.9–14.6)
GLOBULIN SER CALC-MCNC: 3.7 G/DL (ref 2.8–4.5)
GLUCOSE SERPL-MCNC: 123 MG/DL (ref 65–100)
HCT VFR BLD AUTO: 46.3 % (ref 35.8–46.3)
HGB BLD-MCNC: 14.7 G/DL (ref 11.7–15.4)
IMM GRANULOCYTES # BLD AUTO: 0 K/UL (ref 0–0.5)
IMM GRANULOCYTES NFR BLD AUTO: 0 % (ref 0–5)
LYMPHOCYTES # BLD: 1.9 K/UL (ref 0.5–4.6)
LYMPHOCYTES NFR BLD: 37 % (ref 13–44)
MAGNESIUM SERPL-MCNC: 2.1 MG/DL (ref 1.8–2.4)
MCH RBC QN AUTO: 29.7 PG (ref 26.1–32.9)
MCHC RBC AUTO-ENTMCNC: 31.7 G/DL (ref 31.4–35)
MCV RBC AUTO: 93.5 FL (ref 82–102)
MONOCYTES # BLD: 0.3 K/UL (ref 0.1–1.3)
MONOCYTES NFR BLD: 5 % (ref 4–12)
NEUTS SEG # BLD: 2.9 K/UL (ref 1.7–8.2)
NEUTS SEG NFR BLD: 57 % (ref 43–78)
NRBC # BLD: 0 K/UL (ref 0–0.2)
PLATELET # BLD AUTO: 176 K/UL (ref 150–450)
PMV BLD AUTO: 11.1 FL (ref 9.4–12.3)
POTASSIUM SERPL-SCNC: 3.8 MMOL/L (ref 3.5–5.1)
PROT SERPL-MCNC: 7 G/DL (ref 6.3–8.2)
RBC # BLD AUTO: 4.95 M/UL (ref 4.05–5.2)
SODIUM SERPL-SCNC: 143 MMOL/L (ref 133–143)
WBC # BLD AUTO: 5.1 K/UL (ref 4.3–11.1)

## 2023-09-12 PROCEDURE — 3077F SYST BP >= 140 MM HG: CPT | Performed by: INTERNAL MEDICINE

## 2023-09-12 PROCEDURE — 96417 CHEMO IV INFUS EACH ADDL SEQ: CPT

## 2023-09-12 PROCEDURE — 36415 COLL VENOUS BLD VENIPUNCTURE: CPT

## 2023-09-12 PROCEDURE — 80053 COMPREHEN METABOLIC PANEL: CPT

## 2023-09-12 PROCEDURE — 6360000002 HC RX W HCPCS: Performed by: INTERNAL MEDICINE

## 2023-09-12 PROCEDURE — G8428 CUR MEDS NOT DOCUMENT: HCPCS | Performed by: INTERNAL MEDICINE

## 2023-09-12 PROCEDURE — 96413 CHEMO IV INFUSION 1 HR: CPT

## 2023-09-12 PROCEDURE — 1036F TOBACCO NON-USER: CPT | Performed by: INTERNAL MEDICINE

## 2023-09-12 PROCEDURE — G8417 CALC BMI ABV UP PARAM F/U: HCPCS | Performed by: INTERNAL MEDICINE

## 2023-09-12 PROCEDURE — 85025 COMPLETE CBC W/AUTO DIFF WBC: CPT

## 2023-09-12 PROCEDURE — 3079F DIAST BP 80-89 MM HG: CPT | Performed by: INTERNAL MEDICINE

## 2023-09-12 PROCEDURE — 99214 OFFICE O/P EST MOD 30 MIN: CPT | Performed by: INTERNAL MEDICINE

## 2023-09-12 PROCEDURE — 1123F ACP DISCUSS/DSCN MKR DOCD: CPT | Performed by: INTERNAL MEDICINE

## 2023-09-12 PROCEDURE — 1090F PRES/ABSN URINE INCON ASSESS: CPT | Performed by: INTERNAL MEDICINE

## 2023-09-12 PROCEDURE — G8399 PT W/DXA RESULTS DOCUMENT: HCPCS | Performed by: INTERNAL MEDICINE

## 2023-09-12 PROCEDURE — 2580000003 HC RX 258: Performed by: INTERNAL MEDICINE

## 2023-09-12 PROCEDURE — 83735 ASSAY OF MAGNESIUM: CPT

## 2023-09-12 RX ORDER — SODIUM CHLORIDE 9 MG/ML
5-250 INJECTION, SOLUTION INTRAVENOUS PRN
OUTPATIENT
Start: 2023-10-03

## 2023-09-12 RX ORDER — AZITHROMYCIN 250 MG/1
250 TABLET, FILM COATED ORAL SEE ADMIN INSTRUCTIONS
Qty: 6 TABLET | Refills: 0 | Status: SHIPPED | OUTPATIENT
Start: 2023-09-12 | End: 2023-09-17

## 2023-09-12 RX ORDER — SODIUM CHLORIDE 0.9 % (FLUSH) 0.9 %
5-40 SYRINGE (ML) INJECTION PRN
OUTPATIENT
Start: 2023-10-03

## 2023-09-12 RX ORDER — ACETAMINOPHEN 325 MG/1
650 TABLET ORAL
OUTPATIENT
Start: 2023-10-03

## 2023-09-12 RX ORDER — ALBUTEROL SULFATE 90 UG/1
4 AEROSOL, METERED RESPIRATORY (INHALATION) PRN
Status: DISCONTINUED | OUTPATIENT
Start: 2023-09-12 | End: 2023-09-13 | Stop reason: HOSPADM

## 2023-09-12 RX ORDER — ONDANSETRON 2 MG/ML
8 INJECTION INTRAMUSCULAR; INTRAVENOUS
Status: CANCELLED | OUTPATIENT
Start: 2023-09-12

## 2023-09-12 RX ORDER — DIPHENHYDRAMINE HYDROCHLORIDE 50 MG/ML
50 INJECTION INTRAMUSCULAR; INTRAVENOUS
OUTPATIENT
Start: 2023-10-03

## 2023-09-12 RX ORDER — DIPHENHYDRAMINE HYDROCHLORIDE 50 MG/ML
50 INJECTION INTRAMUSCULAR; INTRAVENOUS
Status: CANCELLED | OUTPATIENT
Start: 2023-09-12

## 2023-09-12 RX ORDER — SODIUM CHLORIDE 9 MG/ML
5-250 INJECTION, SOLUTION INTRAVENOUS PRN
Status: CANCELLED | OUTPATIENT
Start: 2023-09-12

## 2023-09-12 RX ORDER — EPINEPHRINE 1 MG/ML
0.3 INJECTION, SOLUTION, CONCENTRATE INTRAVENOUS PRN
Status: DISCONTINUED | OUTPATIENT
Start: 2023-09-12 | End: 2023-09-13 | Stop reason: HOSPADM

## 2023-09-12 RX ORDER — FAMOTIDINE 10 MG/ML
20 INJECTION, SOLUTION INTRAVENOUS
Status: CANCELLED | OUTPATIENT
Start: 2023-09-12

## 2023-09-12 RX ORDER — EPINEPHRINE 1 MG/ML
0.3 INJECTION, SOLUTION, CONCENTRATE INTRAVENOUS PRN
Status: CANCELLED | OUTPATIENT
Start: 2023-09-12

## 2023-09-12 RX ORDER — ACETAMINOPHEN 325 MG/1
650 TABLET ORAL
Status: DISCONTINUED | OUTPATIENT
Start: 2023-09-12 | End: 2023-09-13 | Stop reason: HOSPADM

## 2023-09-12 RX ORDER — MEPERIDINE HYDROCHLORIDE 50 MG/ML
12.5 INJECTION INTRAMUSCULAR; INTRAVENOUS; SUBCUTANEOUS PRN
OUTPATIENT
Start: 2023-10-03

## 2023-09-12 RX ORDER — SODIUM CHLORIDE 0.9 % (FLUSH) 0.9 %
5-40 SYRINGE (ML) INJECTION PRN
Status: DISCONTINUED | OUTPATIENT
Start: 2023-09-12 | End: 2023-09-13 | Stop reason: HOSPADM

## 2023-09-12 RX ORDER — ALBUTEROL SULFATE 90 UG/1
4 AEROSOL, METERED RESPIRATORY (INHALATION) PRN
Status: CANCELLED | OUTPATIENT
Start: 2023-09-12

## 2023-09-12 RX ORDER — ACETAMINOPHEN 325 MG/1
650 TABLET ORAL
Status: CANCELLED | OUTPATIENT
Start: 2023-09-12

## 2023-09-12 RX ORDER — SODIUM CHLORIDE 0.9 % (FLUSH) 0.9 %
5-40 SYRINGE (ML) INJECTION PRN
Status: CANCELLED | OUTPATIENT
Start: 2023-09-12

## 2023-09-12 RX ORDER — HEPARIN SODIUM (PORCINE) LOCK FLUSH IV SOLN 100 UNIT/ML 100 UNIT/ML
500 SOLUTION INTRAVENOUS PRN
Status: CANCELLED | OUTPATIENT
Start: 2023-09-12

## 2023-09-12 RX ORDER — FAMOTIDINE 10 MG/ML
20 INJECTION, SOLUTION INTRAVENOUS
OUTPATIENT
Start: 2023-10-03

## 2023-09-12 RX ORDER — ONDANSETRON 2 MG/ML
8 INJECTION INTRAMUSCULAR; INTRAVENOUS
Status: DISCONTINUED | OUTPATIENT
Start: 2023-09-12 | End: 2023-09-13 | Stop reason: HOSPADM

## 2023-09-12 RX ORDER — MEPERIDINE HYDROCHLORIDE 50 MG/ML
12.5 INJECTION INTRAMUSCULAR; INTRAVENOUS; SUBCUTANEOUS PRN
Status: CANCELLED | OUTPATIENT
Start: 2023-09-12

## 2023-09-12 RX ORDER — ONDANSETRON 2 MG/ML
8 INJECTION INTRAMUSCULAR; INTRAVENOUS
OUTPATIENT
Start: 2023-10-03

## 2023-09-12 RX ORDER — HEPARIN SODIUM (PORCINE) LOCK FLUSH IV SOLN 100 UNIT/ML 100 UNIT/ML
500 SOLUTION INTRAVENOUS PRN
OUTPATIENT
Start: 2023-10-03

## 2023-09-12 RX ORDER — SODIUM CHLORIDE 9 MG/ML
INJECTION, SOLUTION INTRAVENOUS CONTINUOUS
OUTPATIENT
Start: 2023-10-03

## 2023-09-12 RX ORDER — SODIUM CHLORIDE 9 MG/ML
5-250 INJECTION, SOLUTION INTRAVENOUS PRN
Status: DISCONTINUED | OUTPATIENT
Start: 2023-09-12 | End: 2023-09-13 | Stop reason: HOSPADM

## 2023-09-12 RX ORDER — ALBUTEROL SULFATE 90 UG/1
4 AEROSOL, METERED RESPIRATORY (INHALATION) PRN
OUTPATIENT
Start: 2023-10-03

## 2023-09-12 RX ORDER — EPINEPHRINE 1 MG/ML
0.3 INJECTION, SOLUTION, CONCENTRATE INTRAVENOUS PRN
OUTPATIENT
Start: 2023-10-03

## 2023-09-12 RX ORDER — SODIUM CHLORIDE 9 MG/ML
INJECTION, SOLUTION INTRAVENOUS CONTINUOUS
Status: CANCELLED | OUTPATIENT
Start: 2023-09-12

## 2023-09-12 RX ORDER — MEPERIDINE HYDROCHLORIDE 25 MG/ML
12.5 INJECTION INTRAMUSCULAR; INTRAVENOUS; SUBCUTANEOUS PRN
Status: DISCONTINUED | OUTPATIENT
Start: 2023-09-12 | End: 2023-09-13 | Stop reason: HOSPADM

## 2023-09-12 RX ORDER — DIPHENHYDRAMINE HYDROCHLORIDE 50 MG/ML
50 INJECTION INTRAMUSCULAR; INTRAVENOUS
Status: DISCONTINUED | OUTPATIENT
Start: 2023-09-12 | End: 2023-09-13 | Stop reason: HOSPADM

## 2023-09-12 RX ADMIN — PERTUZUMAB 420 MG: 30 INJECTION, SOLUTION, CONCENTRATE INTRAVENOUS at 13:13

## 2023-09-12 RX ADMIN — SODIUM CHLORIDE 483 MG: 9 INJECTION, SOLUTION INTRAVENOUS at 12:15

## 2023-09-12 RX ADMIN — SODIUM CHLORIDE, PRESERVATIVE FREE 10 ML: 5 INJECTION INTRAVENOUS at 11:30

## 2023-09-12 RX ADMIN — SODIUM CHLORIDE 25 ML/HR: 9 INJECTION, SOLUTION INTRAVENOUS at 11:30

## 2023-09-12 ASSESSMENT — PATIENT HEALTH QUESTIONNAIRE - PHQ9
SUM OF ALL RESPONSES TO PHQ9 QUESTIONS 1 & 2: 2
SUM OF ALL RESPONSES TO PHQ QUESTIONS 1-9: 2
2. FEELING DOWN, DEPRESSED OR HOPELESS: 1
SUM OF ALL RESPONSES TO PHQ QUESTIONS 1-9: 2
1. LITTLE INTEREST OR PLEASURE IN DOING THINGS: 1
SUM OF ALL RESPONSES TO PHQ QUESTIONS 1-9: 2
SUM OF ALL RESPONSES TO PHQ QUESTIONS 1-9: 2

## 2023-09-12 NOTE — PROGRESS NOTES
Arrived to the [facility] [place]. C31D1 Herceptin/Perjeta completed. Patient tolerated well. Any issues or concerns during appointment: none. Patient aware of next infusion appointment on 10/3/23 (date) at [phone] (time). Patient aware of next lab and [facility] office visit on 10/25/23 (date) at 0830 (time). Patient instructed to call provider with temperature of 100.4 or greater or nausea/vomiting/ diarrhea or pain not controlled by medications  Discharged home via wheelchair.

## 2023-09-12 NOTE — PROGRESS NOTES
Final    Alk Phosphatase 09/12/2023 113  50 - 136 U/L Final    Total Protein 09/12/2023 7.0  6.3 - 8.2 g/dL Final    Albumin 09/12/2023 3.3  3.2 - 4.6 g/dL Final    Globulin 09/12/2023 3.7  2.8 - 4.5 g/dL Final    Albumin/Globulin Ratio 09/12/2023 0.9  0.4 - 1.6   Final    Magnesium 09/12/2023 2.1  1.8 - 2.4 mg/dL Final         Imaging:  PET/CT         Indication: Left breast cancer restaging       Radiopharmaceutical: 16.8 mCi F18-FDG, intravenously. Technique: Imaging was performed from the skull through the proximal thighs   using routine PET/CT acquisition protocol. Imaging was performed approximately   60 minutes post injection. Oral contrast was administered. Radiation dose   reduction techniques were used for this study:  Our CT scanners use one or all   of the following: Automated exposure control, adjustment of the mA and/or kVp   according to patient's size, iterative reconstruction. Serum glucose: 91 mg/dL prior to injection. Comparison studies: PET/CT 4/23/2021       Findings:       Head and Neck: No enlarged or hypermetabolic cervical chain nodes. Chest: No discrete lung nodule. No mediastinal mass or lymphadenopathy. Abdomen/Pelvis: No enlarged or hypermetabolic lymph nodes of the abdomen or   pelvis. No worrisome focal uptake of the abdominal viscera. Bones and soft tissues: Interval improved tracer uptake associated with   multifocal masses inferiorly in the left breast. Interval resolution of   previously faintly hypermetabolic lymph node of the left inferior axilla. No aggressive osseous lesion. IMPRESSION   1. Interval improved tracer uptake associated with multifocal masses inferiorly   in the left breast.   2.  Interval resolution of previously hypermetabolic lymph node within the left   Axilla. PET/CT: 4/23/2021       INDICATION: Initial staging of breast cancer.        TECHNIQUE: After oral administration of gastroview and

## 2023-09-12 NOTE — PATIENT INSTRUCTIONS
09/12/2023 123 (H)  65 - 100 mg/dL Final    BUN 09/12/2023 8  8 - 23 MG/DL Final    Creatinine 09/12/2023 0.80  0.6 - 1.0 MG/DL Final    Est, Glom Filt Rate 09/12/2023 >60  >60 ml/min/1.73m2 Final    Comment:    Pediatric calculator link: [employer].at. org/professionals/kdoqi/gfr_calculatorped     These results are not intended for use in patients <25years of age. eGFR results are calculated without a race factor using  the 2021 CKD-EPI equation. Careful clinical correlation is recommended, particularly when comparing to results calculated using previous equations. The CKD-EPI equation is less accurate in patients with extremes of muscle mass, extra-renal metabolism of creatinine, excessive creatine ingestion, or following therapy that affects renal tubular secretion. Calcium 09/12/2023 9.9  8.3 - 10.4 MG/DL Final    Total Bilirubin 09/12/2023 0.9  0.2 - 1.1 MG/DL Final    ALT 09/12/2023 20  12 - 65 U/L Final    AST 09/12/2023 15  15 - 37 U/L Final    Alk Phosphatase 09/12/2023 113  50 - 136 U/L Final    Total Protein 09/12/2023 7.0  6.3 - 8.2 g/dL Final    Albumin 09/12/2023 3.3  3.2 - 4.6 g/dL Final    Globulin 09/12/2023 3.7  2.8 - 4.5 g/dL Final    Albumin/Globulin Ratio 09/12/2023 0.9  0.4 - 1.6   Final    Magnesium 09/12/2023 2.1  1.8 - 2.4 mg/dL Final     Treatment Summary has been discussed and given to patient:   N/A  -------------------------------------------------------------------------------------------------------------------  Please call our office at [phone] if you have any  of the following symptoms:   Fever of 100.5 or greater  Chills  Shortness of breath  Swelling or pain in one leg    After office hours an answering service is available and will contact a provider for emergencies or if you are experiencing any of the above symptoms. Patient does express an interest in My Chart.   My Chart log in information explained on the after visit summary printout at the check-out

## 2023-09-14 ENCOUNTER — TELEPHONE (OUTPATIENT)
Dept: ONCOLOGY | Age: 83
End: 2023-09-14

## 2023-09-14 ENCOUNTER — TELEPHONE (OUTPATIENT)
Dept: RADIATION ONCOLOGY | Age: 83
End: 2023-09-14

## 2023-09-14 NOTE — TELEPHONE ENCOUNTER
Pt needs to RS her 10.25.23 appts due to needing a Tuesday or Thursday appt AM so aide can accompany her. Pt's Aide -Everett Sosa- asks to call her on: [phone] to schedule appts.

## 2023-09-15 NOTE — TELEPHONE ENCOUNTER
Pt called to F/U that appt are scheduled for a Tuesday or Thursday in the AM.     \"Pt needs to RS her 10.25.23 appts due to needing a Tuesday or Thursday appt AM so aide can accompany her. Pt's Aide -Estella Scott- asks to call her on: [phone] to schedule appts. \"

## 2023-09-18 ENCOUNTER — TELEPHONE (OUTPATIENT)
Dept: RADIATION ONCOLOGY | Age: 83
End: 2023-09-18

## 2023-09-18 ENCOUNTER — TELEPHONE (OUTPATIENT)
Dept: ONCOLOGY | Age: 83
End: 2023-09-18

## 2023-09-18 NOTE — TELEPHONE ENCOUNTER
Rain Banuelos the pts care giver states there have been many msgs regarding the need to [place] 10-25 to 10-24, please call [phone].

## 2023-09-19 ENCOUNTER — TELEPHONE (OUTPATIENT)
Dept: RADIATION ONCOLOGY | Age: 83
End: 2023-09-19

## 2023-09-19 NOTE — TELEPHONE ENCOUNTER
Please call Corinna Del Castillo [phone] to [place] 10-25 to 10-24 she has called a few times, please call her today. ..tks. ..

## 2023-10-03 ENCOUNTER — HOSPITAL ENCOUNTER (OUTPATIENT)
Dept: INFUSION THERAPY | Age: 83
Discharge: HOME | End: 2023-10-03
Payer: MEDICARE

## 2023-10-03 VITALS
TEMPERATURE: 97.7 F | OXYGEN SATURATION: 93 % | SYSTOLIC BLOOD PRESSURE: 174 MMHG | WEIGHT: 172.5 LBS | RESPIRATION RATE: 18 BRPM | BODY MASS INDEX: 30.56 KG/M2 | HEART RATE: 43 BPM | DIASTOLIC BLOOD PRESSURE: 74 MMHG

## 2023-10-03 DIAGNOSIS — C50.812 MALIGNANT NEOPLASM OF OVERLAPPING SITES OF LEFT BREAST IN FEMALE, ESTROGEN RECEPTOR POSITIVE (HCC): Primary | ICD-10-CM

## 2023-10-03 DIAGNOSIS — Z17.0 MALIGNANT NEOPLASM OF OVERLAPPING SITES OF LEFT BREAST IN FEMALE, ESTROGEN RECEPTOR POSITIVE (HCC): Primary | ICD-10-CM

## 2023-10-03 PROCEDURE — 6360000002 HC RX W HCPCS: Performed by: INTERNAL MEDICINE

## 2023-10-03 PROCEDURE — 96417 CHEMO IV INFUS EACH ADDL SEQ: CPT

## 2023-10-03 PROCEDURE — 96413 CHEMO IV INFUSION 1 HR: CPT

## 2023-10-03 PROCEDURE — 2580000003 HC RX 258: Performed by: INTERNAL MEDICINE

## 2023-10-03 RX ORDER — ONDANSETRON 2 MG/ML
8 INJECTION INTRAMUSCULAR; INTRAVENOUS
Status: DISCONTINUED | OUTPATIENT
Start: 2023-10-03 | End: 2023-10-04 | Stop reason: HOSPADM

## 2023-10-03 RX ORDER — ALBUTEROL SULFATE 90 UG/1
4 AEROSOL, METERED RESPIRATORY (INHALATION) PRN
Status: DISCONTINUED | OUTPATIENT
Start: 2023-10-03 | End: 2023-10-04 | Stop reason: HOSPADM

## 2023-10-03 RX ORDER — MEPERIDINE HYDROCHLORIDE 25 MG/ML
12.5 INJECTION INTRAMUSCULAR; INTRAVENOUS; SUBCUTANEOUS PRN
Status: DISCONTINUED | OUTPATIENT
Start: 2023-10-03 | End: 2023-10-04 | Stop reason: HOSPADM

## 2023-10-03 RX ORDER — SODIUM CHLORIDE 0.9 % (FLUSH) 0.9 %
5-40 SYRINGE (ML) INJECTION PRN
Status: DISCONTINUED | OUTPATIENT
Start: 2023-10-03 | End: 2023-10-04 | Stop reason: HOSPADM

## 2023-10-03 RX ORDER — DIPHENHYDRAMINE HYDROCHLORIDE 50 MG/ML
50 INJECTION INTRAMUSCULAR; INTRAVENOUS
Status: DISCONTINUED | OUTPATIENT
Start: 2023-10-03 | End: 2023-10-04 | Stop reason: HOSPADM

## 2023-10-03 RX ORDER — SODIUM CHLORIDE 9 MG/ML
5-250 INJECTION, SOLUTION INTRAVENOUS PRN
Status: DISCONTINUED | OUTPATIENT
Start: 2023-10-03 | End: 2023-10-04 | Stop reason: HOSPADM

## 2023-10-03 RX ORDER — SODIUM CHLORIDE 9 MG/ML
INJECTION, SOLUTION INTRAVENOUS CONTINUOUS
Status: DISCONTINUED | OUTPATIENT
Start: 2023-10-03 | End: 2023-10-04 | Stop reason: HOSPADM

## 2023-10-03 RX ORDER — HEPARIN 100 UNIT/ML
500 SYRINGE INTRAVENOUS PRN
Status: DISCONTINUED | OUTPATIENT
Start: 2023-10-03 | End: 2023-10-04 | Stop reason: HOSPADM

## 2023-10-03 RX ORDER — ACETAMINOPHEN 325 MG/1
650 TABLET ORAL
Status: DISCONTINUED | OUTPATIENT
Start: 2023-10-03 | End: 2023-10-04 | Stop reason: HOSPADM

## 2023-10-03 RX ORDER — EPINEPHRINE 1 MG/ML
0.3 INJECTION, SOLUTION, CONCENTRATE INTRAVENOUS PRN
Status: DISCONTINUED | OUTPATIENT
Start: 2023-10-03 | End: 2023-10-04 | Stop reason: HOSPADM

## 2023-10-03 RX ADMIN — SODIUM CHLORIDE 80 ML/HR: 9 INJECTION, SOLUTION INTRAVENOUS at 10:23

## 2023-10-03 RX ADMIN — PERTUZUMAB 420 MG: 30 INJECTION, SOLUTION, CONCENTRATE INTRAVENOUS at 11:18

## 2023-10-03 RX ADMIN — SODIUM CHLORIDE 483 MG: 9 INJECTION, SOLUTION INTRAVENOUS at 10:42

## 2023-10-03 ASSESSMENT — PAIN DESCRIPTION - LOCATION: LOCATION: LEG

## 2023-10-03 ASSESSMENT — PAIN SCALES - GENERAL: PAINLEVEL_OUTOF10: 3

## 2023-10-03 ASSESSMENT — PAIN DESCRIPTION - FREQUENCY: FREQUENCY: CONTINUOUS

## 2023-10-03 ASSESSMENT — PAIN DESCRIPTION - DESCRIPTORS: DESCRIPTORS: ACHING

## 2023-10-03 ASSESSMENT — PAIN DESCRIPTION - PAIN TYPE: TYPE: CHRONIC PAIN

## 2023-10-03 ASSESSMENT — PAIN DESCRIPTION - ORIENTATION: ORIENTATION: LEFT

## 2023-10-03 NOTE — PROGRESS NOTES
Pt. Discharged via wheelchair. Tolerated infusion well. No distress noted. To call physician with any problems or concerns. Understanding voiced. To return to Infusions on  10/24/23.

## 2023-10-10 ENCOUNTER — TELEPHONE (OUTPATIENT)
Dept: ONCOLOGY | Age: 83
End: 2023-10-10

## 2023-10-24 DIAGNOSIS — Z51.81 ENCOUNTER FOR MONITORING CARDIOTOXIC DRUG THERAPY: Primary | ICD-10-CM

## 2023-10-24 DIAGNOSIS — Z79.899 ENCOUNTER FOR MONITORING CARDIOTOXIC DRUG THERAPY: Primary | ICD-10-CM

## 2023-11-02 ENCOUNTER — TELEPHONE (OUTPATIENT)
Dept: ONCOLOGY | Age: 83
End: 2023-11-02

## 2023-11-02 NOTE — TELEPHONE ENCOUNTER
Physician provider: Damion Cain MD  Reason for today's call: Medication dosage confirmation request  Last office visit: n./a    Evelio middleton/Darline [facility] called to confirm the dosage of Pt's medication. He can be reached at: [phone] reference number is: [id number]. Fax number is: [phone]. If you need to leave a , please include callers name, title and direct line phone number and reference number.

## 2023-11-10 RX ORDER — ANASTROZOLE 1 MG/1
TABLET ORAL
Qty: 90 TABLET | Refills: 3 | OUTPATIENT
Start: 2023-11-10

## 2023-11-15 ENCOUNTER — HOSPITAL ENCOUNTER (OUTPATIENT)
Dept: INFUSION THERAPY | Age: 83
Discharge: HOME | End: 2023-11-15
Payer: MEDICARE

## 2023-11-15 VITALS — WEIGHT: 172.4 LBS | BODY MASS INDEX: 30.54 KG/M2

## 2023-11-15 DIAGNOSIS — Z17.0 MALIGNANT NEOPLASM OF OVERLAPPING SITES OF LEFT BREAST IN FEMALE, ESTROGEN RECEPTOR POSITIVE (HCC): Primary | ICD-10-CM

## 2023-11-15 DIAGNOSIS — C50.812 MALIGNANT NEOPLASM OF OVERLAPPING SITES OF LEFT BREAST IN FEMALE, ESTROGEN RECEPTOR POSITIVE (HCC): Primary | ICD-10-CM

## 2023-11-15 PROCEDURE — 96417 CHEMO IV INFUS EACH ADDL SEQ: CPT

## 2023-11-15 PROCEDURE — 6360000002 HC RX W HCPCS: Performed by: NURSE PRACTITIONER

## 2023-11-15 PROCEDURE — 96413 CHEMO IV INFUSION 1 HR: CPT

## 2023-11-15 PROCEDURE — 2580000003 HC RX 258: Performed by: NURSE PRACTITIONER

## 2023-11-15 RX ORDER — ACETAMINOPHEN 325 MG/1
650 TABLET ORAL
Status: DISCONTINUED | OUTPATIENT
Start: 2023-11-15 | End: 2023-11-16 | Stop reason: HOSPADM

## 2023-11-15 RX ORDER — MEPERIDINE HYDROCHLORIDE 25 MG/ML
12.5 INJECTION INTRAMUSCULAR; INTRAVENOUS; SUBCUTANEOUS PRN
Status: DISCONTINUED | OUTPATIENT
Start: 2023-11-15 | End: 2023-11-16 | Stop reason: HOSPADM

## 2023-11-15 RX ORDER — SODIUM CHLORIDE 9 MG/ML
5-250 INJECTION, SOLUTION INTRAVENOUS PRN
Status: DISCONTINUED | OUTPATIENT
Start: 2023-11-15 | End: 2023-11-16 | Stop reason: HOSPADM

## 2023-11-15 RX ORDER — DIPHENHYDRAMINE HYDROCHLORIDE 50 MG/ML
50 INJECTION INTRAMUSCULAR; INTRAVENOUS
Status: DISCONTINUED | OUTPATIENT
Start: 2023-11-15 | End: 2023-11-16 | Stop reason: HOSPADM

## 2023-11-15 RX ORDER — SODIUM CHLORIDE 9 MG/ML
INJECTION, SOLUTION INTRAVENOUS CONTINUOUS
Status: DISCONTINUED | OUTPATIENT
Start: 2023-11-15 | End: 2023-11-16 | Stop reason: HOSPADM

## 2023-11-15 RX ORDER — SODIUM CHLORIDE 0.9 % (FLUSH) 0.9 %
5-40 SYRINGE (ML) INJECTION PRN
Status: DISCONTINUED | OUTPATIENT
Start: 2023-11-15 | End: 2023-11-16 | Stop reason: HOSPADM

## 2023-11-15 RX ORDER — ONDANSETRON 2 MG/ML
8 INJECTION INTRAMUSCULAR; INTRAVENOUS
Status: DISCONTINUED | OUTPATIENT
Start: 2023-11-15 | End: 2023-11-16 | Stop reason: HOSPADM

## 2023-11-15 RX ORDER — ALBUTEROL SULFATE 90 UG/1
4 AEROSOL, METERED RESPIRATORY (INHALATION) PRN
Status: DISCONTINUED | OUTPATIENT
Start: 2023-11-15 | End: 2023-11-16 | Stop reason: HOSPADM

## 2023-11-15 RX ORDER — EPINEPHRINE 1 MG/ML
0.3 INJECTION, SOLUTION, CONCENTRATE INTRAVENOUS PRN
Status: DISCONTINUED | OUTPATIENT
Start: 2023-11-15 | End: 2023-11-16 | Stop reason: HOSPADM

## 2023-11-15 RX ADMIN — SODIUM CHLORIDE 651 MG: 9 INJECTION, SOLUTION INTRAVENOUS at 15:20

## 2023-11-15 RX ADMIN — SODIUM CHLORIDE, PRESERVATIVE FREE 10 ML: 5 INJECTION INTRAVENOUS at 18:00

## 2023-11-15 RX ADMIN — PERTUZUMAB 840 MG: 30 INJECTION, SOLUTION, CONCENTRATE INTRAVENOUS at 16:58

## 2023-11-15 RX ADMIN — SODIUM CHLORIDE 100 ML/HR: 9 INJECTION, SOLUTION INTRAVENOUS at 14:35

## 2023-11-15 RX ADMIN — SODIUM CHLORIDE, PRESERVATIVE FREE 10 ML: 5 INJECTION INTRAVENOUS at 14:25

## 2023-11-15 NOTE — PROCEDURES
Verbal report received from [place]. Goldy completed. PIV d/c'd. Patient discharged via wheelchair.

## 2023-11-15 NOTE — PROGRESS NOTES
Arrived to the [facility]. Labs reviewed and Trastuzumab and Perjeta still infusing. Patient tolerated well. Any issues or concerns during appointment: Per rafy Louis to proceed with treatment with ECHO from July. Patient aware of next infusion appointment on 12/5/2023 (date) at 56 (time). Patient aware of next lab and [facility] office visit on 12/5/2023 (date) at [phone] (time). Patient instructed to call provider with temperature of 100.4 or greater or nausea/vomiting/ diarrhea or pain not controlled by medications.    Transfer of care and patient report given to Sean Senior

## 2023-11-16 ENCOUNTER — TELEPHONE (OUTPATIENT)
Dept: ONCOLOGY | Age: 83
End: 2023-11-16

## 2023-11-16 NOTE — TELEPHONE ENCOUNTER
Contacted patient regarding multiple messages received today..  Requesting to move appt to a Thursday after 11am with Dr. Martinez.  Advised patient that Dr. Martinez doesn't work in clinic on Thursdays.  States she will have her caregiver call back.

## 2023-11-17 RX ORDER — CHOLESTYRAMINE 4 G/9G
POWDER, FOR SUSPENSION ORAL
Qty: 378 G | Refills: 1 | OUTPATIENT
Start: 2023-11-17

## 2023-11-17 NOTE — TELEPHONE ENCOUNTER
Patient showed up to office today and requested to moved follow up appts.. changed per her request.

## 2023-11-22 DIAGNOSIS — F51.04 CHRONIC INSOMNIA: ICD-10-CM

## 2023-11-22 RX ORDER — TEMAZEPAM 15 MG/1
15 CAPSULE ORAL NIGHTLY PRN
Qty: 30 CAPSULE | Refills: 0 | Status: SHIPPED | OUTPATIENT
Start: 2023-11-22 | End: 2023-12-22

## 2023-11-29 ENCOUNTER — TELEPHONE (OUTPATIENT)
Dept: ONCOLOGY | Age: 83
End: 2023-11-29

## 2023-11-29 NOTE — TELEPHONE ENCOUNTER
Contacted Víctor () regarding concerns about Ms. Shah upcoming appts.  Offered to move the lab appt to day before OV/Infusion to accomodate caregiver schedule.  Changed per her request to 12/12 for lab appt and then 12/13 for OV/Infusion... She was extremely grateful and requested to possibly have that appt setup in the future... Stated we could notate accomodations needed in chart.

## 2023-12-01 ENCOUNTER — CLINICAL DOCUMENTATION (OUTPATIENT)
Dept: ONCOLOGY | Age: 83
End: 2023-12-01

## 2023-12-01 NOTE — PROGRESS NOTES
In-basket message received regarding the need for an appeal for the denied PET with CT for Attenuation. Call placed to [employer] at #[phone]. Spoke with representative, Armando Prakash. Appeal request was initiated over the phone and assigned reference #[id number]. Per Armando Prakash, the  turnaround time is 72 hours. Copies of the following were faxed to #[phone] for the medical reviewers: Oncology office visit progress note dated 9/12/23, oncology treatment plan, copy of Arimidex Rx, and copy of the PET scan report from July 2023. Pending medical review. 2:20 p.m. Message received from triage stating that daughter has called and would like an update on the status of the appeal.  Patient was called at #[phone]. I spoke directly with Ms. Janette Simon and informed her that the appeal has been submitted along with medical records and that per the insurance company may take up to 72 hours to review. She was asked to relay this information to her daughter and she said that she would call her now. Ms. Flora Perez was very appreciative of the assistance that we have provided with filing the appeal.  Total call time: 3 minutes.

## 2023-12-01 NOTE — PROGRESS NOTES
Patient's daughter Sabine Thrasher) is requesting a call back with update. Informed will contact Benefits Department. 2:20 p.m. Message received from triage stating that daughter has called and would like an update on the status of the appeal.  Patient was called at #[phone]. I spoke directly with Ms. Tremayne Gonzalez and informed her that the appeal has been submitted along with medical records and that per the insurance company may take up to 72 hours to review. She was asked to relay this information to her daughter and she said that she would call her now. Ms. Daina Maicolyvette was very appreciative of the assistance that we have provided with filing the appeal.  Total call time: 3 minutes.

## 2023-12-08 ENCOUNTER — HOSPITAL ENCOUNTER (OUTPATIENT)
Dept: PET IMAGING | Age: 83
Discharge: HOME | End: 2023-12-08
Payer: MEDICARE

## 2023-12-08 DIAGNOSIS — C50.812 MALIGNANT NEOPLASM OF OVERLAPPING SITES OF LEFT BREAST IN FEMALE, ESTROGEN RECEPTOR POSITIVE (HCC): ICD-10-CM

## 2023-12-08 DIAGNOSIS — Z17.0 MALIGNANT NEOPLASM OF OVERLAPPING SITES OF LEFT BREAST IN FEMALE, ESTROGEN RECEPTOR POSITIVE (HCC): ICD-10-CM

## 2023-12-08 DIAGNOSIS — C50.812 MALIGNANT NEOPLASM OF OVERLAPPING SITES OF LEFT BREAST IN FEMALE, ESTROGEN RECEPTOR POSITIVE (HCC): Primary | ICD-10-CM

## 2023-12-08 DIAGNOSIS — Z17.0 MALIGNANT NEOPLASM OF OVERLAPPING SITES OF LEFT BREAST IN FEMALE, ESTROGEN RECEPTOR POSITIVE (HCC): Primary | ICD-10-CM

## 2023-12-08 LAB
GLUCOSE BLD STRIP.AUTO-MCNC: 81 MG/DL (ref 65–100)
SERVICE CMNT-IMP: NORMAL

## 2023-12-08 PROCEDURE — 82962 GLUCOSE BLOOD TEST: CPT

## 2023-12-08 PROCEDURE — 78815 PET IMAGE W/CT SKULL-THIGH: CPT

## 2023-12-08 PROCEDURE — 2580000003 HC RX 258: Performed by: INTERNAL MEDICINE

## 2023-12-08 PROCEDURE — A9552 F18 FDG: HCPCS | Performed by: INTERNAL MEDICINE

## 2023-12-08 PROCEDURE — 3430000000 HC RX DIAGNOSTIC RADIOPHARMACEUTICAL: Performed by: INTERNAL MEDICINE

## 2023-12-08 RX ORDER — FLUDEOXYGLUCOSE F 18 200 MCI/ML
12.31 INJECTION, SOLUTION INTRAVENOUS
Status: COMPLETED | OUTPATIENT
Start: 2023-12-08 | End: 2023-12-08

## 2023-12-08 RX ORDER — SODIUM CHLORIDE 0.9 % (FLUSH) 0.9 %
20 SYRINGE (ML) INJECTION AS NEEDED
Status: ACTIVE | OUTPATIENT
Start: 2023-12-08

## 2023-12-08 RX ADMIN — SODIUM CHLORIDE, PRESERVATIVE FREE 20 ML: 5 INJECTION INTRAVENOUS at 12:31

## 2023-12-08 RX ADMIN — FLUDEOXYGLUCOSE F 18 12.31 MILLICURIE: 200 INJECTION, SOLUTION INTRAVENOUS at 12:31

## 2023-12-12 ENCOUNTER — HOSPITAL ENCOUNTER (OUTPATIENT)
Dept: LAB | Age: 83
Discharge: HOME | End: 2023-12-15
Payer: MEDICARE

## 2023-12-12 DIAGNOSIS — Z17.0 MALIGNANT NEOPLASM OF OVERLAPPING SITES OF LEFT BREAST IN FEMALE, ESTROGEN RECEPTOR POSITIVE (HCC): ICD-10-CM

## 2023-12-12 DIAGNOSIS — C50.812 MALIGNANT NEOPLASM OF OVERLAPPING SITES OF LEFT BREAST IN FEMALE, ESTROGEN RECEPTOR POSITIVE (HCC): ICD-10-CM

## 2023-12-12 LAB
ALBUMIN SERPL-MCNC: 3.2 G/DL (ref 3.2–4.6)
ALBUMIN/GLOB SERPL: 0.9 (ref 0.4–1.6)
ALP SERPL-CCNC: 116 U/L (ref 50–136)
ALT SERPL-CCNC: 23 U/L (ref 12–65)
ANION GAP SERPL CALC-SCNC: 4 MMOL/L (ref 2–11)
AST SERPL-CCNC: 16 U/L (ref 15–37)
BASOPHILS # BLD: 0 K/UL (ref 0–0.2)
BASOPHILS NFR BLD: 0 % (ref 0–2)
BILIRUB SERPL-MCNC: 1 MG/DL (ref 0.2–1.1)
BUN SERPL-MCNC: 7 MG/DL (ref 8–23)
CALCIUM SERPL-MCNC: 9.6 MG/DL (ref 8.3–10.4)
CHLORIDE SERPL-SCNC: 111 MMOL/L (ref 103–113)
CO2 SERPL-SCNC: 28 MMOL/L (ref 21–32)
CREAT SERPL-MCNC: 0.5 MG/DL (ref 0.6–1)
DIFFERENTIAL METHOD BLD: ABNORMAL
EOSINOPHIL # BLD: 0 K/UL (ref 0–0.8)
EOSINOPHIL NFR BLD: 0 % (ref 0.5–7.8)
ERYTHROCYTE [DISTWIDTH] IN BLOOD BY AUTOMATED COUNT: 13.3 % (ref 11.9–14.6)
GLOBULIN SER CALC-MCNC: 3.4 G/DL (ref 2.8–4.5)
GLUCOSE SERPL-MCNC: 102 MG/DL (ref 65–100)
HCT VFR BLD AUTO: 45.2 % (ref 35.8–46.3)
HGB BLD-MCNC: 14.1 G/DL (ref 11.7–15.4)
IMM GRANULOCYTES # BLD AUTO: 0.1 K/UL (ref 0–0.5)
IMM GRANULOCYTES NFR BLD AUTO: 1 % (ref 0–5)
LYMPHOCYTES # BLD: 1.9 K/UL (ref 0.5–4.6)
LYMPHOCYTES NFR BLD: 25 % (ref 13–44)
MCH RBC QN AUTO: 29 PG (ref 26.1–32.9)
MCHC RBC AUTO-ENTMCNC: 31.2 G/DL (ref 31.4–35)
MCV RBC AUTO: 93 FL (ref 82–102)
MONOCYTES # BLD: 0.4 K/UL (ref 0.1–1.3)
MONOCYTES NFR BLD: 6 % (ref 4–12)
NEUTS SEG # BLD: 5.2 K/UL (ref 1.7–8.2)
NEUTS SEG NFR BLD: 68 % (ref 43–78)
NRBC # BLD: 0 K/UL (ref 0–0.2)
PLATELET # BLD AUTO: 175 K/UL (ref 150–450)
PMV BLD AUTO: 11.8 FL (ref 9.4–12.3)
POTASSIUM SERPL-SCNC: 3.5 MMOL/L (ref 3.5–5.1)
PROT SERPL-MCNC: 6.6 G/DL (ref 6.3–8.2)
RBC # BLD AUTO: 4.86 M/UL (ref 4.05–5.2)
SODIUM SERPL-SCNC: 143 MMOL/L (ref 136–146)
WBC # BLD AUTO: 7.6 K/UL (ref 4.3–11.1)

## 2023-12-12 PROCEDURE — 36415 COLL VENOUS BLD VENIPUNCTURE: CPT

## 2023-12-12 PROCEDURE — 85025 COMPLETE CBC W/AUTO DIFF WBC: CPT

## 2023-12-12 PROCEDURE — 80053 COMPREHEN METABOLIC PANEL: CPT

## 2023-12-13 ENCOUNTER — HOSPITAL ENCOUNTER (OUTPATIENT)
Dept: INFUSION THERAPY | Age: 83
Discharge: HOME | End: 2023-12-13
Payer: MEDICARE

## 2023-12-13 ENCOUNTER — OFFICE VISIT (OUTPATIENT)
Dept: ONCOLOGY | Age: 83
End: 2023-12-13
Payer: MEDICARE

## 2023-12-13 VITALS
WEIGHT: 181 LBS | TEMPERATURE: 97.4 F | HEART RATE: 45 BPM | HEIGHT: 63 IN | DIASTOLIC BLOOD PRESSURE: 89 MMHG | SYSTOLIC BLOOD PRESSURE: 176 MMHG | RESPIRATION RATE: 16 BRPM | BODY MASS INDEX: 32.07 KG/M2 | OXYGEN SATURATION: 94 %

## 2023-12-13 DIAGNOSIS — C50.812 MALIGNANT NEOPLASM OF OVERLAPPING SITES OF LEFT BREAST IN FEMALE, ESTROGEN RECEPTOR POSITIVE (HCC): Primary | ICD-10-CM

## 2023-12-13 DIAGNOSIS — Z17.0 MALIGNANT NEOPLASM OF OVERLAPPING SITES OF LEFT BREAST IN FEMALE, ESTROGEN RECEPTOR POSITIVE (HCC): Primary | ICD-10-CM

## 2023-12-13 DIAGNOSIS — J32.9 CHRONIC SINUSITIS, UNSPECIFIED LOCATION: ICD-10-CM

## 2023-12-13 PROCEDURE — 99214 OFFICE O/P EST MOD 30 MIN: CPT | Performed by: INTERNAL MEDICINE

## 2023-12-13 PROCEDURE — G8417 CALC BMI ABV UP PARAM F/U: HCPCS | Performed by: INTERNAL MEDICINE

## 2023-12-13 PROCEDURE — 3079F DIAST BP 80-89 MM HG: CPT | Performed by: INTERNAL MEDICINE

## 2023-12-13 PROCEDURE — 96413 CHEMO IV INFUSION 1 HR: CPT

## 2023-12-13 PROCEDURE — 96417 CHEMO IV INFUS EACH ADDL SEQ: CPT

## 2023-12-13 PROCEDURE — 3077F SYST BP >= 140 MM HG: CPT | Performed by: INTERNAL MEDICINE

## 2023-12-13 PROCEDURE — G8428 CUR MEDS NOT DOCUMENT: HCPCS | Performed by: INTERNAL MEDICINE

## 2023-12-13 PROCEDURE — 1123F ACP DISCUSS/DSCN MKR DOCD: CPT | Performed by: INTERNAL MEDICINE

## 2023-12-13 PROCEDURE — 2580000003 HC RX 258: Performed by: INTERNAL MEDICINE

## 2023-12-13 PROCEDURE — 1090F PRES/ABSN URINE INCON ASSESS: CPT | Performed by: INTERNAL MEDICINE

## 2023-12-13 PROCEDURE — 1036F TOBACCO NON-USER: CPT | Performed by: INTERNAL MEDICINE

## 2023-12-13 PROCEDURE — G8484 FLU IMMUNIZE NO ADMIN: HCPCS | Performed by: INTERNAL MEDICINE

## 2023-12-13 PROCEDURE — G8399 PT W/DXA RESULTS DOCUMENT: HCPCS | Performed by: INTERNAL MEDICINE

## 2023-12-13 PROCEDURE — 6360000002 HC RX W HCPCS: Performed by: INTERNAL MEDICINE

## 2023-12-13 RX ORDER — EPINEPHRINE 1 MG/ML
0.3 INJECTION, SOLUTION, CONCENTRATE INTRAVENOUS PRN
OUTPATIENT
Start: 2024-01-03

## 2023-12-13 RX ORDER — SODIUM CHLORIDE 9 MG/ML
5-250 INJECTION, SOLUTION INTRAVENOUS PRN
Status: CANCELLED | OUTPATIENT
Start: 2023-12-13

## 2023-12-13 RX ORDER — MEPERIDINE HYDROCHLORIDE 25 MG/ML
12.5 INJECTION INTRAMUSCULAR; INTRAVENOUS; SUBCUTANEOUS PRN
OUTPATIENT
Start: 2024-01-03

## 2023-12-13 RX ORDER — ONDANSETRON 2 MG/ML
8 INJECTION INTRAMUSCULAR; INTRAVENOUS
Status: CANCELLED | OUTPATIENT
Start: 2023-12-13

## 2023-12-13 RX ORDER — HEPARIN 100 UNIT/ML
500 SYRINGE INTRAVENOUS PRN
OUTPATIENT
Start: 2024-01-03

## 2023-12-13 RX ORDER — EPINEPHRINE 1 MG/ML
0.3 INJECTION, SOLUTION, CONCENTRATE INTRAVENOUS PRN
Status: DISCONTINUED | OUTPATIENT
Start: 2023-12-13 | End: 2023-12-14 | Stop reason: HOSPADM

## 2023-12-13 RX ORDER — SODIUM CHLORIDE 9 MG/ML
INJECTION, SOLUTION INTRAVENOUS CONTINUOUS
OUTPATIENT
Start: 2024-01-03

## 2023-12-13 RX ORDER — ACETAMINOPHEN 325 MG/1
650 TABLET ORAL
Status: CANCELLED | OUTPATIENT
Start: 2023-12-13

## 2023-12-13 RX ORDER — SODIUM CHLORIDE 0.9 % (FLUSH) 0.9 %
5-40 SYRINGE (ML) INJECTION PRN
Status: DISCONTINUED | OUTPATIENT
Start: 2023-12-13 | End: 2023-12-14 | Stop reason: HOSPADM

## 2023-12-13 RX ORDER — ALBUTEROL SULFATE 90 UG/1
4 AEROSOL, METERED RESPIRATORY (INHALATION) PRN
Status: DISCONTINUED | OUTPATIENT
Start: 2023-12-13 | End: 2023-12-14 | Stop reason: HOSPADM

## 2023-12-13 RX ORDER — SODIUM CHLORIDE 0.9 % (FLUSH) 0.9 %
5-40 SYRINGE (ML) INJECTION PRN
OUTPATIENT
Start: 2024-01-03

## 2023-12-13 RX ORDER — SODIUM CHLORIDE 0.9 % (FLUSH) 0.9 %
5-40 SYRINGE (ML) INJECTION PRN
Status: CANCELLED | OUTPATIENT
Start: 2023-12-13

## 2023-12-13 RX ORDER — SODIUM CHLORIDE 9 MG/ML
INJECTION, SOLUTION INTRAVENOUS CONTINUOUS
Status: CANCELLED | OUTPATIENT
Start: 2023-12-13

## 2023-12-13 RX ORDER — DIPHENHYDRAMINE HYDROCHLORIDE 50 MG/ML
50 INJECTION INTRAMUSCULAR; INTRAVENOUS
OUTPATIENT
Start: 2024-01-03

## 2023-12-13 RX ORDER — ACETAMINOPHEN 325 MG/1
650 TABLET ORAL
OUTPATIENT
Start: 2024-01-03

## 2023-12-13 RX ORDER — SODIUM CHLORIDE 9 MG/ML
5-250 INJECTION, SOLUTION INTRAVENOUS PRN
OUTPATIENT
Start: 2024-01-03

## 2023-12-13 RX ORDER — MEPERIDINE HYDROCHLORIDE 25 MG/ML
12.5 INJECTION INTRAMUSCULAR; INTRAVENOUS; SUBCUTANEOUS PRN
Status: CANCELLED | OUTPATIENT
Start: 2023-12-13

## 2023-12-13 RX ORDER — DIPHENHYDRAMINE HYDROCHLORIDE 50 MG/ML
50 INJECTION INTRAMUSCULAR; INTRAVENOUS
Status: CANCELLED | OUTPATIENT
Start: 2023-12-13

## 2023-12-13 RX ORDER — ONDANSETRON 2 MG/ML
8 INJECTION INTRAMUSCULAR; INTRAVENOUS
OUTPATIENT
Start: 2024-01-03

## 2023-12-13 RX ORDER — MEPERIDINE HYDROCHLORIDE 25 MG/ML
12.5 INJECTION INTRAMUSCULAR; INTRAVENOUS; SUBCUTANEOUS PRN
Status: DISCONTINUED | OUTPATIENT
Start: 2023-12-13 | End: 2023-12-14 | Stop reason: HOSPADM

## 2023-12-13 RX ORDER — EPINEPHRINE 1 MG/ML
0.3 INJECTION, SOLUTION, CONCENTRATE INTRAVENOUS PRN
Status: CANCELLED | OUTPATIENT
Start: 2023-12-13

## 2023-12-13 RX ORDER — SODIUM CHLORIDE 9 MG/ML
5-250 INJECTION, SOLUTION INTRAVENOUS PRN
Status: DISCONTINUED | OUTPATIENT
Start: 2023-12-13 | End: 2023-12-14 | Stop reason: HOSPADM

## 2023-12-13 RX ORDER — DIPHENHYDRAMINE HYDROCHLORIDE 50 MG/ML
50 INJECTION INTRAMUSCULAR; INTRAVENOUS
Status: DISCONTINUED | OUTPATIENT
Start: 2023-12-13 | End: 2023-12-14 | Stop reason: HOSPADM

## 2023-12-13 RX ORDER — ACETAMINOPHEN 325 MG/1
650 TABLET ORAL
Status: DISCONTINUED | OUTPATIENT
Start: 2023-12-13 | End: 2023-12-14 | Stop reason: HOSPADM

## 2023-12-13 RX ORDER — HEPARIN 100 UNIT/ML
500 SYRINGE INTRAVENOUS PRN
Status: CANCELLED | OUTPATIENT
Start: 2023-12-13

## 2023-12-13 RX ORDER — ALBUTEROL SULFATE 90 UG/1
4 AEROSOL, METERED RESPIRATORY (INHALATION) PRN
Status: CANCELLED | OUTPATIENT
Start: 2023-12-13

## 2023-12-13 RX ORDER — ALBUTEROL SULFATE 90 UG/1
4 AEROSOL, METERED RESPIRATORY (INHALATION) PRN
OUTPATIENT
Start: 2024-01-03

## 2023-12-13 RX ORDER — ONDANSETRON 2 MG/ML
8 INJECTION INTRAMUSCULAR; INTRAVENOUS
Status: DISCONTINUED | OUTPATIENT
Start: 2023-12-13 | End: 2023-12-14 | Stop reason: HOSPADM

## 2023-12-13 RX ADMIN — SODIUM CHLORIDE 483 MG: 9 INJECTION, SOLUTION INTRAVENOUS at 15:54

## 2023-12-13 RX ADMIN — SODIUM CHLORIDE 50 ML/HR: 9 INJECTION, SOLUTION INTRAVENOUS at 15:17

## 2023-12-13 RX ADMIN — PERTUZUMAB 420 MG: 30 INJECTION, SOLUTION, CONCENTRATE INTRAVENOUS at 15:19

## 2023-12-13 RX ADMIN — SODIUM CHLORIDE, PRESERVATIVE FREE 10 ML: 5 INJECTION INTRAVENOUS at 14:37

## 2023-12-13 ASSESSMENT — PATIENT HEALTH QUESTIONNAIRE - PHQ9: DEPRESSION UNABLE TO ASSESS: PT REFUSES

## 2023-12-13 NOTE — PROGRESS NOTES
Arrived to the [address] [facility]. Labs and orders reviewed. Okay to treat with current ECHO from July per MD. Herceptin, Perjeta infusion completed. Patient tolerated with some difficulty. Patient agitated easily. Hard of Hearing. Does not see well. Any issues or concerns during appointment: none. Patient aware of next infusion appointment on 1/3/24 (date) at [phone] (time). Patient aware of next lab and [facility] office visit on 1/2/24 (date) at 0930 (time). Patient instructed to call provider with temperature of 100.4 or greater or nausea/vomiting/ diarrhea or pain not controlled by medications  Discharged in a wheelchair with her aide. Angelica Buck

## 2023-12-13 NOTE — PATIENT INSTRUCTIONS
Fever of 100.5 or greater  Chills  Shortness of breath  Swelling or pain in one leg    After office hours an answering service is available and will contact a provider for emergencies or if you are experiencing any of the above symptoms. Patient did express an interest in My Chart. My Chart log in information explained on the after visit summary printout at the [address] desk.     Bruno Wellington RN

## 2023-12-26 ENCOUNTER — OFFICE VISIT (OUTPATIENT)
Dept: ENT CLINIC | Age: 83
End: 2023-12-26
Payer: MEDICARE

## 2023-12-26 VITALS — HEIGHT: 63 IN | OXYGEN SATURATION: 96 % | HEART RATE: 62 BPM | BODY MASS INDEX: 32.07 KG/M2 | WEIGHT: 181 LBS

## 2023-12-26 DIAGNOSIS — J34.89 RHINORRHEA: ICD-10-CM

## 2023-12-26 DIAGNOSIS — H91.8X3 ASYMMETRICAL HEARING LOSS: Primary | ICD-10-CM

## 2023-12-26 DIAGNOSIS — H93.8X1 POPPING OF RIGHT EAR: ICD-10-CM

## 2023-12-26 DIAGNOSIS — H93.13 TINNITUS OF BOTH EARS: ICD-10-CM

## 2023-12-26 DIAGNOSIS — J30.0 VASOMOTOR RHINITIS: ICD-10-CM

## 2023-12-26 PROCEDURE — G8399 PT W/DXA RESULTS DOCUMENT: HCPCS | Performed by: STUDENT IN AN ORGANIZED HEALTH CARE EDUCATION/TRAINING PROGRAM

## 2023-12-26 PROCEDURE — G8417 CALC BMI ABV UP PARAM F/U: HCPCS | Performed by: STUDENT IN AN ORGANIZED HEALTH CARE EDUCATION/TRAINING PROGRAM

## 2023-12-26 PROCEDURE — 1123F ACP DISCUSS/DSCN MKR DOCD: CPT | Performed by: STUDENT IN AN ORGANIZED HEALTH CARE EDUCATION/TRAINING PROGRAM

## 2023-12-26 PROCEDURE — 1090F PRES/ABSN URINE INCON ASSESS: CPT | Performed by: STUDENT IN AN ORGANIZED HEALTH CARE EDUCATION/TRAINING PROGRAM

## 2023-12-26 PROCEDURE — 31231 NASAL ENDOSCOPY DX: CPT | Performed by: STUDENT IN AN ORGANIZED HEALTH CARE EDUCATION/TRAINING PROGRAM

## 2023-12-26 PROCEDURE — G8427 DOCREV CUR MEDS BY ELIG CLIN: HCPCS | Performed by: STUDENT IN AN ORGANIZED HEALTH CARE EDUCATION/TRAINING PROGRAM

## 2023-12-26 PROCEDURE — G8484 FLU IMMUNIZE NO ADMIN: HCPCS | Performed by: STUDENT IN AN ORGANIZED HEALTH CARE EDUCATION/TRAINING PROGRAM

## 2023-12-26 PROCEDURE — 99204 OFFICE O/P NEW MOD 45 MIN: CPT | Performed by: STUDENT IN AN ORGANIZED HEALTH CARE EDUCATION/TRAINING PROGRAM

## 2023-12-26 PROCEDURE — 1036F TOBACCO NON-USER: CPT | Performed by: STUDENT IN AN ORGANIZED HEALTH CARE EDUCATION/TRAINING PROGRAM

## 2023-12-26 RX ORDER — LISINOPRIL 10 MG/1
1 TABLET ORAL DAILY
COMMUNITY
Start: 2023-10-29

## 2023-12-26 RX ORDER — ALBUTEROL SULFATE 90 UG/1
AEROSOL, METERED RESPIRATORY (INHALATION)
COMMUNITY
Start: 2023-11-28

## 2023-12-26 RX ORDER — FLUTICASONE PROPIONATE 50 MCG
SPRAY, SUSPENSION (ML) NASAL
COMMUNITY
Start: 2023-11-17

## 2023-12-26 RX ORDER — IPRATROPIUM BROMIDE 21 UG/1
2 SPRAY, METERED NASAL 2 TIMES DAILY
Qty: 30 ML | Refills: 2 | Status: SHIPPED | OUTPATIENT
Start: 2023-12-26 | End: 2024-03-25

## 2023-12-26 NOTE — PROGRESS NOTES
HPI:  Teresa Ugarte is a 81 y.o. female seen New    Chief Complaint   Patient presents with    New Patient    Sinus Problem     Patient presents for sinus problem. Patient states that her nose is constantly running. Patient states she feels pressure. Patient also states she hears crackling in her right ear worse than the other. 81-year-old female seen as a new patient referral evaluation having multiple ENT related concerns. She has had long-term rhinorrhea which has not had significant improvement with long-term Flonase and other OTC medical therapy. She also has long-term hearing loss but more recently in the last year or so she has had significantly worsening hearing on the right side as compared to the left with ongoing concerns to the right ear to include popping fullness clogged sensation. She has had a long standing concern for a ongoing ear infection as she was told by previous providers that there was an ear infection present and has been given medical therapy without improvement. She also admits to long-term tinnitus bilaterally. There has been some intermittent dizziness symptoms for the last year. Past Medical History, Past Surgical History, Family history, Social History, and Medications were all reviewed with the patient today and updated as necessary.      Allergies   Allergen Reactions    Aspirin Other (See Comments)    Ciprofloxacin Other (See Comments)    Doxycycline Other (See Comments)    Doxycycline Calcium Other (See Comments)    Fluconazole Other (See Comments)    Ibuprofen Other (See Comments)    Metformin Other (See Comments)    Nsaids Other (See Comments)    Sulfa Antibiotics Hives and Other (See Comments)    Sulfabenzamide Other (See Comments)    Sulfamethoxazole-Trimethoprim Other (See Comments)    Benzonatate Rash and Other (See Comments)    Gabapentin Anxiety and Other (See Comments)       Patient Active Problem List   Diagnosis    Mixed incontinence    Insomnia

## 2023-12-30 ENCOUNTER — HOSPITAL ENCOUNTER (EMERGENCY)
Age: 83
Discharge: HOME | End: 2023-12-30
Attending: GENERAL PRACTICE
Payer: MEDICARE

## 2023-12-30 VITALS
BODY MASS INDEX: 32.07 KG/M2 | HEART RATE: 96 BPM | WEIGHT: 181 LBS | OXYGEN SATURATION: 96 % | TEMPERATURE: 97.9 F | RESPIRATION RATE: 18 BRPM | HEIGHT: 63 IN | SYSTOLIC BLOOD PRESSURE: 177 MMHG | DIASTOLIC BLOOD PRESSURE: 69 MMHG

## 2023-12-30 DIAGNOSIS — S70.12XA CONTUSION OF LEFT THIGH, INITIAL ENCOUNTER: Primary | ICD-10-CM
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
## 2023-12-30 PROCEDURE — 99283 EMERGENCY DEPT VISIT LOW MDM: CPT

## 2023-12-30 ASSESSMENT — LIFESTYLE VARIABLES
HOW OFTEN DO YOU HAVE A DRINK CONTAINING ALCOHOL: NEVER
HOW MANY STANDARD DRINKS CONTAINING ALCOHOL DO YOU HAVE ON A TYPICAL DAY: PATIENT DOES NOT DRINK

## 2023-12-30 ASSESSMENT — PAIN - FUNCTIONAL ASSESSMENT: PAIN_FUNCTIONAL_ASSESSMENT: NONE - DENIES PAIN
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
## 2024-01-02 ENCOUNTER — HOSPITAL ENCOUNTER (OUTPATIENT)
Dept: LAB | Age: 84
Discharge: HOME | End: 2024-01-05
Payer: MEDICARE

## 2024-01-02 DIAGNOSIS — Z17.0 MALIGNANT NEOPLASM OF OVERLAPPING SITES OF LEFT BREAST IN FEMALE, ESTROGEN RECEPTOR POSITIVE (HCC): ICD-10-CM

## 2024-01-02 DIAGNOSIS — C50.812 MALIGNANT NEOPLASM OF OVERLAPPING SITES OF LEFT BREAST IN FEMALE, ESTROGEN RECEPTOR POSITIVE (HCC): ICD-10-CM

## 2024-01-02 DIAGNOSIS — Z17.0 MALIGNANT NEOPLASM OF OVERLAPPING SITES OF LEFT BREAST IN FEMALE, ESTROGEN RECEPTOR POSITIVE (HCC): Primary | ICD-10-CM

## 2024-01-02 DIAGNOSIS — C50.812 MALIGNANT NEOPLASM OF OVERLAPPING SITES OF LEFT BREAST IN FEMALE, ESTROGEN RECEPTOR POSITIVE (HCC): Primary | ICD-10-CM

## 2024-01-02 LAB
ALBUMIN SERPL-MCNC: 3.4 G/DL (ref 3.2–4.6)
ALBUMIN/GLOB SERPL: 1 (ref 0.4–1.6)
ALP SERPL-CCNC: 121 U/L (ref 50–136)
ALT SERPL-CCNC: 19 U/L (ref 12–65)
ANION GAP SERPL CALC-SCNC: 0 MMOL/L (ref 2–11)
AST SERPL-CCNC: 19 U/L (ref 15–37)
BASOPHILS # BLD: 0 K/UL (ref 0–0.2)
BASOPHILS NFR BLD: 1 % (ref 0–2)
BILIRUB SERPL-MCNC: 1.1 MG/DL (ref 0.2–1.1)
BUN SERPL-MCNC: 7 MG/DL (ref 8–23)
CALCIUM SERPL-MCNC: 9.8 MG/DL (ref 8.3–10.4)
CHLORIDE SERPL-SCNC: 113 MMOL/L (ref 103–113)
CO2 SERPL-SCNC: 27 MMOL/L (ref 21–32)
CREAT SERPL-MCNC: 0.7 MG/DL (ref 0.6–1)
DIFFERENTIAL METHOD BLD: ABNORMAL
EOSINOPHIL # BLD: 0 K/UL (ref 0–0.8)
EOSINOPHIL NFR BLD: 0 % (ref 0.5–7.8)
ERYTHROCYTE [DISTWIDTH] IN BLOOD BY AUTOMATED COUNT: 13.4 % (ref 11.9–14.6)
GLOBULIN SER CALC-MCNC: 3.3 G/DL (ref 2.8–4.5)
GLUCOSE SERPL-MCNC: 100 MG/DL (ref 65–100)
HCT VFR BLD AUTO: 47.6 % (ref 35.8–46.3)
HGB BLD-MCNC: 15 G/DL (ref 11.7–15.4)
IMM GRANULOCYTES # BLD AUTO: 0 K/UL (ref 0–0.5)
IMM GRANULOCYTES NFR BLD AUTO: 0 % (ref 0–5)
LYMPHOCYTES # BLD: 1.9 K/UL (ref 0.5–4.6)
LYMPHOCYTES NFR BLD: 30 % (ref 13–44)
MCH RBC QN AUTO: 29.1 PG (ref 26.1–32.9)
MCHC RBC AUTO-ENTMCNC: 31.5 G/DL (ref 31.4–35)
MCV RBC AUTO: 92.4 FL (ref 82–102)
MONOCYTES # BLD: 0.4 K/UL (ref 0.1–1.3)
MONOCYTES NFR BLD: 7 % (ref 4–12)
NEUTS SEG # BLD: 3.9 K/UL (ref 1.7–8.2)
NEUTS SEG NFR BLD: 63 % (ref 43–78)
NRBC # BLD: 0 K/UL (ref 0–0.2)
PLATELET # BLD AUTO: 188 K/UL (ref 150–450)
PMV BLD AUTO: 11.3 FL (ref 9.4–12.3)
POTASSIUM SERPL-SCNC: 3.8 MMOL/L (ref 3.5–5.1)
PROT SERPL-MCNC: 6.7 G/DL (ref 6.3–8.2)
RBC # BLD AUTO: 5.15 M/UL (ref 4.05–5.2)
SODIUM SERPL-SCNC: 140 MMOL/L (ref 136–146)
WBC # BLD AUTO: 6.2 K/UL (ref 4.3–11.1)

## 2024-01-02 PROCEDURE — 36415 COLL VENOUS BLD VENIPUNCTURE: CPT

## 2024-01-02 PROCEDURE — 80053 COMPREHEN METABOLIC PANEL: CPT

## 2024-01-02 PROCEDURE — 85025 COMPLETE CBC W/AUTO DIFF WBC: CPT

## 2024-01-03 ENCOUNTER — HOSPITAL ENCOUNTER (OUTPATIENT)
Dept: INFUSION THERAPY | Age: 84
Discharge: HOME | End: 2024-01-03
Payer: MEDICARE

## 2024-01-03 VITALS
SYSTOLIC BLOOD PRESSURE: 171 MMHG | WEIGHT: 170 LBS | RESPIRATION RATE: 18 BRPM | DIASTOLIC BLOOD PRESSURE: 82 MMHG | OXYGEN SATURATION: 96 % | HEART RATE: 52 BPM | TEMPERATURE: 98.2 F | BODY MASS INDEX: 30.11 KG/M2

## 2024-01-03 DIAGNOSIS — C50.812 MALIGNANT NEOPLASM OF OVERLAPPING SITES OF LEFT BREAST IN FEMALE, ESTROGEN RECEPTOR POSITIVE (HCC): Primary | ICD-10-CM

## 2024-01-03 DIAGNOSIS — Z17.0 MALIGNANT NEOPLASM OF OVERLAPPING SITES OF LEFT BREAST IN FEMALE, ESTROGEN RECEPTOR POSITIVE (HCC): Primary | ICD-10-CM

## 2024-01-03 PROCEDURE — 96413 CHEMO IV INFUSION 1 HR: CPT

## 2024-01-03 PROCEDURE — 96417 CHEMO IV INFUS EACH ADDL SEQ: CPT

## 2024-01-03 PROCEDURE — 6360000002 HC RX W HCPCS: Performed by: INTERNAL MEDICINE

## 2024-01-03 PROCEDURE — 2580000003 HC RX 258: Performed by: INTERNAL MEDICINE

## 2024-01-03 RX ORDER — DIPHENHYDRAMINE HYDROCHLORIDE 50 MG/ML
50 INJECTION INTRAMUSCULAR; INTRAVENOUS
Status: DISCONTINUED | OUTPATIENT
Start: 2024-01-03 | End: 2024-01-04 | Stop reason: HOSPADM

## 2024-01-03 RX ORDER — ALBUTEROL SULFATE 90 UG/1
4 AEROSOL, METERED RESPIRATORY (INHALATION) PRN
Status: DISCONTINUED | OUTPATIENT
Start: 2024-01-03 | End: 2024-01-04 | Stop reason: HOSPADM

## 2024-01-03 RX ORDER — ONDANSETRON 2 MG/ML
8 INJECTION INTRAMUSCULAR; INTRAVENOUS
Status: DISCONTINUED | OUTPATIENT
Start: 2024-01-03 | End: 2024-01-04 | Stop reason: HOSPADM

## 2024-01-03 RX ORDER — SODIUM CHLORIDE 0.9 % (FLUSH) 0.9 %
5-40 SYRINGE (ML) INJECTION PRN
Status: DISCONTINUED | OUTPATIENT
Start: 2024-01-03 | End: 2024-01-04 | Stop reason: HOSPADM

## 2024-01-03 RX ORDER — SODIUM CHLORIDE 9 MG/ML
5-250 INJECTION, SOLUTION INTRAVENOUS PRN
Status: DISCONTINUED | OUTPATIENT
Start: 2024-01-03 | End: 2024-01-04 | Stop reason: HOSPADM

## 2024-01-03 RX ORDER — EPINEPHRINE 1 MG/ML
0.3 INJECTION, SOLUTION, CONCENTRATE INTRAVENOUS PRN
Status: DISCONTINUED | OUTPATIENT
Start: 2024-01-03 | End: 2024-01-04 | Stop reason: HOSPADM

## 2024-01-03 RX ORDER — MEPERIDINE HYDROCHLORIDE 25 MG/ML
12.5 INJECTION INTRAMUSCULAR; INTRAVENOUS; SUBCUTANEOUS PRN
Status: DISCONTINUED | OUTPATIENT
Start: 2024-01-03 | End: 2024-01-04 | Stop reason: HOSPADM

## 2024-01-03 RX ORDER — ACETAMINOPHEN 325 MG/1
650 TABLET ORAL
Status: DISCONTINUED | OUTPATIENT
Start: 2024-01-03 | End: 2024-01-04 | Stop reason: HOSPADM

## 2024-01-03 RX ADMIN — SODIUM CHLORIDE 483 MG: 9 INJECTION, SOLUTION INTRAVENOUS at 15:12

## 2024-01-03 RX ADMIN — SODIUM CHLORIDE 50 ML/HR: 9 INJECTION, SOLUTION INTRAVENOUS at 14:50

## 2024-01-03 RX ADMIN — SODIUM CHLORIDE, PRESERVATIVE FREE 10 ML: 5 INJECTION INTRAVENOUS at 14:48

## 2024-01-03 RX ADMIN — PERTUZUMAB 420 MG: 30 INJECTION, SOLUTION, CONCENTRATE INTRAVENOUS at 15:47

## 2024-01-03 NOTE — PROGRESS NOTES
Arrived to the Infusion Center.  Herceptin and Perjeta infusions completed. Patient tolerated the infusion but is irritable at times. Patient has a caretaker that accompanies her.   Any issues or concerns during appointment: Patient insisted that her IV be in her hand, stating that this is the \"ONLY vein that will work\" .  Patient aware of next infusion appointment on 01/23/2024 (date) at 1300 (time).  Patient aware of next lab and [facility] office visit on 01/23/2024 (date) at 1230 (time).  Patient instructed to call provider with temperature of 100.4 or greater or nausea/vomiting/ diarrhea or pain not controlled by medications  Discharged in a wheelchair with her caretaker.

## 2024-01-08 ENCOUNTER — APPOINTMENT (OUTPATIENT)
Dept: CT IMAGING | Age: 84
End: 2024-01-08
Payer: MEDICARE

## 2024-01-08 ENCOUNTER — HOSPITAL ENCOUNTER (EMERGENCY)
Age: 84
Discharge: HOME | End: 2024-01-09
Attending: EMERGENCY MEDICINE
Payer: MEDICARE

## 2024-01-08 DIAGNOSIS — B34.9 VIRAL SYNDROME: Primary | ICD-10-CM

## 2024-01-08 LAB
ALBUMIN SERPL-MCNC: 3.2 G/DL (ref 3.2–4.6)
ALBUMIN/GLOB SERPL: 1.1 (ref 0.4–1.6)
ALP SERPL-CCNC: 103 U/L (ref 50–136)
ALT SERPL-CCNC: 17 U/L (ref 12–65)
ANION GAP SERPL CALC-SCNC: 1 MMOL/L (ref 2–11)
AST SERPL-CCNC: 18 U/L (ref 15–37)
BACTERIA URNS QL MICRO: NEGATIVE /HPF
BASOPHILS # BLD: 0 K/UL (ref 0–0.2)
BASOPHILS NFR BLD: 0 % (ref 0–2)
BILIRUB SERPL-MCNC: 0.6 MG/DL (ref 0.2–1.1)
BUN SERPL-MCNC: 11 MG/DL (ref 8–23)
CALCIUM SERPL-MCNC: 9.4 MG/DL (ref 8.3–10.4)
CASTS URNS QL MICRO: ABNORMAL /LPF
CHLORIDE SERPL-SCNC: 110 MMOL/L (ref 103–113)
CO2 SERPL-SCNC: 29 MMOL/L (ref 21–32)
CREAT SERPL-MCNC: 0.7 MG/DL (ref 0.6–1)
DIFFERENTIAL METHOD BLD: ABNORMAL
EOSINOPHIL # BLD: 0 K/UL (ref 0–0.8)
EOSINOPHIL NFR BLD: 0 % (ref 0.5–7.8)
EPI CELLS #/AREA URNS HPF: ABNORMAL /HPF
ERYTHROCYTE [DISTWIDTH] IN BLOOD BY AUTOMATED COUNT: 13.2 % (ref 11.9–14.6)
FLUAV RNA SPEC QL NAA+PROBE: NOT DETECTED
FLUBV RNA SPEC QL NAA+PROBE: NOT DETECTED
GLOBULIN SER CALC-MCNC: 2.9 G/DL (ref 2.8–4.5)
GLUCOSE SERPL-MCNC: 92 MG/DL (ref 65–100)
HCT VFR BLD AUTO: 44.9 % (ref 35.8–46.3)
HGB BLD-MCNC: 14.2 G/DL (ref 11.7–15.4)
IMM GRANULOCYTES # BLD AUTO: 0 K/UL (ref 0–0.5)
IMM GRANULOCYTES NFR BLD AUTO: 0 % (ref 0–5)
LIPASE SERPL-CCNC: 112 U/L (ref 73–393)
LYMPHOCYTES # BLD: 2 K/UL (ref 0.5–4.6)
LYMPHOCYTES NFR BLD: 30 % (ref 13–44)
MCH RBC QN AUTO: 29.6 PG (ref 26.1–32.9)
MCHC RBC AUTO-ENTMCNC: 31.6 G/DL (ref 31.4–35)
MCV RBC AUTO: 93.5 FL (ref 82–102)
MONOCYTES # BLD: 0.5 K/UL (ref 0.1–1.3)
MONOCYTES NFR BLD: 8 % (ref 4–12)
NEUTS SEG # BLD: 4.3 K/UL (ref 1.7–8.2)
NEUTS SEG NFR BLD: 62 % (ref 43–78)
NRBC # BLD: 0 K/UL (ref 0–0.2)
PLATELET # BLD AUTO: 187 K/UL (ref 150–450)
PMV BLD AUTO: 11.6 FL (ref 9.4–12.3)
POTASSIUM SERPL-SCNC: 3.7 MMOL/L (ref 3.5–5.1)
PROT SERPL-MCNC: 6.1 G/DL (ref 6.3–8.2)
RBC # BLD AUTO: 4.8 M/UL (ref 4.05–5.2)
RBC #/AREA URNS HPF: ABNORMAL /HPF
SARS-COV-2 RDRP RESP QL NAA+PROBE: NOT DETECTED
SODIUM SERPL-SCNC: 140 MMOL/L (ref 136–146)
SOURCE: NORMAL
TROPONIN I SERPL HS-MCNC: 58.1 PG/ML (ref 0–14)
TROPONIN I SERPL HS-MCNC: 62.6 PG/ML (ref 0–14)
WBC # BLD AUTO: 6.9 K/UL (ref 4.3–11.1)
WBC URNS QL MICRO: ABNORMAL /HPF

## 2024-01-08 PROCEDURE — 83690 ASSAY OF LIPASE: CPT

## 2024-01-08 PROCEDURE — 87502 INFLUENZA DNA AMP PROBE: CPT

## 2024-01-08 PROCEDURE — 81001 URINALYSIS AUTO W/SCOPE: CPT

## 2024-01-08 PROCEDURE — 81003 URINALYSIS AUTO W/O SCOPE: CPT

## 2024-01-08 PROCEDURE — 85025 COMPLETE CBC W/AUTO DIFF WBC: CPT

## 2024-01-08 PROCEDURE — 84484 ASSAY OF TROPONIN QUANT: CPT

## 2024-01-08 PROCEDURE — 81015 MICROSCOPIC EXAM OF URINE: CPT

## 2024-01-08 PROCEDURE — 99283 EMERGENCY DEPT VISIT LOW MDM: CPT

## 2024-01-08 PROCEDURE — 80053 COMPREHEN METABOLIC PANEL: CPT

## 2024-01-08 PROCEDURE — 87635 SARS-COV-2 COVID-19 AMP PRB: CPT

## 2024-01-08 PROCEDURE — 87086 URINE CULTURE/COLONY COUNT: CPT

## 2024-01-08 ASSESSMENT — ENCOUNTER SYMPTOMS: NAUSEA: 1

## 2024-01-08 ASSESSMENT — PAIN SCALES - GENERAL: PAINLEVEL_OUTOF10: 0

## 2024-01-08 ASSESSMENT — PAIN - FUNCTIONAL ASSESSMENT: PAIN_FUNCTIONAL_ASSESSMENT: NONE - DENIES PAIN

## 2024-01-09 VITALS
HEIGHT: 64 IN | WEIGHT: 170 LBS | OXYGEN SATURATION: 94 % | DIASTOLIC BLOOD PRESSURE: 95 MMHG | BODY MASS INDEX: 29.02 KG/M2 | HEART RATE: 60 BPM | RESPIRATION RATE: 18 BRPM | SYSTOLIC BLOOD PRESSURE: 213 MMHG | TEMPERATURE: 97.8 F

## 2024-01-09 LAB
BILIRUB UR QL: NEGATIVE
GLUCOSE UR QL STRIP.AUTO: NEGATIVE MG/DL
KETONES UR-MCNC: NEGATIVE MG/DL
LEUKOCYTE ESTERASE UR QL STRIP: ABNORMAL
NITRITE UR QL: NEGATIVE
PH UR: 6.5 (ref 5–9)
PROT UR QL: NEGATIVE MG/DL
RBC # UR STRIP: NEGATIVE
SP GR UR: 1.01 (ref 1–1.02)
UROBILINOGEN UR QL: 1 EU/DL (ref 0.2–1)

## 2024-01-09 NOTE — ED NOTES
Dr. Mathis notified of bp of 213/95 and he approves of discharge     Sudhir Starks, VICKY  01/09/24 0059

## 2024-01-09 NOTE — ED TRIAGE NOTES
Patient coming from home via EMS    Patient complains of feeling \"sick to my stomach\" after eating and nasal drainage.     Patient also complains of a ear infection that was diagnosed few months ago and patient states she has not taken antibiotics.    Patient denies any pain or other symptoms at this time

## 2024-01-09 NOTE — ED NOTES
I have reviewed discharge instructions with the patient.  The patient verbalized understanding.    Patient left ED via Discharge Method: ambulance to Home with [employer]    Opportunity for questions and clarification provided.       Patient given 0 scripts.         To continue your aftercare when you leave the hospital, you may receive an automated call from our care team to check in on how you are doing.  This is a free service and part of our promise to provide the best care and service to meet your aftercare needs.” If you have questions, or wish to unsubscribe from this service please call [phone].  Thank you for Choosing our [facility] Emergency Department.        Sudhir Starks, RN  01/09/24 0027

## 2024-01-09 NOTE — DISCHARGE INSTRUCTIONS
We would love to help you get a primary care doctor for follow-up after your emergency department visit.      Please call [phone] between 7AM - 6PM Monday to Friday.  A care navigator will be able to assist you with setting up a doctor close to your home.

## 2024-01-09 NOTE — ED PROVIDER NOTES
Emergency Department Provider Note       PCP: Yousif Davis MD   Age: 83 y.o.   Sex: female     DISPOSITION Decision To Discharge 01/09/2024 12:03:00 AM       ICD-10-CM    1. Viral syndrome  B34.9           Medical Decision Making     Complexity of Problems Addressed:  1 or more acute illnesses that pose a threat to life or bodily function.     Data Reviewed and Analyzed:   I independently ordered and reviewed each unique test.  I reviewed external records: provider visit note from PCP.                 Discussion of management or test interpretation.    Patient is a 83-year-old female who presents with congestion for the last few days.  Patient states at times when she eats she gets sick to her stomach with nausea but has no specific abdominal pain.  Patient states has been tolerating liquids well and has had normal urine output.  Patient denies any chest pain or chest pressure shortness of breath or nausea vomiting or cough or wheeze.  Patient does have a history of breast CA on chemotherapy.  Patient has a history of chronic sinus symptoms.  Patient also has a history of asthma, arthritis, CKD, GERD, HLD, HTN    Differential diagnosis includes was not limited to COVID, flu.    Patient's physical dam is unremarkable.  Patient declined EKG as well as declined CT abdomen pelvis.  Patient currently has no abdominal pain and denies any chest pain or chest pressure or shortness of breath.  Patient's cardiac enzymes remain flat with no significant delta.  Patient states she never had any cardiac issue today and denies any complaints other than nasal congestion.  We will DC home and have patient follow-up.  Patient is allergic to multiple antibiotics and states she currently has no pain with urination.  Will send a urine culture.  Will give patient information in order to obtain a primary care doctor.      Risk of Complications and/or Morbidity of Patient Management:  Shared medical decision making was utilized in

## 2024-01-11 LAB
BACTERIA SPEC CULT: NORMAL
SERVICE CMNT-IMP: NORMAL

## 2024-01-23 ENCOUNTER — HOSPITAL ENCOUNTER (OUTPATIENT)
Dept: LAB | Age: 84
Discharge: HOME | End: 2024-01-26
Payer: MEDICARE

## 2024-01-23 DIAGNOSIS — Z17.0 MALIGNANT NEOPLASM OF OVERLAPPING SITES OF LEFT BREAST IN FEMALE, ESTROGEN RECEPTOR POSITIVE (HCC): Primary | ICD-10-CM

## 2024-01-23 DIAGNOSIS — C50.812 MALIGNANT NEOPLASM OF OVERLAPPING SITES OF LEFT BREAST IN FEMALE, ESTROGEN RECEPTOR POSITIVE (HCC): ICD-10-CM

## 2024-01-23 DIAGNOSIS — C50.812 MALIGNANT NEOPLASM OF OVERLAPPING SITES OF LEFT BREAST IN FEMALE, ESTROGEN RECEPTOR POSITIVE (HCC): Primary | ICD-10-CM

## 2024-01-23 DIAGNOSIS — Z17.0 MALIGNANT NEOPLASM OF OVERLAPPING SITES OF LEFT BREAST IN FEMALE, ESTROGEN RECEPTOR POSITIVE (HCC): ICD-10-CM

## 2024-01-23 LAB
ALBUMIN SERPL-MCNC: 3.5 G/DL (ref 3.2–4.6)
ALBUMIN/GLOB SERPL: 1.1 (ref 0.4–1.6)
ALP SERPL-CCNC: 118 U/L (ref 50–136)
ALT SERPL-CCNC: 20 U/L (ref 12–65)
ANION GAP SERPL CALC-SCNC: 3 MMOL/L (ref 2–11)
AST SERPL-CCNC: 16 U/L (ref 15–37)
BASOPHILS # BLD: 0 K/UL (ref 0–0.2)
BASOPHILS NFR BLD: 0 % (ref 0–2)
BILIRUB SERPL-MCNC: 0.9 MG/DL (ref 0.2–1.1)
BUN SERPL-MCNC: 9 MG/DL (ref 8–23)
CALCIUM SERPL-MCNC: 9.7 MG/DL (ref 8.3–10.4)
CHLORIDE SERPL-SCNC: 112 MMOL/L (ref 103–113)
CO2 SERPL-SCNC: 28 MMOL/L (ref 21–32)
CREAT SERPL-MCNC: 0.8 MG/DL (ref 0.6–1)
DIFFERENTIAL METHOD BLD: ABNORMAL
EOSINOPHIL # BLD: 0.2 K/UL (ref 0–0.8)
EOSINOPHIL NFR BLD: 3 % (ref 0.5–7.8)
ERYTHROCYTE [DISTWIDTH] IN BLOOD BY AUTOMATED COUNT: 13.4 % (ref 11.9–14.6)
GLOBULIN SER CALC-MCNC: 3.3 G/DL (ref 2.8–4.5)
GLUCOSE SERPL-MCNC: 97 MG/DL (ref 65–100)
HCT VFR BLD AUTO: 48.3 % (ref 35.8–46.3)
HGB BLD-MCNC: 15.2 G/DL (ref 11.7–15.4)
IMM GRANULOCYTES # BLD AUTO: 0 K/UL (ref 0–0.5)
IMM GRANULOCYTES NFR BLD AUTO: 0 % (ref 0–5)
LYMPHOCYTES # BLD: 1.9 K/UL (ref 0.5–4.6)
LYMPHOCYTES NFR BLD: 28 % (ref 13–44)
MCH RBC QN AUTO: 29 PG (ref 26.1–32.9)
MCHC RBC AUTO-ENTMCNC: 31.5 G/DL (ref 31.4–35)
MCV RBC AUTO: 92.2 FL (ref 82–102)
MONOCYTES # BLD: 0.4 K/UL (ref 0.1–1.3)
MONOCYTES NFR BLD: 6 % (ref 4–12)
NEUTS SEG # BLD: 4.2 K/UL (ref 1.7–8.2)
NEUTS SEG NFR BLD: 63 % (ref 43–78)
NRBC # BLD: 0 K/UL (ref 0–0.2)
PLATELET # BLD AUTO: 204 K/UL (ref 150–450)
PMV BLD AUTO: 11.4 FL (ref 9.4–12.3)
POTASSIUM SERPL-SCNC: 3.8 MMOL/L (ref 3.5–5.1)
PROT SERPL-MCNC: 6.8 G/DL (ref 6.3–8.2)
RBC # BLD AUTO: 5.24 M/UL (ref 4.05–5.2)
SODIUM SERPL-SCNC: 143 MMOL/L (ref 136–146)
WBC # BLD AUTO: 6.7 K/UL (ref 4.3–11.1)

## 2024-01-23 PROCEDURE — 80053 COMPREHEN METABOLIC PANEL: CPT

## 2024-01-23 PROCEDURE — 36415 COLL VENOUS BLD VENIPUNCTURE: CPT

## 2024-01-23 PROCEDURE — 85025 COMPLETE CBC W/AUTO DIFF WBC: CPT

## 2024-01-24 ENCOUNTER — OFFICE VISIT (OUTPATIENT)
Dept: ONCOLOGY | Age: 84
End: 2024-01-24
Payer: MEDICARE

## 2024-01-24 ENCOUNTER — HOSPITAL ENCOUNTER (OUTPATIENT)
Dept: INFUSION THERAPY | Age: 84
Discharge: HOME | End: 2024-01-24
Payer: MEDICARE

## 2024-01-24 VITALS
WEIGHT: 175 LBS | DIASTOLIC BLOOD PRESSURE: 85 MMHG | OXYGEN SATURATION: 96 % | HEART RATE: 59 BPM | TEMPERATURE: 97.8 F | BODY MASS INDEX: 31.01 KG/M2 | HEIGHT: 63 IN | RESPIRATION RATE: 18 BRPM | SYSTOLIC BLOOD PRESSURE: 147 MMHG

## 2024-01-24 DIAGNOSIS — R53.82 CHRONIC FATIGUE: ICD-10-CM

## 2024-01-24 DIAGNOSIS — C50.812 MALIGNANT NEOPLASM OF OVERLAPPING SITES OF LEFT BREAST IN FEMALE, ESTROGEN RECEPTOR POSITIVE (HCC): Primary | ICD-10-CM

## 2024-01-24 DIAGNOSIS — Z79.811 LONG TERM (CURRENT) USE OF AROMATASE INHIBITORS: ICD-10-CM

## 2024-01-24 DIAGNOSIS — C77.3 SECONDARY AND UNSPECIFIED MALIGNANT NEOPLASM OF AXILLA AND UPPER LIMB LYMPH NODES (HCC): ICD-10-CM

## 2024-01-24 DIAGNOSIS — Z17.0 MALIGNANT NEOPLASM OF OVERLAPPING SITES OF LEFT BREAST IN FEMALE, ESTROGEN RECEPTOR POSITIVE (HCC): Primary | ICD-10-CM

## 2024-01-24 PROCEDURE — 3077F SYST BP >= 140 MM HG: CPT

## 2024-01-24 PROCEDURE — 96417 CHEMO IV INFUS EACH ADDL SEQ: CPT

## 2024-01-24 PROCEDURE — G8417 CALC BMI ABV UP PARAM F/U: HCPCS

## 2024-01-24 PROCEDURE — 96413 CHEMO IV INFUSION 1 HR: CPT

## 2024-01-24 PROCEDURE — G8484 FLU IMMUNIZE NO ADMIN: HCPCS

## 2024-01-24 PROCEDURE — 3079F DIAST BP 80-89 MM HG: CPT

## 2024-01-24 PROCEDURE — 6360000002 HC RX W HCPCS

## 2024-01-24 PROCEDURE — G8427 DOCREV CUR MEDS BY ELIG CLIN: HCPCS

## 2024-01-24 PROCEDURE — 1090F PRES/ABSN URINE INCON ASSESS: CPT

## 2024-01-24 PROCEDURE — 1123F ACP DISCUSS/DSCN MKR DOCD: CPT

## 2024-01-24 PROCEDURE — 1036F TOBACCO NON-USER: CPT

## 2024-01-24 PROCEDURE — 2580000003 HC RX 258

## 2024-01-24 PROCEDURE — 99214 OFFICE O/P EST MOD 30 MIN: CPT

## 2024-01-24 PROCEDURE — G8399 PT W/DXA RESULTS DOCUMENT: HCPCS

## 2024-01-24 RX ORDER — ACETAMINOPHEN 325 MG/1
650 TABLET ORAL
Status: CANCELLED | OUTPATIENT
Start: 2024-01-24

## 2024-01-24 RX ORDER — DIPHENHYDRAMINE HYDROCHLORIDE 50 MG/ML
50 INJECTION INTRAMUSCULAR; INTRAVENOUS
Status: CANCELLED | OUTPATIENT
Start: 2024-01-24

## 2024-01-24 RX ORDER — MEPERIDINE HYDROCHLORIDE 25 MG/ML
12.5 INJECTION INTRAMUSCULAR; INTRAVENOUS; SUBCUTANEOUS PRN
Status: CANCELLED | OUTPATIENT
Start: 2024-01-24

## 2024-01-24 RX ORDER — MEPERIDINE HYDROCHLORIDE 25 MG/ML
12.5 INJECTION INTRAMUSCULAR; INTRAVENOUS; SUBCUTANEOUS PRN
Status: DISCONTINUED | OUTPATIENT
Start: 2024-01-24 | End: 2024-01-25 | Stop reason: HOSPADM

## 2024-01-24 RX ORDER — DIPHENHYDRAMINE HYDROCHLORIDE 50 MG/ML
50 INJECTION INTRAMUSCULAR; INTRAVENOUS
Status: DISCONTINUED | OUTPATIENT
Start: 2024-01-24 | End: 2024-01-25 | Stop reason: HOSPADM

## 2024-01-24 RX ORDER — ONDANSETRON 2 MG/ML
8 INJECTION INTRAMUSCULAR; INTRAVENOUS
Status: DISCONTINUED | OUTPATIENT
Start: 2024-01-24 | End: 2024-01-25 | Stop reason: HOSPADM

## 2024-01-24 RX ORDER — SODIUM CHLORIDE 0.9 % (FLUSH) 0.9 %
5-40 SYRINGE (ML) INJECTION PRN
Status: DISCONTINUED | OUTPATIENT
Start: 2024-01-24 | End: 2024-01-25 | Stop reason: HOSPADM

## 2024-01-24 RX ORDER — EPINEPHRINE 1 MG/ML
0.3 INJECTION, SOLUTION, CONCENTRATE INTRAVENOUS PRN
Status: DISCONTINUED | OUTPATIENT
Start: 2024-01-24 | End: 2024-01-25 | Stop reason: HOSPADM

## 2024-01-24 RX ORDER — ALBUTEROL SULFATE 90 UG/1
4 AEROSOL, METERED RESPIRATORY (INHALATION) PRN
Status: DISCONTINUED | OUTPATIENT
Start: 2024-01-24 | End: 2024-01-25 | Stop reason: HOSPADM

## 2024-01-24 RX ORDER — HEPARIN 100 UNIT/ML
500 SYRINGE INTRAVENOUS PRN
Status: CANCELLED | OUTPATIENT
Start: 2024-01-24

## 2024-01-24 RX ORDER — ALBUTEROL SULFATE 90 UG/1
4 AEROSOL, METERED RESPIRATORY (INHALATION) PRN
Status: CANCELLED | OUTPATIENT
Start: 2024-01-24

## 2024-01-24 RX ORDER — ONDANSETRON 2 MG/ML
8 INJECTION INTRAMUSCULAR; INTRAVENOUS
Status: CANCELLED | OUTPATIENT
Start: 2024-01-24

## 2024-01-24 RX ORDER — SODIUM CHLORIDE 9 MG/ML
5-250 INJECTION, SOLUTION INTRAVENOUS PRN
Status: CANCELLED | OUTPATIENT
Start: 2024-01-24

## 2024-01-24 RX ORDER — SODIUM CHLORIDE 0.9 % (FLUSH) 0.9 %
5-40 SYRINGE (ML) INJECTION PRN
Status: CANCELLED | OUTPATIENT
Start: 2024-01-24

## 2024-01-24 RX ORDER — SODIUM CHLORIDE 9 MG/ML
5-250 INJECTION, SOLUTION INTRAVENOUS PRN
Status: DISCONTINUED | OUTPATIENT
Start: 2024-01-24 | End: 2024-01-25 | Stop reason: HOSPADM

## 2024-01-24 RX ORDER — SODIUM CHLORIDE 9 MG/ML
INJECTION, SOLUTION INTRAVENOUS CONTINUOUS
Status: CANCELLED | OUTPATIENT
Start: 2024-01-24

## 2024-01-24 RX ORDER — ACETAMINOPHEN 325 MG/1
650 TABLET ORAL
Status: DISCONTINUED | OUTPATIENT
Start: 2024-01-24 | End: 2024-01-25 | Stop reason: HOSPADM

## 2024-01-24 RX ORDER — EPINEPHRINE 1 MG/ML
0.3 INJECTION, SOLUTION, CONCENTRATE INTRAVENOUS PRN
Status: CANCELLED | OUTPATIENT
Start: 2024-01-24

## 2024-01-24 RX ADMIN — SODIUM CHLORIDE, PRESERVATIVE FREE 10 ML: 5 INJECTION INTRAVENOUS at 14:35

## 2024-01-24 RX ADMIN — SODIUM CHLORIDE 50 ML/HR: 9 INJECTION, SOLUTION INTRAVENOUS at 15:05

## 2024-01-24 RX ADMIN — SODIUM CHLORIDE 483 MG: 9 INJECTION, SOLUTION INTRAVENOUS at 15:05

## 2024-01-24 RX ADMIN — PERTUZUMAB 420 MG: 30 INJECTION, SOLUTION, CONCENTRATE INTRAVENOUS at 15:38

## 2024-01-24 NOTE — PROGRESS NOTES
[facility] Hematology and Oncology: Office Visit Established Patient     Chief Complaint:    Chief Complaint   Patient presents with    Follow-up      History of Present Illness:  Ms. Huerta  is a 82 y.o. female who presents  today for follow-up regarding breast cancer.  She presented to [facility] for evaluation of a palpable, painful lump in her left breast. Bilateral diagnostic digital mammography and left breast ultrasound identified an irregular mass, very  suspicious for malignancy located at the 1:30 position in the posterior left breast; a second smaller mass also suspicious for malignancy at the 6:30 position in the left breast; a dysmorphic left axillary lymph node suspicious for metastatic disease;  and innumerable new microcalcifications occupying much of the lateral left breast, suspicious for associated DCIS. Recommended core needle biopsies and marker clip placements for the palpable 2.3 cm posterior 1:30 mass, the 1.8 cm posterior 6:30 nodule,  and a dystrophic axillary lymph node were performed on 4/7/20. 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  She was referred to [facility], per [facility], for oncology evaluation and treatment of newly diagnosed breast cancer.  We recommended

## 2024-01-24 NOTE — PROGRESS NOTES
Arrived to the Infusion Center. Herceptin/Perjeta infusions completed. Patient tolerated well.   Any issues or concerns during appointment: patient reported \"knots\" in her arm where previous peripheral IV's have been plcaed for treatment. NP notified and per NP ok to treat if patient agrees. Patient agreed to proceed with IV placement and treatment.  Patient aware of next infusion appointment on 2/14/2024 (date) at 1330 (time).  Patient aware of next lab and [facility] office visit on 3/6/2024 (date) at 1300 (time).  Patient instructed to call provider with temperature of 100.4 or greater or nausea/vomiting/ diarrhea or pain not controlled by medications  Discharged via wheelchair w/caregiver.

## 2024-02-03 ENCOUNTER — APPOINTMENT (OUTPATIENT)
Dept: CT IMAGING | Age: 84
End: 2024-02-03
Payer: MEDICARE

## 2024-02-03 ENCOUNTER — HOSPITAL ENCOUNTER (EMERGENCY)
Age: 84
Discharge: HOME | End: 2024-02-04
Attending: EMERGENCY MEDICINE
Payer: MEDICARE

## 2024-02-03 DIAGNOSIS — R42 DIZZINESS: Primary | ICD-10-CM

## 2024-02-03 DIAGNOSIS — I10 HYPERTENSION, UNSPECIFIED TYPE: ICD-10-CM

## 2024-02-03 LAB
APPEARANCE UR: CLEAR
BACTERIA URNS QL MICRO: NEGATIVE /HPF
BILIRUB UR QL: NEGATIVE
CASTS URNS QL MICRO: ABNORMAL /LPF
COLOR UR: ABNORMAL
EPI CELLS #/AREA URNS HPF: ABNORMAL /HPF
GLUCOSE UR STRIP.AUTO-MCNC: NEGATIVE MG/DL
HGB UR QL STRIP: NEGATIVE
KETONES UR QL STRIP.AUTO: NEGATIVE MG/DL
LEUKOCYTE ESTERASE UR QL STRIP.AUTO: ABNORMAL
NITRITE UR QL STRIP.AUTO: NEGATIVE
PH UR STRIP: 7.5 (ref 5–9)
PROT UR STRIP-MCNC: NEGATIVE MG/DL
RBC #/AREA URNS HPF: ABNORMAL /HPF
SP GR UR REFRACTOMETRY: <1.005 (ref 1–1.02)
UROBILINOGEN UR QL STRIP.AUTO: 0.2 EU/DL (ref 0.2–1)
WBC URNS QL MICRO: ABNORMAL /HPF

## 2024-02-03 PROCEDURE — 81001 URINALYSIS AUTO W/SCOPE: CPT

## 2024-02-03 PROCEDURE — 99284 EMERGENCY DEPT VISIT MOD MDM: CPT

## 2024-02-03 PROCEDURE — 70450 CT HEAD/BRAIN W/O DYE: CPT

## 2024-02-03 PROCEDURE — 87086 URINE CULTURE/COLONY COUNT: CPT

## 2024-02-03 ASSESSMENT — ENCOUNTER SYMPTOMS
EYE REDNESS: 0
BACK PAIN: 0
CHEST TIGHTNESS: 0
ABDOMINAL PAIN: 0
SHORTNESS OF BREATH: 0
APNEA: 0
VOMITING: 0
COLOR CHANGE: 0

## 2024-02-03 ASSESSMENT — PAIN - FUNCTIONAL ASSESSMENT: PAIN_FUNCTIONAL_ASSESSMENT: NONE - DENIES PAIN

## 2024-02-04 VITALS
WEIGHT: 190 LBS | BODY MASS INDEX: 34.96 KG/M2 | RESPIRATION RATE: 18 BRPM | TEMPERATURE: 98.2 F | HEIGHT: 62 IN | SYSTOLIC BLOOD PRESSURE: 177 MMHG | DIASTOLIC BLOOD PRESSURE: 94 MMHG | OXYGEN SATURATION: 92 % | HEART RATE: 61 BPM

## 2024-02-04 ASSESSMENT — PAIN - FUNCTIONAL ASSESSMENT: PAIN_FUNCTIONAL_ASSESSMENT: NONE - DENIES PAIN

## 2024-02-04 NOTE — ED NOTES
Pt refusing IV and lab work. States she needs to go to the RR. Refusing purewick and bedpan. Advised that this was best as she was here for dizziness. States \"I'm not dizzy and I don't need all of this. I have to pee.\" Dr. Huerta consulted and pt able to get out of bed to wheelchair for RR. Pt placed back in bed, states she does not need monitoring, IV, or blood work. Requests an ambulance home.      Marguerite Ramírez, RN  02/03/24 2043

## 2024-02-04 NOTE — ED NOTES
I have reviewed discharge instructions with the patient.  The patient verbalized understanding.    Patient left ED via Discharge Method: ambulatory to Home with self.    Opportunity for questions and clarification provided.       Patient given 0 scripts.         To continue your aftercare when you leave the hospital, you may receive an automated call from our care team to check in on how you are doing.  This is a free service and part of our promise to provide the best care and service to meet your aftercare needs.” If you have questions, or wish to unsubscribe from this service please call [phone].  Thank you for Choosing our [facility] Emergency Department.        Marguerite Ramírez, RN  02/04/24 0021

## 2024-02-04 NOTE — ED PROVIDER NOTES
Emergency Department Provider Note       PCP: Yousif Davis MD   Age: 84 y.o.   Sex: female     DISPOSITION Decision To Discharge 02/03/2024 11:06:13 PM       ICD-10-CM    1. Dizziness  R42       2. Hypertension, unspecified type  I10           Medical Decision Making     Complexity of Problems Addressed:  1 or more chronic illnesses with a severe exacerbation or progression.  1 or more acute illnesses that pose a threat to life or bodily function.     Data Reviewed and Analyzed:  I independently ordered and reviewed each unique test.  I reviewed external records: ED visit note from an outside group.  I reviewed external records: provider visit note from PCP.  I reviewed external records: provider visit note from outside specialist.  I reviewed external records: previous lab results from outside ED.  I reviewed external records: previous imaging study including radiologist interpretation.       I independently interpreted the cardiac monitor rhythm strip normal sinus rhythm.  I interpreted the CT Scan no acute intracranial bleed.    Discussion of management or test interpretation.  No focal neurological deficits including no nystagmus elicited on exam however patient is significantly hypertensive.  Therefore I will obtain CT scan of head.  Check basic labs.    11:07 PM EST  Patient is adamant that she does not want laboratory data performed.  She has a CT scan of her head shows no acute pathology.  Her urinalysis is without any gross signs of infection.  She is still hypertensive here.  States that her blood pressure is elevated because \"she just got upset\".  Appears to possess clear decision-making capacity.  She states she is ready to be discharged home.    Patient will be allowed to sign out AGAINST MEDICAL ADVICE            Risk of Complications and/or Morbidity of Patient Management:  Patient was discharged risks and benefits of hospitalization were considered.        History       Patient presents the ER

## 2024-02-04 NOTE — DISCHARGE INSTRUCTIONS
As I discussed, you offered further evaluation for the cause of your significant hypertension as well as treatment.  You refused.  I encourage you to follow-up with your primary care physician  You may return to the ER anytime for further evaluation and treatment

## 2024-02-06 LAB
BACTERIA SPEC CULT: NORMAL
BACTERIA SPEC CULT: NORMAL
SERVICE CMNT-IMP: NORMAL

## 2024-02-13 ENCOUNTER — HOSPITAL ENCOUNTER (OUTPATIENT)
Dept: LAB | Age: 84
Discharge: HOME | End: 2024-02-16

## 2024-02-13 LAB
ALBUMIN SERPL-MCNC: 3.2 G/DL (ref 3.2–4.6)
ALBUMIN/GLOB SERPL: 1 (ref 0.4–1.6)
ALP SERPL-CCNC: 120 U/L (ref 50–136)
ALT SERPL-CCNC: 17 U/L (ref 12–65)
ANION GAP SERPL CALC-SCNC: 5 MMOL/L (ref 2–11)
AST SERPL-CCNC: 17 U/L (ref 15–37)
BASOPHILS # BLD: 0 K/UL (ref 0–0.2)
BASOPHILS NFR BLD: 1 % (ref 0–2)
BILIRUB SERPL-MCNC: 0.8 MG/DL (ref 0.2–1.1)
BUN SERPL-MCNC: 9 MG/DL (ref 8–23)
CALCIUM SERPL-MCNC: 9.6 MG/DL (ref 8.3–10.4)
CHLORIDE SERPL-SCNC: 110 MMOL/L (ref 103–113)
CO2 SERPL-SCNC: 27 MMOL/L (ref 21–32)
CREAT SERPL-MCNC: 0.7 MG/DL (ref 0.6–1)
DIFFERENTIAL METHOD BLD: ABNORMAL
EOSINOPHIL # BLD: 0 K/UL (ref 0–0.8)
EOSINOPHIL NFR BLD: 0 % (ref 0.5–7.8)
ERYTHROCYTE [DISTWIDTH] IN BLOOD BY AUTOMATED COUNT: 13.7 % (ref 11.9–14.6)
GLOBULIN SER CALC-MCNC: 3.2 G/DL (ref 2.8–4.5)
GLUCOSE SERPL-MCNC: 112 MG/DL (ref 65–100)
HCT VFR BLD AUTO: 47.3 % (ref 35.8–46.3)
HGB BLD-MCNC: 15.2 G/DL (ref 11.7–15.4)
IMM GRANULOCYTES # BLD AUTO: 0 K/UL (ref 0–0.5)
IMM GRANULOCYTES NFR BLD AUTO: 0 % (ref 0–5)
LYMPHOCYTES # BLD: 1.9 K/UL (ref 0.5–4.6)
LYMPHOCYTES NFR BLD: 34 % (ref 13–44)
MCH RBC QN AUTO: 29.3 PG (ref 26.1–32.9)
MCHC RBC AUTO-ENTMCNC: 32.1 G/DL (ref 31.4–35)
MCV RBC AUTO: 91.3 FL (ref 82–102)
MONOCYTES # BLD: 0.4 K/UL (ref 0.1–1.3)
MONOCYTES NFR BLD: 7 % (ref 4–12)
NEUTS SEG # BLD: 3.3 K/UL (ref 1.7–8.2)
NEUTS SEG NFR BLD: 58 % (ref 43–78)
NRBC # BLD: 0 K/UL (ref 0–0.2)
PLATELET # BLD AUTO: 219 K/UL (ref 150–450)
PMV BLD AUTO: 11.1 FL (ref 9.4–12.3)
POTASSIUM SERPL-SCNC: 3.7 MMOL/L (ref 3.5–5.1)
PROT SERPL-MCNC: 6.4 G/DL (ref 6.3–8.2)
RBC # BLD AUTO: 5.18 M/UL (ref 4.05–5.2)
SODIUM SERPL-SCNC: 142 MMOL/L (ref 136–146)
WBC # BLD AUTO: 5.6 K/UL (ref 4.3–11.1)

## 2024-02-13 PROCEDURE — 85025 COMPLETE CBC W/AUTO DIFF WBC: CPT

## 2024-02-13 PROCEDURE — 36415 COLL VENOUS BLD VENIPUNCTURE: CPT

## 2024-02-13 PROCEDURE — 80053 COMPREHEN METABOLIC PANEL: CPT

## 2024-02-14 ENCOUNTER — HOSPITAL ENCOUNTER (OUTPATIENT)
Dept: INFUSION THERAPY | Age: 84
Discharge: HOME | End: 2024-02-14
Payer: MEDICARE

## 2024-02-14 VITALS
SYSTOLIC BLOOD PRESSURE: 154 MMHG | DIASTOLIC BLOOD PRESSURE: 78 MMHG | BODY MASS INDEX: 32.01 KG/M2 | WEIGHT: 175 LBS | RESPIRATION RATE: 16 BRPM | HEART RATE: 57 BPM | TEMPERATURE: 97.9 F

## 2024-02-14 DIAGNOSIS — Z17.0 MALIGNANT NEOPLASM OF OVERLAPPING SITES OF LEFT BREAST IN FEMALE, ESTROGEN RECEPTOR POSITIVE (HCC): Primary | ICD-10-CM

## 2024-02-14 DIAGNOSIS — C50.812 MALIGNANT NEOPLASM OF OVERLAPPING SITES OF LEFT BREAST IN FEMALE, ESTROGEN RECEPTOR POSITIVE (HCC): Primary | ICD-10-CM

## 2024-02-14 PROCEDURE — 2580000003 HC RX 258: Performed by: INTERNAL MEDICINE

## 2024-02-14 PROCEDURE — 96417 CHEMO IV INFUS EACH ADDL SEQ: CPT

## 2024-02-14 PROCEDURE — 6360000002 HC RX W HCPCS: Performed by: INTERNAL MEDICINE

## 2024-02-14 PROCEDURE — 96413 CHEMO IV INFUSION 1 HR: CPT

## 2024-02-14 RX ORDER — MEPERIDINE HYDROCHLORIDE 25 MG/ML
12.5 INJECTION INTRAMUSCULAR; INTRAVENOUS; SUBCUTANEOUS PRN
Status: CANCELLED | OUTPATIENT
Start: 2024-02-14

## 2024-02-14 RX ORDER — SODIUM CHLORIDE 9 MG/ML
INJECTION, SOLUTION INTRAVENOUS CONTINUOUS
Status: CANCELLED | OUTPATIENT
Start: 2024-02-14

## 2024-02-14 RX ORDER — SODIUM CHLORIDE 0.9 % (FLUSH) 0.9 %
5-40 SYRINGE (ML) INJECTION PRN
Status: DISCONTINUED | OUTPATIENT
Start: 2024-02-14 | End: 2024-02-15 | Stop reason: HOSPADM

## 2024-02-14 RX ORDER — HEPARIN 100 UNIT/ML
500 SYRINGE INTRAVENOUS PRN
Status: CANCELLED | OUTPATIENT
Start: 2024-02-14

## 2024-02-14 RX ORDER — ALBUTEROL SULFATE 90 UG/1
4 AEROSOL, METERED RESPIRATORY (INHALATION) PRN
Status: CANCELLED | OUTPATIENT
Start: 2024-02-14

## 2024-02-14 RX ORDER — ACETAMINOPHEN 325 MG/1
650 TABLET ORAL
Status: CANCELLED | OUTPATIENT
Start: 2024-02-14

## 2024-02-14 RX ORDER — SODIUM CHLORIDE 0.9 % (FLUSH) 0.9 %
5-40 SYRINGE (ML) INJECTION PRN
Status: CANCELLED | OUTPATIENT
Start: 2024-02-14

## 2024-02-14 RX ORDER — EPINEPHRINE 1 MG/ML
0.3 INJECTION, SOLUTION, CONCENTRATE INTRAVENOUS PRN
Status: CANCELLED | OUTPATIENT
Start: 2024-02-14

## 2024-02-14 RX ORDER — DIPHENHYDRAMINE HYDROCHLORIDE 50 MG/ML
50 INJECTION INTRAMUSCULAR; INTRAVENOUS
Status: CANCELLED | OUTPATIENT
Start: 2024-02-14

## 2024-02-14 RX ORDER — DIPHENHYDRAMINE HYDROCHLORIDE 50 MG/ML
50 INJECTION INTRAMUSCULAR; INTRAVENOUS
Status: DISCONTINUED | OUTPATIENT
Start: 2024-02-14 | End: 2024-02-15 | Stop reason: HOSPADM

## 2024-02-14 RX ORDER — SODIUM CHLORIDE 9 MG/ML
5-250 INJECTION, SOLUTION INTRAVENOUS PRN
Status: CANCELLED | OUTPATIENT
Start: 2024-02-14

## 2024-02-14 RX ORDER — ONDANSETRON 2 MG/ML
8 INJECTION INTRAMUSCULAR; INTRAVENOUS
Status: CANCELLED | OUTPATIENT
Start: 2024-02-14

## 2024-02-14 RX ORDER — SODIUM CHLORIDE 9 MG/ML
5-250 INJECTION, SOLUTION INTRAVENOUS PRN
Status: DISCONTINUED | OUTPATIENT
Start: 2024-02-14 | End: 2024-02-15 | Stop reason: HOSPADM

## 2024-02-14 RX ADMIN — SODIUM CHLORIDE, PRESERVATIVE FREE 10 ML: 5 INJECTION INTRAVENOUS at 15:49

## 2024-02-14 RX ADMIN — SODIUM CHLORIDE 483 MG: 9 INJECTION, SOLUTION INTRAVENOUS at 14:31

## 2024-02-14 RX ADMIN — PERTUZUMAB 420 MG: 30 INJECTION, SOLUTION, CONCENTRATE INTRAVENOUS at 15:09

## 2024-02-14 RX ADMIN — SODIUM CHLORIDE, PRESERVATIVE FREE 10 ML: 5 INJECTION INTRAVENOUS at 13:50

## 2024-02-14 RX ADMIN — SODIUM CHLORIDE 25 ML/HR: 9 INJECTION, SOLUTION INTRAVENOUS at 14:28

## 2024-02-14 NOTE — PROGRESS NOTES
Arrived to the Infusion Center.  Trazimera/perjeta completed. Patient tolerated well, pt declined to stay the entire 30 min post-perjeta observation.   Any issues or concerns during appointment: none.  Patient aware of next lab/[facility]/infusion appointments on 3/4-3/6.  Patient instructed to call provider with temperature of 100.4 or greater or nausea/vomiting/ diarrhea or pain not controlled by medications  Discharged via WC with caregiver.

## 2024-03-05 ENCOUNTER — HOSPITAL ENCOUNTER (OUTPATIENT)
Dept: LAB | Age: 84
Discharge: HOME | End: 2024-03-08
Payer: MEDICARE

## 2024-03-05 DIAGNOSIS — Z17.0 MALIGNANT NEOPLASM OF OVERLAPPING SITES OF LEFT BREAST IN FEMALE, ESTROGEN RECEPTOR POSITIVE (HCC): Primary | ICD-10-CM

## 2024-03-05 DIAGNOSIS — Z17.0 MALIGNANT NEOPLASM OF OVERLAPPING SITES OF LEFT BREAST IN FEMALE, ESTROGEN RECEPTOR POSITIVE (HCC): ICD-10-CM

## 2024-03-05 DIAGNOSIS — C50.812 MALIGNANT NEOPLASM OF OVERLAPPING SITES OF LEFT BREAST IN FEMALE, ESTROGEN RECEPTOR POSITIVE (HCC): ICD-10-CM

## 2024-03-05 DIAGNOSIS — C50.812 MALIGNANT NEOPLASM OF OVERLAPPING SITES OF LEFT BREAST IN FEMALE, ESTROGEN RECEPTOR POSITIVE (HCC): Primary | ICD-10-CM

## 2024-03-05 LAB
ALBUMIN SERPL-MCNC: 3.2 G/DL (ref 3.2–4.6)
ALBUMIN/GLOB SERPL: 1 (ref 0.4–1.6)
ALP SERPL-CCNC: 122 U/L (ref 50–136)
ALT SERPL-CCNC: 18 U/L (ref 12–65)
ANION GAP SERPL CALC-SCNC: 5 MMOL/L (ref 2–11)
AST SERPL-CCNC: 20 U/L (ref 15–37)
BASOPHILS # BLD: 0 K/UL (ref 0–0.2)
BASOPHILS NFR BLD: 1 % (ref 0–2)
BILIRUB SERPL-MCNC: 1 MG/DL (ref 0.2–1.1)
BUN SERPL-MCNC: 9 MG/DL (ref 8–23)
CALCIUM SERPL-MCNC: 9.5 MG/DL (ref 8.3–10.4)
CHLORIDE SERPL-SCNC: 110 MMOL/L (ref 103–113)
CO2 SERPL-SCNC: 27 MMOL/L (ref 21–32)
CREAT SERPL-MCNC: 0.7 MG/DL (ref 0.6–1)
DIFFERENTIAL METHOD BLD: ABNORMAL
EOSINOPHIL # BLD: 0 K/UL (ref 0–0.8)
EOSINOPHIL NFR BLD: 0 % (ref 0.5–7.8)
ERYTHROCYTE [DISTWIDTH] IN BLOOD BY AUTOMATED COUNT: 13.7 % (ref 11.9–14.6)
GLOBULIN SER CALC-MCNC: 3.2 G/DL (ref 2.8–4.5)
GLUCOSE SERPL-MCNC: 132 MG/DL (ref 65–100)
HCT VFR BLD AUTO: 44.2 % (ref 35.8–46.3)
HGB BLD-MCNC: 14 G/DL (ref 11.7–15.4)
IMM GRANULOCYTES # BLD AUTO: 0 K/UL (ref 0–0.5)
IMM GRANULOCYTES NFR BLD AUTO: 0 % (ref 0–5)
LYMPHOCYTES # BLD: 2 K/UL (ref 0.5–4.6)
LYMPHOCYTES NFR BLD: 30 % (ref 13–44)
MCH RBC QN AUTO: 29.1 PG (ref 26.1–32.9)
MCHC RBC AUTO-ENTMCNC: 31.7 G/DL (ref 31.4–35)
MCV RBC AUTO: 91.9 FL (ref 82–102)
MONOCYTES # BLD: 0.4 K/UL (ref 0.1–1.3)
MONOCYTES NFR BLD: 6 % (ref 4–12)
NEUTS SEG # BLD: 4.2 K/UL (ref 1.7–8.2)
NEUTS SEG NFR BLD: 63 % (ref 43–78)
NRBC # BLD: 0 K/UL (ref 0–0.2)
PLATELET # BLD AUTO: 179 K/UL (ref 150–450)
PMV BLD AUTO: 11.5 FL (ref 9.4–12.3)
POTASSIUM SERPL-SCNC: 3.7 MMOL/L (ref 3.5–5.1)
PROT SERPL-MCNC: 6.4 G/DL (ref 6.3–8.2)
RBC # BLD AUTO: 4.81 M/UL (ref 4.05–5.2)
SODIUM SERPL-SCNC: 142 MMOL/L (ref 136–146)
WBC # BLD AUTO: 6.6 K/UL (ref 4.3–11.1)

## 2024-03-05 PROCEDURE — 80053 COMPREHEN METABOLIC PANEL: CPT

## 2024-03-05 PROCEDURE — 36415 COLL VENOUS BLD VENIPUNCTURE: CPT

## 2024-03-05 PROCEDURE — 85025 COMPLETE CBC W/AUTO DIFF WBC: CPT

## 2024-03-06 ENCOUNTER — HOSPITAL ENCOUNTER (OUTPATIENT)
Dept: INFUSION THERAPY | Age: 84
Discharge: HOME | End: 2024-03-06
Payer: MEDICARE

## 2024-03-06 DIAGNOSIS — C50.812 MALIGNANT NEOPLASM OF OVERLAPPING SITES OF LEFT BREAST IN FEMALE, ESTROGEN RECEPTOR POSITIVE (HCC): Primary | ICD-10-CM

## 2024-03-06 DIAGNOSIS — Z17.0 MALIGNANT NEOPLASM OF OVERLAPPING SITES OF LEFT BREAST IN FEMALE, ESTROGEN RECEPTOR POSITIVE (HCC): Primary | ICD-10-CM

## 2024-03-06 PROCEDURE — 96417 CHEMO IV INFUS EACH ADDL SEQ: CPT

## 2024-03-06 PROCEDURE — 96413 CHEMO IV INFUSION 1 HR: CPT

## 2024-03-06 PROCEDURE — 2580000003 HC RX 258: Performed by: INTERNAL MEDICINE

## 2024-03-06 PROCEDURE — 6360000002 HC RX W HCPCS: Performed by: INTERNAL MEDICINE

## 2024-03-06 RX ORDER — EPINEPHRINE 1 MG/ML
0.3 INJECTION, SOLUTION, CONCENTRATE INTRAVENOUS PRN
Status: DISCONTINUED | OUTPATIENT
Start: 2024-03-06 | End: 2024-03-07 | Stop reason: HOSPADM

## 2024-03-06 RX ORDER — ONDANSETRON 2 MG/ML
8 INJECTION INTRAMUSCULAR; INTRAVENOUS
Status: DISCONTINUED | OUTPATIENT
Start: 2024-03-06 | End: 2024-03-07 | Stop reason: HOSPADM

## 2024-03-06 RX ORDER — MEPERIDINE HYDROCHLORIDE 25 MG/ML
12.5 INJECTION INTRAMUSCULAR; INTRAVENOUS; SUBCUTANEOUS PRN
Status: DISCONTINUED | OUTPATIENT
Start: 2024-03-06 | End: 2024-03-07 | Stop reason: HOSPADM

## 2024-03-06 RX ORDER — SODIUM CHLORIDE 0.9 % (FLUSH) 0.9 %
5-40 SYRINGE (ML) INJECTION PRN
Status: DISCONTINUED | OUTPATIENT
Start: 2024-03-06 | End: 2024-03-07 | Stop reason: HOSPADM

## 2024-03-06 RX ORDER — ACETAMINOPHEN 325 MG/1
650 TABLET ORAL
Status: DISCONTINUED | OUTPATIENT
Start: 2024-03-06 | End: 2024-03-07 | Stop reason: HOSPADM

## 2024-03-06 RX ORDER — DIPHENHYDRAMINE HYDROCHLORIDE 50 MG/ML
50 INJECTION INTRAMUSCULAR; INTRAVENOUS
Status: DISCONTINUED | OUTPATIENT
Start: 2024-03-06 | End: 2024-03-07 | Stop reason: HOSPADM

## 2024-03-06 RX ORDER — ALBUTEROL SULFATE 90 UG/1
4 AEROSOL, METERED RESPIRATORY (INHALATION) PRN
Status: DISCONTINUED | OUTPATIENT
Start: 2024-03-06 | End: 2024-03-07 | Stop reason: HOSPADM

## 2024-03-06 RX ORDER — SODIUM CHLORIDE 9 MG/ML
5-250 INJECTION, SOLUTION INTRAVENOUS PRN
Status: DISCONTINUED | OUTPATIENT
Start: 2024-03-06 | End: 2024-03-07 | Stop reason: HOSPADM

## 2024-03-06 RX ADMIN — SODIUM CHLORIDE 483 MG: 9 INJECTION, SOLUTION INTRAVENOUS at 15:52

## 2024-03-06 RX ADMIN — SODIUM CHLORIDE, PRESERVATIVE FREE 10 ML: 5 INJECTION INTRAVENOUS at 15:20

## 2024-03-06 RX ADMIN — SODIUM CHLORIDE 50 ML/HR: 9 INJECTION, SOLUTION INTRAVENOUS at 15:27

## 2024-03-06 RX ADMIN — PERTUZUMAB 420 MG: 30 INJECTION, SOLUTION, CONCENTRATE INTRAVENOUS at 16:27

## 2024-03-06 NOTE — PROGRESS NOTES
Arrived to the Infusion Center. Labs and orders reviewed.  Herceptin/Perjeta infusion completed. Patient tolerated well.   Any issues or concerns during appointment: Patient can be loud and can get agitated easily.   Patient aware of next infusion appointment on 03/26/2024 (date) at 1500 (time).  Patient aware of next lab and [facility] office visit on 03/26/2024 (date) at 1230 (time).  Patient instructed to call provider with temperature of 100.4 or greater or nausea/vomiting/ diarrhea or pain not controlled by medications  Discharged in a wheelchair with her caretaker.

## 2024-03-11 ENCOUNTER — HOSPITAL ENCOUNTER (EMERGENCY)
Age: 84
Discharge: HOME | End: 2024-03-12
Payer: MEDICARE

## 2024-03-11 VITALS
SYSTOLIC BLOOD PRESSURE: 163 MMHG | DIASTOLIC BLOOD PRESSURE: 65 MMHG | HEIGHT: 63 IN | TEMPERATURE: 98 F | OXYGEN SATURATION: 95 % | WEIGHT: 173 LBS | HEART RATE: 57 BPM | BODY MASS INDEX: 30.65 KG/M2 | RESPIRATION RATE: 18 BRPM

## 2024-03-11 DIAGNOSIS — G89.29 CHRONIC PAIN OF BOTH KNEES: Primary | ICD-10-CM

## 2024-03-11 DIAGNOSIS — M25.561 CHRONIC PAIN OF BOTH KNEES: Primary | ICD-10-CM

## 2024-03-11 DIAGNOSIS — M25.562 CHRONIC PAIN OF BOTH KNEES: Primary | ICD-10-CM

## 2024-03-11 LAB
BILIRUB UR QL: NEGATIVE
GLUCOSE UR QL STRIP.AUTO: NEGATIVE MG/DL
KETONES UR-MCNC: NEGATIVE MG/DL
LEUKOCYTE ESTERASE UR QL STRIP: NEGATIVE
NITRITE UR QL: NEGATIVE
PH UR: 7 (ref 5–9)
PROT UR QL: NEGATIVE MG/DL
RBC # UR STRIP: NEGATIVE
SERVICE CMNT-IMP: NORMAL
SP GR UR: 1.01 (ref 1–1.02)
UROBILINOGEN UR QL: 0.2 EU/DL (ref 0.2–1)

## 2024-03-11 PROCEDURE — 81003 URINALYSIS AUTO W/O SCOPE: CPT

## 2024-03-11 PROCEDURE — 99283 EMERGENCY DEPT VISIT LOW MDM: CPT

## 2024-03-11 ASSESSMENT — PAIN SCALES - GENERAL: PAINLEVEL_OUTOF10: 0

## 2024-03-11 ASSESSMENT — PAIN - FUNCTIONAL ASSESSMENT: PAIN_FUNCTIONAL_ASSESSMENT: 0-10

## 2024-03-11 ASSESSMENT — LIFESTYLE VARIABLES
HOW MANY STANDARD DRINKS CONTAINING ALCOHOL DO YOU HAVE ON A TYPICAL DAY: PATIENT DOES NOT DRINK
HOW OFTEN DO YOU HAVE A DRINK CONTAINING ALCOHOL: NEVER

## 2024-03-12 ASSESSMENT — ENCOUNTER SYMPTOMS
SHORTNESS OF BREATH: 0
CHEST TIGHTNESS: 0
NAUSEA: 0
ABDOMINAL PAIN: 0
DIARRHEA: 0
VOMITING: 0

## 2024-03-12 NOTE — ED PROVIDER NOTES
mouth every morning (before breakfast)    LISINOPRIL (PRINIVIL;ZESTRIL) 10 MG TABLET    Take 1 tablet by mouth daily    SITAGLIPTIN (JANUVIA) 100 MG TABLET    TAKE 1 TABLET BY MOUTH EVERY DAY        Results for orders placed or performed during the hospital encounter of 03/11/24   POCT Urinalysis no Micro   Result Value Ref Range    Specific Gravity, Urine, POC 1.015 1.001 - 1.023      pH, Urine, POC 7.0 5.0 - 9.0      Protein, Urine, POC Negative NEG mg/dL    Glucose, UA POC Negative NEG mg/dL    Ketones, Urine, POC Negative NEG mg/dL    Bilirubin, Urine, POC Negative NEG      Blood, UA POC Negative NEG      URINE UROBILINOGEN POC 0.2 0.2 - 1.0 EU/dL    Nitrite, Urine, POC Negative NEG      Leukocyte Est, UA POC Negative NEG      Performed by: Marguerite Ramírez          No orders to display                No results for input(s): \"COVID19\" in the last 72 hours.    Voice dictation software was used during the making of this note.  This software is not perfect and grammatical and other typographical errors may be present.  This note has not been completely proofread for errors.       Tianna Duarte, APRN - CNP  03/12/24 0103

## 2024-03-12 NOTE — ED TRIAGE NOTES
Pt bib [facility] from home with complaint of bilateral knee pain. Has hx of arthritis and wants to be checked out.

## 2024-03-12 NOTE — ED NOTES
I have reviewed discharge instructions with the patient.  The patient verbalized understanding.    Patient left ED via Discharge Method: stretcher to Home with [employer].    Opportunity for questions and clarification provided.       Patient given 0 scripts.         To continue your aftercare when you leave the hospital, you may receive an automated call from our care team to check in on how you are doing.  This is a free service and part of our promise to provide the best care and service to meet your aftercare needs.” If you have questions, or wish to unsubscribe from this service please call [phone].  Thank you for Choosing our [facility] Emergency Department.        Marguerite Ramírez, RN  03/12/24 0108

## 2024-03-17 ENCOUNTER — APPOINTMENT (OUTPATIENT)
Dept: GENERAL RADIOLOGY | Age: 84
End: 2024-03-17
Payer: MEDICARE

## 2024-03-17 ENCOUNTER — HOSPITAL ENCOUNTER (EMERGENCY)
Age: 84
Discharge: HOME | End: 2024-03-17
Attending: STUDENT IN AN ORGANIZED HEALTH CARE EDUCATION/TRAINING PROGRAM
Payer: MEDICARE

## 2024-03-17 VITALS
TEMPERATURE: 97.5 F | BODY MASS INDEX: 30.65 KG/M2 | WEIGHT: 173 LBS | HEART RATE: 60 BPM | SYSTOLIC BLOOD PRESSURE: 160 MMHG | RESPIRATION RATE: 16 BRPM | HEIGHT: 63 IN | DIASTOLIC BLOOD PRESSURE: 99 MMHG | OXYGEN SATURATION: 98 %

## 2024-03-17 DIAGNOSIS — M17.12 PRIMARY OSTEOARTHRITIS OF LEFT KNEE: Primary | ICD-10-CM

## 2024-03-17 PROCEDURE — 6360000002 HC RX W HCPCS: Performed by: STUDENT IN AN ORGANIZED HEALTH CARE EDUCATION/TRAINING PROGRAM

## 2024-03-17 PROCEDURE — 73562 X-RAY EXAM OF KNEE 3: CPT

## 2024-03-17 PROCEDURE — 96372 THER/PROPH/DIAG INJ SC/IM: CPT

## 2024-03-17 PROCEDURE — 99284 EMERGENCY DEPT VISIT MOD MDM: CPT

## 2024-03-17 RX ORDER — KETOROLAC TROMETHAMINE 30 MG/ML
30 INJECTION, SOLUTION INTRAMUSCULAR; INTRAVENOUS ONCE
Status: COMPLETED | OUTPATIENT
Start: 2024-03-17 | End: 2024-03-17

## 2024-03-17 RX ORDER — NAPROXEN 500 MG/1
500 TABLET ORAL 2 TIMES DAILY WITH MEALS
Qty: 60 TABLET | Refills: 5 | Status: SHIPPED | OUTPATIENT
Start: 2024-03-17

## 2024-03-17 RX ORDER — HYDROCODONE BITARTRATE AND ACETAMINOPHEN 5; 325 MG/1; MG/1
1 TABLET ORAL EVERY 6 HOURS PRN
Qty: 8 TABLET | Refills: 0 | Status: SHIPPED | OUTPATIENT
Start: 2024-03-17 | End: 2024-03-22

## 2024-03-17 RX ORDER — DEXAMETHASONE SODIUM PHOSPHATE 10 MG/ML
10 INJECTION INTRAMUSCULAR; INTRAVENOUS ONCE
Status: COMPLETED | OUTPATIENT
Start: 2024-03-17 | End: 2024-03-17

## 2024-03-17 RX ORDER — PREDNISONE 20 MG/1
40 TABLET ORAL DAILY
Qty: 10 TABLET | Refills: 0 | Status: SHIPPED | OUTPATIENT
Start: 2024-03-17 | End: 2024-03-22

## 2024-03-17 RX ADMIN — KETOROLAC TROMETHAMINE 30 MG: 30 INJECTION, SOLUTION INTRAMUSCULAR at 19:26

## 2024-03-17 RX ADMIN — DEXAMETHASONE SODIUM PHOSPHATE 10 MG: 10 INJECTION INTRAMUSCULAR; INTRAVENOUS at 19:26

## 2024-03-17 ASSESSMENT — PAIN DESCRIPTION - PAIN TYPE: TYPE: CHRONIC PAIN

## 2024-03-17 ASSESSMENT — PAIN DESCRIPTION - ORIENTATION
ORIENTATION: LEFT
ORIENTATION: LEFT

## 2024-03-17 ASSESSMENT — PAIN - FUNCTIONAL ASSESSMENT
PAIN_FUNCTIONAL_ASSESSMENT: PREVENTS OR INTERFERES SOME ACTIVE ACTIVITIES AND ADLS
PAIN_FUNCTIONAL_ASSESSMENT: 0-10
PAIN_FUNCTIONAL_ASSESSMENT: 0-10

## 2024-03-17 ASSESSMENT — PAIN DESCRIPTION - FREQUENCY: FREQUENCY: CONTINUOUS

## 2024-03-17 ASSESSMENT — PAIN DESCRIPTION - ONSET: ONSET: ON-GOING

## 2024-03-17 ASSESSMENT — PAIN DESCRIPTION - LOCATION
LOCATION: KNEE
LOCATION: KNEE

## 2024-03-17 ASSESSMENT — PAIN DESCRIPTION - DESCRIPTORS
DESCRIPTORS: SHARP
DESCRIPTORS: ACHING;SHARP

## 2024-03-17 ASSESSMENT — PAIN DESCRIPTION - DIRECTION: RADIATING_TOWARDS: LEG

## 2024-03-17 ASSESSMENT — PAIN SCALES - GENERAL
PAINLEVEL_OUTOF10: 9
PAINLEVEL_OUTOF10: 4

## 2024-03-17 NOTE — ED TRIAGE NOTES
Pt presents to the ED from home via [facility] c/o left knee pain for multiple months. Patient has been seen for this issue multiple times.     Hx osteoarthritis in both knees. VSS.

## 2024-03-17 NOTE — DISCHARGE INSTRUCTIONS
Use wrap as directed, elevate with ice in place to help with pain and swelling.  Take the medication prescribed as directed.  Arrange follow-up with orthopedic provider as instructed

## 2024-03-17 NOTE — ED PROVIDER NOTES
Emergency Department Provider Note       PCP: None, None   Age: 84 y.o.   Sex: female     DISPOSITION Decision To Discharge 03/17/2024 07:48:24 PM       ICD-10-CM    1. Primary osteoarthritis of left knee  M17.12 HYDROcodone-acetaminophen (NORCO) 5-325 MG per tablet     [facility]          Medical Decision Making     84-year-old female presenting with worsened left knee pain.  History of arthritic osteoarthritis.  Is previously seen for this issue but declined imaging at that time, requesting imaging today.  Will obtain plain film imaging and treat symptomatically     X-ray imaging shows tricompartmental osteoarthritis.  Will treat outpatient and referred to [place] [facility] for follow-up  1 or more acute illnesses that pose a threat to life or bodily function.   1 or more chronic illnesses with a severe exacerbation or progression.  Prescription drug management performed.  Parental controlled substances given in the ED.  Patient was discharged risks and benefits of hospitalization were considered.  Chronic medical problems impacting care include osteoarthritis.  Shared medical decision making was utilized in creating the patients health plan today.    I independently ordered and reviewed each unique test.  I reviewed external records: ED visit note from an outside group.  I reviewed external records: provider visit note from PCP.  I reviewed external records: provider visit note from outside specialist.   Prescription drug database review    I interpreted the X-rays no fracture.              History     84-year-old female patient with history of chronic bilateral knee pain presenting to this department with concerns over left knee.  Patient states she has had increasing pain over the past several days in the left knee.  She denies falls or trauma.  She is not followed actively by an orthopedic provider and denies medication use at home for pain.  She states was unable to afford

## 2024-03-18 NOTE — DISCHARGE SUMMARY
I have reviewed discharge instructions with the patient.  The patient verbalized understanding.    Patient left ED via Discharge Method: ambulatory to Home with Medicare van     Opportunity for questions and clarification provided.       Patient given 3 scripts.         To continue your aftercare when you leave the hospital, you may receive an automated call from our care team to check in on how you are doing.  This is a free service and part of our promise to provide the best care and service to meet your aftercare needs.” If you have questions, or wish to unsubscribe from this service please call [phone].  Thank you for Choosing our [facility] Emergency Department.

## 2024-03-18 NOTE — ED NOTES
While escorting patient out to ride, patient became upset because she wanted an ambulance. Reminded patient of procedures and prior conversation about transport home and her saying her son was home. Patient then agreeable to ride and ambulated without assistance into car. Patient was A+Ox4 with NAD while being discharged.   Charge RN and unit secretary notified of patient status upon discharge.        Angelica Frey RN  03/17/24 2746

## 2024-03-18 NOTE — ED NOTES
Called [facility] to transport patient back home.  PM to 1100 PM. Trip number [phone].      Valery Guzman  03/17/24 2009
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
## 2024-03-21 DIAGNOSIS — C50.812 MALIGNANT NEOPLASM OF OVERLAPPING SITES OF LEFT BREAST IN FEMALE, ESTROGEN RECEPTOR POSITIVE (HCC): Primary | ICD-10-CM

## 2024-03-21 DIAGNOSIS — Z17.0 MALIGNANT NEOPLASM OF OVERLAPPING SITES OF LEFT BREAST IN FEMALE, ESTROGEN RECEPTOR POSITIVE (HCC): Primary | ICD-10-CM

## 2024-03-24 ENCOUNTER — HOSPITAL ENCOUNTER (EMERGENCY)
Age: 84
Discharge: HOME | End: 2024-03-24
Attending: EMERGENCY MEDICINE
Payer: MEDICARE

## 2024-03-24 VITALS
RESPIRATION RATE: 18 BRPM | BODY MASS INDEX: 30.65 KG/M2 | OXYGEN SATURATION: 93 % | DIASTOLIC BLOOD PRESSURE: 90 MMHG | WEIGHT: 173 LBS | HEIGHT: 63 IN | TEMPERATURE: 98 F | HEART RATE: 53 BPM | SYSTOLIC BLOOD PRESSURE: 169 MMHG

## 2024-03-24 DIAGNOSIS — I10 HYPERTENSION, UNSPECIFIED TYPE: Primary | ICD-10-CM

## 2024-03-24 DIAGNOSIS — F43.0 STRESS REACTION: ICD-10-CM

## 2024-03-24 PROCEDURE — 99284 EMERGENCY DEPT VISIT MOD MDM: CPT

## 2024-03-24 RX ORDER — LISINOPRIL 10 MG/1
10 TABLET ORAL DAILY
Qty: 30 TABLET | Refills: 0 | Status: SHIPPED | OUTPATIENT
Start: 2024-03-24

## 2024-03-24 ASSESSMENT — PAIN - FUNCTIONAL ASSESSMENT: PAIN_FUNCTIONAL_ASSESSMENT: NONE - DENIES PAIN

## 2024-03-25 ENCOUNTER — HOSPITAL ENCOUNTER (OUTPATIENT)
Dept: LAB | Age: 84
Discharge: HOME | End: 2024-03-28
Payer: MEDICARE

## 2024-03-25 DIAGNOSIS — C50.812 MALIGNANT NEOPLASM OF OVERLAPPING SITES OF LEFT BREAST IN FEMALE, ESTROGEN RECEPTOR POSITIVE (HCC): ICD-10-CM

## 2024-03-25 DIAGNOSIS — Z17.0 MALIGNANT NEOPLASM OF OVERLAPPING SITES OF LEFT BREAST IN FEMALE, ESTROGEN RECEPTOR POSITIVE (HCC): ICD-10-CM

## 2024-03-25 LAB
ALBUMIN SERPL-MCNC: 3.2 G/DL (ref 3.2–4.6)
ALBUMIN/GLOB SERPL: 1 (ref 0.4–1.6)
ALP SERPL-CCNC: 109 U/L (ref 50–136)
ALT SERPL-CCNC: 16 U/L (ref 12–65)
ANION GAP SERPL CALC-SCNC: 6 MMOL/L (ref 2–11)
AST SERPL-CCNC: 18 U/L (ref 15–37)
BASOPHILS # BLD: 0 K/UL (ref 0–0.2)
BASOPHILS NFR BLD: 1 % (ref 0–2)
BILIRUB SERPL-MCNC: 1.1 MG/DL (ref 0.2–1.1)
BUN SERPL-MCNC: 10 MG/DL (ref 8–23)
CALCIUM SERPL-MCNC: 9.5 MG/DL (ref 8.3–10.4)
CHLORIDE SERPL-SCNC: 111 MMOL/L (ref 103–113)
CO2 SERPL-SCNC: 27 MMOL/L (ref 21–32)
CREAT SERPL-MCNC: 0.6 MG/DL (ref 0.6–1)
DIFFERENTIAL METHOD BLD: ABNORMAL
EOSINOPHIL # BLD: 0 K/UL (ref 0–0.8)
EOSINOPHIL NFR BLD: 0 % (ref 0.5–7.8)
ERYTHROCYTE [DISTWIDTH] IN BLOOD BY AUTOMATED COUNT: 13.7 % (ref 11.9–14.6)
GLOBULIN SER CALC-MCNC: 3.2 G/DL (ref 2.8–4.5)
GLUCOSE SERPL-MCNC: 91 MG/DL (ref 65–100)
HCT VFR BLD AUTO: 44.5 % (ref 35.8–46.3)
HGB BLD-MCNC: 14.5 G/DL (ref 11.7–15.4)
IMM GRANULOCYTES # BLD AUTO: 0 K/UL (ref 0–0.5)
IMM GRANULOCYTES NFR BLD AUTO: 1 % (ref 0–5)
LYMPHOCYTES # BLD: 1.7 K/UL (ref 0.5–4.6)
LYMPHOCYTES NFR BLD: 28 % (ref 13–44)
MCH RBC QN AUTO: 29.3 PG (ref 26.1–32.9)
MCHC RBC AUTO-ENTMCNC: 32.6 G/DL (ref 31.4–35)
MCV RBC AUTO: 89.9 FL (ref 82–102)
MONOCYTES # BLD: 0.4 K/UL (ref 0.1–1.3)
MONOCYTES NFR BLD: 6 % (ref 4–12)
NEUTS SEG # BLD: 3.8 K/UL (ref 1.7–8.2)
NEUTS SEG NFR BLD: 64 % (ref 43–78)
NRBC # BLD: 0 K/UL (ref 0–0.2)
PLATELET # BLD AUTO: 205 K/UL (ref 150–450)
PMV BLD AUTO: 11.1 FL (ref 9.4–12.3)
POTASSIUM SERPL-SCNC: 4 MMOL/L (ref 3.5–5.1)
PROT SERPL-MCNC: 6.4 G/DL (ref 6.3–8.2)
RBC # BLD AUTO: 4.95 M/UL (ref 4.05–5.2)
SODIUM SERPL-SCNC: 144 MMOL/L (ref 136–146)
WBC # BLD AUTO: 5.9 K/UL (ref 4.3–11.1)

## 2024-03-25 PROCEDURE — 80053 COMPREHEN METABOLIC PANEL: CPT

## 2024-03-25 PROCEDURE — 85025 COMPLETE CBC W/AUTO DIFF WBC: CPT

## 2024-03-25 PROCEDURE — 36415 COLL VENOUS BLD VENIPUNCTURE: CPT

## 2024-03-25 RX ORDER — DIPHENHYDRAMINE HYDROCHLORIDE 50 MG/ML
50 INJECTION INTRAMUSCULAR; INTRAVENOUS
OUTPATIENT
Start: 2024-03-27

## 2024-03-25 RX ORDER — HEPARIN 100 UNIT/ML
500 SYRINGE INTRAVENOUS PRN
OUTPATIENT
Start: 2024-03-27

## 2024-03-25 RX ORDER — SODIUM CHLORIDE 0.9 % (FLUSH) 0.9 %
5-40 SYRINGE (ML) INJECTION PRN
OUTPATIENT
Start: 2024-03-27

## 2024-03-25 RX ORDER — ACETAMINOPHEN 325 MG/1
650 TABLET ORAL
OUTPATIENT
Start: 2024-03-27

## 2024-03-25 RX ORDER — SODIUM CHLORIDE 9 MG/ML
5-250 INJECTION, SOLUTION INTRAVENOUS PRN
OUTPATIENT
Start: 2024-03-27

## 2024-03-25 NOTE — ED NOTES
I have reviewed discharge instructions with the patient.  The patient verbalized understanding.    Patient left ED via Discharge Method: stretcher to Home with [facility]    Opportunity for questions and clarification provided.       Patient given 1 scripts.         To continue your aftercare when you leave the hospital, you may receive an automated call from our care team to check in on how you are doing.  This is a free service and part of our promise to provide the best care and service to meet your aftercare needs.” If you have questions, or wish to unsubscribe from this service please call [phone].  Thank you for Choosing our [facility] Emergency Department.       Nataliia Diaz, VICKY  03/24/24 9523
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
## 2024-03-26 ENCOUNTER — HOSPITAL ENCOUNTER (OUTPATIENT)
Dept: INFUSION THERAPY | Age: 84
Setting detail: INFUSION SERIES
Discharge: HOME | End: 2024-03-26
Payer: MEDICARE

## 2024-03-26 VITALS
RESPIRATION RATE: 16 BRPM | WEIGHT: 178 LBS | TEMPERATURE: 97.5 F | BODY MASS INDEX: 31.53 KG/M2 | HEART RATE: 48 BPM | OXYGEN SATURATION: 96 % | DIASTOLIC BLOOD PRESSURE: 61 MMHG | SYSTOLIC BLOOD PRESSURE: 147 MMHG

## 2024-03-26 DIAGNOSIS — Z17.0 MALIGNANT NEOPLASM OF OVERLAPPING SITES OF LEFT BREAST IN FEMALE, ESTROGEN RECEPTOR POSITIVE (HCC): Primary | ICD-10-CM

## 2024-03-26 DIAGNOSIS — C50.812 MALIGNANT NEOPLASM OF OVERLAPPING SITES OF LEFT BREAST IN FEMALE, ESTROGEN RECEPTOR POSITIVE (HCC): Primary | ICD-10-CM

## 2024-03-26 PROCEDURE — 96417 CHEMO IV INFUS EACH ADDL SEQ: CPT

## 2024-03-26 PROCEDURE — 6360000002 HC RX W HCPCS: Performed by: INTERNAL MEDICINE

## 2024-03-26 PROCEDURE — 2580000003 HC RX 258: Performed by: INTERNAL MEDICINE

## 2024-03-26 PROCEDURE — 96413 CHEMO IV INFUSION 1 HR: CPT

## 2024-03-26 RX ORDER — ONDANSETRON 2 MG/ML
8 INJECTION INTRAMUSCULAR; INTRAVENOUS
Status: DISCONTINUED | OUTPATIENT
Start: 2024-03-26 | End: 2024-03-27 | Stop reason: HOSPADM

## 2024-03-26 RX ORDER — MEPERIDINE HYDROCHLORIDE 25 MG/ML
12.5 INJECTION INTRAMUSCULAR; INTRAVENOUS; SUBCUTANEOUS PRN
Status: DISCONTINUED | OUTPATIENT
Start: 2024-03-26 | End: 2024-03-27 | Stop reason: HOSPADM

## 2024-03-26 RX ORDER — SODIUM CHLORIDE 0.9 % (FLUSH) 0.9 %
5-40 SYRINGE (ML) INJECTION PRN
Status: DISCONTINUED | OUTPATIENT
Start: 2024-03-26 | End: 2024-03-27 | Stop reason: HOSPADM

## 2024-03-26 RX ORDER — SODIUM CHLORIDE 9 MG/ML
5-250 INJECTION, SOLUTION INTRAVENOUS PRN
Status: DISCONTINUED | OUTPATIENT
Start: 2024-03-26 | End: 2024-03-27 | Stop reason: HOSPADM

## 2024-03-26 RX ORDER — DIPHENHYDRAMINE HYDROCHLORIDE 50 MG/ML
50 INJECTION INTRAMUSCULAR; INTRAVENOUS
Status: DISCONTINUED | OUTPATIENT
Start: 2024-03-26 | End: 2024-03-27 | Stop reason: HOSPADM

## 2024-03-26 RX ORDER — SODIUM CHLORIDE 9 MG/ML
INJECTION, SOLUTION INTRAVENOUS CONTINUOUS
Status: DISCONTINUED | OUTPATIENT
Start: 2024-03-26 | End: 2024-03-27 | Stop reason: HOSPADM

## 2024-03-26 RX ORDER — ACETAMINOPHEN 325 MG/1
650 TABLET ORAL
Status: DISCONTINUED | OUTPATIENT
Start: 2024-03-26 | End: 2024-03-27 | Stop reason: HOSPADM

## 2024-03-26 RX ORDER — EPINEPHRINE 1 MG/ML
0.3 INJECTION, SOLUTION, CONCENTRATE INTRAVENOUS PRN
Status: DISCONTINUED | OUTPATIENT
Start: 2024-03-26 | End: 2024-03-27 | Stop reason: HOSPADM

## 2024-03-26 RX ORDER — ALBUTEROL SULFATE 90 UG/1
4 AEROSOL, METERED RESPIRATORY (INHALATION) PRN
Status: DISCONTINUED | OUTPATIENT
Start: 2024-03-26 | End: 2024-03-27 | Stop reason: HOSPADM

## 2024-03-26 RX ADMIN — SODIUM CHLORIDE, PRESERVATIVE FREE 10 ML: 5 INJECTION INTRAVENOUS at 13:15

## 2024-03-26 RX ADMIN — SODIUM CHLORIDE 100 ML/HR: 9 INJECTION, SOLUTION INTRAVENOUS at 13:30

## 2024-03-26 RX ADMIN — SODIUM CHLORIDE 483 MG: 9 INJECTION, SOLUTION INTRAVENOUS at 13:29

## 2024-03-26 RX ADMIN — PERTUZUMAB 420 MG: 30 INJECTION, SOLUTION, CONCENTRATE INTRAVENOUS at 14:05

## 2024-03-26 NOTE — PROGRESS NOTES
Arrived to the [facility]fusion [facility].  Her/Per infusion completed.  Patient tolerated well.   Any issues or concerns during appointment: none.  Patient aware of next infusion appointment on 04/17/2024 (date) at 1415 (time).  Discharged in wheelchair.

## 2024-03-29 ENCOUNTER — HOSPITAL ENCOUNTER (EMERGENCY)
Age: 84
Discharge: HOME | End: 2024-03-30
Attending: EMERGENCY MEDICINE
Payer: MEDICARE

## 2024-03-29 DIAGNOSIS — I10 ESSENTIAL HYPERTENSION: Primary | ICD-10-CM

## 2024-03-29 LAB
ANION GAP SERPL CALC-SCNC: 2 MMOL/L (ref 2–11)
BUN SERPL-MCNC: 9 MG/DL (ref 8–23)
CALCIUM SERPL-MCNC: 9.7 MG/DL (ref 8.3–10.4)
CHLORIDE SERPL-SCNC: 112 MMOL/L (ref 103–113)
CO2 SERPL-SCNC: 28 MMOL/L (ref 21–32)
CREAT SERPL-MCNC: 0.6 MG/DL (ref 0.6–1)
ERYTHROCYTE [DISTWIDTH] IN BLOOD BY AUTOMATED COUNT: 14 % (ref 11.9–14.6)
GLUCOSE SERPL-MCNC: 82 MG/DL (ref 65–100)
HCT VFR BLD AUTO: 46.3 % (ref 35.8–46.3)
HGB BLD-MCNC: 14.7 G/DL (ref 11.7–15.4)
MCH RBC QN AUTO: 28.9 PG (ref 26.1–32.9)
MCHC RBC AUTO-ENTMCNC: 31.7 G/DL (ref 31.4–35)
MCV RBC AUTO: 91.1 FL (ref 82–102)
NRBC # BLD: 0 K/UL (ref 0–0.2)
PLATELET # BLD AUTO: 198 K/UL (ref 150–450)
PMV BLD AUTO: 11.6 FL (ref 9.4–12.3)
POTASSIUM SERPL-SCNC: 4.7 MMOL/L (ref 3.5–5.1)
RBC # BLD AUTO: 5.08 M/UL (ref 4.05–5.2)
SODIUM SERPL-SCNC: 142 MMOL/L (ref 136–146)
WBC # BLD AUTO: 7.4 K/UL (ref 4.3–11.1)

## 2024-03-29 PROCEDURE — 85027 COMPLETE CBC AUTOMATED: CPT

## 2024-03-29 PROCEDURE — 96374 THER/PROPH/DIAG INJ IV PUSH: CPT

## 2024-03-29 PROCEDURE — 80048 BASIC METABOLIC PNL TOTAL CA: CPT

## 2024-03-29 PROCEDURE — 6370000000 HC RX 637 (ALT 250 FOR IP): Performed by: EMERGENCY MEDICINE

## 2024-03-29 PROCEDURE — 6360000002 HC RX W HCPCS: Performed by: EMERGENCY MEDICINE

## 2024-03-29 PROCEDURE — 99285 EMERGENCY DEPT VISIT HI MDM: CPT

## 2024-03-29 RX ORDER — ATORVASTATIN CALCIUM 40 MG/1
20 TABLET, FILM COATED ORAL ONCE
Status: DISCONTINUED | OUTPATIENT
Start: 2024-03-29 | End: 2024-03-30 | Stop reason: HOSPADM

## 2024-03-29 RX ORDER — LEVOTHYROXINE SODIUM 0.05 MG/1
100 TABLET ORAL ONCE
Status: DISCONTINUED | OUTPATIENT
Start: 2024-03-29 | End: 2024-03-30 | Stop reason: HOSPADM

## 2024-03-29 RX ORDER — HYDRALAZINE HYDROCHLORIDE 20 MG/ML
10 INJECTION INTRAMUSCULAR; INTRAVENOUS ONCE
Status: COMPLETED | OUTPATIENT
Start: 2024-03-29 | End: 2024-03-29

## 2024-03-29 RX ORDER — LISINOPRIL 5 MG/1
10 TABLET ORAL ONCE
Status: COMPLETED | OUTPATIENT
Start: 2024-03-29 | End: 2024-03-29

## 2024-03-29 RX ADMIN — LISINOPRIL 10 MG: 5 TABLET ORAL at 22:06

## 2024-03-29 RX ADMIN — HYDRALAZINE HYDROCHLORIDE 10 MG: 20 INJECTION, SOLUTION INTRAMUSCULAR; INTRAVENOUS at 21:04

## 2024-03-29 ASSESSMENT — ENCOUNTER SYMPTOMS
NAUSEA: 0
COUGH: 0
VOMITING: 0
SHORTNESS OF BREATH: 0
DIARRHEA: 0
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
## 2024-03-29 NOTE — ED TRIAGE NOTES
Pt arrives via EMS from home, has not taken BP medications since yesterday. Initially systolic Bps in the 190s. Now SBP in the 170s. Denies HA, dizziness, vision changes.    VS  170/90  HR 50s  95% RA    98.9 temp

## 2024-03-30 VITALS
BODY MASS INDEX: 31.54 KG/M2 | RESPIRATION RATE: 16 BRPM | OXYGEN SATURATION: 98 % | HEART RATE: 64 BPM | WEIGHT: 178 LBS | TEMPERATURE: 98.4 F | HEIGHT: 63 IN | SYSTOLIC BLOOD PRESSURE: 182 MMHG | DIASTOLIC BLOOD PRESSURE: 72 MMHG

## 2024-03-30 PROCEDURE — 6370000000 HC RX 637 (ALT 250 FOR IP): Performed by: EMERGENCY MEDICINE

## 2024-03-30 RX ADMIN — MICONAZOLE NITRATE: 20 CREAM TOPICAL at 05:40

## 2024-03-30 NOTE — ED NOTES
Patient assisted to bathroom and back to bed. No signs of distress at this time.      Sudhir Starks RN  03/30/24 0416

## 2024-03-30 NOTE — ED NOTES
I have reviewed discharge instructions with the patient.  The patient verbalized understanding.    Patient left ED via Discharge Method: stretcher to Home with [facility].    Opportunity for questions and clarification provided.     Patient given 0 scripts.            Serge Turner RN  03/30/24 9933

## 2024-04-15 DIAGNOSIS — Z17.0 MALIGNANT NEOPLASM OF OVERLAPPING SITES OF LEFT BREAST IN FEMALE, ESTROGEN RECEPTOR POSITIVE (HCC): Primary | ICD-10-CM

## 2024-04-15 DIAGNOSIS — Z79.811 LONG TERM (CURRENT) USE OF AROMATASE INHIBITORS: ICD-10-CM

## 2024-04-15 DIAGNOSIS — C50.812 MALIGNANT NEOPLASM OF OVERLAPPING SITES OF LEFT BREAST IN FEMALE, ESTROGEN RECEPTOR POSITIVE (HCC): Primary | ICD-10-CM

## 2024-04-16 ENCOUNTER — HOSPITAL ENCOUNTER (OUTPATIENT)
Dept: LAB | Age: 84
Discharge: HOME | End: 2024-04-19
Payer: MEDICARE

## 2024-04-16 LAB
ALBUMIN SERPL-MCNC: 3.7 G/DL (ref 3.2–4.6)
ALBUMIN/GLOB SERPL: 1.3 (ref 0.4–1.6)
ALP SERPL-CCNC: 131 U/L (ref 50–136)
ALT SERPL-CCNC: 19 U/L (ref 12–65)
ANION GAP SERPL CALC-SCNC: 3 MMOL/L (ref 2–11)
AST SERPL-CCNC: 17 U/L (ref 15–37)
BASOPHILS # BLD: 0 K/UL (ref 0–0.2)
BASOPHILS NFR BLD: 1 % (ref 0–2)
BILIRUB SERPL-MCNC: 0.7 MG/DL (ref 0.2–1.1)
BUN SERPL-MCNC: 9 MG/DL (ref 8–23)
CALCIUM SERPL-MCNC: 9.8 MG/DL (ref 8.3–10.4)
CHLORIDE SERPL-SCNC: 113 MMOL/L (ref 103–113)
CO2 SERPL-SCNC: 28 MMOL/L (ref 21–32)
CREAT SERPL-MCNC: 0.6 MG/DL (ref 0.6–1)
DIFFERENTIAL METHOD BLD: ABNORMAL
EOSINOPHIL # BLD: 0 K/UL (ref 0–0.8)
EOSINOPHIL NFR BLD: 0 % (ref 0.5–7.8)
ERYTHROCYTE [DISTWIDTH] IN BLOOD BY AUTOMATED COUNT: 13.9 % (ref 11.9–14.6)
GLOBULIN SER CALC-MCNC: 2.9 G/DL (ref 2.8–4.5)
GLUCOSE SERPL-MCNC: 166 MG/DL (ref 65–100)
HCT VFR BLD AUTO: 47.6 % (ref 35.8–46.3)
HGB BLD-MCNC: 14.8 G/DL (ref 11.7–15.4)
IMM GRANULOCYTES # BLD AUTO: 0 K/UL (ref 0–0.5)
IMM GRANULOCYTES NFR BLD AUTO: 0 % (ref 0–5)
LYMPHOCYTES # BLD: 1.6 K/UL (ref 0.5–4.6)
LYMPHOCYTES NFR BLD: 25 % (ref 13–44)
MCH RBC QN AUTO: 28.7 PG (ref 26.1–32.9)
MCHC RBC AUTO-ENTMCNC: 31.1 G/DL (ref 31.4–35)
MCV RBC AUTO: 92.2 FL (ref 82–102)
MONOCYTES # BLD: 0.2 K/UL (ref 0.1–1.3)
MONOCYTES NFR BLD: 4 % (ref 4–12)
NEUTS SEG # BLD: 4.5 K/UL (ref 1.7–8.2)
NEUTS SEG NFR BLD: 70 % (ref 43–78)
NRBC # BLD: 0 K/UL (ref 0–0.2)
PLATELET # BLD AUTO: 209 K/UL (ref 150–450)
PMV BLD AUTO: 11.1 FL (ref 9.4–12.3)
POTASSIUM SERPL-SCNC: 3.5 MMOL/L (ref 3.5–5.1)
PROT SERPL-MCNC: 6.6 G/DL (ref 6.3–8.2)
RBC # BLD AUTO: 5.16 M/UL (ref 4.05–5.2)
SODIUM SERPL-SCNC: 144 MMOL/L (ref 136–146)
WBC # BLD AUTO: 6.4 K/UL (ref 4.3–11.1)

## 2024-04-16 PROCEDURE — 36415 COLL VENOUS BLD VENIPUNCTURE: CPT

## 2024-04-16 PROCEDURE — 80053 COMPREHEN METABOLIC PANEL: CPT

## 2024-04-16 PROCEDURE — 85025 COMPLETE CBC W/AUTO DIFF WBC: CPT

## 2024-04-17 ENCOUNTER — HOSPITAL ENCOUNTER (OUTPATIENT)
Dept: INFUSION THERAPY | Age: 84
Setting detail: INFUSION SERIES
Discharge: HOME | End: 2024-04-17

## 2024-04-17 ENCOUNTER — OFFICE VISIT (OUTPATIENT)
Dept: ONCOLOGY | Age: 84
End: 2024-04-17
Payer: MEDICARE

## 2024-04-17 VITALS
RESPIRATION RATE: 16 BRPM | DIASTOLIC BLOOD PRESSURE: 70 MMHG | TEMPERATURE: 97.6 F | BODY MASS INDEX: 32.11 KG/M2 | HEART RATE: 53 BPM | OXYGEN SATURATION: 96 % | WEIGHT: 181.2 LBS | SYSTOLIC BLOOD PRESSURE: 169 MMHG | HEIGHT: 63 IN

## 2024-04-17 DIAGNOSIS — R19.7 DIARRHEA, UNSPECIFIED TYPE: ICD-10-CM

## 2024-04-17 DIAGNOSIS — Z79.811 LONG TERM (CURRENT) USE OF AROMATASE INHIBITORS: ICD-10-CM

## 2024-04-17 DIAGNOSIS — C77.3 SECONDARY AND UNSPECIFIED MALIGNANT NEOPLASM OF AXILLA AND UPPER LIMB LYMPH NODES (HCC): ICD-10-CM

## 2024-04-17 DIAGNOSIS — Z17.0 MALIGNANT NEOPLASM OF OVERLAPPING SITES OF LEFT BREAST IN FEMALE, ESTROGEN RECEPTOR POSITIVE (HCC): Primary | ICD-10-CM

## 2024-04-17 DIAGNOSIS — C50.812 MALIGNANT NEOPLASM OF OVERLAPPING SITES OF LEFT BREAST IN FEMALE, ESTROGEN RECEPTOR POSITIVE (HCC): Primary | ICD-10-CM

## 2024-04-17 PROCEDURE — G8399 PT W/DXA RESULTS DOCUMENT: HCPCS | Performed by: NURSE PRACTITIONER

## 2024-04-17 PROCEDURE — 1090F PRES/ABSN URINE INCON ASSESS: CPT | Performed by: NURSE PRACTITIONER

## 2024-04-17 PROCEDURE — 1036F TOBACCO NON-USER: CPT | Performed by: NURSE PRACTITIONER

## 2024-04-17 PROCEDURE — 99214 OFFICE O/P EST MOD 30 MIN: CPT | Performed by: NURSE PRACTITIONER

## 2024-04-17 PROCEDURE — 3078F DIAST BP <80 MM HG: CPT | Performed by: NURSE PRACTITIONER

## 2024-04-17 PROCEDURE — G8417 CALC BMI ABV UP PARAM F/U: HCPCS | Performed by: NURSE PRACTITIONER

## 2024-04-17 PROCEDURE — G8427 DOCREV CUR MEDS BY ELIG CLIN: HCPCS | Performed by: NURSE PRACTITIONER

## 2024-04-17 PROCEDURE — 1123F ACP DISCUSS/DSCN MKR DOCD: CPT | Performed by: NURSE PRACTITIONER

## 2024-04-17 PROCEDURE — 3077F SYST BP >= 140 MM HG: CPT | Performed by: NURSE PRACTITIONER

## 2024-04-17 NOTE — PROGRESS NOTES
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     Breast cancer: two separate nodules with dominant left UOQ nodule 2.3 cm and HER2 positive, other nodule left LIQ 1.2 cm and HER2 negative.  Both low grade and ER positive, weakly AK positive.  Axillary node positive for macrometastatic carcinoma, also  HER2 positive.  PET/CT shows no distant metastatic disease.  I discussed the case with Dr. Mast at tumor board.  Normally, for node positive HER2 positive breast cancer, we would recommend neoadjuvant  chemoimmunotherapy.  However, she is unwilling to consider cytotoxic therapy, which is not unreasonable given her age and comorbidities.  Since she has only locoregional disease, we should then consider mastectomy and node dissection for definitive therapy.   She was also very reluctant to consider any surgery because of her history of intolerance to anesthesia, but we were able to convince her to at least meet with Dr. Mast.  However, after discussion with him, she declined any local therapy and was  only willing to consider systemic treatment.  Therefore, we recommended the PERTAIN

## 2024-04-30 ENCOUNTER — HOSPITAL ENCOUNTER (OUTPATIENT)
Dept: LAB | Age: 84
Discharge: HOME | End: 2024-05-03
Payer: MEDICARE

## 2024-04-30 DIAGNOSIS — Z79.811 LONG TERM (CURRENT) USE OF AROMATASE INHIBITORS: ICD-10-CM

## 2024-04-30 DIAGNOSIS — C50.812 MALIGNANT NEOPLASM OF OVERLAPPING SITES OF LEFT BREAST IN FEMALE, ESTROGEN RECEPTOR POSITIVE (HCC): ICD-10-CM

## 2024-04-30 DIAGNOSIS — Z17.0 MALIGNANT NEOPLASM OF OVERLAPPING SITES OF LEFT BREAST IN FEMALE, ESTROGEN RECEPTOR POSITIVE (HCC): ICD-10-CM

## 2024-04-30 LAB
ALBUMIN SERPL-MCNC: 3.6 G/DL (ref 3.2–4.6)
ALBUMIN/GLOB SERPL: 1.2 (ref 1–1.9)
ALP SERPL-CCNC: 126 U/L (ref 35–104)
ALT SERPL-CCNC: <5 U/L (ref 12–65)
ANION GAP SERPL CALC-SCNC: 11 MMOL/L (ref 9–18)
AST SERPL-CCNC: 20 U/L (ref 15–37)
BASOPHILS # BLD: 0 K/UL (ref 0–0.2)
BASOPHILS NFR BLD: 0 % (ref 0–2)
BILIRUB SERPL-MCNC: 1 MG/DL (ref 0–1.2)
BUN SERPL-MCNC: 11 MG/DL (ref 8–23)
CALCIUM SERPL-MCNC: 9.8 MG/DL (ref 8.8–10.2)
CHLORIDE SERPL-SCNC: 108 MMOL/L (ref 98–107)
CO2 SERPL-SCNC: 23 MMOL/L (ref 20–28)
CREAT SERPL-MCNC: 0.61 MG/DL (ref 0.6–1.1)
DIFFERENTIAL METHOD BLD: ABNORMAL
EOSINOPHIL # BLD: 0 K/UL (ref 0–0.8)
EOSINOPHIL NFR BLD: 0 % (ref 0.5–7.8)
ERYTHROCYTE [DISTWIDTH] IN BLOOD BY AUTOMATED COUNT: 13.9 % (ref 11.9–14.6)
GLOBULIN SER CALC-MCNC: 3.1 G/DL (ref 2.3–3.5)
GLUCOSE SERPL-MCNC: 93 MG/DL (ref 70–99)
HCT VFR BLD AUTO: 46 % (ref 35.8–46.3)
HGB BLD-MCNC: 14.3 G/DL (ref 11.7–15.4)
IMM GRANULOCYTES # BLD AUTO: 0 K/UL (ref 0–0.5)
IMM GRANULOCYTES NFR BLD AUTO: 0 % (ref 0–5)
LYMPHOCYTES # BLD: 1.5 K/UL (ref 0.5–4.6)
LYMPHOCYTES NFR BLD: 22 % (ref 13–44)
MCH RBC QN AUTO: 28.5 PG (ref 26.1–32.9)
MCHC RBC AUTO-ENTMCNC: 31.1 G/DL (ref 31.4–35)
MCV RBC AUTO: 91.8 FL (ref 82–102)
MONOCYTES # BLD: 0.4 K/UL (ref 0.1–1.3)
MONOCYTES NFR BLD: 6 % (ref 4–12)
NEUTS SEG # BLD: 5 K/UL (ref 1.7–8.2)
NEUTS SEG NFR BLD: 72 % (ref 43–78)
NRBC # BLD: 0 K/UL (ref 0–0.2)
PLATELET # BLD AUTO: 163 K/UL (ref 150–450)
PMV BLD AUTO: 11.2 FL (ref 9.4–12.3)
POTASSIUM SERPL-SCNC: 3.4 MMOL/L (ref 3.5–5.1)
PROT SERPL-MCNC: 6.6 G/DL (ref 6.3–8.2)
RBC # BLD AUTO: 5.01 M/UL (ref 4.05–5.2)
SODIUM SERPL-SCNC: 142 MMOL/L (ref 136–145)
WBC # BLD AUTO: 6.9 K/UL (ref 4.3–11.1)

## 2024-04-30 PROCEDURE — 80053 COMPREHEN METABOLIC PANEL: CPT

## 2024-04-30 PROCEDURE — 85025 COMPLETE CBC W/AUTO DIFF WBC: CPT

## 2024-04-30 PROCEDURE — 36415 COLL VENOUS BLD VENIPUNCTURE: CPT

## 2024-05-01 ENCOUNTER — HOSPITAL ENCOUNTER (OUTPATIENT)
Dept: INFUSION THERAPY | Age: 84
Setting detail: INFUSION SERIES
Discharge: HOME | End: 2024-05-01
Payer: MEDICARE

## 2024-05-01 VITALS
TEMPERATURE: 98 F | RESPIRATION RATE: 16 BRPM | DIASTOLIC BLOOD PRESSURE: 75 MMHG | WEIGHT: 173 LBS | OXYGEN SATURATION: 94 % | BODY MASS INDEX: 30.65 KG/M2 | HEART RATE: 48 BPM | SYSTOLIC BLOOD PRESSURE: 168 MMHG

## 2024-05-01 DIAGNOSIS — C50.812 MALIGNANT NEOPLASM OF OVERLAPPING SITES OF LEFT BREAST IN FEMALE, ESTROGEN RECEPTOR POSITIVE (HCC): Primary | ICD-10-CM

## 2024-05-01 DIAGNOSIS — Z17.0 MALIGNANT NEOPLASM OF OVERLAPPING SITES OF LEFT BREAST IN FEMALE, ESTROGEN RECEPTOR POSITIVE (HCC): Primary | ICD-10-CM

## 2024-05-01 PROCEDURE — 2580000003 HC RX 258: Performed by: NURSE PRACTITIONER

## 2024-05-01 PROCEDURE — 6360000002 HC RX W HCPCS: Performed by: NURSE PRACTITIONER

## 2024-05-01 PROCEDURE — 96417 CHEMO IV INFUS EACH ADDL SEQ: CPT

## 2024-05-01 PROCEDURE — 96413 CHEMO IV INFUSION 1 HR: CPT

## 2024-05-01 RX ORDER — SODIUM CHLORIDE 9 MG/ML
5-250 INJECTION, SOLUTION INTRAVENOUS PRN
Status: CANCELLED | OUTPATIENT
Start: 2024-05-01

## 2024-05-01 RX ORDER — ACETAMINOPHEN 325 MG/1
650 TABLET ORAL
Status: CANCELLED | OUTPATIENT
Start: 2024-05-01

## 2024-05-01 RX ORDER — MEPERIDINE HYDROCHLORIDE 25 MG/ML
12.5 INJECTION INTRAMUSCULAR; INTRAVENOUS; SUBCUTANEOUS PRN
Status: DISCONTINUED | OUTPATIENT
Start: 2024-05-01 | End: 2024-05-02 | Stop reason: HOSPADM

## 2024-05-01 RX ORDER — EPINEPHRINE 1 MG/ML
0.3 INJECTION, SOLUTION, CONCENTRATE INTRAVENOUS PRN
Status: DISCONTINUED | OUTPATIENT
Start: 2024-05-01 | End: 2024-05-02 | Stop reason: HOSPADM

## 2024-05-01 RX ORDER — HEPARIN 100 UNIT/ML
500 SYRINGE INTRAVENOUS PRN
Status: CANCELLED | OUTPATIENT
Start: 2024-05-01

## 2024-05-01 RX ORDER — SODIUM CHLORIDE 9 MG/ML
INJECTION, SOLUTION INTRAVENOUS CONTINUOUS
Status: CANCELLED | OUTPATIENT
Start: 2024-05-01

## 2024-05-01 RX ORDER — DIPHENHYDRAMINE HYDROCHLORIDE 50 MG/ML
50 INJECTION INTRAMUSCULAR; INTRAVENOUS
Status: CANCELLED | OUTPATIENT
Start: 2024-05-01

## 2024-05-01 RX ORDER — SODIUM CHLORIDE 0.9 % (FLUSH) 0.9 %
5-40 SYRINGE (ML) INJECTION PRN
Status: CANCELLED | OUTPATIENT
Start: 2024-05-01

## 2024-05-01 RX ORDER — ONDANSETRON 2 MG/ML
8 INJECTION INTRAMUSCULAR; INTRAVENOUS
Status: DISCONTINUED | OUTPATIENT
Start: 2024-05-01 | End: 2024-05-02 | Stop reason: HOSPADM

## 2024-05-01 RX ORDER — ALBUTEROL SULFATE 90 UG/1
4 AEROSOL, METERED RESPIRATORY (INHALATION) PRN
Status: DISCONTINUED | OUTPATIENT
Start: 2024-05-01 | End: 2024-05-02 | Stop reason: HOSPADM

## 2024-05-01 RX ORDER — SODIUM CHLORIDE 9 MG/ML
5-250 INJECTION, SOLUTION INTRAVENOUS PRN
Status: DISCONTINUED | OUTPATIENT
Start: 2024-05-01 | End: 2024-05-02 | Stop reason: HOSPADM

## 2024-05-01 RX ORDER — SODIUM CHLORIDE 0.9 % (FLUSH) 0.9 %
5-40 SYRINGE (ML) INJECTION PRN
Status: DISCONTINUED | OUTPATIENT
Start: 2024-05-01 | End: 2024-05-02 | Stop reason: HOSPADM

## 2024-05-01 RX ORDER — ACETAMINOPHEN 325 MG/1
650 TABLET ORAL
Status: DISCONTINUED | OUTPATIENT
Start: 2024-05-01 | End: 2024-05-02 | Stop reason: HOSPADM

## 2024-05-01 RX ORDER — DIPHENHYDRAMINE HYDROCHLORIDE 50 MG/ML
50 INJECTION INTRAMUSCULAR; INTRAVENOUS
Status: DISCONTINUED | OUTPATIENT
Start: 2024-05-01 | End: 2024-05-02 | Stop reason: HOSPADM

## 2024-05-01 RX ADMIN — SODIUM CHLORIDE, PRESERVATIVE FREE 10 ML: 5 INJECTION INTRAVENOUS at 13:25

## 2024-05-01 RX ADMIN — SODIUM CHLORIDE 25 ML/HR: 9 INJECTION, SOLUTION INTRAVENOUS at 13:30

## 2024-05-01 RX ADMIN — SODIUM CHLORIDE 651 MG: 9 INJECTION, SOLUTION INTRAVENOUS at 13:40

## 2024-05-01 RX ADMIN — PERTUZUMAB 840 MG: 30 INJECTION, SOLUTION, CONCENTRATE INTRAVENOUS at 15:12

## 2024-05-01 NOTE — PROGRESS NOTES
Patient arrived to infusion. Herceptin/perjeta completed. Patient declined to stay for 30 minutes. PIV removed. Discharged via w/c with caregiver. Patient aware of next infusion appt on 5/22.

## 2024-05-06 ENCOUNTER — APPOINTMENT (OUTPATIENT)
Dept: CT IMAGING | Age: 84
End: 2024-05-06
Payer: MEDICARE

## 2024-05-06 ENCOUNTER — APPOINTMENT (OUTPATIENT)
Dept: GENERAL RADIOLOGY | Age: 84
End: 2024-05-06
Payer: MEDICARE

## 2024-05-06 ENCOUNTER — HOSPITAL ENCOUNTER (EMERGENCY)
Age: 84
Discharge: HOME | End: 2024-05-06
Attending: STUDENT IN AN ORGANIZED HEALTH CARE EDUCATION/TRAINING PROGRAM
Payer: MEDICARE

## 2024-05-06 VITALS
HEIGHT: 64 IN | DIASTOLIC BLOOD PRESSURE: 108 MMHG | RESPIRATION RATE: 18 BRPM | WEIGHT: 170 LBS | BODY MASS INDEX: 29.02 KG/M2 | HEART RATE: 59 BPM | TEMPERATURE: 98.7 F | OXYGEN SATURATION: 100 % | SYSTOLIC BLOOD PRESSURE: 146 MMHG

## 2024-05-06 DIAGNOSIS — N39.0 URINARY TRACT INFECTION WITHOUT HEMATURIA, SITE UNSPECIFIED: Primary | ICD-10-CM

## 2024-05-06 DIAGNOSIS — R50.9 FEVER, UNSPECIFIED FEVER CAUSE: ICD-10-CM

## 2024-05-06 LAB
ALBUMIN SERPL-MCNC: 3.3 G/DL (ref 3.2–4.6)
ALBUMIN/GLOB SERPL: 1.1 (ref 1–1.9)
ALP SERPL-CCNC: 118 U/L (ref 35–104)
ALT SERPL-CCNC: <5 U/L (ref 12–65)
ANION GAP SERPL CALC-SCNC: 10 MMOL/L (ref 9–18)
APPEARANCE UR: ABNORMAL
AST SERPL-CCNC: 22 U/L (ref 15–37)
BACTERIA URNS QL MICRO: NEGATIVE /HPF
BILIRUB SERPL-MCNC: 1.5 MG/DL (ref 0–1.2)
BILIRUB UR QL: NEGATIVE
BILIRUB UR QL: NEGATIVE
BUN SERPL-MCNC: 9 MG/DL (ref 8–23)
CALCIUM SERPL-MCNC: 9.6 MG/DL (ref 8.8–10.2)
CASTS URNS QL MICRO: ABNORMAL /LPF
CHLORIDE SERPL-SCNC: 104 MMOL/L (ref 98–107)
CO2 SERPL-SCNC: 22 MMOL/L (ref 20–28)
COLOR UR: ABNORMAL
CREAT SERPL-MCNC: 0.57 MG/DL (ref 0.6–1.1)
EPI CELLS #/AREA URNS HPF: ABNORMAL /HPF
ERYTHROCYTE [DISTWIDTH] IN BLOOD BY AUTOMATED COUNT: 13.9 % (ref 11.9–14.6)
GLOBULIN SER CALC-MCNC: 3.1 G/DL (ref 2.3–3.5)
GLUCOSE SERPL-MCNC: 128 MG/DL (ref 70–99)
GLUCOSE UR QL STRIP.AUTO: NEGATIVE MG/DL
GLUCOSE UR STRIP.AUTO-MCNC: NEGATIVE MG/DL
HCT VFR BLD AUTO: 43.2 % (ref 35.8–46.3)
HGB BLD-MCNC: 13.7 G/DL (ref 11.7–15.4)
HGB UR QL STRIP: NEGATIVE
KETONES UR QL STRIP.AUTO: 15 MG/DL
KETONES UR-MCNC: 15 MG/DL
LACTATE SERPL-SCNC: 1.3 MMOL/L (ref 0.5–2)
LEUKOCYTE ESTERASE UR QL STRIP.AUTO: ABNORMAL
LEUKOCYTE ESTERASE UR QL STRIP: NEGATIVE
MCH RBC QN AUTO: 29 PG (ref 26.1–32.9)
MCHC RBC AUTO-ENTMCNC: 31.7 G/DL (ref 31.4–35)
MCV RBC AUTO: 91.3 FL (ref 82–102)
NITRITE UR QL STRIP.AUTO: NEGATIVE
NITRITE UR QL: NEGATIVE
NRBC # BLD: 0 K/UL (ref 0–0.2)
PH UR STRIP: 8.5 (ref 5–9)
PH UR: 8 (ref 5–9)
PLATELET # BLD AUTO: 169 K/UL (ref 150–450)
PMV BLD AUTO: 12.2 FL (ref 9.4–12.3)
POTASSIUM SERPL-SCNC: 3.2 MMOL/L (ref 3.5–5.1)
PROCALCITONIN SERPL-MCNC: 0.09 NG/ML (ref 0–0.1)
PROT SERPL-MCNC: 6.4 G/DL (ref 6.3–8.2)
PROT UR QL: ABNORMAL MG/DL
PROT UR STRIP-MCNC: ABNORMAL MG/DL
RBC # BLD AUTO: 4.73 M/UL (ref 4.05–5.2)
RBC # UR STRIP: NEGATIVE
RBC #/AREA URNS HPF: ABNORMAL /HPF
SARS-COV-2 RDRP RESP QL NAA+PROBE: NOT DETECTED
SERVICE CMNT-IMP: ABNORMAL
SODIUM SERPL-SCNC: 136 MMOL/L (ref 136–145)
SOURCE: NORMAL
SP GR UR REFRACTOMETRY: 1.01 (ref 1–1.02)
SP GR UR: 1.02 (ref 1–1.02)
UROBILINOGEN UR QL STRIP.AUTO: 1 EU/DL (ref 0.2–1)
UROBILINOGEN UR QL: 0.2 EU/DL (ref 0.2–1)
WBC # BLD AUTO: 14.9 K/UL (ref 4.3–11.1)
WBC URNS QL MICRO: ABNORMAL /HPF

## 2024-05-06 PROCEDURE — 84145 PROCALCITONIN (PCT): CPT

## 2024-05-06 PROCEDURE — 74177 CT ABD & PELVIS W/CONTRAST: CPT

## 2024-05-06 PROCEDURE — 81003 URINALYSIS AUTO W/O SCOPE: CPT

## 2024-05-06 PROCEDURE — 83605 ASSAY OF LACTIC ACID: CPT

## 2024-05-06 PROCEDURE — 6360000002 HC RX W HCPCS: Performed by: STUDENT IN AN ORGANIZED HEALTH CARE EDUCATION/TRAINING PROGRAM

## 2024-05-06 PROCEDURE — 99285 EMERGENCY DEPT VISIT HI MDM: CPT

## 2024-05-06 PROCEDURE — 87635 SARS-COV-2 COVID-19 AMP PRB: CPT

## 2024-05-06 PROCEDURE — 96365 THER/PROPH/DIAG IV INF INIT: CPT

## 2024-05-06 PROCEDURE — 96366 THER/PROPH/DIAG IV INF ADDON: CPT

## 2024-05-06 PROCEDURE — 81001 URINALYSIS AUTO W/SCOPE: CPT

## 2024-05-06 PROCEDURE — 80053 COMPREHEN METABOLIC PANEL: CPT

## 2024-05-06 PROCEDURE — 6360000004 HC RX CONTRAST MEDICATION: Performed by: STUDENT IN AN ORGANIZED HEALTH CARE EDUCATION/TRAINING PROGRAM

## 2024-05-06 PROCEDURE — 85027 COMPLETE CBC AUTOMATED: CPT

## 2024-05-06 PROCEDURE — 2580000003 HC RX 258: Performed by: STUDENT IN AN ORGANIZED HEALTH CARE EDUCATION/TRAINING PROGRAM

## 2024-05-06 PROCEDURE — 71045 X-RAY EXAM CHEST 1 VIEW: CPT

## 2024-05-06 PROCEDURE — 87040 BLOOD CULTURE FOR BACTERIA: CPT

## 2024-05-06 RX ORDER — CEPHALEXIN 500 MG/1
500 CAPSULE ORAL 4 TIMES DAILY
Qty: 28 CAPSULE | Refills: 0 | Status: SHIPPED | OUTPATIENT
Start: 2024-05-06 | End: 2024-05-13

## 2024-05-06 RX ADMIN — IOPAMIDOL 100 ML: 755 INJECTION, SOLUTION INTRAVENOUS at 03:08

## 2024-05-06 RX ADMIN — VANCOMYCIN HYDROCHLORIDE 2000 MG: 10 INJECTION, POWDER, LYOPHILIZED, FOR SOLUTION INTRAVENOUS at 03:00

## 2024-05-06 RX ADMIN — PIPERACILLIN AND TAZOBACTAM 4500 MG: 4; .5 INJECTION, POWDER, LYOPHILIZED, FOR SOLUTION INTRAVENOUS at 02:51

## 2024-05-06 ASSESSMENT — PAIN DESCRIPTION - ORIENTATION: ORIENTATION: MID

## 2024-05-06 ASSESSMENT — PAIN SCALES - GENERAL: PAINLEVEL_OUTOF10: 6

## 2024-05-06 ASSESSMENT — PAIN DESCRIPTION - LOCATION: LOCATION: BACK

## 2024-05-06 ASSESSMENT — PAIN DESCRIPTION - DESCRIPTORS: DESCRIPTORS: THROBBING

## 2024-05-06 ASSESSMENT — PAIN - FUNCTIONAL ASSESSMENT: PAIN_FUNCTIONAL_ASSESSMENT: 0-10

## 2024-05-06 NOTE — ED NOTES
Patient mobility status  with mild difficulty. Provider aware     I have reviewed discharge instructions with the patient.  The patient verbalized understanding.    Patient left ED via Discharge Method: stretcher to Home with  [facility] .    Opportunity for questions and clarification provided.     Patient given 1 scripts.           Eliseo Landin RN  05/06/24 0608

## 2024-05-06 NOTE — ED TRIAGE NOTES
Patient arrives via ems from home. Patient states she had sharp pain in her lower back starting tonight. Patient was able to ambulate to the stretcher. Patient denies falls and recent injury.

## 2024-05-06 NOTE — DISCHARGE INSTRUCTIONS
Your urine sample today showed evidence of an infection.  I recommend being admitted into the hospital for MRI.  Return to the hospital anytime if you change your mind about getting MRI and being admitted into the hospital.  Return at any time if you have any new or worsening symptoms.

## 2024-05-06 NOTE — ED PROVIDER NOTES
during the making of this note. This software is not perfect and grammatical and other typographical errors may be present. This note has not been completely proofread for errors.     Michael Gan MD  05/06/24 0559

## 2024-05-06 NOTE — ED NOTES
[facility] arrived to transport patient at this time.      Eliseo Landin, VICKY  05/06/24 0762

## 2024-05-08 ENCOUNTER — HOSPITAL ENCOUNTER (EMERGENCY)
Age: 84
Discharge: HOME | End: 2024-05-08
Attending: STUDENT IN AN ORGANIZED HEALTH CARE EDUCATION/TRAINING PROGRAM
Payer: MEDICARE

## 2024-05-08 ENCOUNTER — APPOINTMENT (OUTPATIENT)
Dept: INFUSION THERAPY | Age: 84
End: 2024-05-08
Payer: MEDICARE

## 2024-05-08 VITALS
OXYGEN SATURATION: 93 % | BODY MASS INDEX: 29.02 KG/M2 | DIASTOLIC BLOOD PRESSURE: 72 MMHG | HEIGHT: 64 IN | RESPIRATION RATE: 16 BRPM | HEART RATE: 64 BPM | TEMPERATURE: 98.7 F | WEIGHT: 170 LBS | SYSTOLIC BLOOD PRESSURE: 176 MMHG

## 2024-05-08 DIAGNOSIS — R11.0 NAUSEA: Primary | ICD-10-CM

## 2024-05-08 LAB
ALBUMIN SERPL-MCNC: 2.8 G/DL (ref 3.2–4.6)
ALBUMIN/GLOB SERPL: 0.8 (ref 1–1.9)
ALP SERPL-CCNC: 112 U/L (ref 35–104)
ALT SERPL-CCNC: 13 U/L (ref 12–65)
ANION GAP SERPL CALC-SCNC: 10 MMOL/L (ref 9–18)
AST SERPL-CCNC: 27 U/L (ref 15–37)
BASOPHILS # BLD: 0 K/UL (ref 0–0.2)
BASOPHILS NFR BLD: 0 % (ref 0–2)
BILIRUB SERPL-MCNC: 1.3 MG/DL (ref 0–1.2)
BILIRUB UR QL: NEGATIVE
BUN SERPL-MCNC: 13 MG/DL (ref 8–23)
CALCIUM SERPL-MCNC: 9.7 MG/DL (ref 8.8–10.2)
CHLORIDE SERPL-SCNC: 105 MMOL/L (ref 98–107)
CO2 SERPL-SCNC: 28 MMOL/L (ref 20–28)
CREAT SERPL-MCNC: 0.55 MG/DL (ref 0.6–1.1)
DIFFERENTIAL METHOD BLD: ABNORMAL
EOSINOPHIL # BLD: 0 K/UL (ref 0–0.8)
EOSINOPHIL NFR BLD: 0 % (ref 0.5–7.8)
ERYTHROCYTE [DISTWIDTH] IN BLOOD BY AUTOMATED COUNT: 13.8 % (ref 11.9–14.6)
GLOBULIN SER CALC-MCNC: 3.4 G/DL (ref 2.3–3.5)
GLUCOSE SERPL-MCNC: 110 MG/DL (ref 70–99)
GLUCOSE UR QL STRIP.AUTO: NEGATIVE MG/DL
HCT VFR BLD AUTO: 40.9 % (ref 35.8–46.3)
HGB BLD-MCNC: 13.1 G/DL (ref 11.7–15.4)
IMM GRANULOCYTES # BLD AUTO: 0.1 K/UL (ref 0–0.5)
IMM GRANULOCYTES NFR BLD AUTO: 1 % (ref 0–5)
KETONES UR-MCNC: NEGATIVE MG/DL
LEUKOCYTE ESTERASE UR QL STRIP: NEGATIVE
LIPASE SERPL-CCNC: 15 U/L (ref 13–60)
LYMPHOCYTES # BLD: 1.1 K/UL (ref 0.5–4.6)
LYMPHOCYTES NFR BLD: 9 % (ref 13–44)
MAGNESIUM SERPL-MCNC: 1.5 MG/DL (ref 1.8–2.4)
MCH RBC QN AUTO: 29.3 PG (ref 26.1–32.9)
MCHC RBC AUTO-ENTMCNC: 32 G/DL (ref 31.4–35)
MCV RBC AUTO: 91.5 FL (ref 82–102)
MONOCYTES # BLD: 0.6 K/UL (ref 0.1–1.3)
MONOCYTES NFR BLD: 5 % (ref 4–12)
NEUTS SEG # BLD: 10.6 K/UL (ref 1.7–8.2)
NEUTS SEG NFR BLD: 85 % (ref 43–78)
NITRITE UR QL: NEGATIVE
NRBC # BLD: 0 K/UL (ref 0–0.2)
PH UR: 7 (ref 5–9)
PLATELET # BLD AUTO: 164 K/UL (ref 150–450)
PMV BLD AUTO: 12.3 FL (ref 9.4–12.3)
POTASSIUM SERPL-SCNC: 3.2 MMOL/L (ref 3.5–5.1)
PROT SERPL-MCNC: 6.1 G/DL (ref 6.3–8.2)
PROT UR QL: NEGATIVE MG/DL
RBC # BLD AUTO: 4.47 M/UL (ref 4.05–5.2)
RBC # UR STRIP: NEGATIVE
SERVICE CMNT-IMP: NORMAL
SODIUM SERPL-SCNC: 143 MMOL/L (ref 136–145)
SP GR UR: 1.01 (ref 1–1.02)
UROBILINOGEN UR QL: 1 EU/DL (ref 0.2–1)
WBC # BLD AUTO: 12.4 K/UL (ref 4.3–11.1)

## 2024-05-08 PROCEDURE — 6360000002 HC RX W HCPCS: Performed by: STUDENT IN AN ORGANIZED HEALTH CARE EDUCATION/TRAINING PROGRAM

## 2024-05-08 PROCEDURE — 81003 URINALYSIS AUTO W/O SCOPE: CPT

## 2024-05-08 PROCEDURE — 96365 THER/PROPH/DIAG IV INF INIT: CPT

## 2024-05-08 PROCEDURE — 83735 ASSAY OF MAGNESIUM: CPT

## 2024-05-08 PROCEDURE — 85025 COMPLETE CBC W/AUTO DIFF WBC: CPT

## 2024-05-08 PROCEDURE — 83690 ASSAY OF LIPASE: CPT

## 2024-05-08 PROCEDURE — 99284 EMERGENCY DEPT VISIT MOD MDM: CPT

## 2024-05-08 PROCEDURE — 2580000003 HC RX 258: Performed by: STUDENT IN AN ORGANIZED HEALTH CARE EDUCATION/TRAINING PROGRAM

## 2024-05-08 PROCEDURE — 80053 COMPREHEN METABOLIC PANEL: CPT

## 2024-05-08 RX ORDER — MAGNESIUM SULFATE IN WATER 40 MG/ML
2000 INJECTION, SOLUTION INTRAVENOUS ONCE
Status: COMPLETED | OUTPATIENT
Start: 2024-05-08 | End: 2024-05-08

## 2024-05-08 RX ORDER — ONDANSETRON 4 MG/1
4 TABLET, FILM COATED ORAL 3 TIMES DAILY PRN
Qty: 15 TABLET | Refills: 2 | Status: SHIPPED | OUTPATIENT
Start: 2024-05-08

## 2024-05-08 RX ORDER — 0.9 % SODIUM CHLORIDE 0.9 %
1000 INTRAVENOUS SOLUTION INTRAVENOUS
Status: COMPLETED | OUTPATIENT
Start: 2024-05-08 | End: 2024-05-08

## 2024-05-08 RX ORDER — ONDANSETRON 2 MG/ML
4 INJECTION INTRAMUSCULAR; INTRAVENOUS
Status: DISCONTINUED | OUTPATIENT
Start: 2024-05-08 | End: 2024-05-08 | Stop reason: HOSPADM

## 2024-05-08 RX ADMIN — SODIUM CHLORIDE 1000 ML: 9 INJECTION, SOLUTION INTRAVENOUS at 16:42

## 2024-05-08 RX ADMIN — MAGNESIUM SULFATE HEPTAHYDRATE 2000 MG: 40 INJECTION, SOLUTION INTRAVENOUS at 16:43

## 2024-05-08 ASSESSMENT — PAIN SCALES - GENERAL: PAINLEVEL_OUTOF10: 5

## 2024-05-08 ASSESSMENT — PAIN DESCRIPTION - ORIENTATION: ORIENTATION: RIGHT;LEFT

## 2024-05-08 ASSESSMENT — PAIN DESCRIPTION - LOCATION: LOCATION: LEG

## 2024-05-08 ASSESSMENT — PAIN - FUNCTIONAL ASSESSMENT: PAIN_FUNCTIONAL_ASSESSMENT: 0-10

## 2024-05-08 NOTE — ED TRIAGE NOTES
Patient arrives to ED via EMS from home. Patient reports urinary retention and nausea. Patient was supposed to be admitted into the hospital on Sunday for UTI but patient left AMA from ED.

## 2024-05-08 NOTE — ED PROVIDER NOTES
Recent Labs     05/06/24  0228   COVID19 Not detected       Voice dictation software was used during the making of this note.  This software is not perfect and grammatical and other typographical errors may be present.  This note has not been completely proofread for errors.        Neo Scott, DO  05/09/24 0031

## 2024-05-08 NOTE — ACP (ADVANCE CARE PLANNING)
Advance Care Planning   Healthcare Decision Maker:    Primary Decision Maker: Alexus Salazar - Child - [phone]    Click here to complete Healthcare Decision Makers including selection of the Healthcare Decision Maker Relationship (ie \"Primary\").

## 2024-05-08 NOTE — ED NOTES
Patient mobility status  with difficulty, uses a walker. Provider aware     I have reviewed discharge instructions with the patient.  The patient verbalized understanding.    Patient left ED via Discharge Method: stretcher to Home with  [facility] .    Opportunity for questions and clarification provided.     Patient given 1 scripts.            Haroldo Montague, RN  05/08/24 2495

## 2024-05-08 NOTE — DISCHARGE INSTRUCTIONS
Your lab testing and urinalysis appear improved today with the antibiotics you are prescribed during your last visit.  Continue these antibiotics as directed and to run out of medication.  Return immediately for worsening symptoms, concerns or questions.  Arrange follow-up with your primary care provider for recheck.
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
## 2024-05-11 LAB
BACTERIA SPEC CULT: NORMAL
BACTERIA SPEC CULT: NORMAL
SERVICE CMNT-IMP: NORMAL
SERVICE CMNT-IMP: NORMAL

## 2024-05-23 ENCOUNTER — HOSPITAL ENCOUNTER (EMERGENCY)
Age: 84
Discharge: HOME | End: 2024-05-23
Attending: STUDENT IN AN ORGANIZED HEALTH CARE EDUCATION/TRAINING PROGRAM
Payer: MEDICARE

## 2024-05-23 VITALS
RESPIRATION RATE: 18 BRPM | HEART RATE: 53 BPM | SYSTOLIC BLOOD PRESSURE: 188 MMHG | BODY MASS INDEX: 29.02 KG/M2 | DIASTOLIC BLOOD PRESSURE: 73 MMHG | HEIGHT: 64 IN | WEIGHT: 170 LBS | TEMPERATURE: 98.1 F | OXYGEN SATURATION: 99 %

## 2024-05-23 DIAGNOSIS — M25.562 CHRONIC PAIN OF LEFT KNEE: Primary | ICD-10-CM

## 2024-05-23 DIAGNOSIS — G89.29 CHRONIC PAIN OF LEFT KNEE: Primary | ICD-10-CM

## 2024-05-23 PROCEDURE — 96372 THER/PROPH/DIAG INJ SC/IM: CPT

## 2024-05-23 PROCEDURE — 6360000002 HC RX W HCPCS: Performed by: STUDENT IN AN ORGANIZED HEALTH CARE EDUCATION/TRAINING PROGRAM

## 2024-05-23 PROCEDURE — 6370000000 HC RX 637 (ALT 250 FOR IP): Performed by: STUDENT IN AN ORGANIZED HEALTH CARE EDUCATION/TRAINING PROGRAM

## 2024-05-23 PROCEDURE — 99284 EMERGENCY DEPT VISIT MOD MDM: CPT

## 2024-05-23 RX ORDER — DEXAMETHASONE SODIUM PHOSPHATE 10 MG/ML
10 INJECTION INTRAMUSCULAR; INTRAVENOUS
Status: COMPLETED | OUTPATIENT
Start: 2024-05-23 | End: 2024-05-23

## 2024-05-23 RX ORDER — OXYCODONE HYDROCHLORIDE 5 MG/1
5 TABLET ORAL
Status: COMPLETED | OUTPATIENT
Start: 2024-05-23 | End: 2024-05-23

## 2024-05-23 RX ORDER — ACETAMINOPHEN 500 MG
1000 TABLET ORAL
Status: COMPLETED | OUTPATIENT
Start: 2024-05-23 | End: 2024-05-23

## 2024-05-23 RX ADMIN — OXYCODONE 5 MG: 5 TABLET ORAL at 12:23

## 2024-05-23 RX ADMIN — ACETAMINOPHEN 1000 MG: 500 TABLET, FILM COATED ORAL at 12:23

## 2024-05-23 RX ADMIN — DEXAMETHASONE SODIUM PHOSPHATE 10 MG: 10 INJECTION INTRAMUSCULAR; INTRAVENOUS at 12:24

## 2024-05-23 ASSESSMENT — PAIN SCALES - GENERAL: PAINLEVEL_OUTOF10: 10

## 2024-05-23 ASSESSMENT — PAIN - FUNCTIONAL ASSESSMENT: PAIN_FUNCTIONAL_ASSESSMENT: 0-10

## 2024-05-23 ASSESSMENT — PAIN DESCRIPTION - DESCRIPTORS: DESCRIPTORS: ACHING;DISCOMFORT

## 2024-05-23 ASSESSMENT — PAIN DESCRIPTION - LOCATION: LOCATION: HIP;LEG;KNEE

## 2024-05-23 ASSESSMENT — PAIN DESCRIPTION - ORIENTATION: ORIENTATION: LEFT

## 2024-05-23 ASSESSMENT — PAIN DESCRIPTION - FREQUENCY: FREQUENCY: CONTINUOUS

## 2024-05-23 ASSESSMENT — PAIN DESCRIPTION - PAIN TYPE: TYPE: CHRONIC PAIN;ACUTE PAIN

## 2024-05-23 NOTE — ED PROVIDER NOTES
Emergency Department Provider Note       PCP: Unknown, Provider, APRN - NP   Age: 84 y.o.   Sex: female     DISPOSITION Decision To Discharge 05/23/2024 01:06:39 PM       ICD-10-CM    1. Chronic pain of left knee  M25.562     G89.29           Medical Decision Making     84-year-old female here today for atraumatic left knee pain x 2 days that did not improve with p.o. Tylenol administration.  On exam, she has excellent range of motion in bilateral knees, reports pain whenever she ambulates and applies pressure to the left knee, but can ambulate, she did so for us in the room.  She has no signs of an infected joint, no patellar instability.  High suspicion that this is likely acute on chronic osteoarthritic in etiology, possible meniscal injury, low suspicion for bony injury such as fracture or dislocation given the absence of trauma.  I explained to the patient that no imaging was indicated given the absence of trauma, and she would need to follow-up with orthopedic surgery for further management.  Symptomatic care provided    Planning for discharge  ED Course as of 05/23/24 1307   Thu May 23, 2024   1305 Patient feels much better after medication administration.  Explained to the patient that she needs to follow-up for further management of her chronic knee pain.  Discharged the patient home [EM]      ED Course User Index  [EM] Roberto Carlos Morales III, MD     1 acute, uncomplicated illness or injury.   Patient was discharged risks and benefits of hospitalization were considered.    I independently ordered and reviewed each unique test.                     History     Patient is an 84-year-old female with a past medical history of hypertension, here today for atraumatic left knee pain x 2 days that did not improve with p.o. Tylenol administration.  Patient states that she has pain whenever she ambulates, but can ambulate under her own assistance, denies any recent falls, trauma, further sick symptoms.  Has

## 2024-05-23 NOTE — ED TRIAGE NOTES
Pt brought in by ems from home ([facility]) c/o chronic L hip and leg pain. Denies any injury/fall. Took pain medication last night without improvement.        Pt has not taken BP medications yet today    A&Ox4

## 2024-05-23 NOTE — ED NOTES
Pt assisted to bathroom, ambulatory to wheelchair then escorted by RN. Pt has been given meal box, tolerating PO intake      Rocio Jacobo, RN  05/23/24 1257

## 2024-06-10 ENCOUNTER — TELEPHONE (OUTPATIENT)
Dept: RADIATION ONCOLOGY | Age: 84
End: 2024-06-10

## 2024-06-10 ENCOUNTER — TELEPHONE (OUTPATIENT)
Dept: INFUSION THERAPY | Age: 84
End: 2024-06-10

## 2024-06-10 NOTE — TELEPHONE ENCOUNTER
Reached out to pts daughter Alexus regarding pt calling multiple times today to r/s her missed appts.. Per daughter, patient no longer has a caregiver or transportation and is unable to get to her appts. Dtr states that she has 3 appts herself tomorrow and is unable to get her here... requests to call back when she is able.. inquired about reaching out to SW to get assistance with transportation for patient and she declined.  Said she would call back once she figured things out..  Provided callback #

## 2024-06-10 NOTE — TELEPHONE ENCOUNTER
Spoke with patients daughter and was informed that pt no longer has a caregiver..  daughter unable to mukesh an appt as she herself has multiple appts and can't bring patient.. she declined assistance from SW to help with transporation... said that she would call back when she had things figured out..

## 2024-06-12 DIAGNOSIS — F51.04 CHRONIC INSOMNIA: ICD-10-CM

## 2024-06-12 RX ORDER — TEMAZEPAM 15 MG/1
15 CAPSULE ORAL NIGHTLY PRN
Qty: 30 CAPSULE | OUTPATIENT
Start: 2024-06-12 | End: 2024-07-12

## 2024-08-25 ENCOUNTER — HOSPITAL ENCOUNTER (EMERGENCY)
Age: 84
Discharge: HOME | End: 2024-08-25
Attending: EMERGENCY MEDICINE
Payer: MEDICARE

## 2024-08-25 VITALS
RESPIRATION RATE: 18 BRPM | HEART RATE: 83 BPM | SYSTOLIC BLOOD PRESSURE: 111 MMHG | DIASTOLIC BLOOD PRESSURE: 87 MMHG | OXYGEN SATURATION: 97 % | TEMPERATURE: 98.3 F

## 2024-08-25 DIAGNOSIS — T73.0XXA HUNGRY, INITIAL ENCOUNTER: ICD-10-CM

## 2024-08-25 DIAGNOSIS — R42 LIGHTHEADEDNESS: Primary | ICD-10-CM

## 2024-08-25 PROCEDURE — 99283 EMERGENCY DEPT VISIT LOW MDM: CPT

## 2024-08-25 NOTE — ED NOTES
Patient mobility status  with no difficulty. Provider aware     I have reviewed discharge instructions with the patient.  The patient verbalized understanding.    Patient left ED via Discharge Method: stretcher to Home with  [facility] .    Opportunity for questions and clarification provided.     Patient given 0 scripts.           Nataliia Diaz RN  08/25/24 2088
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
## 2024-08-25 NOTE — ED TRIAGE NOTES
Coming from home. States she hasn't eaten all day and became busy. Main complaint is that she is hungry

## 2024-08-30 RX ORDER — ANASTROZOLE 1 MG/1
1 TABLET ORAL DAILY
Qty: 90 TABLET | Refills: 3 | OUTPATIENT
Start: 2024-08-30

## 2024-09-06 ENCOUNTER — HOSPITAL ENCOUNTER (EMERGENCY)
Age: 84
Discharge: HOME | End: 2024-09-06
Attending: EMERGENCY MEDICINE
Payer: MEDICARE

## 2024-09-06 ENCOUNTER — APPOINTMENT (OUTPATIENT)
Dept: CT IMAGING | Age: 84
End: 2024-09-06
Payer: MEDICARE

## 2024-09-06 VITALS
HEART RATE: 50 BPM | BODY MASS INDEX: 29.02 KG/M2 | SYSTOLIC BLOOD PRESSURE: 147 MMHG | OXYGEN SATURATION: 97 % | HEIGHT: 64 IN | WEIGHT: 170 LBS | DIASTOLIC BLOOD PRESSURE: 98 MMHG | TEMPERATURE: 97.9 F | RESPIRATION RATE: 18 BRPM

## 2024-09-06 DIAGNOSIS — M54.12 CERVICAL RADICULOPATHY: ICD-10-CM

## 2024-09-06 DIAGNOSIS — S16.1XXA STRAIN OF NECK MUSCLE, INITIAL ENCOUNTER: Primary | ICD-10-CM

## 2024-09-06 PROCEDURE — 99284 EMERGENCY DEPT VISIT MOD MDM: CPT

## 2024-09-06 PROCEDURE — 70450 CT HEAD/BRAIN W/O DYE: CPT

## 2024-09-06 PROCEDURE — 72125 CT NECK SPINE W/O DYE: CPT

## 2024-09-06 PROCEDURE — 96372 THER/PROPH/DIAG INJ SC/IM: CPT

## 2024-09-06 PROCEDURE — 6360000002 HC RX W HCPCS: Performed by: EMERGENCY MEDICINE

## 2024-09-06 RX ORDER — LIDOCAINE 50 MG/G
1 PATCH TOPICAL DAILY
Qty: 10 PATCH | Refills: 0 | Status: SHIPPED | OUTPATIENT
Start: 2024-09-06 | End: 2024-09-16

## 2024-09-06 RX ORDER — KETOROLAC TROMETHAMINE 15 MG/ML
15 INJECTION, SOLUTION INTRAMUSCULAR; INTRAVENOUS
Status: COMPLETED | OUTPATIENT
Start: 2024-09-06 | End: 2024-09-06

## 2024-09-06 RX ADMIN — KETOROLAC TROMETHAMINE 15 MG: 15 INJECTION, SOLUTION INTRAMUSCULAR; INTRAVENOUS at 19:33

## 2024-09-06 NOTE — ED PROVIDER NOTES
Emergency Department Provider Note       PCP: File, Not On, MD   Age: 84 y.o.   Sex: female     DISPOSITION Decision To Discharge 09/06/2024 09:12:51 PM  Condition at Disposition: Good       ICD-10-CM    1. Strain of neck muscle, initial encounter  S16.1XXA       2. Cervical radiculopathy  M54.12           Medical Decision Making     DDX:    Cervical radiculopathy, CVA, carpal tunnel,    Sprain, strain, tendon injury, contusion,    Abrasion, laceration, neurovascular injury, foreign body    Fracture, open fracture, dislocation, joint separation, articular surface injury,    ED Course as of 09/06/24 2115   Fri Sep 06, 2024   2110 I talked to the patient about the findings here in the emergency department.  No acute emergent condition identified in the ER this evening.  I encouraged patient take Tylenol and do some light range of motion stretching. [KH]      ED Course User Index  [KH] Yousif Nice, DO     1 chronic illness with exacerbation.  Prescription drug management performed.  Shared medical decision making was utilized in creating the patients health plan today.  I independently ordered and reviewed each unique test.           I interpreted the CT Scan no fracture.              History     Patient is an 84-year-old female presenting to the emergency department by EMS because of pain in the right side of her upper trapezius and what she describes as a numbness in the right hand.  Patient says that the numbness in the right hand has been present for a while.  She says even back in [facility] last year she had a fall and has had pain in that right hand since then.  The patient says that the hand felt heavy and that is why she called the ambulance.  She denies any recent trauma or injury.  The patient has no other acute complaint          ROS     Review of Systems   Musculoskeletal:  Positive for neck pain.   Neurological:  Positive for numbness.        Physical Exam     Vitals signs and nursing note  maintained.   Multilevel degenerative disc disease is demonstrated.  The portions of the lung apices included in the field of view are grossly  unremarkable.  There is no evidence for acute fracture.          Impression    CT HEAD:  1. Chronic small vessel ischemic change.   2. If patient continues to have symptoms, or if there remains any further  clinical concern, MRI may help better delineate.       CT CERVICAL SPINE:  1. Straightening of the cervical spine with loss of normal cervical lordosis.  This may be due to muscle spasm and/or cervical collar.   2. Multilevel degenerative disc disease. If symptoms continue, an MRI would help  better delineate.  3. No CT evidence for acute fracture to the cervical spine.          Electronically signed by Nery Yanes         CT HEAD WO CONTRAST   Final Result      CT HEAD:   1. Chronic small vessel ischemic change.    2. If patient continues to have symptoms, or if there remains any further   clinical concern, MRI may help better delineate.          CT CERVICAL SPINE:   1. Straightening of the cervical spine with loss of normal cervical lordosis.   This may be due to muscle spasm and/or cervical collar.    2. Multilevel degenerative disc disease. If symptoms continue, an MRI would help   better delineate.   3. No CT evidence for acute fracture to the cervical spine.               Electronically signed by Nery Yanes      CT CERVICAL SPINE WO CONTRAST   Final Result      CT HEAD:   1. Chronic small vessel ischemic change.    2. If patient continues to have symptoms, or if there remains any further   clinical concern, MRI may help better delineate.          CT CERVICAL SPINE:   1. Straightening of the cervical spine with loss of normal cervical lordosis.   This may be due to muscle spasm and/or cervical collar.    2. Multilevel degenerative disc disease. If symptoms continue, an MRI would help   better delineate.   3. No CT evidence for acute fracture to the

## 2024-09-06 NOTE — ED TRIAGE NOTES
Pt arrives via [facility] from home for complaint of neck pain upon waking up this AM. Pt in no acute distress. No traumatic injury.

## 2024-09-07 NOTE — DISCHARGE INSTRUCTIONS
Do the stretching exercises like we discussed.    Use the Lidoderm patch over the affected area 12 hours on and a minimum of 12 hours off before putting a new patch on your body

## 2024-09-16 ENCOUNTER — HOSPITAL ENCOUNTER (EMERGENCY)
Age: 84
Discharge: HOME | End: 2024-09-16
Attending: STUDENT IN AN ORGANIZED HEALTH CARE EDUCATION/TRAINING PROGRAM
Payer: MEDICARE

## 2024-09-16 VITALS
RESPIRATION RATE: 20 BRPM | OXYGEN SATURATION: 98 % | WEIGHT: 170 LBS | HEIGHT: 64 IN | BODY MASS INDEX: 29.02 KG/M2 | HEART RATE: 55 BPM | TEMPERATURE: 97.8 F | DIASTOLIC BLOOD PRESSURE: 62 MMHG | SYSTOLIC BLOOD PRESSURE: 119 MMHG

## 2024-09-16 DIAGNOSIS — M25.561 CHRONIC PAIN OF BOTH KNEES: Primary | ICD-10-CM

## 2024-09-16 DIAGNOSIS — M25.562 CHRONIC PAIN OF BOTH KNEES: Primary | ICD-10-CM

## 2024-09-16 DIAGNOSIS — G89.29 CHRONIC PAIN OF BOTH KNEES: Primary | ICD-10-CM

## 2024-09-16 PROCEDURE — 99283 EMERGENCY DEPT VISIT LOW MDM: CPT

## 2024-09-16 PROCEDURE — 6370000000 HC RX 637 (ALT 250 FOR IP): Performed by: STUDENT IN AN ORGANIZED HEALTH CARE EDUCATION/TRAINING PROGRAM

## 2024-09-16 RX ORDER — ACETAMINOPHEN 325 MG/1
650 TABLET ORAL
Status: COMPLETED | OUTPATIENT
Start: 2024-09-16 | End: 2024-09-16

## 2024-09-16 RX ADMIN — ACETAMINOPHEN 650 MG: 325 TABLET ORAL at 01:22

## 2024-09-16 ASSESSMENT — PAIN DESCRIPTION - DESCRIPTORS: DESCRIPTORS: ACHING

## 2024-09-16 ASSESSMENT — PAIN - FUNCTIONAL ASSESSMENT: PAIN_FUNCTIONAL_ASSESSMENT: 0-10

## 2024-09-16 ASSESSMENT — PAIN DESCRIPTION - ORIENTATION: ORIENTATION: RIGHT;LEFT

## 2024-09-16 ASSESSMENT — PAIN SCALES - GENERAL: PAINLEVEL_OUTOF10: 4

## 2024-09-16 ASSESSMENT — PAIN DESCRIPTION - LOCATION: LOCATION: KNEE

## 2024-10-03 PROCEDURE — 99283 EMERGENCY DEPT VISIT LOW MDM: CPT

## 2024-10-04 ENCOUNTER — HOSPITAL ENCOUNTER (EMERGENCY)
Age: 84
Discharge: HOME | End: 2024-10-04
Attending: EMERGENCY MEDICINE
Payer: MEDICARE

## 2024-10-04 ENCOUNTER — HOSPITAL ENCOUNTER (EMERGENCY)
Age: 84
Discharge: HOME | End: 2024-10-04
Payer: MEDICARE

## 2024-10-04 ENCOUNTER — APPOINTMENT (OUTPATIENT)
Dept: CT IMAGING | Age: 84
End: 2024-10-04
Payer: MEDICARE

## 2024-10-04 VITALS
RESPIRATION RATE: 17 BRPM | SYSTOLIC BLOOD PRESSURE: 132 MMHG | TEMPERATURE: 98 F | OXYGEN SATURATION: 99 % | DIASTOLIC BLOOD PRESSURE: 72 MMHG | HEART RATE: 62 BPM

## 2024-10-04 DIAGNOSIS — G30.9 MODERATE ALZHEIMER'S DEMENTIA WITH PSYCHOTIC DISTURBANCE, UNSPECIFIED TIMING OF DEMENTIA ONSET (HCC): Primary | ICD-10-CM

## 2024-10-04 DIAGNOSIS — F02.B2 MODERATE ALZHEIMER'S DEMENTIA WITH PSYCHOTIC DISTURBANCE, UNSPECIFIED TIMING OF DEMENTIA ONSET (HCC): Primary | ICD-10-CM

## 2024-10-04 LAB
ALBUMIN SERPL-MCNC: 3.7 G/DL (ref 3.2–4.6)
ALBUMIN/GLOB SERPL: 1.1 (ref 1–1.9)
ALP SERPL-CCNC: 127 U/L (ref 35–104)
ALT SERPL-CCNC: 14 U/L (ref 8–45)
AMMONIA PLAS-SCNC: 26 UMOL/L (ref 11–51)
ANION GAP SERPL CALC-SCNC: 12 MMOL/L (ref 9–18)
ARTERIAL PATENCY WRIST A: POSITIVE
AST SERPL-CCNC: 44 U/L (ref 15–37)
BASE EXCESS BLD CALC-SCNC: 0 MMOL/L
BDY SITE: ABNORMAL
BILIRUB SERPL-MCNC: 0.6 MG/DL (ref 0–1.2)
BUN SERPL-MCNC: 12 MG/DL (ref 8–23)
CALCIUM SERPL-MCNC: 10 MG/DL (ref 8.8–10.2)
CHLORIDE SERPL-SCNC: 104 MMOL/L (ref 98–107)
CO2 SERPL-SCNC: 23 MMOL/L (ref 20–28)
CREAT SERPL-MCNC: 0.9 MG/DL (ref 0.6–1.1)
ETHANOL SERPL-MCNC: <11 MG/DL (ref 0–0.08)
GAS FLOW.O2 O2 DELIVERY SYS: ABNORMAL
GLOBULIN SER CALC-MCNC: 3.3 G/DL (ref 2.3–3.5)
GLUCOSE SERPL-MCNC: 80 MG/DL (ref 70–99)
HCO3 BLD-SCNC: 23 MMOL/L (ref 22–26)
O2/TOTAL GAS SETTING VFR VENT: 21 %
PCO2 BLD: 32.4 MMHG (ref 35–45)
PH BLD: 7.46 (ref 7.35–7.45)
PO2 BLD: 90 MMHG (ref 75–100)
POTASSIUM SERPL-SCNC: ABNORMAL MMOL/L (ref 3.5–5.1)
PROT SERPL-MCNC: 7 G/DL (ref 6.3–8.2)
RESPIRATORY RATE, POC: 16 (ref 5–40)
SAO2 % BLD: 97.5 % (ref 95–98)
SERVICE CMNT-IMP: ABNORMAL
SODIUM SERPL-SCNC: 139 MMOL/L (ref 136–145)
SPECIMEN TYPE: ABNORMAL
TSH, 3RD GENERATION: 3.59 UIU/ML (ref 0.27–4.2)

## 2024-10-04 PROCEDURE — 36600 WITHDRAWAL OF ARTERIAL BLOOD: CPT

## 2024-10-04 PROCEDURE — 84443 ASSAY THYROID STIM HORMONE: CPT

## 2024-10-04 PROCEDURE — 82803 BLOOD GASES ANY COMBINATION: CPT

## 2024-10-04 PROCEDURE — 81001 URINALYSIS AUTO W/SCOPE: CPT

## 2024-10-04 PROCEDURE — 99284 EMERGENCY DEPT VISIT MOD MDM: CPT

## 2024-10-04 PROCEDURE — 87086 URINE CULTURE/COLONY COUNT: CPT

## 2024-10-04 PROCEDURE — 70450 CT HEAD/BRAIN W/O DYE: CPT

## 2024-10-04 PROCEDURE — 82077 ASSAY SPEC XCP UR&BREATH IA: CPT

## 2024-10-04 PROCEDURE — 82140 ASSAY OF AMMONIA: CPT

## 2024-10-04 PROCEDURE — 80053 COMPREHEN METABOLIC PANEL: CPT

## 2024-10-04 ASSESSMENT — PAIN - FUNCTIONAL ASSESSMENT: PAIN_FUNCTIONAL_ASSESSMENT: NONE - DENIES PAIN

## 2024-10-04 NOTE — ED NOTES
Patient mobility status  with no difficulty. Provider aware     I have reviewed discharge instructions with the patient/ transport.  The patient verbalized understanding.    Patient left ED via Discharge Method: stretcher to Home with  [facility] .    Opportunity for questions and clarification provided.     Patient given 0 scripts.          Adrianne Luther RN  10/04/24 8601     0

## 2024-10-04 NOTE — ED PROVIDER NOTES
Emergency Department Provider Note       PCP: File, Not On, MD   Age: 84 y.o.   Sex: female     DISPOSITION Decision To Discharge 10/04/2024 07:07:52 AM  Condition at Disposition: Data Unavailable       ICD-10-CM    1. Moderate Alzheimer's dementia with psychotic disturbance, unspecified timing of dementia onset ([facility])  G30.9     F02.B2           Medical Decision Making     DDX:    TIA, CVA, ischemic stroke, Bell's palsy, intracranial hemorrhage,  subdural hematoma, subarachnoid hemorrhage,    electrolyte abnormality, renal failure, malignant hypertension, UTI    peripheral neuropathy, metabolic disorder, Guillian Rock syndrome, multiple sclerosis,    Encephalopathy, Alzheimer's dementia with hallucinations,      ED Course as of 10/04/24 0709   Fri Oct 04, 2024   0706 Urine culture has been ordered for this patient secondary to her multiple allergies and minimal bacteria and white blood cells on urinalysis.  It is my understanding the patient has advanced dementia and has a longstanding history of hallucinations and this is not a new issue for her after talking with her son.  No acute findings requiring admission to the hospital appreciated. [KH]      ED Course User Index  [KH] Yousif Nice, DO     1 or more chronic illnesses with a severe exacerbation or progression.  I independently ordered and reviewed each unique test.    I reviewed external records: ED visit note from an outside group.  I reviewed external records: provider visit note from PCP.  I reviewed external records: previous EKG including cardiologist interpretation.    I reviewed external records: previous lab results from outside ED.  I reviewed external records: previous imaging study including radiologist interpretation.   The patients assessment required an independent historian: Son.  The reason they were needed is dementia and important historical information not provided by the patient.                  History     84-year-old female

## 2024-10-04 NOTE — ED PROVIDER NOTES
Patient was seen by Dr. Nice this morning and disposition home.  Reentry of patient's chart was a mistake upon staff and was not evaluated by myself.  Nursing staff is arranging transportation for patient for discharge.     Pelon Enriquez APRN - NP  10/04/24 0001       Pelon Enriquez APRN - NP  10/04/24 4923

## 2024-10-04 NOTE — ED NOTES
Prior to discharge patient's respiration was regular and WNL. Patient denies chest pain and abdominal pain.     Adrianne Luther RN  10/04/24 4183

## 2024-10-04 NOTE — ED NOTES
Patient was fed this morning and provided with fluids prior to discharge with [facility].  [facility] is also informed to evaluate her home as well.      Adrianne Luther RN  10/04/24 5707

## 2024-10-04 NOTE — DISCHARGE INSTRUCTIONS
Urine culture is pending.  If the patient requires antibiotics after urine culture results you will be called and a prescription will be sent to the pharmacy.

## 2024-10-04 NOTE — ED TRIAGE NOTES
Patient arrived last night to the ER with a complaint of hallucination. Patient reports of seeing \" a woman and a cat in her back door\"

## 2024-10-06 LAB
APPEARANCE UR: ABNORMAL
BACTERIA SPEC CULT: NORMAL
BACTERIA SPEC CULT: NORMAL
BACTERIA URNS QL MICRO: ABNORMAL /HPF
BILIRUB UR QL: NEGATIVE
CASTS URNS QL MICRO: ABNORMAL /LPF
COLOR UR: ABNORMAL
EPI CELLS #/AREA URNS HPF: ABNORMAL /HPF
GLUCOSE UR STRIP.AUTO-MCNC: NEGATIVE MG/DL
HGB UR QL STRIP: NEGATIVE
KETONES UR QL STRIP.AUTO: NEGATIVE MG/DL
LEUKOCYTE ESTERASE UR QL STRIP.AUTO: ABNORMAL
NITRITE UR QL STRIP.AUTO: NEGATIVE
OTHER OBSERVATIONS: ABNORMAL
PH UR STRIP: 6 (ref 5–9)
PROT UR STRIP-MCNC: 30 MG/DL
RBC #/AREA URNS HPF: ABNORMAL /HPF
SERVICE CMNT-IMP: NORMAL
SP GR UR REFRACTOMETRY: 1.01 (ref 1–1.02)
UROBILINOGEN UR QL STRIP.AUTO: 1 EU/DL (ref 0.2–1)
WBC URNS QL MICRO: ABNORMAL /HPF

## 2024-10-07 ENCOUNTER — HOSPITAL ENCOUNTER (OUTPATIENT)
Dept: GENERAL RADIOLOGY | Age: 84
Discharge: HOME | End: 2024-10-10
Payer: MEDICARE

## 2024-10-07 DIAGNOSIS — R41.82 ALTERED MENTAL STATUS, UNSPECIFIED ALTERED MENTAL STATUS TYPE: ICD-10-CM

## 2024-10-07 PROCEDURE — 71045 X-RAY EXAM CHEST 1 VIEW: CPT

## 2024-12-28 ENCOUNTER — HOSPITAL ENCOUNTER (EMERGENCY)
Age: 84
Discharge: HOME | End: 2024-12-28
Attending: EMERGENCY MEDICINE
Payer: MEDICARE

## 2024-12-28 ENCOUNTER — APPOINTMENT (OUTPATIENT)
Dept: GENERAL RADIOLOGY | Age: 84
End: 2024-12-28
Payer: MEDICARE

## 2024-12-28 VITALS
BODY MASS INDEX: 29.02 KG/M2 | OXYGEN SATURATION: 92 % | HEART RATE: 77 BPM | DIASTOLIC BLOOD PRESSURE: 77 MMHG | WEIGHT: 170 LBS | SYSTOLIC BLOOD PRESSURE: 148 MMHG | TEMPERATURE: 98.9 F | HEIGHT: 64 IN | RESPIRATION RATE: 20 BRPM

## 2024-12-28 VITALS
DIASTOLIC BLOOD PRESSURE: 74 MMHG | SYSTOLIC BLOOD PRESSURE: 183 MMHG | RESPIRATION RATE: 17 BRPM | HEART RATE: 65 BPM | OXYGEN SATURATION: 95 % | TEMPERATURE: 99.8 F

## 2024-12-28 DIAGNOSIS — J18.9 COMMUNITY ACQUIRED PNEUMONIA OF RIGHT LOWER LOBE OF LUNG: Primary | ICD-10-CM

## 2024-12-28 DIAGNOSIS — J11.1 INFLUENZA WITH RESPIRATORY MANIFESTATION OTHER THAN PNEUMONIA: Primary | ICD-10-CM

## 2024-12-28 DIAGNOSIS — I10 ESSENTIAL HYPERTENSION: ICD-10-CM

## 2024-12-28 LAB
ALBUMIN SERPL-MCNC: 3.7 G/DL (ref 3.2–4.6)
ALBUMIN/GLOB SERPL: 1 (ref 1–1.9)
ALP SERPL-CCNC: 136 U/L (ref 35–104)
ALT SERPL-CCNC: 7 U/L (ref 8–45)
ANION GAP SERPL CALC-SCNC: 12 MMOL/L (ref 7–16)
AST SERPL-CCNC: 26 U/L (ref 15–37)
BASOPHILS # BLD: 0 K/UL (ref 0–0.2)
BASOPHILS NFR BLD: 0 % (ref 0–2)
BILIRUB SERPL-MCNC: 1.1 MG/DL (ref 0–1.2)
BUN SERPL-MCNC: 10 MG/DL (ref 8–23)
CALCIUM SERPL-MCNC: 10.3 MG/DL (ref 8.8–10.2)
CHLORIDE SERPL-SCNC: 105 MMOL/L (ref 98–107)
CK SERPL-CCNC: 52 U/L (ref 21–215)
CO2 SERPL-SCNC: 23 MMOL/L (ref 20–29)
CREAT SERPL-MCNC: 0.62 MG/DL (ref 0.6–1.1)
CRP SERPL-MCNC: 0.6 MG/DL (ref 0–0.4)
DIFFERENTIAL METHOD BLD: ABNORMAL
EOSINOPHIL # BLD: 0 K/UL (ref 0–0.8)
EOSINOPHIL NFR BLD: 0 % (ref 0.5–7.8)
ERYTHROCYTE [DISTWIDTH] IN BLOOD BY AUTOMATED COUNT: 14.3 % (ref 11.9–14.6)
FLUAV RNA SPEC QL NAA+PROBE: DETECTED
FLUBV RNA SPEC QL NAA+PROBE: NOT DETECTED
GLOBULIN SER CALC-MCNC: 3.6 G/DL (ref 2.3–3.5)
GLUCOSE SERPL-MCNC: 90 MG/DL (ref 70–99)
HCT VFR BLD AUTO: 50.4 % (ref 35.8–46.3)
HGB BLD-MCNC: 16.4 G/DL (ref 11.7–15.4)
IMM GRANULOCYTES # BLD AUTO: 0 K/UL (ref 0–0.5)
IMM GRANULOCYTES NFR BLD AUTO: 0 % (ref 0–5)
LYMPHOCYTES # BLD: 0.6 K/UL (ref 0.5–4.6)
LYMPHOCYTES NFR BLD: 10 % (ref 13–44)
MCH RBC QN AUTO: 29.1 PG (ref 26.1–32.9)
MCHC RBC AUTO-ENTMCNC: 32.5 G/DL (ref 31.4–35)
MCV RBC AUTO: 89.5 FL (ref 82–102)
MONOCYTES # BLD: 0.4 K/UL (ref 0.1–1.3)
MONOCYTES NFR BLD: 7 % (ref 4–12)
NEUTS SEG # BLD: 5.3 K/UL (ref 1.7–8.2)
NEUTS SEG NFR BLD: 83 % (ref 43–78)
NRBC # BLD: 0 K/UL (ref 0–0.2)
PLATELET # BLD AUTO: 186 K/UL (ref 150–450)
PMV BLD AUTO: 10.9 FL (ref 9.4–12.3)
POTASSIUM SERPL-SCNC: 4.3 MMOL/L (ref 3.5–5.1)
PROCALCITONIN SERPL-MCNC: 0.06 NG/ML (ref 0–0.1)
PROT SERPL-MCNC: 7.3 G/DL (ref 6.3–8.2)
RBC # BLD AUTO: 5.63 M/UL (ref 4.05–5.2)
SARS-COV-2 RDRP RESP QL NAA+PROBE: NOT DETECTED
SODIUM SERPL-SCNC: 140 MMOL/L (ref 136–145)
SOURCE: NORMAL
WBC # BLD AUTO: 6.3 K/UL (ref 4.3–11.1)

## 2024-12-28 PROCEDURE — 87635 SARS-COV-2 COVID-19 AMP PRB: CPT

## 2024-12-28 PROCEDURE — 6370000000 HC RX 637 (ALT 250 FOR IP): Performed by: EMERGENCY MEDICINE

## 2024-12-28 PROCEDURE — 84145 PROCALCITONIN (PCT): CPT

## 2024-12-28 PROCEDURE — 80053 COMPREHEN METABOLIC PANEL: CPT

## 2024-12-28 PROCEDURE — 87502 INFLUENZA DNA AMP PROBE: CPT

## 2024-12-28 PROCEDURE — 82550 ASSAY OF CK (CPK): CPT

## 2024-12-28 PROCEDURE — 99283 EMERGENCY DEPT VISIT LOW MDM: CPT

## 2024-12-28 PROCEDURE — 85025 COMPLETE CBC W/AUTO DIFF WBC: CPT

## 2024-12-28 PROCEDURE — 86140 C-REACTIVE PROTEIN: CPT

## 2024-12-28 PROCEDURE — 71046 X-RAY EXAM CHEST 2 VIEWS: CPT

## 2024-12-28 RX ORDER — OSELTAMIVIR PHOSPHATE 75 MG/1
75 CAPSULE ORAL 2 TIMES DAILY
Qty: 10 CAPSULE | Refills: 0 | Status: SHIPPED | OUTPATIENT
Start: 2024-12-28 | End: 2025-01-02

## 2024-12-28 RX ORDER — TRAMADOL HYDROCHLORIDE 50 MG/1
50 TABLET ORAL
Status: COMPLETED | OUTPATIENT
Start: 2024-12-28 | End: 2024-12-28

## 2024-12-28 RX ORDER — AZITHROMYCIN 250 MG/1
500 TABLET, FILM COATED ORAL
Status: COMPLETED | OUTPATIENT
Start: 2024-12-28 | End: 2024-12-28

## 2024-12-28 RX ORDER — ONDANSETRON 4 MG/1
4 TABLET, ORALLY DISINTEGRATING ORAL ONCE
Status: COMPLETED | OUTPATIENT
Start: 2024-12-28 | End: 2024-12-28

## 2024-12-28 RX ORDER — TRAMADOL HYDROCHLORIDE 50 MG/1
50 TABLET ORAL EVERY 6 HOURS PRN
Qty: 12 TABLET | Refills: 0 | Status: SHIPPED | OUTPATIENT
Start: 2024-12-28 | End: 2024-12-31

## 2024-12-28 RX ORDER — ONDANSETRON 4 MG/1
4 TABLET, ORALLY DISINTEGRATING ORAL EVERY 30 MIN PRN
Status: DISCONTINUED | OUTPATIENT
Start: 2024-12-28 | End: 2024-12-28 | Stop reason: HOSPADM

## 2024-12-28 RX ORDER — CEFDINIR 300 MG/1
300 CAPSULE ORAL
Status: COMPLETED | OUTPATIENT
Start: 2024-12-28 | End: 2024-12-28

## 2024-12-28 RX ORDER — AZITHROMYCIN 250 MG/1
250 TABLET, FILM COATED ORAL DAILY
Qty: 4 TABLET | Refills: 0 | Status: SHIPPED | OUTPATIENT
Start: 2024-12-29 | End: 2025-01-02

## 2024-12-28 RX ORDER — LISINOPRIL 5 MG/1
10 TABLET ORAL
Status: DISCONTINUED | OUTPATIENT
Start: 2024-12-28 | End: 2024-12-28 | Stop reason: HOSPADM

## 2024-12-28 RX ORDER — CEFDINIR 300 MG/1
300 CAPSULE ORAL 2 TIMES DAILY
Qty: 20 CAPSULE | Refills: 0 | Status: SHIPPED | OUTPATIENT
Start: 2024-12-28 | End: 2025-01-07

## 2024-12-28 RX ADMIN — CEFDINIR 300 MG: 300 CAPSULE ORAL at 09:01

## 2024-12-28 RX ADMIN — TRAMADOL HYDROCHLORIDE 50 MG: 50 TABLET ORAL at 15:24

## 2024-12-28 RX ADMIN — ONDANSETRON 4 MG: 4 TABLET, ORALLY DISINTEGRATING ORAL at 15:24

## 2024-12-28 RX ADMIN — AZITHROMYCIN 500 MG: 250 TABLET, FILM COATED ORAL at 09:01

## 2024-12-28 ASSESSMENT — ENCOUNTER SYMPTOMS
COUGH: 1
SHORTNESS OF BREATH: 1

## 2024-12-28 ASSESSMENT — PAIN - FUNCTIONAL ASSESSMENT: PAIN_FUNCTIONAL_ASSESSMENT: 0-10

## 2024-12-28 ASSESSMENT — PAIN DESCRIPTION - DESCRIPTORS: DESCRIPTORS: ACHING

## 2024-12-28 ASSESSMENT — LIFESTYLE VARIABLES
HOW MANY STANDARD DRINKS CONTAINING ALCOHOL DO YOU HAVE ON A TYPICAL DAY: PATIENT DOES NOT DRINK
HOW MANY STANDARD DRINKS CONTAINING ALCOHOL DO YOU HAVE ON A TYPICAL DAY: PATIENT DOES NOT DRINK
HOW OFTEN DO YOU HAVE A DRINK CONTAINING ALCOHOL: NEVER
HOW OFTEN DO YOU HAVE A DRINK CONTAINING ALCOHOL: NEVER

## 2024-12-28 ASSESSMENT — PAIN SCALES - GENERAL
PAINLEVEL_OUTOF10: 6
PAINLEVEL_OUTOF10: 8

## 2024-12-28 ASSESSMENT — PAIN DESCRIPTION - LOCATION: LOCATION: GENERALIZED

## 2024-12-28 NOTE — ED TRIAGE NOTES
Pt brought in from home c/o \"not feeling any better\". Pt reports she was seen and discharged from ER this afternoon, DX w/PNA and prescribed ANT BX.

## 2024-12-28 NOTE — ED TRIAGE NOTES
Pt brought in by EMS from home c/o SOB and cough xs 1 day. Pt denies fever, N/V/D. Pt reports home concentrator for PRN O2 stopped working 2 months ago.

## 2024-12-28 NOTE — ED PROVIDER NOTES
the Last Year: Often true     Ran Out of Food in the Last Year: Often true   Transportation Needs: Unmet Transportation Needs (4/25/2023)    PRAPARE - Transportation     Lack of Transportation (Non-Medical): Yes   Social Connections: Unknown (3/20/2021)    Received from [facility]    Social Connections     Frequency of Communication with Friends and Family: Not asked     Frequency of Social Gatherings with Friends and Family: Not asked   Intimate Partner Violence: Unknown (3/20/2021)    Received from [facility]    Intimate Partner Violence     Fear of Current or Ex-Partner: Not asked     Emotionally Abused: Not asked     Physically Abused: Not asked     Sexually Abused: Not asked   Housing Stability: High Risk (4/25/2023)    Housing Stability Vital Sign     Unstable Housing in the Last Year: Yes        Previous Medications    ACETAMINOPHEN (TYLENOL) 500 MG TABLET    Take 1 tablet by mouth 4 times daily as needed for Pain    ALBUTEROL SULFATE HFA (PROVENTIL;VENTOLIN;PROAIR) 108 (90 BASE) MCG/ACT INHALER    USE 2 PUFFS BY INHALATION EVERY 4 HOURS AS NEEDED.    ANASTROZOLE (ARIMIDEX) 1 MG TABLET    Take 1 tablet by mouth daily    ATORVASTATIN (LIPITOR) 20 MG TABLET    Take 1 tablet by mouth daily    AZITHROMYCIN (ZITHROMAX) 250 MG TABLET    Take 1 tablet by mouth daily for 4 days    CEFDINIR (OMNICEF) 300 MG CAPSULE    Take 1 capsule by mouth 2 times daily for 10 days    CHOLESTYRAMINE (QUESTRAN) 4 GM/DOSE POWDER    TAKE 4 G BY MOUTH THREE (3) TIMES DAILY (WITH MEALS) FOR 30 DAYS.    FLUTICASONE (FLONASE) 50 MCG/ACT NASAL SPRAY    SPRAY 1 SPRAY INTO EACH NOSTRIL EVERY DAY BY INTRANASAL ROUTE    HYDROCORTISONE (WESTCORT) 0.2 % CREAM    Apply topically 2 times daily.    IPRATROPIUM (ATROVENT) 0.03 % NASAL SPRAY    2 sprays by Each Nostril route 2 times daily    LEVOTHYROXINE (SYNTHROID) 112 MCG TABLET    Take 1 tablet by mouth every morning (before breakfast)    LISINOPRIL

## 2024-12-28 NOTE — ED NOTES
Pt found at edge of bed demanding for food when this RN came to give pt medications. Pt stated \"I don't need medication I need food.\" This RN went to get pt a sandwich tray. Pt then stated \"I don't understand while you keep refusing to give me my medication.\" This RN explained that the pt was about to receive her medication before asking for food first. Pt then refused each drink choice that was uncarbonated per provider order. Finally settled on a carbonated drink.      Marleni Galeana, RN  12/28/24 3822

## 2024-12-28 NOTE — ED NOTES
Patient mobility status  with no difficulty.     I have reviewed discharge instructions with the patient.  The patient verbalized understanding.    Patient left ED via Discharge Method: stretcher to Home with  [facility] .    Opportunity for questions and clarification provided.     Patient given 2 scripts.           Benjamin White RN  12/28/24 0966

## 2024-12-28 NOTE — PROGRESS NOTES
LMSW asked to review pt's chart today as she is a frequently pt in the ED here and at [facility].  Pt has now reported she feels fine and is ready to go home and transport has been called.  Per chart pt resides with her son and they have a conflictual relationship but pt has refused to evict him and DSS APS has historically refused to accept a referral on pt.  Pt has CLTC services and an aide M-F.  Chart also reflects that pt gets delivered frozen meals through her CLTC benefit.  Pt told staff that her O2 concentrator has not worked in a few months and she get anxious at night and needs oxygen.  After review of past few years of CM notes determined that pt had [employer] but they have recently sold to [employer].  Spoke with [employer] on-call staff Mary Grace Narvaez, she reports that pt has no active account for home O2 but she can see notes from years ago that pt had O2 with [employer].  Pt may have an old concentrator in the home that is no longer serviced.  If pt returns to the ED and she is oxygen dependent, she will need a new O2 qualifier and orders sent to [employer] [facility] or another O2 provider for a new home O2 set up.

## 2024-12-28 NOTE — ED NOTES
Patient mobility status   in stretcher via [facility] .     I have reviewed discharge instructions with the patient.  The patient verbalized understanding.    Patient left ED via Discharge Method: stretcher to Home with  [facility] .    Opportunity for questions and clarification provided.     Patient given 2 scripts.       Marleni Arguello, RN  12/28/24 8016

## 2024-12-28 NOTE — ED PROVIDER NOTES
Emergency Department Provider Note       PCP: File, Not On   Age: 84 y.o.   Sex: female     DISPOSITION Discharge - Pending Orders Complete 12/28/2024 08:54:19 AM    ICD-10-CM    1. Community acquired pneumonia of right lower lobe of lung  J18.9       2. Essential hypertension  I10           Medical Decision Making     No signs of heart failure.  Patient does not appear ill or toxic.  Blood pressure mildly elevated.  Few scattered wheezes on exam so patient will be given a spacer chamber to use with her albuterol inhaler for instructions here.  Currently she says she does not need her albuterol inhaler.  I instructed her otherwise.  Will check a chest x-ray to evaluate for pneumonia.  No lab work at this time    9:34 AM  Lisinopril regular dosing provided for patient for elevated blood pressure this morning.  Continue current dose     1 acute complicated illness or injury.  Over the counter drug management performed.  Prescription drug management performed.  Patient was discharged risks and benefits of hospitalization were considered.  Shared medical decision making was utilized in creating the patients health plan today.  I independently ordered and reviewed each unique test.    I reviewed external records: provider visit note from PCP.   The patients assessment required an independent historian: EMS.  The reason they were needed is dementia and important historical information not provided by the patient.    I interpreted the X-rays faint early right lower lobe infiltrate.        Exclusion criteria - the patient is NOT to be included for SEP-1 Core Measure due to: 2+ SIRS criteria are not met       History     Cough since yesterday.  Patient had difficulty sleeping due to cough last night.  No fevers chills nausea vomiting or diarrhea.  Home oxygen concentrator quit working 2 months ago for as needed oxygen.  Patient is not hypoxic upon arrival.        Physical Exam     Vitals signs and nursing note  (4/25/2023)    PRAPARE - Transportation     Lack of Transportation (Non-Medical): Yes   Social Connections: Unknown (3/20/2021)    Received from [email or web address]    Social Connections     Frequency of Communication with Friends and Family: Not asked     Frequency of Social Gatherings with Friends and Family: Not asked   Intimate Partner Violence: Unknown (3/20/2021)    Received from [email or web address] [facility]    Intimate Partner Violence     Fear of Current or Ex-Partner: Not asked     Emotionally Abused: Not asked     Physically Abused: Not asked     Sexually Abused: Not asked   Housing Stability: High Risk (4/25/2023)    Housing Stability Vital Sign     Unstable Housing in the Last Year: Yes        Current Discharge Medication List        CONTINUE these medications which have NOT CHANGED    Details   ondansetron (ZOFRAN) 4 MG tablet Take 1 tablet by mouth 3 times daily as needed for Nausea or Vomiting  Qty: 15 tablet, Refills: 2      lisinopril (PRINIVIL;ZESTRIL) 10 MG tablet Take 1 tablet by mouth daily  Qty: 30 tablet, Refills: 0      naproxen (NAPROSYN) 500 MG tablet Take 1 tablet by mouth 2 times daily (with meals)  Qty: 60 tablet, Refills: 5      albuterol sulfate HFA (PROVENTIL;VENTOLIN;PROAIR) 108 (90 Base) MCG/ACT inhaler USE 2 PUFFS BY INHALATION EVERY 4 HOURS AS NEEDED.      fluticasone (FLONASE) 50 MCG/ACT nasal spray SPRAY 1 SPRAY INTO EACH NOSTRIL EVERY DAY BY INTRANASAL ROUTE      ipratropium (ATROVENT) 0.03 % nasal spray 2 sprays by Each Nostril route 2 times daily  Qty: 30 mL, Refills: 2      anastrozole (ARIMIDEX) 1 MG tablet Take 1 tablet by mouth daily  Qty: 30 tablet, Refills: 11      levothyroxine (SYNTHROID) 112 MCG tablet Take 1 tablet by mouth every morning (before breakfast)  Qty: 90 tablet, Refills: 1      atorvastatin (LIPITOR) 20 MG tablet Take 1 tablet by mouth daily  Qty: 90 tablet, Refills: 1    Associated Diagnoses: Type 2 diabetes mellitus with unspecified

## 2024-12-28 NOTE — DISCHARGE INSTRUCTIONS
Start Zithromax tomorrow and Omnicef this evening.  Omnicef twice daily until complete.  Zithromax once daily until complete.  Albuterol 2 puffs with spacer chamber every 4 hours as needed for cough.  Warm fluids, chicken soup and over-the-counter cough drops for cough.  See your doctor or home health in 4 to 5 days if not beginning to improve.  Return if any new, worsening or concerning symptoms

## 2024-12-28 NOTE — ED NOTES
Pt has taken off all of her monitoring equipment at this time. Pt has her pocketbook in her hand and states she is ready to go. Pt has been informed that [facility] has been called for her since she does not have her cane and is a fall risk.     Marleni Galeana, RN  12/28/24 9835

## 2025-01-01 ENCOUNTER — APPOINTMENT (OUTPATIENT)
Dept: GENERAL RADIOLOGY | Age: 85
End: 2025-01-01
Payer: MEDICARE

## 2025-01-01 ENCOUNTER — HOSPITAL ENCOUNTER (EMERGENCY)
Age: 85
Discharge: HOME | End: 2025-01-01
Attending: EMERGENCY MEDICINE
Payer: MEDICARE

## 2025-01-01 VITALS
BODY MASS INDEX: 29.02 KG/M2 | OXYGEN SATURATION: 94 % | WEIGHT: 170 LBS | SYSTOLIC BLOOD PRESSURE: 109 MMHG | RESPIRATION RATE: 20 BRPM | TEMPERATURE: 98 F | HEART RATE: 70 BPM | HEIGHT: 64 IN | DIASTOLIC BLOOD PRESSURE: 91 MMHG

## 2025-01-01 DIAGNOSIS — R06.02 SHORTNESS OF BREATH: ICD-10-CM

## 2025-01-01 DIAGNOSIS — J44.1 COPD EXACERBATION (HCC): Primary | ICD-10-CM

## 2025-01-01 LAB
ALBUMIN SERPL-MCNC: 3.1 G/DL (ref 3.2–4.6)
ALBUMIN/GLOB SERPL: 0.9 (ref 1–1.9)
ALP SERPL-CCNC: 105 U/L (ref 35–104)
ALT SERPL-CCNC: 13 U/L (ref 8–45)
ANION GAP SERPL CALC-SCNC: 8 MMOL/L (ref 7–16)
AST SERPL-CCNC: 32 U/L (ref 15–37)
BASOPHILS # BLD: 0 K/UL (ref 0–0.2)
BASOPHILS NFR BLD: 1 % (ref 0–2)
BILIRUB SERPL-MCNC: 0.6 MG/DL (ref 0–1.2)
BUN SERPL-MCNC: 13 MG/DL (ref 8–23)
CALCIUM SERPL-MCNC: 9.5 MG/DL (ref 8.8–10.2)
CHLORIDE SERPL-SCNC: 107 MMOL/L (ref 98–107)
CO2 SERPL-SCNC: 30 MMOL/L (ref 20–29)
CREAT SERPL-MCNC: 0.77 MG/DL (ref 0.6–1.1)
DIFFERENTIAL METHOD BLD: ABNORMAL
EKG ATRIAL RATE: 62 BPM
EKG DIAGNOSIS: NORMAL
EKG P AXIS: 72 DEGREES
EKG P-R INTERVAL: 158 MS
EKG Q-T INTERVAL: 447 MS
EKG QRS DURATION: 103 MS
EKG QTC CALCULATION (BAZETT): 454 MS
EKG R AXIS: -35 DEGREES
EKG T AXIS: 126 DEGREES
EKG VENTRICULAR RATE: 62 BPM
EOSINOPHIL # BLD: 0 K/UL (ref 0–0.8)
EOSINOPHIL NFR BLD: 0 % (ref 0.5–7.8)
ERYTHROCYTE [DISTWIDTH] IN BLOOD BY AUTOMATED COUNT: 13.7 % (ref 11.9–14.6)
GLOBULIN SER CALC-MCNC: 3.3 G/DL (ref 2.3–3.5)
GLUCOSE SERPL-MCNC: 126 MG/DL (ref 70–99)
HCT VFR BLD AUTO: 48 % (ref 35.8–46.3)
HGB BLD-MCNC: 14.9 G/DL (ref 11.7–15.4)
IMM GRANULOCYTES # BLD AUTO: 0 K/UL (ref 0–0.5)
IMM GRANULOCYTES NFR BLD AUTO: 0 % (ref 0–5)
LYMPHOCYTES # BLD: 2.4 K/UL (ref 0.5–4.6)
LYMPHOCYTES NFR BLD: 57 % (ref 13–44)
MAGNESIUM SERPL-MCNC: 1.9 MG/DL (ref 1.8–2.4)
MCH RBC QN AUTO: 28.9 PG (ref 26.1–32.9)
MCHC RBC AUTO-ENTMCNC: 31 G/DL (ref 31.4–35)
MCV RBC AUTO: 93 FL (ref 82–102)
MONOCYTES # BLD: 0.4 K/UL (ref 0.1–1.3)
MONOCYTES NFR BLD: 9 % (ref 4–12)
NEUTS SEG # BLD: 1.4 K/UL (ref 1.7–8.2)
NEUTS SEG NFR BLD: 33 % (ref 43–78)
NRBC # BLD: 0 K/UL (ref 0–0.2)
NT PRO BNP: 470 PG/ML (ref 0–450)
PLATELET # BLD AUTO: 123 K/UL (ref 150–450)
PMV BLD AUTO: 11.6 FL (ref 9.4–12.3)
POTASSIUM SERPL-SCNC: 3.3 MMOL/L (ref 3.5–5.1)
PROT SERPL-MCNC: 6.3 G/DL (ref 6.3–8.2)
RBC # BLD AUTO: 5.16 M/UL (ref 4.05–5.2)
SODIUM SERPL-SCNC: 144 MMOL/L (ref 136–145)
TROPONIN T SERPL HS-MCNC: 33 NG/L (ref 0–14)
TROPONIN T SERPL HS-MCNC: 40 NG/L (ref 0–14)
WBC # BLD AUTO: 4.2 K/UL (ref 4.3–11.1)

## 2025-01-01 PROCEDURE — 6370000000 HC RX 637 (ALT 250 FOR IP): Performed by: EMERGENCY MEDICINE

## 2025-01-01 PROCEDURE — 94640 AIRWAY INHALATION TREATMENT: CPT

## 2025-01-01 PROCEDURE — 2500000003 HC RX 250 WO HCPCS: Performed by: EMERGENCY MEDICINE

## 2025-01-01 PROCEDURE — 83735 ASSAY OF MAGNESIUM: CPT

## 2025-01-01 PROCEDURE — 85025 COMPLETE CBC W/AUTO DIFF WBC: CPT

## 2025-01-01 PROCEDURE — 93010 ELECTROCARDIOGRAM REPORT: CPT | Performed by: INTERNAL MEDICINE

## 2025-01-01 PROCEDURE — 80053 COMPREHEN METABOLIC PANEL: CPT

## 2025-01-01 PROCEDURE — 6360000002 HC RX W HCPCS: Performed by: EMERGENCY MEDICINE

## 2025-01-01 PROCEDURE — 93005 ELECTROCARDIOGRAM TRACING: CPT | Performed by: EMERGENCY MEDICINE

## 2025-01-01 PROCEDURE — 99285 EMERGENCY DEPT VISIT HI MDM: CPT

## 2025-01-01 PROCEDURE — 84484 ASSAY OF TROPONIN QUANT: CPT

## 2025-01-01 PROCEDURE — 83880 ASSAY OF NATRIURETIC PEPTIDE: CPT

## 2025-01-01 PROCEDURE — 71045 X-RAY EXAM CHEST 1 VIEW: CPT

## 2025-01-01 PROCEDURE — 96374 THER/PROPH/DIAG INJ IV PUSH: CPT

## 2025-01-01 RX ORDER — IPRATROPIUM BROMIDE AND ALBUTEROL SULFATE 2.5; .5 MG/3ML; MG/3ML
1 SOLUTION RESPIRATORY (INHALATION)
Status: COMPLETED | OUTPATIENT
Start: 2025-01-01 | End: 2025-01-01

## 2025-01-01 RX ORDER — METHYLPREDNISOLONE 4 MG/1
TABLET ORAL
Qty: 1 KIT | Refills: 0 | Status: SHIPPED | OUTPATIENT
Start: 2025-01-01

## 2025-01-01 RX ADMIN — IPRATROPIUM BROMIDE AND ALBUTEROL SULFATE 1 DOSE: .5; 3 SOLUTION RESPIRATORY (INHALATION) at 18:25

## 2025-01-01 RX ADMIN — IPRATROPIUM BROMIDE AND ALBUTEROL SULFATE 1 DOSE: .5; 3 SOLUTION RESPIRATORY (INHALATION) at 16:52

## 2025-01-01 RX ADMIN — WATER 125 MG: 1 INJECTION INTRAMUSCULAR; INTRAVENOUS; SUBCUTANEOUS at 18:05

## 2025-01-01 ASSESSMENT — PAIN - FUNCTIONAL ASSESSMENT: PAIN_FUNCTIONAL_ASSESSMENT: NONE - DENIES PAIN

## 2025-01-01 ASSESSMENT — ENCOUNTER SYMPTOMS
COUGH: 1
SHORTNESS OF BREATH: 1
WHEEZING: 1
VOMITING: 0
ABDOMINAL PAIN: 0
SPUTUM PRODUCTION: 0

## 2025-01-01 NOTE — ED PROVIDER NOTES
Emergency Department Provider Note       PCP: File, Not On   Age: 84 y.o.   Sex: female     DISPOSITION Decision To Discharge 01/01/2025 07:29:40 PM    ICD-10-CM    1. COPD exacerbation (HCC)  J44.1       2. Shortness of breath  R06.02           Medical Decision Making     84-year-old female patient recently diagnosed with influenza presents with complaints of shortness of breath  Similar complaints to previous visits     Patient improved with treatments today  No other new findings on exam  Patient feeling well and anxious to go home  Discussed return precautions and need for close outpatient follow-up  1 chronic illness with exacerbation.  Prescription drug management performed.  Patient was discharged risks and benefits of hospitalization were considered.  Shared medical decision making was utilized in creating the patients health plan today.  I independently ordered and reviewed each unique test.    I reviewed external records: provider visit note from PCP.  I reviewed external records: provider visit note from outside specialist.     ED cardiac monitoring rhythm strip was ordered and interpreted:  sinus rhythm, no evidence of an arrhythmia  ST Segments:Normal ST segments - NO STEMI   Rate: 62  I interpreted the X-rays chest x-ray reveals hyperinflation but no definitive infiltrate.              History     84-year-old female patient presents to the ER with complaints of feeling \"smothered\"  Patient seen a few days ago and was diagnosed with influenza pneumonia  Records indicate that the patient is supposed to be on oxygen at least at night but she states that she has not been on it for several months  Limited history secondary to her dementia  She has had a cough which she describes as mostly nonproductive  History of COPD  Denies any vomiting or diarrhea    Patient's room air sats are fluctuated from about 88 to 92%.  Refusing nasal cannula oxygen    The history is provided by the patient and the EMS

## 2025-01-01 NOTE — ED TRIAGE NOTES
Pt. Arriving by EMS from home. Pt. Report, \" I'm smothering\". Was recently seen and dx with pneumonia and pt. Reports abx not helping.   93% RA  150/70  64bpm

## 2025-01-02 ENCOUNTER — APPOINTMENT (OUTPATIENT)
Dept: GENERAL RADIOLOGY | Age: 85
End: 2025-01-02
Payer: MEDICARE

## 2025-01-02 ENCOUNTER — HOSPITAL ENCOUNTER (EMERGENCY)
Age: 85
Discharge: HOME | End: 2025-01-02
Attending: EMERGENCY MEDICINE
Payer: MEDICARE

## 2025-01-02 VITALS
BODY MASS INDEX: 29.07 KG/M2 | WEIGHT: 164.1 LBS | OXYGEN SATURATION: 96 % | HEIGHT: 63 IN | DIASTOLIC BLOOD PRESSURE: 86 MMHG | SYSTOLIC BLOOD PRESSURE: 164 MMHG | TEMPERATURE: 97.8 F | HEART RATE: 70 BPM | RESPIRATION RATE: 18 BRPM

## 2025-01-02 DIAGNOSIS — J10.1 INFLUENZA A: Primary | ICD-10-CM

## 2025-01-02 LAB
ALBUMIN SERPL-MCNC: 3.2 G/DL (ref 3.2–4.6)
ALBUMIN/GLOB SERPL: 0.9 (ref 1–1.9)
ALP SERPL-CCNC: 103 U/L (ref 35–104)
ALT SERPL-CCNC: 12 U/L (ref 8–45)
ANION GAP SERPL CALC-SCNC: 12 MMOL/L (ref 7–16)
AST SERPL-CCNC: 31 U/L (ref 15–37)
B PERT DNA SPEC QL NAA+PROBE: NOT DETECTED
BASOPHILS # BLD: 0 K/UL (ref 0–0.2)
BASOPHILS NFR BLD: 0 % (ref 0–2)
BILIRUB SERPL-MCNC: 0.7 MG/DL (ref 0–1.2)
BORDETELLA PARAPERTUSSIS BY PCR: NOT DETECTED
BUN SERPL-MCNC: 16 MG/DL (ref 8–23)
C PNEUM DNA SPEC QL NAA+PROBE: NOT DETECTED
CALCIUM SERPL-MCNC: 9.8 MG/DL (ref 8.8–10.2)
CHLORIDE SERPL-SCNC: 105 MMOL/L (ref 98–107)
CO2 SERPL-SCNC: 26 MMOL/L (ref 20–29)
CREAT SERPL-MCNC: 0.78 MG/DL (ref 0.6–1.1)
DIFFERENTIAL METHOD BLD: ABNORMAL
EOSINOPHIL # BLD: 0 K/UL (ref 0–0.8)
EOSINOPHIL NFR BLD: 0 % (ref 0.5–7.8)
ERYTHROCYTE [DISTWIDTH] IN BLOOD BY AUTOMATED COUNT: 13.6 % (ref 11.9–14.6)
FLUAV H3 RNA SPEC QL NAA+PROBE: DETECTED
FLUBV RNA SPEC QL NAA+PROBE: NOT DETECTED
GLOBULIN SER CALC-MCNC: 3.5 G/DL (ref 2.3–3.5)
GLUCOSE SERPL-MCNC: 149 MG/DL (ref 70–99)
HADV DNA SPEC QL NAA+PROBE: NOT DETECTED
HCOV 229E RNA SPEC QL NAA+PROBE: NOT DETECTED
HCOV HKU1 RNA SPEC QL NAA+PROBE: NOT DETECTED
HCOV NL63 RNA SPEC QL NAA+PROBE: NOT DETECTED
HCOV OC43 RNA SPEC QL NAA+PROBE: NOT DETECTED
HCT VFR BLD AUTO: 46.3 % (ref 35.8–46.3)
HGB BLD-MCNC: 14.8 G/DL (ref 11.7–15.4)
HMPV RNA SPEC QL NAA+PROBE: NOT DETECTED
HPIV1 RNA SPEC QL NAA+PROBE: NOT DETECTED
HPIV2 RNA SPEC QL NAA+PROBE: NOT DETECTED
HPIV3 RNA SPEC QL NAA+PROBE: NOT DETECTED
HPIV4 RNA SPEC QL NAA+PROBE: NOT DETECTED
IMM GRANULOCYTES # BLD AUTO: 0 K/UL (ref 0–0.5)
IMM GRANULOCYTES NFR BLD AUTO: 0 % (ref 0–5)
LYMPHOCYTES # BLD: 0.9 K/UL (ref 0.5–4.6)
LYMPHOCYTES NFR BLD: 20 % (ref 13–44)
M PNEUMO DNA SPEC QL NAA+PROBE: NOT DETECTED
MCH RBC QN AUTO: 29 PG (ref 26.1–32.9)
MCHC RBC AUTO-ENTMCNC: 32 G/DL (ref 31.4–35)
MCV RBC AUTO: 90.8 FL (ref 82–102)
MONOCYTES # BLD: 0.4 K/UL (ref 0.1–1.3)
MONOCYTES NFR BLD: 10 % (ref 4–12)
NEUTS SEG # BLD: 3.1 K/UL (ref 1.7–8.2)
NEUTS SEG NFR BLD: 70 % (ref 43–78)
NRBC # BLD: 0 K/UL (ref 0–0.2)
PLATELET # BLD AUTO: 137 K/UL (ref 150–450)
PMV BLD AUTO: 11.6 FL (ref 9.4–12.3)
POTASSIUM SERPL-SCNC: 3.6 MMOL/L (ref 3.5–5.1)
PROCALCITONIN SERPL-MCNC: 0.09 NG/ML (ref 0–0.1)
PROT SERPL-MCNC: 6.7 G/DL (ref 6.3–8.2)
RBC # BLD AUTO: 5.1 M/UL (ref 4.05–5.2)
RSV RNA SPEC QL NAA+PROBE: NOT DETECTED
RV+EV RNA SPEC QL NAA+PROBE: NOT DETECTED
SARS-COV-2 RNA RESP QL NAA+PROBE: NOT DETECTED
SODIUM SERPL-SCNC: 143 MMOL/L (ref 136–145)
WBC # BLD AUTO: 4.4 K/UL (ref 4.3–11.1)

## 2025-01-02 PROCEDURE — 99284 EMERGENCY DEPT VISIT MOD MDM: CPT

## 2025-01-02 PROCEDURE — 80053 COMPREHEN METABOLIC PANEL: CPT

## 2025-01-02 PROCEDURE — 71045 X-RAY EXAM CHEST 1 VIEW: CPT

## 2025-01-02 PROCEDURE — 0202U NFCT DS 22 TRGT SARS-COV-2: CPT

## 2025-01-02 PROCEDURE — 85025 COMPLETE CBC W/AUTO DIFF WBC: CPT

## 2025-01-02 PROCEDURE — 84145 PROCALCITONIN (PCT): CPT

## 2025-01-02 ASSESSMENT — PAIN - FUNCTIONAL ASSESSMENT
PAIN_FUNCTIONAL_ASSESSMENT: NONE - DENIES PAIN

## 2025-01-02 NOTE — ACP (ADVANCE CARE PLANNING)
Advance Care Planning   Healthcare Decision Maker:    Primary Decision Maker: Alexus Salazar - Child - [phone]    Click here to complete Healthcare Decision Makers including selection of the Healthcare Decision Maker Relationship (ie \"Primary\").

## 2025-01-02 NOTE — CARE COORDINATION
Patient requests CM visit regarding her 52 year old son that she lives with.  She is upset that he swears.  Patient states she has talked to her daughter about his swearing but she does not support her with this issue.  Patient has a caregiver through [facility] for 25 hours a week.  Caregiver helps with many things including getting food and medications. She confirms she has running water and electricity.  Information re: eviction process is discussed and it is suggested if she needs assistance she can involve police for assistance.  She denies that their is any physical harm to her.    Patient uses a walker at home but it did not come with her.  She is also concerned about a missing phone  but cannot confirm it came with her to the hospital.    01/02/25 0524   Service Assessment   Patient Orientation Alert and Oriented   Cognition Alert   History Provided By Patient   Primary Caregiver Self   Support Systems Children   Patient's Healthcare Decision Maker is: Legal Next of Kin   PCP Verified by CM Yes   Last Visit to PCP Within last year   Prior Functional Level Assistance with the following:;Cooking;Housework;Mobility;Shopping   Current Functional Level Cooking;Housework;Shopping;Mobility;Assistance with the following:   Can patient return to prior living arrangement Yes   Ability to make needs known: Good   Family able to assist with home care needs: Yes   Would you like for me to discuss the discharge plan with any other family members/significant others, and if so, who? Yes   Financial Resources Medicare;Medicaid   Community Resources ECF/Home Care   CM/SW Referral Other (see comment)  (pending)   Social/Functional History   Lives With Son   Type of Home House   Home Layout One level   Home Access Level entry   Bathroom Shower/Tub Tub/Shower unit   Bathroom Toilet Standard   Bathroom Equipment None   Bathroom Accessibility Accessible   Home Equipment Walker - Standard   Receives Help From Family   Prior Level

## 2025-01-02 NOTE — FLOWSHEET NOTE
Patient is complaining of SOB. She was here yesterday and was diagnosed with SOB, and prescribed methylprednisolone. Patient has not taken these meds yet.

## 2025-01-02 NOTE — DISCHARGE INSTRUCTIONS
Take steroids as directed  Get plenty of rest  Use inhalers as needed for wheeze  Drink plenty of fluids  Call your doctor in the morning for recheck visit    Return to ER for any worsening symptoms or new problems which may arise

## 2025-01-02 NOTE — ED PROVIDER NOTES
Emergency Department Provider Note       PCP: File, Not On   Age: 84 y.o.   Sex: female     DISPOSITION Decision To Discharge 01/02/2025 03:52:45 PM    ICD-10-CM    1. Influenza A  J10.1           Medical Decision Making     Patient is being evaluated for her continued cough.  She was seen yesterday and prescribed steroids which she started today.  She is only taken 1 dose was not feeling better so called EMS.  Repeat workup will be performed noted as he is any sign of developing pneumonia, viral process, or other cause of her symptoms.  ED Course as of 01/02/25 1553   Thu Jan 02, 2025   1427 Patient did have some wheezing and rhonchi on exam so was ordered a DuoNeb breathing treatment. [YESENIA]   1530 Patient's tested positive for influenza.  This would explain her respiratory symptoms.  She is outside the window for any treatment. [YESENIA]   1536 Patient's workup shows no concerning metabolic, electrolyte, or someone abnormalities.  She did test positive for influenza A.  Chest x-ray is unchanged.  Vital signs been stable.  I think she is appropriate from a medical standpoint to be discharged.  She is requesting to speak with case management concerning issues at home. [YESENIA]   1552 Case management spoke with the patient.  Currently the issue she wanted to speak with them about was that her son who lives with her swears.  He is not physically harmful or threatening to her.  She has a home caregiver.  I see no reason from a safety standpoint that the patient cannot be discharged. [YESENIA]      ED Course User Index  [YESENIA] Rosey Young, DO     1 or more acute illnesses that pose a threat to life or bodily function.   Patient was discharged risks and benefits of hospitalization were considered.  Discussion with external consultants.  Shared medical decision making was utilized in creating the patients health plan today.  I independently ordered and reviewed each unique test.           I interpreted the X-rays chest

## 2025-01-03 ENCOUNTER — HOSPITAL ENCOUNTER (EMERGENCY)
Age: 85
Discharge: HOME | End: 2025-01-03
Attending: EMERGENCY MEDICINE
Payer: MEDICARE

## 2025-01-03 ENCOUNTER — APPOINTMENT (OUTPATIENT)
Dept: GENERAL RADIOLOGY | Age: 85
End: 2025-01-03
Payer: MEDICARE

## 2025-01-03 VITALS
HEART RATE: 62 BPM | OXYGEN SATURATION: 95 % | RESPIRATION RATE: 17 BRPM | SYSTOLIC BLOOD PRESSURE: 185 MMHG | TEMPERATURE: 98.1 F | DIASTOLIC BLOOD PRESSURE: 87 MMHG

## 2025-01-03 DIAGNOSIS — J44.9 CHRONIC OBSTRUCTIVE PULMONARY DISEASE, UNSPECIFIED COPD TYPE (HCC): ICD-10-CM

## 2025-01-03 DIAGNOSIS — J10.1 INFLUENZA A: Primary | ICD-10-CM

## 2025-01-03 LAB
EKG ATRIAL RATE: 66 BPM
EKG DIAGNOSIS: NORMAL
EKG P AXIS: 49 DEGREES
EKG P-R INTERVAL: 160 MS
EKG Q-T INTERVAL: 461 MS
EKG QRS DURATION: 99 MS
EKG QTC CALCULATION (BAZETT): 484 MS
EKG R AXIS: -33 DEGREES
EKG T AXIS: 119 DEGREES
EKG VENTRICULAR RATE: 66 BPM

## 2025-01-03 PROCEDURE — 99284 EMERGENCY DEPT VISIT MOD MDM: CPT

## 2025-01-03 PROCEDURE — 94640 AIRWAY INHALATION TREATMENT: CPT

## 2025-01-03 PROCEDURE — 93010 ELECTROCARDIOGRAM REPORT: CPT | Performed by: INTERNAL MEDICINE

## 2025-01-03 PROCEDURE — 93005 ELECTROCARDIOGRAM TRACING: CPT | Performed by: EMERGENCY MEDICINE

## 2025-01-03 PROCEDURE — 6370000000 HC RX 637 (ALT 250 FOR IP): Performed by: EMERGENCY MEDICINE

## 2025-01-03 PROCEDURE — 71045 X-RAY EXAM CHEST 1 VIEW: CPT

## 2025-01-03 RX ORDER — IPRATROPIUM BROMIDE AND ALBUTEROL SULFATE 2.5; .5 MG/3ML; MG/3ML
1 SOLUTION RESPIRATORY (INHALATION)
Status: COMPLETED | OUTPATIENT
Start: 2025-01-03 | End: 2025-01-03

## 2025-01-03 RX ADMIN — IPRATROPIUM BROMIDE AND ALBUTEROL SULFATE 1 DOSE: 2.5; .5 SOLUTION RESPIRATORY (INHALATION) at 12:38

## 2025-01-03 NOTE — ED NOTES
Patient mobility status   [facility] .     I have reviewed discharge instructions with the patient.  The patient verbalized understanding.    Patient left ED via Discharge Method: stretcher to Home with  [facility] .    Opportunity for questions and clarification provided.     Patient given 0 scripts.           Benjamin White RN  01/03/25 1510

## 2025-01-03 NOTE — ED PROVIDER NOTES
Emergency Department Provider Note       PCP: File, Not On   Age: 84 y.o.   Sex: female     DISPOSITION Decision To Discharge 01/03/2025 02:32:04 PM    ICD-10-CM    1. Influenza A  J10.1       2. Chronic obstructive pulmonary disease, unspecified COPD type (HCC)  J44.9           Medical Decision Making     84-year-old female with history of asthma, cardiomegaly, type 2 diabetes mellitus, osteoarthritis, recent diagnosis of influenza A who presents to ED with complaint of shortness of breath, cough last night when trying to sleep.   Patient seen in ED on 12/28/2024 x2, followed by ED visit on 1/1/2025 and again on 1/2/2025 (yesterday).  WBC on yesterday 4.4.  CMP unremarkable yesterday with exception of glucose of 149.  Procalcitonin 0.09.  Respiratory viral panel positive for influenza A H3 by PCR.  Chest x-ray with interstitial changes in both lungs that are persistent and unchanged from prior exams.    Repeat chest x-ray today with no changes.  Patient given DuoNeb treatment.  Discussed obtaining additional blood work.  Patient declines.  Risks discussed.  Patient states the primary reason she is here today is that she would like to speak with case management to help provide her with a walker even though she has a walker at home that is functioning properly.  Patient states that she needs to go by the bank and would like case management to arrange for a Uber/taxi ride to that she can go to the bank to obtain money so that she can pay her bills.  Patient with no complaints of shortness of breath this time.  Case management met with patient.  Will plan to discharge back home.  Patient instructed to return to ED if symptoms worsen or progress in any way.  Instructed patient to continue taking previous prescribed medications and to return to ED if symptoms worsen or progress in any way.    ED Course as of 01/04/25 1314   Fri Jan 03, 2025   1222 CXR FINDINGS: Borderline heart size. The lungs are clear. No  (PROVENTIL;VENTOLIN;PROAIR) 108 (90 Base) MCG/ACT inhaler USE 2 PUFFS BY INHALATION EVERY 4 HOURS AS NEEDED.Historical Med      fluticasone (FLONASE) 50 MCG/ACT nasal spray SPRAY 1 SPRAY INTO EACH NOSTRIL EVERY DAY BY INTRANASAL ROUTEHistorical Med      ipratropium (ATROVENT) 0.03 % nasal spray 2 sprays by Each Nostril route 2 times daily, Disp-30 mL, R-2Normal      anastrozole (ARIMIDEX) 1 MG tablet Take 1 tablet by mouth daily, Disp-30 tablet, R-11Normal      levothyroxine (SYNTHROID) 112 MCG tablet Take 1 tablet by mouth every morning (before breakfast), Disp-90 tablet, R-1Normal      atorvastatin (LIPITOR) 20 MG tablet Take 1 tablet by mouth daily, Disp-90 tablet, R-1Normal      SITagliptin (JANUVIA) 100 MG tablet TAKE 1 TABLET BY MOUTH EVERY DAY, Disp-90 tablet, R-1Normal      acetaminophen (TYLENOL) 500 MG tablet Take 1 tablet by mouth 4 times daily as needed for Pain, Disp-360 tablet, R-1Normal      hydrocortisone (WESTCORT) 0.2 % cream Apply topically 2 times daily., Disp-45 g, R-1, Normal      cholestyramine (QUESTRAN) 4 GM/DOSE powder TAKE 4 G BY MOUTH THREE (3) TIMES DAILY (WITH MEALS) FOR 30 DAYS., Disp-378 g, R-1Normal              Results from this emergency department visit:      Results for orders placed or performed during the hospital encounter of 01/03/25   XR CHEST PORTABLE    Narrative    Chest X-ray    INDICATION: SOB    COMPARISON: Chest radiograph January 2, 2025    TECHNIQUE: AP/PA view of the chest was obtained.    FINDINGS: Borderline heart size. The lungs are clear. No consolidation. No  pleural effusion or pneumothorax. No acute osseous abnormality.        Impression    No acute cardiopulmonary abnormality.      Electronically signed by Gibson Marte   EKG 12 Lead   Result Value Ref Range    Ventricular Rate 66 BPM    Atrial Rate 66 BPM    P-R Interval 160 ms    QRS Duration 99 ms    Q-T Interval 461 ms    QTc Calculation (Bazett) 484 ms    P Axis 49 degrees    R Axis -33 degrees    T Axis

## 2025-01-03 NOTE — DISCHARGE INSTRUCTIONS
Closely monitor pulse oximetry at home.  Return to ED if O2 sats less than 90%.  Use albuterol inhalers, cough medication, all home medications as prescribed.  Please return if symptoms worsen or progress in any way

## 2025-01-03 NOTE — DISCHARGE SUMMARY
I have reviewed discharge instructions with the patient.  The patient verbalized understanding.    Patient left ED via Discharge Method: stretcher to Home with [facility]    Opportunity for questions and clarification provided.       Patient given 0 scripts.         To continue your aftercare when you leave the hospital, you may receive an automated call from our care team to check in on how you are doing.  This is a free service and part of our promise to provide the best care and service to meet your aftercare needs.” If you have questions, or wish to unsubscribe from this service please call [phone].  Thank you for Choosing our [facility] Emergency Department.

## 2025-01-04 ASSESSMENT — ENCOUNTER SYMPTOMS
ABDOMINAL PAIN: 0
WHEEZING: 1
NAUSEA: 0
RHINORRHEA: 0
BACK PAIN: 0
SORE THROAT: 0
DIARRHEA: 0
COUGH: 1
VOMITING: 0

## 2025-01-06 ENCOUNTER — HOSPITAL ENCOUNTER (EMERGENCY)
Age: 85
Discharge: HOME | End: 2025-01-06
Attending: EMERGENCY MEDICINE
Payer: MEDICARE

## 2025-01-06 ENCOUNTER — APPOINTMENT (OUTPATIENT)
Dept: GENERAL RADIOLOGY | Age: 85
End: 2025-01-06
Payer: MEDICARE

## 2025-01-06 VITALS
HEIGHT: 63 IN | WEIGHT: 164 LBS | TEMPERATURE: 98.6 F | HEART RATE: 93 BPM | RESPIRATION RATE: 18 BRPM | BODY MASS INDEX: 29.06 KG/M2 | DIASTOLIC BLOOD PRESSURE: 88 MMHG | OXYGEN SATURATION: 94 % | SYSTOLIC BLOOD PRESSURE: 141 MMHG

## 2025-01-06 DIAGNOSIS — J11.1 INFLUENZA WITH RESPIRATORY MANIFESTATION OTHER THAN PNEUMONIA: ICD-10-CM

## 2025-01-06 DIAGNOSIS — J44.1 COPD WITH ACUTE EXACERBATION (HCC): Primary | ICD-10-CM

## 2025-01-06 PROCEDURE — 96372 THER/PROPH/DIAG INJ SC/IM: CPT

## 2025-01-06 PROCEDURE — 94761 N-INVAS EAR/PLS OXIMETRY MLT: CPT

## 2025-01-06 PROCEDURE — 99284 EMERGENCY DEPT VISIT MOD MDM: CPT

## 2025-01-06 PROCEDURE — 71045 X-RAY EXAM CHEST 1 VIEW: CPT

## 2025-01-06 PROCEDURE — 6360000002 HC RX W HCPCS: Performed by: EMERGENCY MEDICINE

## 2025-01-06 PROCEDURE — 6370000000 HC RX 637 (ALT 250 FOR IP): Performed by: EMERGENCY MEDICINE

## 2025-01-06 PROCEDURE — 94640 AIRWAY INHALATION TREATMENT: CPT

## 2025-01-06 RX ORDER — DEXAMETHASONE SODIUM PHOSPHATE 10 MG/ML
10 INJECTION INTRAMUSCULAR; INTRAVENOUS
Status: COMPLETED | OUTPATIENT
Start: 2025-01-06 | End: 2025-01-06

## 2025-01-06 RX ORDER — GUAIFENESIN 600 MG/1
600 TABLET, EXTENDED RELEASE ORAL
Status: COMPLETED | OUTPATIENT
Start: 2025-01-06 | End: 2025-01-06

## 2025-01-06 RX ORDER — IPRATROPIUM BROMIDE AND ALBUTEROL SULFATE 2.5; .5 MG/3ML; MG/3ML
1 SOLUTION RESPIRATORY (INHALATION)
Status: COMPLETED | OUTPATIENT
Start: 2025-01-06 | End: 2025-01-06

## 2025-01-06 RX ADMIN — GUAIFENESIN 600 MG: 600 TABLET ORAL at 18:39

## 2025-01-06 RX ADMIN — IPRATROPIUM BROMIDE AND ALBUTEROL SULFATE 1 DOSE: 2.5; .5 SOLUTION RESPIRATORY (INHALATION) at 19:01

## 2025-01-06 RX ADMIN — DEXAMETHASONE SODIUM PHOSPHATE 10 MG: 10 INJECTION INTRAMUSCULAR; INTRAVENOUS at 18:39

## 2025-01-06 RX ADMIN — IPRATROPIUM BROMIDE AND ALBUTEROL SULFATE 1 DOSE: 2.5; .5 SOLUTION RESPIRATORY (INHALATION) at 21:32

## 2025-01-06 ASSESSMENT — ENCOUNTER SYMPTOMS
SWOLLEN GLANDS: 0
SHORTNESS OF BREATH: 1
ABDOMINAL PAIN: 0
WHEEZING: 1
SPUTUM PRODUCTION: 1
COUGH: 1

## 2025-01-06 ASSESSMENT — PAIN - FUNCTIONAL ASSESSMENT
PAIN_FUNCTIONAL_ASSESSMENT: NONE - DENIES PAIN
PAIN_FUNCTIONAL_ASSESSMENT: NONE - DENIES PAIN
PAIN_FUNCTIONAL_ASSESSMENT: 0-10

## 2025-01-06 NOTE — ED PROVIDER NOTES
Emergency Department Provider Note       PCP: File, Not On   Age: 84 y.o.   Sex: female     DISPOSITION Decision To Discharge 01/06/2025 08:39:53 PM    ICD-10-CM    1. COPD with acute exacerbation (HCC)  J44.1       2. Influenza with respiratory manifestation other than pneumonia  J11.1           Medical Decision Making     84-year-old female patient returns to the ER due to shortness of breath symptoms  Has been seen multiple times recently related to COPD and influenza  She is hemodynamically stable not hypoxic and not febrile  Patient improved with treatment here today  No indication for admission  Will discharge  Patient advised to contact her doctor for follow-up visit     1 or more chronic illnesses with a severe exacerbation or progression.  Prescription drug management performed.  Patient was discharged risks and benefits of hospitalization were considered.  Shared medical decision making was utilized in creating the patients health plan today.  I independently ordered and reviewed each unique test.    I reviewed external records: ED visit note from a different ED.   I reviewed external records: provider visit note from PCP.  I reviewed external records: provider visit note from outside specialist.       I interpreted the X-rays chest x-ray revealed COPD changes but no acute findings.  Confirmed with radiologist written report.              History     84-year-old female patient returns to the ER with recurrent complaints of \"smothering.\"  Patient has a history of COPD  As well as dementia  She was recently diagnosed with influenza  On arrival the patient's had room air sats between 90 to 93%  She had some nausea but no vomiting or diarrhea  Cough has been nonproductive  Essentially she is having the same symptoms today that I saw her for several days ago and she has had 2 interim visits as well    The history is provided by the patient and the EMS personnel.   Shortness of Breath  Severity:

## 2025-01-06 NOTE — ED TRIAGE NOTES
PT arrives from home via [facility] w c/o feeling sick, and denies SOB, denies CP, endorses vomiting, PT states she doesn't feel good and does have the flu per ems. PT has been here the past 3xdays for kimberly c/o per ems.   /83 HR 60s,

## 2025-01-07 NOTE — ED NOTES
[facility] ETA back to [address] [place] [address] in [place]: 1040 PM. (Tried to arrange wheelchair van for patient. Was told the wheelchair van booking was 1.5 hours out. Wheelchair van scheduling ends at midnight.)     Valery Guzman  01/06/25 2217

## 2025-01-07 NOTE — DISCHARGE INSTRUCTIONS
Continue all your home medications  Drink plenty of fluids  Call your doctor in the morning for follow-up visit    Return to ER for any worsening symptoms or new problems which may arise

## 2025-04-21 NOTE — ED TRIAGE NOTES
Patient arrives via [facility] from home. Masked. Reports left shoulder pain for weeks. Denies falls or injury. 162/72; HR 62; no EKG; O2 not working.  States that the tylenol she is taking at home with no relief LR